# Patient Record
Sex: FEMALE | Race: ASIAN | NOT HISPANIC OR LATINO | Employment: OTHER | URBAN - METROPOLITAN AREA
[De-identification: names, ages, dates, MRNs, and addresses within clinical notes are randomized per-mention and may not be internally consistent; named-entity substitution may affect disease eponyms.]

---

## 2021-01-18 ENCOUNTER — HOSPITAL ENCOUNTER (EMERGENCY)
Facility: HOSPITAL | Age: 66
Discharge: HOME | End: 2021-01-18
Attending: EMERGENCY MEDICINE | Admitting: EMERGENCY MEDICINE
Payer: MEDICARE

## 2021-01-18 VITALS
SYSTOLIC BLOOD PRESSURE: 100 MMHG | OXYGEN SATURATION: 99 % | TEMPERATURE: 99.1 F | RESPIRATION RATE: 16 BRPM | HEART RATE: 87 BPM | DIASTOLIC BLOOD PRESSURE: 67 MMHG

## 2021-01-18 DIAGNOSIS — U07.1 COVID-19 VIRUS INFECTION: Primary | ICD-10-CM

## 2021-01-18 LAB
ALBUMIN SERPL BCP-MCNC: 3.3 G/DL (ref 3.5–5)
ALP SERPL-CCNC: 52 U/L (ref 46–116)
ALT SERPL W P-5'-P-CCNC: 50 U/L (ref 12–78)
ANION GAP SERPL CALCULATED.3IONS-SCNC: 12 MMOL/L (ref 4–13)
AST SERPL W P-5'-P-CCNC: 47 U/L (ref 5–45)
BASOPHILS # BLD AUTO: 0.01 THOUSANDS/ΜL (ref 0–0.1)
BASOPHILS NFR BLD AUTO: 0 % (ref 0–1)
BILIRUB SERPL-MCNC: 0.33 MG/DL (ref 0.2–1)
BILIRUB UR QL STRIP: NEGATIVE
BUN SERPL-MCNC: 21 MG/DL (ref 5–25)
CALCIUM ALBUM COR SERPL-MCNC: 8.4 MG/DL (ref 8.3–10.1)
CALCIUM SERPL-MCNC: 7.8 MG/DL (ref 8.3–10.1)
CHLORIDE SERPL-SCNC: 100 MMOL/L (ref 100–108)
CLARITY UR: CLEAR
CO2 SERPL-SCNC: 25 MMOL/L (ref 21–32)
COLOR UR: YELLOW
CREAT SERPL-MCNC: 1.27 MG/DL (ref 0.6–1.3)
EOSINOPHIL # BLD AUTO: 0 THOUSAND/ΜL (ref 0–0.61)
EOSINOPHIL NFR BLD AUTO: 0 % (ref 0–6)
ERYTHROCYTE [DISTWIDTH] IN BLOOD BY AUTOMATED COUNT: 13.3 % (ref 11.6–15.1)
FLUAV RNA RESP QL NAA+PROBE: NEGATIVE
FLUBV RNA RESP QL NAA+PROBE: NEGATIVE
GFR SERPL CREATININE-BSD FRML MDRD: 44 ML/MIN/1.73SQ M
GLUCOSE SERPL-MCNC: 120 MG/DL (ref 65–140)
GLUCOSE UR STRIP-MCNC: NEGATIVE MG/DL
HCT VFR BLD AUTO: 38.7 % (ref 34.8–46.1)
HGB BLD-MCNC: 12.7 G/DL (ref 11.5–15.4)
HGB UR QL STRIP.AUTO: NEGATIVE
IMM GRANULOCYTES # BLD AUTO: 0.02 THOUSAND/UL (ref 0–0.2)
IMM GRANULOCYTES NFR BLD AUTO: 0 % (ref 0–2)
KETONES UR STRIP-MCNC: NEGATIVE MG/DL
LEUKOCYTE ESTERASE UR QL STRIP: NEGATIVE
LYMPHOCYTES # BLD AUTO: 1.15 THOUSANDS/ΜL (ref 0.6–4.47)
LYMPHOCYTES NFR BLD AUTO: 26 % (ref 14–44)
MCH RBC QN AUTO: 29.7 PG (ref 26.8–34.3)
MCHC RBC AUTO-ENTMCNC: 32.8 G/DL (ref 31.4–37.4)
MCV RBC AUTO: 91 FL (ref 82–98)
MONOCYTES # BLD AUTO: 0.41 THOUSAND/ΜL (ref 0.17–1.22)
MONOCYTES NFR BLD AUTO: 9 % (ref 4–12)
NEUTROPHILS # BLD AUTO: 2.89 THOUSANDS/ΜL (ref 1.85–7.62)
NEUTS SEG NFR BLD AUTO: 65 % (ref 43–75)
NITRITE UR QL STRIP: NEGATIVE
NRBC BLD AUTO-RTO: 0 /100 WBCS
PH UR STRIP.AUTO: 5.5 [PH]
PLATELET # BLD AUTO: 155 THOUSANDS/UL (ref 149–390)
PMV BLD AUTO: 11.4 FL (ref 8.9–12.7)
POTASSIUM SERPL-SCNC: 2.9 MMOL/L (ref 3.5–5.3)
PROT SERPL-MCNC: 7.3 G/DL (ref 6.4–8.2)
PROT UR STRIP-MCNC: NEGATIVE MG/DL
RBC # BLD AUTO: 4.27 MILLION/UL (ref 3.81–5.12)
RSV RNA RESP QL NAA+PROBE: NEGATIVE
SARS-COV-2 RNA RESP QL NAA+PROBE: POSITIVE
SODIUM SERPL-SCNC: 137 MMOL/L (ref 136–145)
SP GR UR STRIP.AUTO: 1.01 (ref 1–1.03)
UROBILINOGEN UR QL STRIP.AUTO: 0.2 E.U./DL
WBC # BLD AUTO: 4.48 THOUSAND/UL (ref 4.31–10.16)

## 2021-01-18 PROCEDURE — 81003 URINALYSIS AUTO W/O SCOPE: CPT | Performed by: EMERGENCY MEDICINE

## 2021-01-18 PROCEDURE — 80053 COMPREHEN METABOLIC PANEL: CPT | Performed by: EMERGENCY MEDICINE

## 2021-01-18 PROCEDURE — 99284 EMERGENCY DEPT VISIT MOD MDM: CPT | Performed by: EMERGENCY MEDICINE

## 2021-01-18 PROCEDURE — 85025 COMPLETE CBC W/AUTO DIFF WBC: CPT | Performed by: EMERGENCY MEDICINE

## 2021-01-18 PROCEDURE — 0241U HB NFCT DS VIR RESP RNA 4 TRGT: CPT | Performed by: EMERGENCY MEDICINE

## 2021-01-18 PROCEDURE — 36415 COLL VENOUS BLD VENIPUNCTURE: CPT | Performed by: EMERGENCY MEDICINE

## 2021-01-18 PROCEDURE — 99284 EMERGENCY DEPT VISIT MOD MDM: CPT

## 2021-01-18 PROCEDURE — 96374 THER/PROPH/DIAG INJ IV PUSH: CPT

## 2021-01-18 RX ORDER — MELATONIN
2000 DAILY
Qty: 20 TABLET | Refills: 0 | Status: SHIPPED | OUTPATIENT
Start: 2021-01-18 | End: 2022-01-26

## 2021-01-18 RX ORDER — MELATONIN
2000 DAILY
Qty: 20 TABLET | Refills: 0 | Status: SHIPPED | OUTPATIENT
Start: 2021-01-18 | End: 2021-01-18 | Stop reason: SDUPTHER

## 2021-01-18 RX ORDER — MULTIVIT WITH MINERALS/LUTEIN
1000 TABLET ORAL 2 TIMES DAILY
Qty: 20 TABLET | Refills: 0 | Status: SHIPPED | OUTPATIENT
Start: 2021-01-18 | End: 2021-07-20 | Stop reason: ALTCHOICE

## 2021-01-18 RX ORDER — MULTIVITAMIN
1 TABLET ORAL DAILY
Qty: 10 TABLET | Refills: 0 | Status: SHIPPED | OUTPATIENT
Start: 2021-01-18 | End: 2021-01-18 | Stop reason: SDUPTHER

## 2021-01-18 RX ORDER — KETOROLAC TROMETHAMINE 30 MG/ML
15 INJECTION, SOLUTION INTRAMUSCULAR; INTRAVENOUS ONCE
Status: COMPLETED | OUTPATIENT
Start: 2021-01-18 | End: 2021-01-18

## 2021-01-18 RX ORDER — MULTIVIT WITH MINERALS/LUTEIN
1000 TABLET ORAL 2 TIMES DAILY
Qty: 20 TABLET | Refills: 0 | Status: SHIPPED | OUTPATIENT
Start: 2021-01-18 | End: 2021-01-18 | Stop reason: SDUPTHER

## 2021-01-18 RX ORDER — MULTIVITAMIN
1 TABLET ORAL DAILY
Qty: 10 TABLET | Refills: 0 | Status: SHIPPED | OUTPATIENT
Start: 2021-01-18 | End: 2022-01-26

## 2021-01-18 RX ORDER — POTASSIUM CHLORIDE 20 MEQ/1
40 TABLET, EXTENDED RELEASE ORAL ONCE
Status: COMPLETED | OUTPATIENT
Start: 2021-01-18 | End: 2021-01-18

## 2021-01-18 RX ADMIN — POTASSIUM CHLORIDE 40 MEQ: 1500 TABLET, EXTENDED RELEASE ORAL at 11:21

## 2021-01-18 RX ADMIN — KETOROLAC TROMETHAMINE 15 MG: 30 INJECTION, SOLUTION INTRAMUSCULAR at 10:32

## 2021-01-18 NOTE — DISCHARGE INSTRUCTIONS
Take tylenol 650 mg every 4 hours for pain  Alternatively, may take ibuprofen 400 mg every 6 hours as needed for pain up to 5 days

## 2021-01-18 NOTE — ED PROVIDER NOTES
History  Chief Complaint   Patient presents with    Generalized Body Aches     Patient is a 66-year-old female with a history of hypertension and hyperlipidemia who presents with generalized myalgias  Patient states that she had body aches for the past few days  She describes pain primarily in her back and lower extremities  She does have a history of chronic back pain and this feels similar to previous episodes of back pain  She describes her leg pain has muscle aches  She also describes intermittent headaches and generalized weakness  She states that multiple family members were in the hospital recently with COVID-19 infection  Therefore she was doing a majority of the house work by herself and has become very weak over the past couple of days  She denies fever, chills, cough, shortness of breath, chest pain, abdominal pain, vomiting, diarrhea or other complaints  History provided by:  Patient  Generalized Body Aches  Location:  Generalized, worse in lower back and legs  Progression:  Worsening  Chronicity:  New  Ineffective treatments:  None tried  Associated symptoms: fatigue, headaches and myalgias    Associated symptoms: no abdominal pain, no chest pain, no cough, no diarrhea, no fever, no nausea, no rash, no shortness of breath, no sore throat and no vomiting        None       History reviewed  No pertinent past medical history  History reviewed  No pertinent surgical history  History reviewed  No pertinent family history  I have reviewed and agree with the history as documented  E-Cigarette/Vaping     E-Cigarette/Vaping Substances     Social History     Tobacco Use    Smoking status: Never Smoker    Smokeless tobacco: Never Used   Substance Use Topics    Alcohol use: Never     Frequency: Never    Drug use: Never       Review of Systems   Constitutional: Positive for fatigue  Negative for chills, diaphoresis and fever     HENT: Negative for nosebleeds, sore throat and trouble swallowing  Eyes: Negative for photophobia, pain and visual disturbance  Respiratory: Negative for cough, chest tightness and shortness of breath  Cardiovascular: Negative for chest pain, palpitations and leg swelling  Gastrointestinal: Negative for abdominal pain, constipation, diarrhea, nausea and vomiting  Endocrine: Negative for polydipsia and polyuria  Genitourinary: Negative for difficulty urinating, dysuria, hematuria, pelvic pain, vaginal bleeding and vaginal discharge  Musculoskeletal: Positive for myalgias  Negative for back pain, neck pain and neck stiffness  Skin: Negative for pallor and rash  Neurological: Positive for headaches  Negative for dizziness, seizures and light-headedness  All other systems reviewed and are negative  Physical Exam  Physical Exam  Vitals signs and nursing note reviewed  Constitutional:       General: She is not in acute distress  Appearance: She is well-developed  HENT:      Head: Normocephalic and atraumatic  Eyes:      Pupils: Pupils are equal, round, and reactive to light  Neck:      Musculoskeletal: Normal range of motion and neck supple  Cardiovascular:      Rate and Rhythm: Normal rate and regular rhythm  Pulses: Normal pulses  Heart sounds: Normal heart sounds  Pulmonary:      Effort: Pulmonary effort is normal  No respiratory distress  Breath sounds: Normal breath sounds  Abdominal:      General: There is no distension  Palpations: Abdomen is soft  Abdomen is not rigid  Tenderness: There is no abdominal tenderness  There is no guarding or rebound  Musculoskeletal: Normal range of motion  Lumbar back: She exhibits tenderness (Diffuse tenderness to palpation in the lower lumbar region  )  Lymphadenopathy:      Cervical: No cervical adenopathy  Skin:     General: Skin is warm and dry  Capillary Refill: Capillary refill takes less than 2 seconds     Neurological:      Mental Status: She is alert and oriented to person, place, and time  Cranial Nerves: No cranial nerve deficit  Sensory: No sensory deficit  Motor: Motor function is intact  Vital Signs  ED Triage Vitals   Temperature Pulse Respirations Blood Pressure SpO2   01/18/21 0958 01/18/21 0958 01/18/21 0958 01/18/21 0958 01/18/21 0958   99 1 °F (37 3 °C) 91 16 117/62 100 %      Temp Source Heart Rate Source Patient Position - Orthostatic VS BP Location FiO2 (%)   01/18/21 0958 01/18/21 0958 01/18/21 0958 01/18/21 0958 --   Oral Monitor Sitting Left arm       Pain Score       01/18/21 1118       Worst Possible Pain           Vitals:    01/18/21 0958 01/18/21 1200 01/18/21 1613   BP: 117/62 106/66 100/67   Pulse: 91 78 87   Patient Position - Orthostatic VS: Sitting Lying Lying         Visual Acuity  Visual Acuity      Most Recent Value   L Pupil Size (mm)  3   R Pupil Size (mm)  3          ED Medications  Medications   ketorolac (TORADOL) injection 15 mg (15 mg Intravenous Given 1/18/21 1032)   potassium chloride (K-DUR,KLOR-CON) CR tablet 40 mEq (40 mEq Oral Given 1/18/21 1121)       Diagnostic Studies  Results Reviewed     Procedure Component Value Units Date/Time    COVID19, Influenza A/B, RSV PCR, [facility] [[id number]]  (Abnormal) Collected: 01/18/21 1033    Lab Status: Final result Specimen: Nares from Nasopharyngeal Swab Updated: 01/18/21 1126     SARS-CoV-2 Positive     INFLUENZA A PCR Negative     INFLUENZA B PCR Negative     RSV PCR Negative    Narrative: This test has been authorized by FDA under an EUA (Emergency Use Assay) for use by authorized laboratories  Clinical caution and judgement should be used with the interpretation of these results with consideration of the clinical impression and other laboratory testing  Testing reported as "Positive" or "Negative" has been proven to be accurate according to standard laboratory validation requirements    All testing is performed with control materials showing appropriate reactivity at standard intervals      Comprehensive metabolic panel [[id number]]  (Abnormal) Collected: 01/18/21 1032    Lab Status: Final result Specimen: Blood from Arm, Right Updated: 01/18/21 1108     Sodium 137 mmol/L      Potassium 2 9 mmol/L      Chloride 100 mmol/L      CO2 25 mmol/L      ANION GAP 12 mmol/L      BUN 21 mg/dL      Creatinine 1 27 mg/dL      Glucose 120 mg/dL      Calcium 7 8 mg/dL      Corrected Calcium 8 4 mg/dL      AST 47 U/L      ALT 50 U/L      Alkaline Phosphatase 52 U/L      Total Protein 7 3 g/dL      Albumin 3 3 g/dL      Total Bilirubin 0 33 mg/dL      eGFR 44 ml/min/1 73sq m     Narrative:      [facility] guidelines for Chronic Kid[facility] Disease (CKD):     Stage 1 with normal or high GFR (GFR > 90 mL/min/1 73 square meters)    Stage 2 Mild CKD (GFR = 60-89 mL/min/1 73 square meters)    Stage 3A Moderate CKD (GFR = 45-59 mL/min/1 73 square meters)    Stage 3B Moderate CKD (GFR = 30-44 mL/min/1 73 square meters)    Stage 4 Severe CKD (GFR = 15-29 mL/min/1 73 square meters)    Stage 5 End Stage CKD (GFR <15 mL/min/1 73 square meters)  Note: GFR calculation is accurate only with a steady state creatinine    UA (URINE) with reflex to Scope [[id number]] Collected: 01/18/21 1053    Lab Status: Final result Specimen: Urine, Clean Catch Updated: 01/18/21 1102     Color, UA Yellow     Clarity, UA Clear     Specific Gravity, UA 1 010     pH, UA 5 5     Leukocytes, UA Negative     Nitrite, UA Negative     Protein, UA Negative mg/dl      Glucose, UA Negative mg/dl      Ketones, UA Negative mg/dl      Urobilinogen, UA 0 2 E U /dl      Bilirubin, UA Negative     Blood, UA Negative    CBC and differential [[id number]] Collected: 01/18/21 1032    Lab Status: Final result Specimen: Blood from Arm, Right Updated: 01/18/21 1038     WBC 4 48 Thousand/uL      RBC 4 27 Million/uL      Hemoglobin 12 7 g/dL      Hematocrit 38 7 %      MCV 91 fL      MCH 29 7 pg      MCHC 32 8 g/dL      RDW 13 3 %      MPV 11 4 fL      Platelets 369 Thousands/uL      nRBC 0 /100 WBCs      Neutrophils Relative 65 %      Immat GRANS % 0 %      Lymphocytes Relative 26 %      Monocytes Relative 9 %      Eosinophils Relative 0 %      Basophils Relative 0 %      Neutrophils Absolute 2 89 Thousands/µL      Immature Grans Absolute 0 02 Thousand/uL      Lymphocytes Absolute 1 15 Thousands/µL      Monocytes Absolute 0 41 Thousand/µL      Eosinophils Absolute 0 00 Thousand/µL      Basophils Absolute 0 01 Thousands/µL                  No orders to display              Procedures  Procedures         ED Course  ED Course as of Jan 18 1916 Mon Jan 18, 2021   1110 Ordered p o  Potassium  Potassium(!): 2 9   1220 Discussed diagnosis with patient using [employer]   Patient breath his understanding discharge instructions  I also sent a message via epic for patient to be under  Patient seen to me criteria for monoclonal antibody  Therefore someone will contact her to schedule a virtual appointment  SBIRT 22yo+      Most Recent Value   SBIRT (22 yo +)   In order to provide better care to our patients, we are screening all of our patients for alcohol and drug use  Would it be okay to ask you these screening questions? Yes Filed at: 01/18/2021 1054   Initial Alcohol Screen: US AUDIT-C    1  How often do you have a drink containing alcohol?  0 Filed at: 01/18/2021 1054   2  How many drinks containing alcohol do you have on a typical day you are drinking? 0 Filed at: 01/18/2021 1054   3a  Male UNDER 65: How often do you have five or more drinks on one occasion? 0 Filed at: 01/18/2021 1054   3b  FEMALE Any Age, or MALE 65+: How often do you have 4 or more drinks on one occassion? 0 Filed at: 01/18/2021 1054   Audit-C Score  0 Filed at: 01/18/2021 1054   KYLE: How many times in the past year have you       Used an illegal drug or used a prescription medication for non-medical reasons? Never Filed at: 01/18/2021 1054                    [facility]  Number of Diagnoses or Management Options  COVID-19 virus infection: new and requires workup  Diagnosis management comments: Patient presents with generalized myalgias and fatigue  She is positive for SARS-CoV-2  However she is in no respiratory distress  She is not hypoxic or tachypneic  Although she feels generally weak, she is ambulating independently in the emergency department and is tolerating p o  Her potassium was replaced in the ED  She is stable for discharge and supportive care at home  She is comfortable with this plan and will return to ED if symptoms worsen  I did send a message via epic so that patient can be evaluated for possible monoclonal antibody treatment  Patient expresses understanding of discharge and treatment plan  Amount and/or Complexity of Data Reviewed  Clinical lab tests: ordered and reviewed  Tests in the medicine section of CPT®: ordered and reviewed  Review and summarize past medical records: yes  Independent visualization of images, tracings, or specimens: yes    Risk of Complications, Morbidity, and/or Mortality  Presenting problems: high  Diagnostic procedures: moderate  Management options: moderate    Patient Progress  Patient progress: stable      Disposition  Final diagnoses:   COVID-19 virus infection     Time reflects when diagnosis was documented in both MDM as applicable and the Disposition within this note     Time User Action Codes Description Comment    1/18/2021 11:44 AM Tere MULLINS Add [U07 1] COVID-19 virus infection       ED Disposition     ED Disposition Condition Date/Time Comment    Discharge Stable Mon Jan 18, 2021 11:44 AM Jamel Rinne discharge to home/self care              Follow-up Information     Follow up With Specialties Details Why Contact Info    Infolink  Schedule an appointment as soon as possible for a visit  Return to ED sooner if symptoms worsen or persist  [phone]            Discharge Medication List as of 1/18/2021 11:59 AM      START taking these medications    Details   Ascorbic Acid (vitamin C) 1000 MG tablet Take 1 tablet (1,000 mg total) by mouth 2 (two) times a day, Starting Mon 1/18/2021, Print      cholecalciferol (VITAMIN D3) 1,000 units tablet Take 2 tablets (2,000 Units total) by mouth daily, Starting Mon 1/18/2021, Print      Multiple Vitamin (multivitamin) tablet Take 1 tablet by mouth daily, Starting Mon 1/18/2021, Print           No discharge procedures on file      PDMP Review     None          ED Provider  Electronically Signed by           Carmel Sosa DO  01/18/21 6361

## 2021-01-21 ENCOUNTER — TELEPHONE (OUTPATIENT)
Dept: FAMILY MEDICINE CLINIC | Facility: CLINIC | Age: 66
End: 2021-01-21

## 2021-01-21 NOTE — TELEPHONE ENCOUNTER
Made second attempt to consult with Shadia Warner but was put through to voicemail  Message left to return my call

## 2021-02-03 ENCOUNTER — TELEMEDICINE (OUTPATIENT)
Dept: FAMILY MEDICINE CLINIC | Facility: CLINIC | Age: 66
End: 2021-02-03
Payer: MEDICARE

## 2021-02-03 DIAGNOSIS — E87.6 HYPOKALEMIA: ICD-10-CM

## 2021-02-03 DIAGNOSIS — K21.9 GASTROESOPHAGEAL REFLUX DISEASE WITHOUT ESOPHAGITIS: ICD-10-CM

## 2021-02-03 DIAGNOSIS — G89.29 CHRONIC BILATERAL LOW BACK PAIN WITHOUT SCIATICA: ICD-10-CM

## 2021-02-03 DIAGNOSIS — I10 ESSENTIAL HYPERTENSION: ICD-10-CM

## 2021-02-03 DIAGNOSIS — U07.1 COVID-19 VIRUS DETECTED: Primary | ICD-10-CM

## 2021-02-03 DIAGNOSIS — Z13.220 SCREENING, LIPID: ICD-10-CM

## 2021-02-03 DIAGNOSIS — E78.5 HYPERLIPIDEMIA, UNSPECIFIED HYPERLIPIDEMIA TYPE: ICD-10-CM

## 2021-02-03 DIAGNOSIS — R74.8 ELEVATED LIVER ENZYMES: ICD-10-CM

## 2021-02-03 DIAGNOSIS — Z12.31 VISIT FOR SCREENING MAMMOGRAM: ICD-10-CM

## 2021-02-03 DIAGNOSIS — Z12.11 SCREEN FOR COLON CANCER: ICD-10-CM

## 2021-02-03 DIAGNOSIS — M54.50 CHRONIC BILATERAL LOW BACK PAIN WITHOUT SCIATICA: ICD-10-CM

## 2021-02-03 PROCEDURE — 99204 OFFICE O/P NEW MOD 45 MIN: CPT | Performed by: FAMILY MEDICINE

## 2021-02-03 RX ORDER — ATORVASTATIN CALCIUM 20 MG/1
20 TABLET, FILM COATED ORAL DAILY
Qty: 90 TABLET | Refills: 1 | Status: SHIPPED | OUTPATIENT
Start: 2021-02-03 | End: 2021-07-20

## 2021-02-03 RX ORDER — OMEPRAZOLE 20 MG/1
20 CAPSULE, DELAYED RELEASE ORAL DAILY
Qty: 90 CAPSULE | Refills: 1 | Status: SHIPPED | OUTPATIENT
Start: 2021-02-03 | End: 2021-07-20

## 2021-02-03 RX ORDER — AMLODIPINE BESYLATE 5 MG/1
5 TABLET ORAL DAILY
Qty: 90 TABLET | Refills: 1 | Status: SHIPPED | OUTPATIENT
Start: 2021-02-03 | End: 2021-07-20

## 2021-02-03 RX ORDER — GABAPENTIN 300 MG/1
300 CAPSULE ORAL 3 TIMES DAILY
Qty: 90 CAPSULE | Refills: 0
Start: 2021-02-03 | End: 2021-11-05

## 2021-02-03 NOTE — PROGRESS NOTES
Virtual Regular Visit      Assessment/Plan:    Problem List Items Addressed This Visit        Digestive    Gastroesophageal reflux disease without esophagitis     Stable on omeprazole 20 milligram   Continue same  Relevant Medications    omeprazole (PriLOSEC) 20 mg delayed release capsule       Cardiovascular and Mediastinum    Essential hypertension     Stable on amlodipine 5 milligram   Continue same         Relevant Medications    amLODIPine (NORVASC) 5 mg tablet       Other    COVID-19 virus detected - Primary     Symptoms improved significantly  Advised CMP to follow-up on hypokalemia and elevated AST  Hyperlipidemia     Patient on atorvastatin 20 milligram   Suggested metabolic labs  Relevant Medications    atorvastatin (LIPITOR) 20 mg tablet    Chronic bilateral low back pain without sciatica    Relevant Medications    gabapentin (NEURONTIN) 300 mg capsule      Other Visit Diagnoses     Hypokalemia        Relevant Orders    Comprehensive metabolic panel    Elevated liver enzymes        Relevant Orders    Comprehensive metabolic panel    Screening, lipid        Relevant Orders    Lipid panel    Visit for screening mammogram        Relevant Orders    Mammo screening bilateral w 3d & cad    Screen for colon cancer        Relevant Orders    Cologuard               Reason for visit is   Chief Complaint   Patient presents with   [address] Follow-up    Virtual Regular Visit        Encounter provider Yolande Walter MD    Provider located at [address] [place] [id number]      Recent Visits  No visits were found meeting these conditions     Showing recent visits within past 7 days and meeting all other requirements     Today's Visits  Date Type Provider Dept   02/03/21 Telemedicine Yolande Walter MD [facility]   Showing today's visits and meeting all other requirements     Future Appointments  No visits were found meeting these conditions  Showing future appointments within next 150 days and meeting all other requirements        The patient was identified by name and date of birth  Compa Parker was informed that this is a telemedicine visit and that the visit is being conducted through [facility] and patient was informed that this is a secure, HIPAA-compliant platform  She agrees to proceed     My office door was closed  No one else was in the room  She acknowledged consent and understanding of privacy and security of the video platform  The patient has agreed to participate and understands they can discontinue the visit at any time  Patient is aware this is a billable service  Subjective  Compa Parker is a 77 y o  female    HPI   Patient's this time she care  Diagnosed to have COVID-19 on 01/18/2021  Patient reporting significant improvement in all her symptoms  Labs revealed an elevated liver enzyme, AST 47, potassium 2 9  Patient has not had any follow-up labs since  Patient has history of hypertension, on amlodipine 5 milligram   Also has history of hyperlipidemia, on atorvastatin 20 milligram   No recent labs  Has history of acid reflux  Symptoms stable on omeprazole 20 milligram   Patient has history of chronic low back pain  Is receiving epidural injection in her back to her spine specialist   Is prescribed gabapentin 300 milligram q h s  P r n  By her spine specialist   Patient denies any symptoms of chest pain or shortness of breath  History reviewed  No pertinent past medical history  History reviewed  No pertinent surgical history      Current Outpatient Medications   Medication Sig Dispense Refill    Ascorbic Acid (vitamin C) 1000 MG tablet Take 1 tablet (1,000 mg total) by mouth 2 (two) times a day 20 tablet 0    cholecalciferol (VITAMIN D3) 1,000 units tablet Take 2 tablets (2,000 Units total) by mouth daily 20 tablet 0    Multiple Vitamin (multivitamin) tablet Take 1 tablet by mouth daily 10 tablet 0    amLODIPine (NORVASC) 5 mg tablet Take 1 tablet (5 mg total) by mouth daily 90 tablet 1    atorvastatin (LIPITOR) 20 mg tablet Take 1 tablet (20 mg total) by mouth daily 90 tablet 1    gabapentin (NEURONTIN) 300 mg capsule Take 1 capsule (300 mg total) by mouth 3 (three) times a day 90 capsule 0    omeprazole (PriLOSEC) 20 mg delayed release capsule Take 1 capsule (20 mg total) by mouth daily 90 capsule 1     No current facility-administered medications for this visit  No Known Allergies    Review of Systems   Constitutional: Negative  Eyes: Negative  Respiratory: Negative  Negative for shortness of breath  Cardiovascular: Negative  Negative for chest pain and palpitations  Gastrointestinal: Negative  Endocrine: Negative  Genitourinary: Negative  Musculoskeletal: Negative  Negative for myalgias  Neurological: Negative  Negative for headaches  Psychiatric/Behavioral: Negative  Video Exam    There were no vitals filed for this visit  Physical Exam  Constitutional:       General: She is not in acute distress  Pulmonary:      Effort: Pulmonary effort is normal  No respiratory distress  Neurological:      Mental Status: She is alert and oriented to person, place, and time  Psychiatric:         Behavior: Behavior normal          Thought Content:  Thought content normal          Judgment: Judgment normal         Recent Results (from the past 672 hour(s))   CBC and differential    Collection Time: 01/18/21 10:32 AM   Result Value Ref Range    WBC 4 48 4 31 - 10 16 Thousand/uL    RBC 4 27 3 81 - 5 12 Million/uL    Hemoglobin 12 7 11 5 - 15 4 g/dL    Hematocrit 38 7 34 8 - 46 1 %    MCV 91 82 - 98 fL    MCH 29 7 26 8 - 34 3 pg    MCHC 32 8 31 4 - 37 4 g/dL    RDW 13 3 11 6 - 15 1 %    MPV 11 4 8 9 - 12 7 fL    Platelets 017 615 - 358 Thousands/uL    nRBC 0 /100 WBCs    Neutrophils Relative 65 43 - 75 %    Immat GRANS % 0 0 - 2 %    Lymphocytes Relative 26 14 - 44 %    Monocytes Relative 9 4 - 12 %    Eosinophils Relative 0 0 - 6 %    Basophils Relative 0 0 - 1 %    Neutrophils Absolute 2 89 1 85 - 7 62 Thousands/µL    Immature Grans Absolute 0 02 0 00 - 0 20 Thousand/uL    Lymphocytes Absolute 1 15 0 60 - 4 47 Thousands/µL    Monocytes Absolute 0 41 0 17 - 1 22 Thousand/µL    Eosinophils Absolute 0 00 0 00 - 0 61 Thousand/µL    Basophils Absolute 0 01 0 00 - 0 10 Thousands/µL   Comprehensive metabolic panel    Collection Time: 01/18/21 10:32 AM   Result Value Ref Range    Sodium 137 136 - 145 mmol/L    Potassium 2 9 (L) 3 5 - 5 3 mmol/L    Chloride 100 100 - 108 mmol/L    CO2 25 21 - 32 mmol/L    ANION GAP 12 4 - 13 mmol/L    BUN 21 5 - 25 mg/dL    Creatinine 1 27 0 60 - 1 30 mg/dL    Glucose 120 65 - 140 mg/dL    Calcium 7 8 (L) 8 3 - 10 1 mg/dL    Corrected Calcium 8 4 8 3 - 10 1 mg/dL    AST 47 (H) 5 - 45 U/L    ALT 50 12 - 78 U/L    Alkaline Phosphatase 52 46 - 116 U/L    Total Protein 7 3 6 4 - 8 2 g/dL    Albumin 3 3 (L) 3 5 - 5 0 g/dL    Total Bilirubin 0 33 0 20 - 1 00 mg/dL    eGFR 44 ml/min/1 73sq m   COVID19, Influenza A/B, RSV PCR, [facility]    Collection Time: 01/18/21 10:33 AM    Specimen: Nasopharyngeal Swab; Nares   Result Value Ref Range    SARS-CoV-2 Positive (A) Negative    INFLUENZA A PCR Negative Negative    INFLUENZA B PCR Negative Negative    RSV PCR Negative Negative   UA (URINE) with reflex to Scope    Collection Time: 01/18/21 10:53 AM   Result Value Ref Range    Color, UA Yellow     Clarity, UA Clear     Specific [place], UA 1 010 1 003 - 1 030    pH, UA 5 5 4 5, 5 0, 5 5, 6 0, 6 5, 7 0, 7 5, 8 0    Leukocytes, UA Negative Negative    Nitrite, UA Negative Negative    Protein, UA Negative Negative mg/dl    Glucose, UA Negative Negative mg/dl    Ketones, UA Negative Negative mg/dl    Urobilinogen, UA 0 2 0 2, 1 0 E U /dl E U /dl    Bilirubin, UA Negative Negative    Blood, UA Negative Negative   ]  I spent 45 minutes with patient today in which greater than 50% of the time was spent in counseling/coordination of care regarding Reviewing labs, management of symptoms      VIRTUAL VISIT DISCLAIMER    Cruz Giraldo acknowledges that she has consented to an online visit or consultation  She understands that the online visit is based solely on information provided by her, and that, in the absence of a face-to-face physical evaluation by the physician, the diagnosis she receives is both limited and provisional in terms of accuracy and completeness  This is not intended to replace a full medical face-to-face evaluation by the physician  Cruz Giraldo understands and accepts these terms

## 2021-02-09 DIAGNOSIS — I10 ESSENTIAL HYPERTENSION: Primary | ICD-10-CM

## 2021-02-09 RX ORDER — FUROSEMIDE 20 MG/1
20 TABLET ORAL DAILY
COMMUNITY
Start: 2021-01-11 | End: 2021-06-09

## 2021-02-09 RX ORDER — TRAMADOL HYDROCHLORIDE 50 MG/1
TABLET ORAL
COMMUNITY
Start: 2021-01-19 | End: 2021-04-14

## 2021-02-09 RX ORDER — LOSARTAN POTASSIUM AND HYDROCHLOROTHIAZIDE 25; 100 MG/1; MG/1
1 TABLET ORAL DAILY
Qty: 90 TABLET | Refills: 0 | Status: SHIPPED | OUTPATIENT
Start: 2021-02-09 | End: 2021-04-14 | Stop reason: SDUPTHER

## 2021-03-01 ENCOUNTER — TELEPHONE (OUTPATIENT)
Dept: LAB | Facility: HOSPITAL | Age: 66
End: 2021-03-01

## 2021-03-01 ENCOUNTER — TELEMEDICINE (OUTPATIENT)
Dept: FAMILY MEDICINE CLINIC | Facility: CLINIC | Age: 66
End: 2021-03-01
Payer: MEDICARE

## 2021-03-01 VITALS — DIASTOLIC BLOOD PRESSURE: 80 MMHG | SYSTOLIC BLOOD PRESSURE: 120 MMHG

## 2021-03-01 DIAGNOSIS — E78.5 HYPERLIPIDEMIA, UNSPECIFIED HYPERLIPIDEMIA TYPE: ICD-10-CM

## 2021-03-01 DIAGNOSIS — G89.29 CHRONIC BILATERAL LOW BACK PAIN WITHOUT SCIATICA: ICD-10-CM

## 2021-03-01 DIAGNOSIS — M54.50 CHRONIC BILATERAL LOW BACK PAIN WITHOUT SCIATICA: ICD-10-CM

## 2021-03-01 DIAGNOSIS — I10 ESSENTIAL HYPERTENSION: Primary | ICD-10-CM

## 2021-03-01 PROCEDURE — G0438 PPPS, INITIAL VISIT: HCPCS | Performed by: FAMILY MEDICINE

## 2021-03-01 PROCEDURE — 1123F ACP DISCUSS/DSCN MKR DOCD: CPT | Performed by: FAMILY MEDICINE

## 2021-03-01 PROCEDURE — 99214 OFFICE O/P EST MOD 30 MIN: CPT | Performed by: FAMILY MEDICINE

## 2021-03-01 NOTE — PROGRESS NOTES
Assessment and Plan:     Problem List Items Addressed This Visit     None           Preventive health issues were discussed with patient, and age appropriate screening tests were ordered as noted in patient's After Visit Summary  Personalized health advice and appropriate referrals for health education or preventive services given if needed, as noted in patient's After Visit Summary  History of Present Illness:     Patient presents for Medicare Annual Wellness visit    Patient Care Team:  Pancho Tran MD as PCP - General (Family Medicine)     Problem List:     Patient Active Problem List   Diagnosis    COVID-19 virus detected    Hyperlipidemia    Chronic bilateral low back pain without sciatica    Gastroesophageal reflux disease without esophagitis    Essential hypertension      Past Medical and Surgical History:     No past medical history on file  No past surgical history on file     Family History:     Family History   Problem Relation Age of Onset    No Known Problems Mother     No Known Problems Father       Social History:        Social History     Socioeconomic History    Marital status: /Civil Union     Spouse name: Not on file    Number of children: Not on file    Years of education: Not on file    Highest education level: Not on file   Occupational History    Not on file   Social Needs    Financial resource strain: Not on file    Food insecurity     Worry: Not on file     Inability: Not on file   [employer] needs     Medical: Not on file     Non-medical: Not on file   Tobacco Use    Smoking status: Never Smoker    Smokeless tobacco: Never Used   Substance and Sexual Activity    Alcohol use: Never     Frequency: Never    Drug use: Never    Sexual activity: Not on file   Lifestyle    Physical activity     Days per week: Not on file     Minutes per session: Not on file    Stress: Not on file   Relationships    Social connections     Talks on phone: Not on file Gets together: Not on file     Attends Yarsanism service: Not on file     Active member of club or organization: Not on file     Attends meetings of clubs or organizations: Not on file     Relationship status: Not on file    Intimate partner violence     Fear of current or ex partner: Not on file     Emotionally abused: Not on file     Physically abused: Not on file     Forced sexual activity: Not on file   Other Topics Concern    Not on file   Social History Narrative    Not on file      Medications and Allergies:     Current Outpatient Medications   Medication Sig Dispense Refill    amLODIPine (NORVASC) 5 mg tablet Take 1 tablet (5 mg total) by mouth daily 90 tablet 1    Ascorbic Acid (vitamin C) 1000 MG tablet Take 1 tablet (1,000 mg total) by mouth 2 (two) times a day 20 tablet 0    atorvastatin (LIPITOR) 20 mg tablet Take 1 tablet (20 mg total) by mouth daily 90 tablet 1    cholecalciferol (VITAMIN D3) 1,000 units tablet Take 2 tablets (2,000 Units total) by mouth daily 20 tablet 0    furosemide (LASIX) 20 mg tablet Take 20 mg by mouth daily      gabapentin (NEURONTIN) 300 mg capsule Take 1 capsule (300 mg total) by mouth 3 (three) times a day 90 capsule 0    losartan-hydrochlorothiazide (HYZAAR) 100-25 MG per tablet Take 1 tablet by mouth daily 90 tablet 0    Multiple Vitamin (multivitamin) tablet Take 1 tablet by mouth daily 10 tablet 0    omeprazole (PriLOSEC) 20 mg delayed release capsule Take 1 capsule (20 mg total) by mouth daily 90 capsule 1    traMADol (ULTRAM) 50 mg tablet TAKE ONE (1) TABLET BY MOUTH EVERY 8 HOURS DAILY AS NEEDED       No current facility-administered medications for this visit  No Known Allergies   Immunizations: There is no immunization history on file for this patient     Health Maintenance:         Topic Date Due    MAMMOGRAM  1955    Colorectal Cancer Screening  08/04/2003    Hepatitis C Screening  02/03/2022 (Originally 1955)         Topic Date Due    Pneumococcal Vaccine: 65+ Years (1 of 1 - PPSV23) 08/04/2018      Medicare Health Risk Assessment:     There were no vitals taken for this visit  Zelalem Miranda is here for her Initial Wellness visit  Health Risk Assessment:   Patient rates overall health as good  Patient feels that their physical health rating is same  Eyesight was rated as same  Hearing was rated as same  Patient feels that their emotional and mental health rating is same  Pain experienced in the last 7 days has been some  Patient's pain rating has been 3/10  Patient states that she has experienced no weight loss or gain in last 6 months  Depression Screening:   PHQ-2 Score: 0      Fall Risk Screening: In the past year, patient has experienced: no history of falling in past year      Urinary Incontinence Screening:   Patient has not leaked urine accidently in the last six months  Home Safety:  Patient does not have trouble with stairs inside or outside of their home  Patient has working smoke alarms and has working carbon monoxide detector  Home safety hazards include: none  Nutrition:   Current diet is Regular  Medications:   Patient is currently taking over-the-counter supplements  OTC medications include: see medication list  Patient is able to manage medications  Activities of Daily Living (ADLs)/Instrumental Activities of Daily Living (IADLs):   Walk and transfer into and out of bed and chair?: Yes  Dress and groom yourself?: Yes    Bathe or shower yourself?: Yes    Feed yourself?  Yes  Do your laundry/housekeeping?: Yes  Manage your money, pay your bills and track your expenses?: Yes  Make your own meals?: Yes    Do your own shopping?: Yes    Previous Hospitalizations:   Any hospitalizations or ED visits within the last 12 months?: Yes    How many hospitalizations have you had in the last year?: 1-2    Advance Care Planning:   Living will: No    Durable POA for healthcare: No    Advanced directive: No Cognitive Screening:   Provider or family/friend/caregiver concerned regarding cognition?: No    PREVENTIVE SCREENINGS      Cardiovascular Screening:    General: Screening Not Indicated and History Lipid Disorder      Diabetes Screening:     General: Screening Current      Colorectal Cancer Screening:     General: Risks and Benefits Discussed    Due for: Cologuard      Breast Cancer Screening:     General: Risks and Benefits Discussed    Due for: Mammogram        Cervical Cancer Screening:    General: Screening Not Indicated      Osteoporosis Screening:    General: Risks and Benefits Discussed and Patient Declines      Abdominal Aortic Aneurysm (AAA) Screening:        General: Risks and Benefits Discussed      Lung Cancer Screening:     General: Screening Not Indicated      Hepatitis C Screening:    General: Screening Current    Other Counseling Topics:   Car/seat belt/driving safety, skin self-exam, sunscreen and calcium and vitamin D intake and regular weightbearing exercise         Mau Coon MD

## 2021-03-01 NOTE — PROGRESS NOTES
Virtual Regular Visit      Assessment/Plan:    Problem List Items Addressed This Visit        Cardiovascular and Mediastinum    Essential hypertension - Primary     Blood pressure is at goal   Continue amlodipine 5 milligram, losartan hydrochlorothiazide 100/25 milligram             Other    Hyperlipidemia     Continue atorvastatin 20 milligram   Patient encouraged to go for metabolic labs         Chronic bilateral low back pain without sciatica     On gabapentin 300 milligram t i d  Being monitored by pain management  Relevant Orders    Ambulatory referral to Pain Management               Reason for visit is   Chief Complaint   Patient presents with    Medicare Wellness Visit    Follow-up    Virtual Regular Visit        Encounter provider Che Wheeler MD    Provider located at [address] [id number]      Recent Visits  No visits were found meeting these conditions  Showing recent visits within past 7 days and meeting all other requirements     Today's Visits  Date Type Provider Dept   03/01/21 Telemedicine Che Wheeler MD [facility]   Showing today's visits and meeting all other requirements     Future Appointments  No visits were found meeting these conditions  Showing future appointments within next 150 days and meeting all other requirements        The patient was identified by name and date of birth  Jose L Knee was informed that this is a telemedicine visit and that the visit is being conducted through [facility] and patient was informed that this is a secure, HIPAA-compliant platform  She agrees to proceed     My office door was closed  No one else was in the room  She acknowledged consent and understanding of privacy and security of the video platform  The patient has agreed to participate and understands they can discontinue the visit at any time  Patient is aware this is a billable service       Subjective  Lela Geoff Mi is a 77 y o  female    HPI   Patient here for routine follow-up  Following up on hypertension, hyperlipidemia  Unable to go for labs due to [place] pandemic  Reports stable blood pressure  Currently on amlodipine 5 milligram, losartan hydrochlorothiazide 100/25 milligram   Is currently on Lasix 20 milligram   Takes atorvastatin 20 milligram   Patient has chronic low back pain radiating to her legs  On gabapentin 300 milligram t i d   Patient follows with pain management specialist   Past medical history of hypertension, hyperlipidemia  History reviewed  No pertinent surgical history  Current Outpatient Medications   Medication Sig Dispense Refill    amLODIPine (NORVASC) 5 mg tablet Take 1 tablet (5 mg total) by mouth daily 90 tablet 1    Ascorbic Acid (vitamin C) 1000 MG tablet Take 1 tablet (1,000 mg total) by mouth 2 (two) times a day 20 tablet 0    atorvastatin (LIPITOR) 20 mg tablet Take 1 tablet (20 mg total) by mouth daily 90 tablet 1    cholecalciferol (VITAMIN D3) 1,000 units tablet Take 2 tablets (2,000 Units total) by mouth daily 20 tablet 0    furosemide (LASIX) 20 mg tablet Take 20 mg by mouth daily      gabapentin (NEURONTIN) 300 mg capsule Take 1 capsule (300 mg total) by mouth 3 (three) times a day 90 capsule 0    losartan-hydrochlorothiazide (HYZAAR) 100-25 MG per tablet Take 1 tablet by mouth daily 90 tablet 0    Multiple Vitamin (multivitamin) tablet Take 1 tablet by mouth daily 10 tablet 0    omeprazole (PriLOSEC) 20 mg delayed release capsule Take 1 capsule (20 mg total) by mouth daily 90 capsule 1    traMADol (ULTRAM) 50 mg tablet TAKE ONE (1) TABLET BY MOUTH EVERY 8 HOURS DAILY AS NEEDED       No current facility-administered medications for this visit  No Known Allergies    Review of Systems   Constitutional: Negative  HENT: Negative  Eyes: Negative  Respiratory: Negative  Cardiovascular: Negative  Gastrointestinal: Negative      Endocrine: Negative  Genitourinary: Negative  Musculoskeletal: Negative  Skin: Negative  Neurological: Negative  Psychiatric/Behavioral: Negative  Video Exam    Vitals:    03/01/21 0930   BP: 120/80       Physical Exam  Constitutional:       General: She is not in acute distress  Pulmonary:      Effort: Pulmonary effort is normal  No respiratory distress  Neurological:      Mental Status: She is alert and oriented to person, place, and time  Psychiatric:         Behavior: Behavior normal          Thought Content: Thought content normal          Judgment: Judgment normal           I spent 25 minutes with patient today in which greater than 50% of the time was spent in counseling/coordination of care regarding Management of symptoms  VIRTUAL VISIT DISCLAIMER    Dereje Gomez acknowledges that she has consented to an online visit or consultation  She understands that the online visit is based solely on information provided by her, and that, in the absence of a face-to-face physical evaluation by the physician, the diagnosis she receives is both limited and provisional in terms of accuracy and completeness  This is not intended to replace a full medical face-to-face evaluation by the physician  Dereje Gomez understands and accepts these terms

## 2021-03-01 NOTE — ASSESSMENT & PLAN NOTE
Blood pressure is at goal   Continue amlodipine 5 milligram, losartan hydrochlorothiazide 100/25 milligram

## 2021-03-01 NOTE — PATIENT INSTRUCTIONS
Medicare Preventive Visit Patient Instructions  Thank you for completing your Welcome to Medicare Visit or Medicare Annual Wellness Visit today  Your next wellness visit will be due in one year (3/1/2022)  The screening/preventive services that you may require over the next 5-10 years are detailed below  Some tests may not apply to you based off risk factors and/or age  Screening tests ordered at today's visit but not completed yet may show as past due  Also, please note that scanned in results may not display below  Preventive Screenings:  Service Recommendations Previous Testing/Comments   Colorectal Cancer Screening  * Colonoscopy    * Fecal Occult Blood Test (FOBT)/Fecal Immunochemical Test (FIT)  * Fecal DNA/Cologuard Test  * Flexible Sigmoidoscopy Age: 54-65 years old   Colonoscopy: every 10 years (may be performed more frequently if at higher risk)  OR  FOBT/FIT: every 1 year  OR  Cologuard: every 3 years  OR  Sigmoidoscopy: every 5 years  Screening may be recommended earlier than age 48 if at higher risk for colorectal cancer  Also, an individualized decision between you and your healthcare provider will decide whether screening between the ages of 74-80 would be appropriate  Colonoscopy: Not on file  FOBT/FIT: Not on file  Cologuard: Not on file  Sigmoidoscopy: Not on file         Breast Cancer Screening Age: 36 years old  Frequency: every 1-2 years  Not required if history of left and right mastectomy Mammogram: Not on file       Cervical Cancer Screening Between the ages of 21-29, pap smear recommended once every 3 years  Between the ages of 33-67, can perform pap smear with HPV co-testing every 5 years     Recommendations may differ for women with a history of total hysterectomy, cervical cancer, or abnormal pap smears in past  Pap Smear: Not on file    Screening Not Indicated   Hepatitis C Screening Once for adults born between [facility]  More frequently in patients at high risk for Hepatitis C Hep C Antibody: Not on file    Screening Current   Diabetes Screening 1-2 times per year if you're at risk for diabetes or have pre-diabetes Fasting glucose: No results in last 5 years   A1C: No results in last 5 years    Screening Current   Cholesterol Screening Once every 5 years if you don't have a lipid disorder  May order more often based on risk factors  Lipid panel: Not on file    Screening Not Indicated  History Lipid Disorder     Other Preventive Screenings Covered by Medicare:  1  Abdominal Aortic Aneurysm (AAA) Screening: covered once if your at risk  You're considered to be at risk if you have a family history of AAA  2  Lung Cancer Screening: covers low dose CT scan once per year if you meet all of the following conditions: (1) Age 50-69; (2) No signs or symptoms of lung cancer; (3) Current smoker or have quit smoking within the last 15 years; (4) You have a tobacco smoking history of at least 30 pack years (packs per day multiplied by number of years you smoked); (5) You get a written order from a healthcare provider  3  Glaucoma Screening: covered annually if you're considered high risk: (1) You have diabetes OR (2) Family history of glaucoma OR (3)  aged 48 and older OR (3)  American aged 72 and older  3  Osteoporosis Screening: covered every 2 years if you meet one of the following conditions: (1) You're estrogen deficient and at risk for osteoporosis based off medical history and other findings; (2) Have a vertebral abnormality; (3) On glucocorticoid therapy for more than 3 months; (4) Have primary hyperparathyroidism; (5) On osteoporosis medications and need to assess response to drug therapy  · Last bone density test (DXA Scan): Not on file  5  HIV Screening: covered annually if you're between the age of 12-76  Also covered annually if you are younger than 13 and older than 72 with risk factors for HIV infection   For pregnant patients, it is covered up to 3 times per pregnancy  Immunizations:  Immunization Recommendations   Influenza Vaccine Annual influenza vaccination during flu season is recommended for all persons aged >= 6 months who do not have contraindications   Pneumococcal Vaccine (Prevnar and Pneumovax)  * Prevnar = PCV13  * Pneumovax = PPSV23   Adults 25-60 years old: 1-3 doses may be recommended based on certain risk factors  Adults 72 years old: Prevnar (PCV13) vaccine recommended followed by Pneumovax (PPSV23) vaccine  If already received PPSV23 since turning 65, then PCV13 recommended at least one year after PPSV23 dose  Hepatitis B Vaccine 3 dose series if at intermediate or high risk (ex: diabetes, end stage renal disease, liver disease)   Tetanus (Td) Vaccine - COST NOT COVERED BY MEDICARE PART B Following completion of primary series, a booster dose should be given every 10 years to maintain immunity against tetanus  Td may also be given as tetanus wound prophylaxis  Tdap Vaccine - COST NOT COVERED BY MEDICARE PART B Recommended at least once for all adults  For pregnant patients, recommended with each pregnancy  Shingles Vaccine (Shingrix) - COST NOT COVERED BY MEDICARE PART B  2 shot series recommended in those aged 48 and above     Health Maintenance Due:      Topic Date Due    MAMMOGRAM  1955    Colorectal Cancer Screening  08/04/2003    Hepatitis C Screening  02/03/2022 (Originally 1955)     Immunizations Due:      Topic Date Due    Pneumococcal Vaccine: 65+ Years (1 of 1 - PPSV23) 08/04/2018     Advance Directives   What are advance directives? Advance directives are legal documents that state your wishes and plans for medical care  These plans are made ahead of time in case you lose your ability to make decisions for yourself  Advance directives can apply to any medical decision, such as the treatments you want, and if you want to donate organs  What are the types of advance directives?   There are many types of advance directives, and each state has rules about how to use them  You may choose a combination of any of the following:  · Living will: This is a written record of the treatment you want  You can also choose which treatments you do not want, which to limit, and which to stop at a certain time  This includes surgery, medicine, IV fluid, and tube feedings  · Durable power of  for healthcare [facility]): This is a written record that states who you want to make healthcare choices for you when you are unable to make them for yourself  This person, called a proxy, is usually a family member or a friend  You may choose more than 1 proxy  · Do not resuscitate (DNR) order:  A DNR order is used in case your heart stops beating or you stop breathing  It is a request not to have certain forms of treatment, such as CPR  A DNR order may be included in other types of advance directives  · Medical directive: This covers the care that you want if you are in a coma, near death, or unable to make decisions for yourself  You can list the treatments you want for each condition  Treatment may include pain medicine, surgery, blood transfusions, dialysis, IV or tube feedings, and a ventilator (breathing machine)  · Values history: This document has questions about your views, beliefs, and how you feel and think about life  This information can help others choose the care that you would choose  Why are advance directives important? An advance directive helps you control your care  Although spoken wishes may be used, it is better to have your wishes written down  Spoken wishes can be misunderstood, or not followed  Treatments may be given even if you do not want them  An advance directive may make it easier for your family to make difficult choices about your care  © Copyright [employer] 2018 Information is for End User's use only and may not be sold, redistributed or otherwise used for commercial purposes   All illustrations and images included in CareNotes® are the copyrighted property of A D A M [employer]  or Joey Goodson

## 2021-03-04 DIAGNOSIS — I10 ESSENTIAL HYPERTENSION: ICD-10-CM

## 2021-03-04 RX ORDER — LOSARTAN POTASSIUM AND HYDROCHLOROTHIAZIDE 25; 100 MG/1; MG/1
1 TABLET ORAL DAILY
Qty: 90 TABLET | Refills: 0 | OUTPATIENT
Start: 2021-03-04

## 2021-03-15 ENCOUNTER — APPOINTMENT (OUTPATIENT)
Dept: LAB | Facility: HOSPITAL | Age: 66
End: 2021-03-15
Payer: MEDICARE

## 2021-03-15 DIAGNOSIS — E87.6 HYPOKALEMIA: ICD-10-CM

## 2021-03-15 DIAGNOSIS — R74.8 ELEVATED LIVER ENZYMES: ICD-10-CM

## 2021-03-15 DIAGNOSIS — Z13.220 SCREENING, LIPID: ICD-10-CM

## 2021-03-15 LAB
ALBUMIN SERPL BCP-MCNC: 4.2 G/DL (ref 3.5–5)
ALP SERPL-CCNC: 75 U/L (ref 46–116)
ALT SERPL W P-5'-P-CCNC: 39 U/L (ref 12–78)
ANION GAP SERPL CALCULATED.3IONS-SCNC: 9 MMOL/L (ref 4–13)
AST SERPL W P-5'-P-CCNC: 18 U/L (ref 5–45)
BILIRUB SERPL-MCNC: 0.77 MG/DL (ref 0.2–1)
BUN SERPL-MCNC: 18 MG/DL (ref 5–25)
CALCIUM SERPL-MCNC: 9.6 MG/DL (ref 8.3–10.1)
CHLORIDE SERPL-SCNC: 103 MMOL/L (ref 100–108)
CHOLEST SERPL-MCNC: 182 MG/DL (ref 50–200)
CO2 SERPL-SCNC: 26 MMOL/L (ref 21–32)
CREAT SERPL-MCNC: 0.8 MG/DL (ref 0.6–1.3)
GFR SERPL CREATININE-BSD FRML MDRD: 77 ML/MIN/1.73SQ M
GLUCOSE SERPL-MCNC: 95 MG/DL (ref 65–140)
HDLC SERPL-MCNC: 44 MG/DL
LDLC SERPL CALC-MCNC: 97 MG/DL (ref 0–100)
NONHDLC SERPL-MCNC: 138 MG/DL
POTASSIUM SERPL-SCNC: 3.8 MMOL/L (ref 3.5–5.3)
PROT SERPL-MCNC: 8.5 G/DL (ref 6.4–8.2)
SODIUM SERPL-SCNC: 138 MMOL/L (ref 136–145)
TRIGL SERPL-MCNC: 205 MG/DL

## 2021-03-15 PROCEDURE — 80053 COMPREHEN METABOLIC PANEL: CPT

## 2021-03-15 PROCEDURE — 80061 LIPID PANEL: CPT

## 2021-03-15 PROCEDURE — 36415 COLL VENOUS BLD VENIPUNCTURE: CPT

## 2021-03-25 ENCOUNTER — TELEPHONE (OUTPATIENT)
Dept: FAMILY MEDICINE CLINIC | Facility: CLINIC | Age: 66
End: 2021-03-25

## 2021-03-25 NOTE — TELEPHONE ENCOUNTER
Patient's daughter called in, she said her mom tried to apply for [address] but failed the exam and now she needs a medical excuse for her dementia? She said she has a form that would need to be filled out  I advised I would send a message but she may need an OV  Patient had virtual visit on 3/1/21  Please call her daughter back

## 2021-03-27 DIAGNOSIS — I10 ESSENTIAL HYPERTENSION: ICD-10-CM

## 2021-03-29 RX ORDER — LOSARTAN POTASSIUM AND HYDROCHLOROTHIAZIDE 25; 100 MG/1; MG/1
1 TABLET ORAL DAILY
Qty: 90 TABLET | Refills: 0 | OUTPATIENT
Start: 2021-03-29

## 2021-04-14 ENCOUNTER — OFFICE VISIT (OUTPATIENT)
Dept: FAMILY MEDICINE CLINIC | Facility: CLINIC | Age: 66
End: 2021-04-14
Payer: MEDICARE

## 2021-04-14 VITALS
RESPIRATION RATE: 16 BRPM | DIASTOLIC BLOOD PRESSURE: 78 MMHG | WEIGHT: 177.8 LBS | HEIGHT: 60 IN | SYSTOLIC BLOOD PRESSURE: 118 MMHG | OXYGEN SATURATION: 99 % | BODY MASS INDEX: 34.91 KG/M2 | HEART RATE: 89 BPM

## 2021-04-14 DIAGNOSIS — M25.562 CHRONIC PAIN OF BOTH KNEES: ICD-10-CM

## 2021-04-14 DIAGNOSIS — M25.561 CHRONIC PAIN OF BOTH KNEES: ICD-10-CM

## 2021-04-14 DIAGNOSIS — I10 ESSENTIAL HYPERTENSION: Primary | ICD-10-CM

## 2021-04-14 DIAGNOSIS — E78.5 HYPERLIPIDEMIA, UNSPECIFIED HYPERLIPIDEMIA TYPE: ICD-10-CM

## 2021-04-14 DIAGNOSIS — G89.29 CHRONIC PAIN OF BOTH KNEES: ICD-10-CM

## 2021-04-14 PROBLEM — F03.90 DEMENTIA WITHOUT BEHAVIORAL DISTURBANCE (HCC): Status: RESOLVED | Noted: 2021-04-14 | Resolved: 2021-04-14

## 2021-04-14 PROBLEM — F03.90 DEMENTIA WITHOUT BEHAVIORAL DISTURBANCE (HCC): Status: ACTIVE | Noted: 2021-04-14

## 2021-04-14 PROBLEM — U07.1 COVID-19 VIRUS DETECTED: Status: RESOLVED | Noted: 2021-02-03 | Resolved: 2021-04-14

## 2021-04-14 PROCEDURE — 99213 OFFICE O/P EST LOW 20 MIN: CPT | Performed by: FAMILY MEDICINE

## 2021-04-14 RX ORDER — CYCLOBENZAPRINE HCL 5 MG
TABLET ORAL
COMMUNITY
Start: 2021-03-31 | End: 2021-07-20 | Stop reason: ALTCHOICE

## 2021-04-14 RX ORDER — LOSARTAN POTASSIUM AND HYDROCHLOROTHIAZIDE 25; 100 MG/1; MG/1
1 TABLET ORAL DAILY
Qty: 90 TABLET | Refills: 1 | Status: SHIPPED | OUTPATIENT
Start: 2021-04-14 | End: 2021-07-22

## 2021-04-14 NOTE — ASSESSMENT & PLAN NOTE
BMI Counseling: Body mass index is 34 72 kg/m²  The BMI is above normal  Nutrition recommendations include reducing portion sizes, decreasing overall calorie intake and 3-5 servings of fruits/vegetables daily  Exercise recommendations include strength training exercises

## 2021-04-14 NOTE — PROGRESS NOTES
Subjective:      Patient ID: Virgen Weaver is a 77 y o  female  Hypertension  This is a chronic problem  The current episode started more than 1 year ago  The problem is controlled  Pertinent negatives include no blurred vision, chest pain, headaches, palpitations or shortness of breath  Risk factors for coronary artery disease include dyslipidemia  Treatments tried: Amlodipine 5 milligram, losartan hydrochlorothiazide 100/25 milligram  The current treatment provides significant improvement  There are no compliance problems  Hyperlipidemia  This is a chronic problem  The current episode started more than 1 year ago  The problem is controlled  Recent lipid tests were reviewed and are normal  Pertinent negatives include no chest pain, focal sensory loss, focal weakness, myalgias or shortness of breath  Current antihyperlipidemic treatment includes statins (Atorvastatin 20 milligram)  The current treatment provides significant improvement of lipids  There are no compliance problems  Risk factors for coronary artery disease include dyslipidemia, hypertension and post-menopausal        Past Medical History:   Diagnosis Date    Hyperlipidemia     Hypertension        Family History   Problem Relation Age of Onset    No Known Problems Mother     No Known Problems Father        History reviewed  No pertinent surgical history  reports that she has never smoked  She has never used smokeless tobacco  She reports that she does not drink alcohol or use drugs        Current Outpatient Medications:     amLODIPine (NORVASC) 5 mg tablet, Take 1 tablet (5 mg total) by mouth daily, Disp: 90 tablet, Rfl: 1    atorvastatin (LIPITOR) 20 mg tablet, Take 1 tablet (20 mg total) by mouth daily, Disp: 90 tablet, Rfl: 1    cholecalciferol (VITAMIN D3) 1,000 units tablet, Take 2 tablets (2,000 Units total) by mouth daily, Disp: 20 tablet, Rfl: 0    cyclobenzaprine (FLEXERIL) 5 mg tablet, 1 TAB BY MOUTH TWICE DAILY, Disp: , Rfl:    Diclofenac Sodium (VOLTAREN) 1 %, 1-2 G TO THE AFFECTED AREA TWICE A DAY, Disp: , Rfl:     furosemide (LASIX) 20 mg tablet, Take 20 mg by mouth daily, Disp: , Rfl:     gabapentin (NEURONTIN) 300 mg capsule, Take 1 capsule (300 mg total) by mouth 3 (three) times a day, Disp: 90 capsule, Rfl: 0    losartan-hydrochlorothiazide (HYZAAR) 100-25 MG per tablet, Take 1 tablet by mouth daily, Disp: 90 tablet, Rfl: 1    Multiple Vitamin (multivitamin) tablet, Take 1 tablet by mouth daily, Disp: 10 tablet, Rfl: 0    omeprazole (PriLOSEC) 20 mg delayed release capsule, Take 1 capsule (20 mg total) by mouth daily, Disp: 90 capsule, Rfl: 1    traMADol-acetaminophen (ULTRACET) 37 5-325 mg per tablet, 1 TABLET BY MOUTH TWICE A DAY WHEN NECESSARY, Disp: , Rfl:     Ascorbic Acid (vitamin C) 1000 MG tablet, Take 1 tablet (1,000 mg total) by mouth 2 (two) times a day, Disp: 20 tablet, Rfl: 0    The following portions of the patient's history were reviewed and updated as appropriate: allergies, current medications, past family history, past medical history, past social history, past surgical history and problem list     Review of Systems   Constitutional: Negative  Eyes: Negative  Negative for blurred vision  Respiratory: Negative  Negative for shortness of breath  Cardiovascular: Negative  Negative for chest pain and palpitations  Gastrointestinal: Negative  Endocrine: Negative  Genitourinary: Negative  Musculoskeletal: Negative  Negative for myalgias  Neurological: Negative  Negative for focal weakness and headaches  Psychiatric/Behavioral: Negative  Objective:    /78   Pulse 89   Resp 16   Ht 5' (1 524 m)   Wt 80 6 kg (177 lb 12 8 oz)   SpO2 99%   BMI 34 72 kg/m²      Physical Exam  Constitutional:       Appearance: She is well-developed  Eyes:      Pupils: Pupils are equal, round, and reactive to light  Neck:      Musculoskeletal: Normal range of motion  Cardiovascular:      Rate and Rhythm: Normal rate and regular rhythm  Heart sounds: Normal heart sounds  Pulmonary:      Effort: Pulmonary effort is normal       Breath sounds: Normal breath sounds  Abdominal:      General: Bowel sounds are normal       Palpations: Abdomen is soft  Musculoskeletal: Normal range of motion  Neurological:      Mental Status: She is alert and oriented to person, place, and time     Psychiatric:         Behavior: Behavior normal          Judgment: Judgment normal            Recent Results (from the past 1008 hour(s))   Lipid panel    Collection Time: 03/15/21  7:05 AM   Result Value Ref Range    Cholesterol 182 50 - 200 mg/dL    Triglycerides 205 (H) <=150 mg/dL    HDL, Direct 44 >=40 mg/dL    LDL Calculated 97 0 - 100 mg/dL    Non-HDL-Chol (CHOL-HDL) 138 mg/dl   Comprehensive metabolic panel    Collection Time: 03/15/21  7:05 AM   Result Value Ref Range    Sodium 138 136 - 145 mmol/L    Potassium 3 8 3 5 - 5 3 mmol/L    Chloride 103 100 - 108 mmol/L    CO2 26 21 - 32 mmol/L    ANION GAP 9 4 - 13 mmol/L    BUN 18 5 - 25 mg/dL    Creatinine 0 80 0 60 - 1 30 mg/dL    Glucose 95 65 - 140 mg/dL    Calcium 9 6 8 3 - 10 1 mg/dL    AST 18 5 - 45 U/L    ALT 39 12 - 78 U/L    Alkaline Phosphatase 75 46 - 116 U/L    Total Protein 8 5 (H) 6 4 - 8 2 g/dL    Albumin 4 2 3 5 - 5 0 g/dL    Total Bilirubin 0 77 0 20 - 1 00 mg/dL    eGFR 77 ml/min/1 73sq m       Assessment/Plan:         Problem List Items Addressed This Visit        Cardiovascular and Mediastinum    Essential hypertension     Blood pressure is at goal   Continue amlodipine 5 milligram, losartan hydrochlorothiazide 100/25 milligram          Relevant Medications    losartan-hydrochlorothiazide (HYZAAR) 100-25 MG per tablet       Other    Hyperlipidemia - Primary     LDL is at goal   Continue atorvastatin 20 milligram          Relevant Orders    Comprehensive metabolic panel    Lipid panel    BMI 34 0-34 9,adult     BMI Counseling: Body mass index is 34 72 kg/m²  The BMI is above normal  Nutrition recommendations include reducing portion sizes, decreasing overall calorie intake and 3-5 servings of fruits/vegetables daily  Exercise recommendations include strength training exercises  Chronic pain of both knees     Continue per orthopedics                BMI Counseling: Body mass index is 34 72 kg/m²  The BMI is above normal  Nutrition recommendations include reducing portion sizes, decreasing overall calorie intake, 3-5 servings of fruits/vegetables daily, reducing fast food intake and consuming healthier snacks  Exercise recommendations include moderate aerobic physical activity for 150 minutes/week

## 2021-04-14 NOTE — ASSESSMENT & PLAN NOTE
Different treatment options discussed with patient  Patient would like to start with conservative care  Declines neurology evaluation  Has supportive family  Would like to continue monitoring her symptoms  If symptoms worsen then patient will consider neuro evaluation

## 2021-04-27 DIAGNOSIS — K21.9 GASTROESOPHAGEAL REFLUX DISEASE WITHOUT ESOPHAGITIS: ICD-10-CM

## 2021-04-27 DIAGNOSIS — E78.5 HYPERLIPIDEMIA, UNSPECIFIED HYPERLIPIDEMIA TYPE: ICD-10-CM

## 2021-04-27 RX ORDER — OMEPRAZOLE 20 MG/1
20 CAPSULE, DELAYED RELEASE ORAL DAILY
Qty: 90 CAPSULE | Refills: 1 | OUTPATIENT
Start: 2021-04-27

## 2021-04-27 RX ORDER — ATORVASTATIN CALCIUM 20 MG/1
20 TABLET, FILM COATED ORAL DAILY
Qty: 90 TABLET | Refills: 1 | OUTPATIENT
Start: 2021-04-27

## 2021-04-29 DIAGNOSIS — I10 ESSENTIAL HYPERTENSION: ICD-10-CM

## 2021-04-29 RX ORDER — AMLODIPINE BESYLATE 5 MG/1
5 TABLET ORAL DAILY
Qty: 90 TABLET | Refills: 1 | OUTPATIENT
Start: 2021-04-29

## 2021-05-20 ENCOUNTER — TELEPHONE (OUTPATIENT)
Dept: FAMILY MEDICINE CLINIC | Facility: CLINIC | Age: 66
End: 2021-05-20

## 2021-05-20 DIAGNOSIS — M25.562 CHRONIC PAIN OF BOTH KNEES: Primary | ICD-10-CM

## 2021-05-20 DIAGNOSIS — M54.50 CHRONIC BILATERAL LOW BACK PAIN WITHOUT SCIATICA: ICD-10-CM

## 2021-05-20 DIAGNOSIS — U07.1 COVID-19 VIRUS DETECTED: ICD-10-CM

## 2021-05-20 DIAGNOSIS — M25.561 CHRONIC PAIN OF BOTH KNEES: Primary | ICD-10-CM

## 2021-05-20 DIAGNOSIS — G89.29 CHRONIC BILATERAL LOW BACK PAIN WITHOUT SCIATICA: ICD-10-CM

## 2021-05-20 DIAGNOSIS — Z91.81 RISK FOR FALLS: ICD-10-CM

## 2021-05-20 DIAGNOSIS — G89.29 CHRONIC PAIN OF BOTH KNEES: Primary | ICD-10-CM

## 2021-05-20 NOTE — TELEPHONE ENCOUNTER
Patient's daughter called in requesting an order for a cane with the four prongs at the bottom be faxed to [facility]  Please call her once faxed       Fax: [phone]

## 2021-06-08 DIAGNOSIS — R60.0 PEDAL EDEMA: Primary | ICD-10-CM

## 2021-06-08 NOTE — TELEPHONE ENCOUNTER
Spoke with daughter last prescribed by previous pcp in [place] and she takes this for swelling that she gets in her feet and she is requesting a refill she is completely out

## 2021-06-09 RX ORDER — FUROSEMIDE 20 MG/1
TABLET ORAL
Qty: 30 TABLET | Refills: 2 | Status: SHIPPED | OUTPATIENT
Start: 2021-06-09 | End: 2021-07-26

## 2021-07-01 ENCOUNTER — EVALUATION (OUTPATIENT)
Dept: PHYSICAL THERAPY | Facility: CLINIC | Age: 66
End: 2021-07-01
Payer: MEDICARE

## 2021-07-01 DIAGNOSIS — R26.9 GAIT ABNORMALITY: ICD-10-CM

## 2021-07-01 DIAGNOSIS — R53.1 WEAKNESS: ICD-10-CM

## 2021-07-01 DIAGNOSIS — M25.572 LEFT ANKLE PAIN, UNSPECIFIED CHRONICITY: ICD-10-CM

## 2021-07-01 DIAGNOSIS — M25.562 RECURRENT PAIN OF BOTH KNEES: Primary | ICD-10-CM

## 2021-07-01 DIAGNOSIS — M25.561 RECURRENT PAIN OF BOTH KNEES: Primary | ICD-10-CM

## 2021-07-01 DIAGNOSIS — M25.571 RIGHT ANKLE PAIN, UNSPECIFIED CHRONICITY: ICD-10-CM

## 2021-07-01 PROCEDURE — 97162 PT EVAL MOD COMPLEX 30 MIN: CPT | Performed by: PHYSICAL THERAPIST

## 2021-07-01 NOTE — PROGRESS NOTES
PT Evaluation     Today's date: 2021  Patient name: Miguel Angel Pelayo  : 1955  MRN: [id number]  Referring provider: Jaylyn Hamilton DPM  Dx:   Encounter Diagnosis     ICD-10-CM    1  Recurrent pain of both knees  M25 561     M25 562    2  Right ankle pain, unspecified chronicity  M25 571    3  Left ankle pain, unspecified chronicity  M25 572    4  Weakness  R53 1    5  Gait abnormality  R26 9                   Assessment  Assessment details: Miguel Angel Pelayo is a pleasant 77 y o  female who presents with chronic history of LE pain including knees, ankles and involved musculature R > L resulting in difficulty with ADLs and functional activities  Subjective history and objective measures were limited this date secondary to language barrier  She does self limit true MMT testing secondary to pain  She would benefit from skilled PT to address strength, limited mobility, and overall safety  We did discuss SPC use to offload RLE  The patient's greatest concerns are the pain she is experiencing, worry over not knowing what's wrong, concern at no signs of improvement, fear of not being able to keep active and future ill health (and wanting to prevent it)  The primary movement problem is gait dysfunction and impaired strength of b/l LE, resulting in R and L LE pain and weakness and limiting her ability to care for self, exercise or recreation, get off the toilet, get out of a chair, lift, perform household chores, sleep, squat to  objects from the floor, stand, walk and negotiate stairs  No further referral appears necessary at this time based upon examination results  Primary Impairments:  1) gait dysfunction  2) impaired strength of b/l LE    Etiologic factors include none recalled by the patient      Impairments: abnormal gait, abnormal muscle firing, abnormal muscle tone, abnormal or restricted ROM, abnormal movement, activity intolerance, difficulty understanding, impaired balance, impaired physical strength, lacks appropriate home exercise program, pain with function, safety issue and weight-bearing intolerance    Symptom irritability: moderateUnderstanding of Dx/Px/POC: good   Prognosis: good  Prognosis details: Positive prognostic indicators include absence of observed red flags  Negative prognostic indicators include chronicity of symptoms, hypertension, high symptom irritability, minimal changes in pain with movement, multiple concurrent orthopedic problems and language barrier  Goals  ST  Patient will be able to perform sit to stand with UE assist without weight shift to L in 4 weeks  2  Patient will be able to demonstrate correct SPC use to offload RLE as needed in 4 weeks  LT  Patient will be able to perform 6 inch step up with HR in 8 weeks  2  Patient will be able to walk the length of the clinic without increase in RLE symptoms in 8 weeks  3  Patient will be independent with home exercise program    4  Patient will be able to manage symptoms independently  Plan  Plan details: Prognosis above is given PT services 2x/week tapering to 1x/week over the next 2 months and home program adherence  Patient would benefit from: skilled physical therapy  Planned modality interventions: thermotherapy: hydrocollator packs and cryotherapy  Planned therapy interventions: activity modification, joint mobilization, manual therapy, motor coordination training, neuromuscular re-education, patient education, self care, therapeutic activities, therapeutic exercise, graded activity, home exercise program, behavior modification, gait training, functional ROM exercises, flexibility, stretching, strengthening, postural training, balance/weight bearing training and abdominal trunk stabilization  Frequency: 2x week  Duration in weeks: 8  Treatment plan discussed with: patient, referring physician and family        Subjective Evaluation    History of Present Illness  Mechanism of injury: Pain for 1 year   Back pain first  Denies any falls  She notes back problem  She notes both knees hurt, but R > L  She notes that her R foot hurts also  Patient with difficulty providing subjective history due to language barrier; daughter in law presented for final 1/3 of session to assist with translation  She notes injections in back and knees, but only had about 2 weeks of relief  She notes that they got a new car and lifting her leg into the car is a problem  She notes lifting R leg to the side to roll over in bed and it hurts, but the L isn't as bad  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  She notes that walking causes too much pain  She notes that sitting she has no pain  Pain  Relieving factors: ice and rest  Aggravating factors: stair climbing and walking  Progression: worsening    Patient Goals  Patient goals for therapy: decreased pain, increased motion, improved balance, increased strength and independence with ADLs/IADLs          Objective     Static Posture     Comments  Difficulty with sit to stand, weight shift to L, UE dependent   Improved with raised seat height    Strength/Myotome Testing     Left Hip   Planes of Motion   Flexion: 3+  Abduction: 3+  Adduction: 3+    Right Hip   Planes of Motion   Flexion: 3+  Abduction: 3+  Adduction: 3+    Left Knee   Flexion: 4-  Extension: 4    Right Knee   Flexion: 3+  Extension: 3+    Left Ankle/Foot   Dorsiflexion: 4+  Inversion: 3-    Right Ankle/Foot   Dorsiflexion: 4+  Inversion: 3-    Additional Strength Details  Strength assessed in sitting due to following direction; unable to assess against resistance due to patient difficulty with following instructions              Diagnosis: b/l LE strength deficits, gait dysfunction  Precautions: Language barrier      Manuals 7/1            Gentle ankle, knee ROM                                                    Neuro Re-Ed                          [employer] Ther Ex             bike             Heel slides             Ankle pumps             LAQ                                                                 Ther Activity             Sit to stands                          Gait Training                                       Modalities

## 2021-07-01 NOTE — LETTER
2021    Francia Burgess, [address]  [address]    Patient: Marianna Mariano   YOB: 1955   Date of Visit: 2021     Encounter Diagnosis     ICD-10-CM    1  Recurrent pain of both knees  M25 561     M25 562    2  Right ankle pain, unspecified chronicity  M25 571    3  Left ankle pain, unspecified chronicity  M25 572    4  Weakness  R53 1    5  Gait abnormality  R26 9        Dear Dr Claudia Blair: Thank you for your recent referral of Marianna Mariano  Please review the attached evaluation summary from [facility]'s recent visit  Please verify that you agree with the plan of care by signing the attached order  If you have any questions or concerns, please do not hesitate to call  I sincerely appreciate the opportunity to share in the care of one of your patients and hope to have another opportunity to work with you in the near future  Sincerely,    Karel Kelly, PT      Referring Provider:      I certify that I have read the below Plan of Care and certify the need for these services furnished under this plan of treatment while under my care  Francia Burgess[address]  [address]  Via Fax: [phone]          PT Evaluation     Today's date: 2021  Patient name: Marianna Mariano  : 1955  MRN: [id number]  Referring provider: Agnieszka Acuna DPM  Dx:   Encounter Diagnosis     ICD-10-CM    1  Recurrent pain of both knees  M25 561     M25 562    2  Right ankle pain, unspecified chronicity  M25 571    3  Left ankle pain, unspecified chronicity  M25 572    4  Weakness  R53 1    5  Gait abnormality  R26 9                   Assessment  Assessment details: Marianna Mariano is a pleasant 77 y o  female who presents with chronic history of LE pain including knees, ankles and involved musculature R > L resulting in difficulty with ADLs and functional activities   Subjective history and objective measures were limited this date secondary to language barrier  She does self limit true MMT testing secondary to pain  She would benefit from skilled PT to address strength, limited mobility, and overall safety  We did discuss SPC use to offload RLE  The patient's greatest concerns are the pain she is experiencing, worry over not knowing what's wrong, concern at no signs of improvement, fear of not being able to keep active and future ill health (and wanting to prevent it)  The primary movement problem is gait dysfunction and impaired strength of b/l LE, resulting in R and L LE pain and weakness and limiting her ability to care for self, exercise or recreation, get off the toilet, get out of a chair, lift, perform household chores, sleep, squat to  objects from the floor, stand, walk and negotiate stairs  No further referral appears necessary at this time based upon examination results  Primary Impairments:  1) gait dysfunction  2) impaired strength of b/l LE    Etiologic factors include none recalled by the patient  Impairments: abnormal gait, abnormal muscle firing, abnormal muscle tone, abnormal or restricted ROM, abnormal movement, activity intolerance, difficulty understanding, impaired balance, impaired physical strength, lacks appropriate home exercise program, pain with function, safety issue and weight-bearing intolerance    Symptom irritability: moderateUnderstanding of Dx/Px/POC: good   Prognosis: good  Prognosis details: Positive prognostic indicators include absence of observed red flags  Negative prognostic indicators include chronicity of symptoms, hypertension, high symptom irritability, minimal changes in pain with movement, multiple concurrent orthopedic problems and language barrier  Goals  ST  Patient will be able to perform sit to stand with UE assist without weight shift to L in 4 weeks  2  Patient will be able to demonstrate correct SPC use to offload RLE as needed in 4 weeks  LT   Patient will be able to perform 6 inch step up with HR in 8 weeks  2  Patient will be able to walk the length of the clinic without increase in RLE symptoms in 8 weeks  3  Patient will be independent with home exercise program    4  Patient will be able to manage symptoms independently  Plan  Plan details: Prognosis above is given PT services 2x/week tapering to 1x/week over the next 2 months and home program adherence  Patient would benefit from: skilled physical therapy  Planned modality interventions: thermotherapy: hydrocollator packs and cryotherapy  Planned therapy interventions: activity modification, joint mobilization, manual therapy, motor coordination training, neuromuscular re-education, patient education, self care, therapeutic activities, therapeutic exercise, graded activity, home exercise program, behavior modification, gait training, functional ROM exercises, flexibility, stretching, strengthening, postural training, balance/weight bearing training and abdominal trunk stabilization  Frequency: 2x week  Duration in weeks: 8  Treatment plan discussed with: patient, referring physician and family        Subjective Evaluation    History of Present Illness  Mechanism of injury: Pain for 1 year  Back pain first  Denies any falls  She notes back problem  She notes both knees hurt, but R > L  She notes that her R foot hurts also  Patient with difficulty providing subjective history due to language barrier; daughter in law presented for final 1/3 of session to assist with translation  She notes injections in back and knees, but only had about 2 weeks of relief  She notes that they got a new car and lifting her leg into the car is a problem  She notes lifting R leg to the side to roll over in bed and it hurts, but the L isn't as bad  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  She notes that walking causes too much pain  She notes that sitting she has no pain     Pain  Relieving factors: ice and rest  Aggravating factors: stair climbing and walking  Progression: worsening    Patient Goals  Patient goals for therapy: decreased pain, increased motion, improved balance, increased strength and independence with ADLs/IADLs          Objective     Static Posture     Comments  Difficulty with sit to stand, weight shift to L, UE dependent   Improved with raised seat height    Strength/Myotome Testing     Left Hip   Planes of Motion   Flexion: 3+  Abduction: 3+  Adduction: 3+    Right Hip   Planes of Motion   Flexion: 3+  Abduction: 3+  Adduction: 3+    Left Knee   Flexion: 4-  Extension: 4    Right Knee   Flexion: 3+  Extension: 3+    Left Ankle/Foot   Dorsiflexion: 4+  Inversion: 3-    Right Ankle/Foot   Dorsiflexion: 4+  Inversion: 3-    Additional Strength Details  Strength assessed in sitting due to following direction; unable to assess against resistance due to patient difficulty with following instructions              Diagnosis: b/l LE strength deficits, gait dysfunction  Precautions: Language barrier      Manuals 7/1            Gentle ankle, knee ROM                                                    Neuro Re-Ed                          [employer]                                                                 Ther Ex             bike             Heel slides             Ankle pumps             LAQ                                                                 Ther Activity             Sit to stands                          Gait Training                                       Modalities

## 2021-07-08 ENCOUNTER — TELEPHONE (OUTPATIENT)
Dept: FAMILY MEDICINE CLINIC | Facility: CLINIC | Age: 66
End: 2021-07-08

## 2021-07-08 ENCOUNTER — OFFICE VISIT (OUTPATIENT)
Dept: PHYSICAL THERAPY | Facility: CLINIC | Age: 66
End: 2021-07-08
Payer: MEDICARE

## 2021-07-08 DIAGNOSIS — M25.571 RIGHT ANKLE PAIN, UNSPECIFIED CHRONICITY: ICD-10-CM

## 2021-07-08 DIAGNOSIS — R26.9 GAIT ABNORMALITY: ICD-10-CM

## 2021-07-08 DIAGNOSIS — M25.562 RECURRENT PAIN OF BOTH KNEES: Primary | ICD-10-CM

## 2021-07-08 DIAGNOSIS — M25.572 LEFT ANKLE PAIN, UNSPECIFIED CHRONICITY: ICD-10-CM

## 2021-07-08 DIAGNOSIS — R53.1 WEAKNESS: ICD-10-CM

## 2021-07-08 DIAGNOSIS — M25.561 RECURRENT PAIN OF BOTH KNEES: Primary | ICD-10-CM

## 2021-07-08 PROCEDURE — 97110 THERAPEUTIC EXERCISES: CPT | Performed by: PHYSICAL THERAPIST

## 2021-07-08 PROCEDURE — 97140 MANUAL THERAPY 1/> REGIONS: CPT | Performed by: PHYSICAL THERAPIST

## 2021-07-08 NOTE — PROGRESS NOTES
Daily Note     Today's date: 2021  Patient name: Tariq Luna  : 1955  MRN: [id number]  Referring provider: Samy Tavarez DPM  Dx:   Encounter Diagnosis     ICD-10-CM    1  Recurrent pain of both knees  M25 561     M25 562    2  Right ankle pain, unspecified chronicity  M25 571    3  Left ankle pain, unspecified chronicity  M25 572    4  Weakness  R53 1    5  Gait abnormality  R26 9                   Subjective: Patient reports too much pain in her back and leg  Objective: See treatment diary below      Assessment: Tolerated treatment well  Patient would benefit from continued PT  She was unable to tolerate bike without pendulums and encouragement  She tolerated PROM on L  >R, with improved R knee PROM after manual techniques  She could perform gravity minimized hip abduction on stool this date  She did demonstrate less antalgic gait when ambulating with SPC in LUE this date at end of session  Plan: Continue per plan of care  Progress to weight bearing strengthening as able        Diagnosis: b/l LE strength deficits, gait dysfunction  Precautions: Language barrier      Manuals            Gentle ankle, knee ROM  RS                                                  Neuro Re-Ed                          Glute sets  20x           Quad sets  20x                                                               Ther Ex             bike  5'  pendulums           Heel slides             Ankle pumps  20xea           LAQ  20xea           Hip abduction on stool  20xea                        Chair flexion  5"x20    Bal 5"x20                        Ther Activity             Sit to stands                          Gait Training                                       Modalities

## 2021-07-08 NOTE — TELEPHONE ENCOUNTER
Patient's daughter came to the window    she is requesting a walker for her mother  She is having difficulty walking  Please call her back

## 2021-07-15 ENCOUNTER — OFFICE VISIT (OUTPATIENT)
Dept: PHYSICAL THERAPY | Facility: CLINIC | Age: 66
End: 2021-07-15
Payer: MEDICARE

## 2021-07-15 DIAGNOSIS — R26.9 GAIT ABNORMALITY: ICD-10-CM

## 2021-07-15 DIAGNOSIS — M25.572 LEFT ANKLE PAIN, UNSPECIFIED CHRONICITY: ICD-10-CM

## 2021-07-15 DIAGNOSIS — M25.571 RIGHT ANKLE PAIN, UNSPECIFIED CHRONICITY: ICD-10-CM

## 2021-07-15 DIAGNOSIS — R53.1 WEAKNESS: ICD-10-CM

## 2021-07-15 DIAGNOSIS — M25.561 RECURRENT PAIN OF BOTH KNEES: Primary | ICD-10-CM

## 2021-07-15 DIAGNOSIS — M25.562 RECURRENT PAIN OF BOTH KNEES: Primary | ICD-10-CM

## 2021-07-15 PROCEDURE — 97110 THERAPEUTIC EXERCISES: CPT

## 2021-07-15 PROCEDURE — 97140 MANUAL THERAPY 1/> REGIONS: CPT

## 2021-07-15 NOTE — PROGRESS NOTES
Daily Note     Today's date: 7/15/2021  Patient name: Jacquie Morales  : 1955  MRN: [id number]  Referring provider: Aditi Laureano DPM  Dx:   Encounter Diagnosis     ICD-10-CM    1  Recurrent pain of both knees  M25 561     M25 562    2  Right ankle pain, unspecified chronicity  M25 571    3  Left ankle pain, unspecified chronicity  M25 572    4  Weakness  R53 1    5  Gait abnormality  R26 9        Start Time: 1044  Stop Time: 1125  Total time in clinic (min): 41 minutes    Subjective: Patient states she is in so much pain today  Objective: See treatment diary below      Assessment: Patient states she is unable to use the bike today because of too much pain  She has fair tolerance to strenghtening exercises performed this session  She requires cueing to maintain proper technique, difficult at times due to language barrier, however tactile cues of assistance  She was able to progress with seated marches and isometric PF into ball  Updated HEP with pictures to perform at home  She states she feels better post session  Plan: Progress treatment as tolerated         Diagnosis: b/l LE strength deficits, gait dysfunction  Precautions: Language barrier      Manuals 7/1 7/8 7/15          Gentle ankle, knee ROM  RS KS                                                 Neuro Re-Ed                          Glute sets  20x 20x           Quad sets  20x 20x          Isometric PF into purple ball   20x ea                                                 Ther Ex             bike  5'  pendulums def          Heel slides   20xea          Ankle pumps  20xea 20xea          LAQ  20xea 20xea          Hip abduction on stool  20xea 20xea                       Chair flexion  5"x20    Bal 5"x20 5"x20    Ball 5"x20          Seated marches   20xea                                                 Ther Activity             Sit to stands                          Gait Training                                       Modalities

## 2021-07-16 RX ORDER — PREDNISONE 10 MG/1
10 TABLET ORAL DAILY
COMMUNITY
Start: 2021-05-17 | End: 2021-07-20 | Stop reason: ALTCHOICE

## 2021-07-20 ENCOUNTER — OFFICE VISIT (OUTPATIENT)
Dept: FAMILY MEDICINE CLINIC | Facility: CLINIC | Age: 66
End: 2021-07-20
Payer: MEDICARE

## 2021-07-20 ENCOUNTER — APPOINTMENT (OUTPATIENT)
Dept: LAB | Facility: CLINIC | Age: 66
End: 2021-07-20
Payer: MEDICARE

## 2021-07-20 VITALS
HEIGHT: 60 IN | HEART RATE: 82 BPM | RESPIRATION RATE: 16 BRPM | SYSTOLIC BLOOD PRESSURE: 118 MMHG | BODY MASS INDEX: 35.3 KG/M2 | DIASTOLIC BLOOD PRESSURE: 70 MMHG | TEMPERATURE: 97.6 F | WEIGHT: 179.8 LBS | OXYGEN SATURATION: 97 %

## 2021-07-20 DIAGNOSIS — I10 ESSENTIAL HYPERTENSION: ICD-10-CM

## 2021-07-20 DIAGNOSIS — E78.5 HYPERLIPIDEMIA, UNSPECIFIED HYPERLIPIDEMIA TYPE: ICD-10-CM

## 2021-07-20 DIAGNOSIS — Z01.818 PREOPERATIVE CLEARANCE: ICD-10-CM

## 2021-07-20 DIAGNOSIS — H26.9 CATARACT OF BOTH EYES, UNSPECIFIED CATARACT TYPE: ICD-10-CM

## 2021-07-20 DIAGNOSIS — Z01.818 PREOPERATIVE CLEARANCE: Primary | ICD-10-CM

## 2021-07-20 LAB
ALBUMIN SERPL BCP-MCNC: 4.2 G/DL (ref 3.5–5)
ALP SERPL-CCNC: 82 U/L (ref 46–116)
ALT SERPL W P-5'-P-CCNC: 39 U/L (ref 12–78)
ANION GAP SERPL CALCULATED.3IONS-SCNC: 8 MMOL/L (ref 4–13)
AST SERPL W P-5'-P-CCNC: 25 U/L (ref 5–45)
BASOPHILS # BLD AUTO: 0.05 THOUSANDS/ΜL (ref 0–0.1)
BASOPHILS NFR BLD AUTO: 1 % (ref 0–1)
BILIRUB SERPL-MCNC: 0.56 MG/DL (ref 0.2–1)
BUN SERPL-MCNC: 15 MG/DL (ref 5–25)
CALCIUM SERPL-MCNC: 9.8 MG/DL (ref 8.3–10.1)
CHLORIDE SERPL-SCNC: 101 MMOL/L (ref 100–108)
CO2 SERPL-SCNC: 28 MMOL/L (ref 21–32)
CREAT SERPL-MCNC: 0.91 MG/DL (ref 0.6–1.3)
EOSINOPHIL # BLD AUTO: 0.09 THOUSAND/ΜL (ref 0–0.61)
EOSINOPHIL NFR BLD AUTO: 1 % (ref 0–6)
ERYTHROCYTE [DISTWIDTH] IN BLOOD BY AUTOMATED COUNT: 13 % (ref 11.6–15.1)
GFR SERPL CREATININE-BSD FRML MDRD: 65 ML/MIN/1.73SQ M
GLUCOSE SERPL-MCNC: 97 MG/DL (ref 65–140)
HCT VFR BLD AUTO: 40.9 % (ref 34.8–46.1)
HGB BLD-MCNC: 13.5 G/DL (ref 11.5–15.4)
IMM GRANULOCYTES # BLD AUTO: 0.02 THOUSAND/UL (ref 0–0.2)
IMM GRANULOCYTES NFR BLD AUTO: 0 % (ref 0–2)
LYMPHOCYTES # BLD AUTO: 3.1 THOUSANDS/ΜL (ref 0.6–4.47)
LYMPHOCYTES NFR BLD AUTO: 37 % (ref 14–44)
MCH RBC QN AUTO: 29.3 PG (ref 26.8–34.3)
MCHC RBC AUTO-ENTMCNC: 33 G/DL (ref 31.4–37.4)
MCV RBC AUTO: 89 FL (ref 82–98)
MONOCYTES # BLD AUTO: 0.8 THOUSAND/ΜL (ref 0.17–1.22)
MONOCYTES NFR BLD AUTO: 10 % (ref 4–12)
NEUTROPHILS # BLD AUTO: 4.29 THOUSANDS/ΜL (ref 1.85–7.62)
NEUTS SEG NFR BLD AUTO: 51 % (ref 43–75)
NRBC BLD AUTO-RTO: 0 /100 WBCS
PLATELET # BLD AUTO: 219 THOUSANDS/UL (ref 149–390)
PMV BLD AUTO: 12.1 FL (ref 8.9–12.7)
POTASSIUM SERPL-SCNC: 3.1 MMOL/L (ref 3.5–5.3)
PROT SERPL-MCNC: 8.7 G/DL (ref 6.4–8.2)
RBC # BLD AUTO: 4.61 MILLION/UL (ref 3.81–5.12)
SODIUM SERPL-SCNC: 137 MMOL/L (ref 136–145)
WBC # BLD AUTO: 8.35 THOUSAND/UL (ref 4.31–10.16)

## 2021-07-20 PROCEDURE — 80053 COMPREHEN METABOLIC PANEL: CPT

## 2021-07-20 PROCEDURE — 36415 COLL VENOUS BLD VENIPUNCTURE: CPT

## 2021-07-20 PROCEDURE — 85025 COMPLETE CBC W/AUTO DIFF WBC: CPT

## 2021-07-20 PROCEDURE — 99214 OFFICE O/P EST MOD 30 MIN: CPT | Performed by: NURSE PRACTITIONER

## 2021-07-20 RX ORDER — POLYMYXIN B SULFATE AND TRIMETHOPRIM 1; 10000 MG/ML; [USP'U]/ML
SOLUTION OPHTHALMIC
COMMUNITY
Start: 2021-07-15 | End: 2022-05-16

## 2021-07-20 RX ORDER — PREDNISOLONE ACETATE 10 MG/ML
SUSPENSION/ DROPS OPHTHALMIC
COMMUNITY
Start: 2021-07-15 | End: 2022-05-16

## 2021-07-20 NOTE — PROGRESS NOTES
Assessment/Plan:      Diagnoses and all orders for this visit:    Preoperative clearance  -     CBC and differential; Future  -     Comprehensive metabolic panel; Future  -     ECG 12 lead; Future    Patient is scheduled for left eye cataract surgery on 7/26/21 and right eye cataract surgery on 8/16/21  Lab work and EKG ordered  Essential hypertension  -     CBC and differential; Future  -     Comprehensive metabolic panel; Future  -     ECG 12 lead; Future    Stable  Continue current medications  Hyperlipidemia, unspecified hyperlipidemia type  -     CBC and differential; Future  -     Comprehensive metabolic panel; Future  -     ECG 12 lead; Future    Cataract of both eyes, unspecified cataract type  Patient has left eye cataract surgery scheduled for 7/26/21 and right eye cataract surgery scheduled for 8/16/21  Patient is cleared for surgery pending lab results and EKG results  Subjective:     Patient ID: Emma Garcia is a 77 y o  female  Patient is here with her daughter  Patient's daughter is translating for her  Patient is here for a preoperative clearance for cataract surgery  Patient is having left eye cataract surgery on 7/26/21 and right eye cataract surgery on 8/16/21  Dr [facility] is performing the surgeries  Patient has had surgery before  Denies any adverse reaction to anesthesia  Denies any chest pain or SOB with exertion or at rest  Denies any fevers, sore throat, earache, nasal congestion, or cough  Denies any wheezing, palpitations, dizziness, Has, vomiting, or diarrhea  Denies any easy bleeding or bruising  Patient has help at home after the surgery  Patient takes amlodipine and losartan-HCTZ for HTN  Denies any chest pain or palpitations  Patient takes atorvastatin for hyperlipidemia  Denies any side effects  Review of Systems   Constitutional: Negative for chills and fever  HENT: Negative for congestion, ear pain, sinus pressure and sore throat      Eyes: As noted in HPI  Respiratory: Negative for cough, chest tightness, shortness of breath and wheezing  Cardiovascular: Negative for chest pain and palpitations  Gastrointestinal: Negative for abdominal pain, blood in stool, diarrhea, nausea and vomiting  Genitourinary: Negative for dysuria, frequency and hematuria  Skin: Negative for rash  Neurological: Negative for dizziness, seizures, syncope, weakness and headaches  Objective:     Physical Exam  Vitals reviewed  Constitutional:       General: She is not in acute distress  Appearance: She is obese  She is not ill-appearing or diaphoretic  HENT:      Right Ear: Tympanic membrane, ear canal and external ear normal       Left Ear: Tympanic membrane, ear canal and external ear normal       Nose: Nose normal       Mouth/Throat:      Mouth: Mucous membranes are moist       Pharynx: Oropharynx is clear  No posterior oropharyngeal erythema  Eyes:      Conjunctiva/sclera: Conjunctivae normal       Pupils: Pupils are equal, round, and reactive to light  Cardiovascular:      Rate and Rhythm: Normal rate and regular rhythm  Pulses: Normal pulses  Heart sounds: Normal heart sounds  Pulmonary:      Effort: Pulmonary effort is normal  No respiratory distress  Breath sounds: Normal breath sounds  No wheezing  Abdominal:      General: There is no distension  Palpations: Abdomen is soft  Tenderness: There is no abdominal tenderness  Lymphadenopathy:      Cervical: No cervical adenopathy  Skin:     Findings: No rash  Neurological:      Mental Status: She is alert and oriented to person, place, and time     Psychiatric:         Mood and Affect: Mood normal

## 2021-07-21 ENCOUNTER — TELEPHONE (OUTPATIENT)
Dept: FAMILY MEDICINE CLINIC | Facility: CLINIC | Age: 66
End: 2021-07-21

## 2021-07-21 NOTE — TELEPHONE ENCOUNTER
----- Message from [id number] Docurated sent at 7/20/2021  9:02 PM EDT -----  Please let patient's daughter know that her EKG was normal    Please let her know that her mother's potassium level was low  Please make sure that her mother is eating potassium rich foods such as kiwis and bananas  I want to repeat her potassium level in 2 days to make sure that it is not any lower  Order placed

## 2021-07-23 ENCOUNTER — OFFICE VISIT (OUTPATIENT)
Dept: PHYSICAL THERAPY | Facility: CLINIC | Age: 66
End: 2021-07-23
Payer: MEDICARE

## 2021-07-23 DIAGNOSIS — M25.572 LEFT ANKLE PAIN, UNSPECIFIED CHRONICITY: ICD-10-CM

## 2021-07-23 DIAGNOSIS — M25.562 RECURRENT PAIN OF BOTH KNEES: Primary | ICD-10-CM

## 2021-07-23 DIAGNOSIS — M25.571 RIGHT ANKLE PAIN, UNSPECIFIED CHRONICITY: ICD-10-CM

## 2021-07-23 DIAGNOSIS — M25.561 RECURRENT PAIN OF BOTH KNEES: Primary | ICD-10-CM

## 2021-07-23 DIAGNOSIS — R53.1 WEAKNESS: ICD-10-CM

## 2021-07-23 DIAGNOSIS — R26.9 GAIT ABNORMALITY: ICD-10-CM

## 2021-07-23 PROCEDURE — 97530 THERAPEUTIC ACTIVITIES: CPT | Performed by: PHYSICAL THERAPIST

## 2021-07-23 PROCEDURE — 97116 GAIT TRAINING THERAPY: CPT | Performed by: PHYSICAL THERAPIST

## 2021-07-23 PROCEDURE — 97110 THERAPEUTIC EXERCISES: CPT | Performed by: PHYSICAL THERAPIST

## 2021-07-26 DIAGNOSIS — R60.0 PEDAL EDEMA: ICD-10-CM

## 2021-07-26 RX ORDER — FUROSEMIDE 20 MG/1
TABLET ORAL
Qty: 30 TABLET | Refills: 2 | Status: SHIPPED | OUTPATIENT
Start: 2021-07-26 | End: 2021-11-09

## 2021-08-20 NOTE — PROGRESS NOTES
PT Discharge    Today's date: 2021  Patient name: Torie Edmondson  : 1955  MRN: [id number]  Referring provider: Niya Covarrubias DPM  Dx:   Encounter Diagnosis     ICD-10-CM    1  Recurrent pain of both knees  M25 561     M25 562    2  Right ankle pain, unspecified chronicity  M25 571    3  Left ankle pain, unspecified chronicity  M25 572    4  Weakness  R53 1    5  Gait abnormality  R26 9        Start Time: 1005  Stop Time: 1100  Total time in clinic (min): 55 minutes    Assessment/Plan    Subjective     Patient was last treated on 21  She was not scheduled for future appointments due to being scheduled for an eye surgery  She or her family did not call to schedule additional appointments  She was contacted via phone but family did not answer call  Patient is discharged at this time      Objective

## 2021-09-15 ENCOUNTER — OFFICE VISIT (OUTPATIENT)
Dept: FAMILY MEDICINE CLINIC | Facility: CLINIC | Age: 66
End: 2021-09-15
Payer: MEDICARE

## 2021-09-15 VITALS
SYSTOLIC BLOOD PRESSURE: 136 MMHG | DIASTOLIC BLOOD PRESSURE: 86 MMHG | BODY MASS INDEX: 35.34 KG/M2 | WEIGHT: 180 LBS | HEART RATE: 78 BPM | HEIGHT: 60 IN | RESPIRATION RATE: 18 BRPM | OXYGEN SATURATION: 98 %

## 2021-09-15 DIAGNOSIS — Z91.81 AT RISK FOR FALLS: ICD-10-CM

## 2021-09-15 DIAGNOSIS — G89.29 CHRONIC PAIN OF BOTH KNEES: Primary | ICD-10-CM

## 2021-09-15 DIAGNOSIS — E66.01 OBESITY, MORBID (HCC): ICD-10-CM

## 2021-09-15 DIAGNOSIS — M25.562 CHRONIC PAIN OF BOTH KNEES: Primary | ICD-10-CM

## 2021-09-15 DIAGNOSIS — M54.50 CHRONIC BILATERAL LOW BACK PAIN WITHOUT SCIATICA: ICD-10-CM

## 2021-09-15 DIAGNOSIS — R26.89 BALANCE DISORDER: ICD-10-CM

## 2021-09-15 DIAGNOSIS — G89.29 CHRONIC BILATERAL LOW BACK PAIN WITHOUT SCIATICA: ICD-10-CM

## 2021-09-15 DIAGNOSIS — M25.561 CHRONIC PAIN OF BOTH KNEES: Primary | ICD-10-CM

## 2021-09-15 PROCEDURE — 99214 OFFICE O/P EST MOD 30 MIN: CPT | Performed by: FAMILY MEDICINE

## 2021-09-15 NOTE — ASSESSMENT & PLAN NOTE
Patient scheduled for evaluation with Orthopedics  Advised topical Voltaren  Suggested a walker to avoid falls

## 2021-09-15 NOTE — PROGRESS NOTES
Subjective:      Patient ID: Miguel Angel Pelayo is a 74 y o  female  HPI  Patient is here reporting pain in both her knees and lower back  Has been evaluated by orthopedics in the, received intra-articular injections and advised knee replacement surgery  Patient has made appointment for evaluation within ortho specialist locally  Also has been following [facility] specialist for low back pain receives injections in her spine  Reports difficulty walking, due to pain  Has history of hypertension, hyperlipidemia  Her pressure is at goal today  Does not eat any refills today  Due for metabolic labs  Past Medical History:   Diagnosis Date    Hyperlipidemia     Hypertension        Family History   Problem Relation Age of Onset    No Known Problems Mother     No Known Problems Father        History reviewed  No pertinent surgical history  reports that she has never smoked  She has never used smokeless tobacco  She reports that she does not drink alcohol and does not use drugs        Current Outpatient Medications:     amLODIPine (NORVASC) 5 mg tablet, TAKE 1 TABLET (5 MG TOTAL) BY MOUTH DAILY, Disp: 90 tablet, Rfl: 1    atorvastatin (LIPITOR) 20 mg tablet, TAKE 1 TABLET (20 MG TOTAL) BY MOUTH DAILY, Disp: 90 tablet, Rfl: 1    furosemide (LASIX) 20 mg tablet, TAKE ONE (1) TABLET BY MOUTH DAILY, Disp: 30 tablet, Rfl: 2    gabapentin (NEURONTIN) 300 mg capsule, Take 1 capsule (300 mg total) by mouth 3 (three) times a day, Disp: 90 capsule, Rfl: 0    losartan-hydrochlorothiazide (HYZAAR) 100-25 MG per tablet, TAKE 1 TABLET BY MOUTH DAILY, Disp: 90 tablet, Rfl: 0    omeprazole (PriLOSEC) 20 mg delayed release capsule, TAKE 1 CAPSULE (20 MG TOTAL) BY MOUTH DAILY, Disp: 90 capsule, Rfl: 1    cholecalciferol (VITAMIN D3) 1,000 units tablet, Take 2 tablets (2,000 Units total) by mouth daily (Patient not taking: Reported on 7/20/2021), Disp: 20 tablet, Rfl: 0    Multiple Vitamin (multivitamin) tablet, Take 1 tablet by mouth daily (Patient not taking: Reported on 7/20/2021), Disp: 10 tablet, Rfl: 0    polymyxin b-trimethoprim (POLYTRIM) ophthalmic solution, INSTILL 1 DROP INTO SURGERY EYE 4 TIMES A DAY  START 3 DAYS PRIOR TO SURGERY DATE  SEPARATE EYE DROPS BY FIVE MINUTES OF EACH OTHER , Disp: , Rfl:     prednisoLONE acetate (PRED FORTE) 1 % ophthalmic suspension, INSTILL 1 DROP INTO SURGERY EYE 4 TIMES A DAY  START 3 DAYS PRIOR TO SURGERY DATE  SEPARATE EYE DROPS BY FIVE MINUTES OF EACH OTHER , Disp: , Rfl:     The following portions of the patient's history were reviewed and updated as appropriate: allergies, current medications, past family history, past medical history, past social history, past surgical history and problem list     Review of Systems   Constitutional: Negative  Eyes: Negative  Respiratory: Negative  Negative for shortness of breath  Cardiovascular: Negative  Negative for chest pain and palpitations  Gastrointestinal: Negative  Endocrine: Negative  Genitourinary: Negative  Musculoskeletal: Negative  Negative for myalgias  Left knee pain  Back pain   Neurological: Negative  Negative for headaches  Psychiatric/Behavioral: Negative  Objective:    /86   Pulse 78   Resp 18   Ht 5' (1 524 m)   Wt 81 6 kg (180 lb)   SpO2 98%   BMI 35 15 kg/m²      Physical Exam  Constitutional:       Appearance: She is well-developed  HENT:      Head: Normocephalic  Right Ear: External ear normal       Left Ear: External ear normal    Eyes:      Pupils: Pupils are equal, round, and reactive to light  Cardiovascular:      Rate and Rhythm: Normal rate and regular rhythm  Pulmonary:      Effort: Pulmonary effort is normal       Breath sounds: Normal breath sounds  Abdominal:      General: Bowel sounds are normal       Palpations: Abdomen is soft  Musculoskeletal:         General: Normal range of motion        Cervical back: Normal range of motion and neck supple  Comments: Limping due to pain  Left knee- ROM restricted  Neurological:      Mental Status: She is alert and oriented to person, place, and time  Psychiatric:         Behavior: Behavior normal          Judgment: Judgment normal            No results found for this or any previous visit (from the past 1008 hour(s))  Assessment/Plan:    No problem-specific Assessment & Plan notes found for this encounter  Problem List Items Addressed This Visit        Other    Chronic bilateral low back pain without sciatica     Continue treatment per pain management  Relevant Orders    Walker    Chronic pain of both knees - Primary     Patient scheduled for evaluation with Orthopedics  Advised topical Voltaren  Suggested a walker to avoid falls           Relevant Orders    Ambulatory referral to Orthopedic Surgery    Walker    Obesity, morbid ([facility] [address] )    At risk for falls    Relevant Orders    Walker    Balance disorder    Relevant Orders    Ana Martinez

## 2021-10-07 ENCOUNTER — TELEPHONE (OUTPATIENT)
Dept: OBGYN CLINIC | Facility: HOSPITAL | Age: 66
End: 2021-10-07

## 2021-10-08 ENCOUNTER — OFFICE VISIT (OUTPATIENT)
Dept: OBGYN CLINIC | Facility: CLINIC | Age: 66
End: 2021-10-08
Payer: MEDICARE

## 2021-10-08 ENCOUNTER — APPOINTMENT (OUTPATIENT)
Dept: RADIOLOGY | Facility: AMBULARY SURGERY CENTER | Age: 66
End: 2021-10-08
Payer: MEDICARE

## 2021-10-08 VITALS
SYSTOLIC BLOOD PRESSURE: 118 MMHG | HEIGHT: 60 IN | BODY MASS INDEX: 35.34 KG/M2 | HEART RATE: 82 BPM | WEIGHT: 180 LBS | DIASTOLIC BLOOD PRESSURE: 74 MMHG

## 2021-10-08 DIAGNOSIS — M25.561 CHRONIC PAIN OF BOTH KNEES: ICD-10-CM

## 2021-10-08 DIAGNOSIS — M17.0 PRIMARY OSTEOARTHRITIS OF BOTH KNEES: Primary | ICD-10-CM

## 2021-10-08 DIAGNOSIS — M25.562 CHRONIC PAIN OF BOTH KNEES: ICD-10-CM

## 2021-10-08 DIAGNOSIS — M54.50 CHRONIC BILATERAL LOW BACK PAIN WITHOUT SCIATICA: ICD-10-CM

## 2021-10-08 DIAGNOSIS — G89.29 CHRONIC BILATERAL LOW BACK PAIN WITHOUT SCIATICA: ICD-10-CM

## 2021-10-08 DIAGNOSIS — G89.29 CHRONIC PAIN OF BOTH KNEES: ICD-10-CM

## 2021-10-08 PROCEDURE — 99204 OFFICE O/P NEW MOD 45 MIN: CPT | Performed by: PHYSICAL MEDICINE & REHABILITATION

## 2021-10-08 PROCEDURE — 20610 DRAIN/INJ JOINT/BURSA W/O US: CPT | Performed by: PHYSICAL MEDICINE & REHABILITATION

## 2021-10-08 PROCEDURE — 73562 X-RAY EXAM OF KNEE 3: CPT

## 2021-10-08 RX ORDER — TRIAMCINOLONE ACETONIDE 40 MG/ML
40 INJECTION, SUSPENSION INTRA-ARTICULAR; INTRAMUSCULAR
Status: COMPLETED | OUTPATIENT
Start: 2021-10-08 | End: 2021-10-08

## 2021-10-08 RX ORDER — NAPROXEN 500 MG/1
500 TABLET ORAL 2 TIMES DAILY PRN
Qty: 90 TABLET | Refills: 0 | Status: SHIPPED | OUTPATIENT
Start: 2021-10-08 | End: 2021-11-17 | Stop reason: SDUPTHER

## 2021-10-08 RX ORDER — LIDOCAINE HYDROCHLORIDE 10 MG/ML
3 INJECTION, SOLUTION INFILTRATION; PERINEURAL
Status: COMPLETED | OUTPATIENT
Start: 2021-10-08 | End: 2021-10-08

## 2021-10-08 RX ADMIN — LIDOCAINE HYDROCHLORIDE 3 ML: 10 INJECTION, SOLUTION INFILTRATION; PERINEURAL at 08:52

## 2021-10-08 RX ADMIN — TRIAMCINOLONE ACETONIDE 40 MG: 40 INJECTION, SUSPENSION INTRA-ARTICULAR; INTRAMUSCULAR at 08:52

## 2021-10-21 ENCOUNTER — APPOINTMENT (OUTPATIENT)
Dept: LAB | Facility: HOSPITAL | Age: 66
End: 2021-10-21
Payer: MEDICARE

## 2021-10-21 DIAGNOSIS — E87.6 HYPOKALEMIA: ICD-10-CM

## 2021-10-21 DIAGNOSIS — E78.5 HYPERLIPIDEMIA, UNSPECIFIED HYPERLIPIDEMIA TYPE: ICD-10-CM

## 2021-10-21 LAB
ALBUMIN SERPL BCP-MCNC: 3.8 G/DL (ref 3.5–5)
ALP SERPL-CCNC: 80 U/L (ref 46–116)
ALT SERPL W P-5'-P-CCNC: 39 U/L (ref 12–78)
ANION GAP SERPL CALCULATED.3IONS-SCNC: 9 MMOL/L (ref 4–13)
AST SERPL W P-5'-P-CCNC: 21 U/L (ref 5–45)
BILIRUB SERPL-MCNC: 0.58 MG/DL (ref 0.2–1)
BUN SERPL-MCNC: 12 MG/DL (ref 5–25)
CALCIUM SERPL-MCNC: 9.2 MG/DL (ref 8.3–10.1)
CHLORIDE SERPL-SCNC: 104 MMOL/L (ref 100–108)
CHOLEST SERPL-MCNC: 165 MG/DL (ref 50–200)
CO2 SERPL-SCNC: 26 MMOL/L (ref 21–32)
CREAT SERPL-MCNC: 0.78 MG/DL (ref 0.6–1.3)
GFR SERPL CREATININE-BSD FRML MDRD: 78 ML/MIN/1.73SQ M
GLUCOSE SERPL-MCNC: 63 MG/DL (ref 65–140)
HDLC SERPL-MCNC: 42 MG/DL
LDLC SERPL CALC-MCNC: 95 MG/DL (ref 0–100)
NONHDLC SERPL-MCNC: 123 MG/DL
POTASSIUM SERPL-SCNC: 3.2 MMOL/L (ref 3.5–5.3)
PROT SERPL-MCNC: 8 G/DL (ref 6.4–8.2)
SODIUM SERPL-SCNC: 139 MMOL/L (ref 136–145)
TRIGL SERPL-MCNC: 142 MG/DL

## 2021-10-21 PROCEDURE — 80061 LIPID PANEL: CPT

## 2021-10-21 PROCEDURE — 36415 COLL VENOUS BLD VENIPUNCTURE: CPT

## 2021-10-21 PROCEDURE — 80053 COMPREHEN METABOLIC PANEL: CPT

## 2021-10-22 ENCOUNTER — TELEPHONE (OUTPATIENT)
Dept: FAMILY MEDICINE CLINIC | Facility: CLINIC | Age: 66
End: 2021-10-22

## 2021-11-02 DIAGNOSIS — I10 ESSENTIAL HYPERTENSION: ICD-10-CM

## 2021-11-02 RX ORDER — LOSARTAN POTASSIUM AND HYDROCHLOROTHIAZIDE 25; 100 MG/1; MG/1
1 TABLET ORAL DAILY
Qty: 90 TABLET | Refills: 0 | Status: SHIPPED | OUTPATIENT
Start: 2021-11-02 | End: 2022-01-26

## 2021-11-04 ENCOUNTER — APPOINTMENT (OUTPATIENT)
Dept: LAB | Facility: CLINIC | Age: 66
End: 2021-11-04
Payer: MEDICARE

## 2021-11-04 DIAGNOSIS — E87.6 HYPOKALEMIA: ICD-10-CM

## 2021-11-04 LAB
ALBUMIN SERPL BCP-MCNC: 4 G/DL (ref 3.5–5)
ALP SERPL-CCNC: 85 U/L (ref 46–116)
ALT SERPL W P-5'-P-CCNC: 41 U/L (ref 12–78)
ANION GAP SERPL CALCULATED.3IONS-SCNC: 7 MMOL/L (ref 4–13)
AST SERPL W P-5'-P-CCNC: 27 U/L (ref 5–45)
BILIRUB SERPL-MCNC: 0.64 MG/DL (ref 0.2–1)
BUN SERPL-MCNC: 17 MG/DL (ref 5–25)
CALCIUM SERPL-MCNC: 9.6 MG/DL (ref 8.3–10.1)
CHLORIDE SERPL-SCNC: 103 MMOL/L (ref 100–108)
CO2 SERPL-SCNC: 26 MMOL/L (ref 21–32)
CREAT SERPL-MCNC: 1.06 MG/DL (ref 0.6–1.3)
GFR SERPL CREATININE-BSD FRML MDRD: 54 ML/MIN/1.73SQ M
GLUCOSE P FAST SERPL-MCNC: 100 MG/DL (ref 65–99)
POTASSIUM SERPL-SCNC: 3.5 MMOL/L (ref 3.5–5.3)
PROT SERPL-MCNC: 8.2 G/DL (ref 6.4–8.2)
SODIUM SERPL-SCNC: 136 MMOL/L (ref 136–145)

## 2021-11-04 PROCEDURE — 36415 COLL VENOUS BLD VENIPUNCTURE: CPT

## 2021-11-04 PROCEDURE — 80053 COMPREHEN METABOLIC PANEL: CPT

## 2021-11-05 ENCOUNTER — OFFICE VISIT (OUTPATIENT)
Dept: FAMILY MEDICINE CLINIC | Facility: CLINIC | Age: 66
End: 2021-11-05
Payer: MEDICARE

## 2021-11-05 VITALS
OXYGEN SATURATION: 98 % | HEIGHT: 60 IN | BODY MASS INDEX: 36.05 KG/M2 | HEART RATE: 98 BPM | DIASTOLIC BLOOD PRESSURE: 78 MMHG | SYSTOLIC BLOOD PRESSURE: 120 MMHG | WEIGHT: 183.6 LBS | RESPIRATION RATE: 18 BRPM

## 2021-11-05 DIAGNOSIS — M79.671 PAIN IN BOTH FEET: ICD-10-CM

## 2021-11-05 DIAGNOSIS — E78.5 HYPERLIPIDEMIA, UNSPECIFIED HYPERLIPIDEMIA TYPE: ICD-10-CM

## 2021-11-05 DIAGNOSIS — R73.9 ELEVATED BLOOD SUGAR: ICD-10-CM

## 2021-11-05 DIAGNOSIS — I10 ESSENTIAL HYPERTENSION: Primary | ICD-10-CM

## 2021-11-05 DIAGNOSIS — K21.9 GASTROESOPHAGEAL REFLUX DISEASE WITHOUT ESOPHAGITIS: ICD-10-CM

## 2021-11-05 DIAGNOSIS — M79.672 PAIN IN BOTH FEET: ICD-10-CM

## 2021-11-05 DIAGNOSIS — E66.01 OBESITY, MORBID (HCC): ICD-10-CM

## 2021-11-05 PROBLEM — Z01.818 PREOPERATIVE CLEARANCE: Status: RESOLVED | Noted: 2021-07-20 | Resolved: 2021-11-05

## 2021-11-05 PROBLEM — H26.9 CATARACT OF BOTH EYES: Status: RESOLVED | Noted: 2021-07-20 | Resolved: 2021-11-05

## 2021-11-05 PROCEDURE — 99214 OFFICE O/P EST MOD 30 MIN: CPT | Performed by: FAMILY MEDICINE

## 2021-11-08 DIAGNOSIS — R60.0 PEDAL EDEMA: ICD-10-CM

## 2021-11-09 RX ORDER — FUROSEMIDE 20 MG/1
TABLET ORAL
Qty: 30 TABLET | Refills: 2 | Status: SHIPPED | OUTPATIENT
Start: 2021-11-09 | End: 2021-12-07 | Stop reason: ALTCHOICE

## 2021-11-16 DIAGNOSIS — M17.0 PRIMARY OSTEOARTHRITIS OF BOTH KNEES: ICD-10-CM

## 2021-11-17 RX ORDER — NAPROXEN 500 MG/1
500 TABLET ORAL 2 TIMES DAILY PRN
Qty: 90 TABLET | Refills: 0 | Status: SHIPPED | OUTPATIENT
Start: 2021-11-17 | End: 2021-12-29

## 2021-11-22 ENCOUNTER — HOSPITAL ENCOUNTER (OUTPATIENT)
Dept: RADIOLOGY | Facility: HOSPITAL | Age: 66
Discharge: HOME | End: 2021-11-22
Attending: ANESTHESIOLOGY
Payer: MEDICARE

## 2021-11-22 ENCOUNTER — CONSULT (OUTPATIENT)
Dept: PAIN MEDICINE | Facility: CLINIC | Age: 66
End: 2021-11-22
Payer: MEDICARE

## 2021-11-22 VITALS
DIASTOLIC BLOOD PRESSURE: 92 MMHG | BODY MASS INDEX: 35.74 KG/M2 | SYSTOLIC BLOOD PRESSURE: 154 MMHG | WEIGHT: 183 LBS | HEART RATE: 74 BPM

## 2021-11-22 DIAGNOSIS — G89.29 CHRONIC BILATERAL LOW BACK PAIN WITH RIGHT-SIDED SCIATICA: ICD-10-CM

## 2021-11-22 DIAGNOSIS — M54.41 CHRONIC BILATERAL LOW BACK PAIN WITH RIGHT-SIDED SCIATICA: ICD-10-CM

## 2021-11-22 DIAGNOSIS — M51.16 INTERVERTEBRAL DISC DISORDER WITH RADICULOPATHY OF LUMBAR REGION: ICD-10-CM

## 2021-11-22 DIAGNOSIS — M17.11 PRIMARY LOCALIZED OSTEOARTHRITIS OF RIGHT KNEE: ICD-10-CM

## 2021-11-22 DIAGNOSIS — G89.4 CHRONIC PAIN SYNDROME: Primary | ICD-10-CM

## 2021-11-22 DIAGNOSIS — M48.062 LUMBAR STENOSIS WITH NEUROGENIC CLAUDICATION: ICD-10-CM

## 2021-11-22 PROCEDURE — 99204 OFFICE O/P NEW MOD 45 MIN: CPT | Performed by: ANESTHESIOLOGY

## 2021-11-22 PROCEDURE — 73521 X-RAY EXAM HIPS BI 2 VIEWS: CPT

## 2021-11-22 RX ORDER — PREGABALIN 75 MG/1
75 CAPSULE ORAL 2 TIMES DAILY
Qty: 60 CAPSULE | Refills: 1 | Status: SHIPPED | OUTPATIENT
Start: 2021-11-22 | End: 2022-01-26

## 2021-11-23 ENCOUNTER — TELEPHONE (OUTPATIENT)
Dept: FAMILY MEDICINE CLINIC | Facility: CLINIC | Age: 66
End: 2021-11-23

## 2021-11-30 ENCOUNTER — TELEPHONE (OUTPATIENT)
Dept: PAIN MEDICINE | Facility: CLINIC | Age: 66
End: 2021-11-30

## 2021-12-02 ENCOUNTER — TELEPHONE (OUTPATIENT)
Dept: FAMILY MEDICINE CLINIC | Facility: CLINIC | Age: 66
End: 2021-12-02

## 2021-12-07 ENCOUNTER — HOSPITAL ENCOUNTER (EMERGENCY)
Facility: HOSPITAL | Age: 66
Discharge: HOME | End: 2021-12-07
Attending: EMERGENCY MEDICINE
Payer: MEDICARE

## 2021-12-07 ENCOUNTER — APPOINTMENT (EMERGENCY)
Dept: VASCULAR ULTRASOUND | Facility: HOSPITAL | Age: 66
End: 2021-12-07
Payer: MEDICARE

## 2021-12-07 VITALS
DIASTOLIC BLOOD PRESSURE: 84 MMHG | RESPIRATION RATE: 18 BRPM | SYSTOLIC BLOOD PRESSURE: 123 MMHG | TEMPERATURE: 97.7 F | OXYGEN SATURATION: 98 % | HEART RATE: 84 BPM

## 2021-12-07 DIAGNOSIS — E87.6 HYPOKALEMIA: ICD-10-CM

## 2021-12-07 DIAGNOSIS — N18.9 CKD (CHRONIC KIDNEY DISEASE): ICD-10-CM

## 2021-12-07 DIAGNOSIS — R60.9 PERIPHERAL EDEMA: Primary | ICD-10-CM

## 2021-12-07 LAB
ALBUMIN SERPL BCP-MCNC: 3.8 G/DL (ref 3.5–5)
ALP SERPL-CCNC: 77 U/L (ref 46–116)
ALT SERPL W P-5'-P-CCNC: 49 U/L (ref 12–78)
ANION GAP SERPL CALCULATED.3IONS-SCNC: 10 MMOL/L (ref 4–13)
AST SERPL W P-5'-P-CCNC: 31 U/L (ref 5–45)
BASOPHILS # BLD AUTO: 0.04 THOUSANDS/ΜL (ref 0–0.1)
BILIRUB SERPL-MCNC: 0.33 MG/DL (ref 0.2–1)
BUN SERPL-MCNC: 15 MG/DL (ref 5–25)
CALCIUM SERPL-MCNC: 8.9 MG/DL (ref 8.3–10.1)
CHLORIDE SERPL-SCNC: 104 MMOL/L (ref 100–108)
CO2 SERPL-SCNC: 27 MMOL/L (ref 21–32)
CREAT SERPL-MCNC: 0.95 MG/DL (ref 0.6–1.3)
EOSINOPHIL # BLD AUTO: 0.26 THOUSAND/ΜL (ref 0–0.61)
EOSINOPHIL NFR BLD AUTO: 4 % (ref 0–6)
ERYTHROCYTE [DISTWIDTH] IN BLOOD BY AUTOMATED COUNT: 13.2 % (ref 11.6–15.1)
GFR SERPL CREATININE-BSD FRML MDRD: 62 ML/MIN/1.73SQ M
GLUCOSE SERPL-MCNC: 113 MG/DL (ref 65–140)
HCT VFR BLD AUTO: 38.4 % (ref 34.8–46.1)
HGB BLD-MCNC: 12.3 G/DL (ref 11.5–15.4)
HOLD SPECIMEN: NORMAL
LYMPHOCYTES # BLD AUTO: 2.8 THOUSANDS/ΜL (ref 0.6–4.47)
LYMPHOCYTES NFR BLD AUTO: 42 % (ref 14–44)
MCH RBC QN AUTO: 28.5 PG (ref 26.8–34.3)
MCHC RBC AUTO-ENTMCNC: 32 G/DL (ref 31.4–37.4)
MCV RBC AUTO: 89 FL (ref 82–98)
MONOCYTES # BLD AUTO: 0.68 THOUSAND/ΜL (ref 0.17–1.22)
MONOCYTES NFR BLD AUTO: 10 % (ref 4–12)
NEUTROPHILS # BLD AUTO: 2.96 THOUSANDS/ΜL (ref 1.85–7.62)
NEUTS SEG NFR BLD AUTO: 44 % (ref 43–75)
NT-PROBNP SERPL-MCNC: 167 PG/ML
PLATELET # BLD AUTO: 176 THOUSANDS/UL (ref 149–390)
PMV BLD AUTO: 12.1 FL (ref 8.9–12.7)
POTASSIUM SERPL-SCNC: 3.2 MMOL/L (ref 3.5–5.3)
PROT SERPL-MCNC: 7.8 G/DL (ref 6.4–8.2)
RBC # BLD AUTO: 4.31 MILLION/UL (ref 3.81–5.12)
SODIUM SERPL-SCNC: 141 MMOL/L (ref 136–145)
WBC # BLD AUTO: 6.75 THOUSAND/UL (ref 4.31–10.16)

## 2021-12-07 PROCEDURE — 99285 EMERGENCY DEPT VISIT HI MDM: CPT | Performed by: EMERGENCY MEDICINE

## 2021-12-07 PROCEDURE — 83880 ASSAY OF NATRIURETIC PEPTIDE: CPT | Performed by: EMERGENCY MEDICINE

## 2021-12-07 PROCEDURE — 99284 EMERGENCY DEPT VISIT MOD MDM: CPT

## 2021-12-07 PROCEDURE — 80053 COMPREHEN METABOLIC PANEL: CPT | Performed by: EMERGENCY MEDICINE

## 2021-12-07 PROCEDURE — 93971 EXTREMITY STUDY: CPT

## 2021-12-07 PROCEDURE — 93971 EXTREMITY STUDY: CPT | Performed by: SURGERY

## 2021-12-07 PROCEDURE — 85025 COMPLETE CBC W/AUTO DIFF WBC: CPT | Performed by: EMERGENCY MEDICINE

## 2021-12-07 PROCEDURE — 36415 COLL VENOUS BLD VENIPUNCTURE: CPT

## 2021-12-07 RX ORDER — POTASSIUM CHLORIDE 20 MEQ/1
20 TABLET, EXTENDED RELEASE ORAL DAILY
Qty: 15 TABLET | Refills: 0 | Status: SHIPPED | OUTPATIENT
Start: 2021-12-07 | End: 2021-12-16 | Stop reason: SDUPTHER

## 2021-12-07 RX ORDER — POTASSIUM CHLORIDE 20 MEQ/1
40 TABLET, EXTENDED RELEASE ORAL ONCE
Status: COMPLETED | OUTPATIENT
Start: 2021-12-07 | End: 2021-12-07

## 2021-12-07 RX ORDER — TORSEMIDE 10 MG/1
10 TABLET ORAL DAILY
Qty: 15 TABLET | Refills: 0 | Status: SHIPPED | OUTPATIENT
Start: 2021-12-07 | End: 2021-12-16

## 2021-12-07 RX ORDER — TORSEMIDE 10 MG/1
10 TABLET ORAL ONCE
Status: COMPLETED | OUTPATIENT
Start: 2021-12-07 | End: 2021-12-07

## 2021-12-07 RX ADMIN — POTASSIUM CHLORIDE 40 MEQ: 1500 TABLET, EXTENDED RELEASE ORAL at 19:24

## 2021-12-07 RX ADMIN — TORSEMIDE 10 MG: 10 TABLET ORAL at 20:04

## 2021-12-16 ENCOUNTER — OFFICE VISIT (OUTPATIENT)
Dept: FAMILY MEDICINE CLINIC | Facility: CLINIC | Age: 66
End: 2021-12-16
Payer: MEDICARE

## 2021-12-16 ENCOUNTER — TELEPHONE (OUTPATIENT)
Dept: RADIOLOGY | Facility: CLINIC | Age: 66
End: 2021-12-16

## 2021-12-16 VITALS
TEMPERATURE: 98.6 F | SYSTOLIC BLOOD PRESSURE: 120 MMHG | DIASTOLIC BLOOD PRESSURE: 72 MMHG | BODY MASS INDEX: 35.65 KG/M2 | HEIGHT: 60 IN | OXYGEN SATURATION: 99 % | WEIGHT: 181.6 LBS | HEART RATE: 97 BPM | RESPIRATION RATE: 16 BRPM

## 2021-12-16 DIAGNOSIS — E87.6 HYPOKALEMIA: ICD-10-CM

## 2021-12-16 DIAGNOSIS — G89.29 CHRONIC PAIN OF BOTH KNEES: ICD-10-CM

## 2021-12-16 DIAGNOSIS — M25.561 CHRONIC PAIN OF BOTH KNEES: ICD-10-CM

## 2021-12-16 DIAGNOSIS — M25.562 CHRONIC PAIN OF BOTH KNEES: ICD-10-CM

## 2021-12-16 DIAGNOSIS — R60.0 PEDAL EDEMA: Primary | ICD-10-CM

## 2021-12-16 DIAGNOSIS — I10 ESSENTIAL HYPERTENSION: ICD-10-CM

## 2021-12-16 PROCEDURE — 99215 OFFICE O/P EST HI 40 MIN: CPT | Performed by: FAMILY MEDICINE

## 2021-12-16 RX ORDER — TORSEMIDE 20 MG/1
20 TABLET ORAL DAILY
Qty: 30 TABLET | Refills: 0 | Status: SHIPPED | OUTPATIENT
Start: 2021-12-16 | End: 2022-01-12

## 2021-12-16 RX ORDER — POTASSIUM CHLORIDE 20 MEQ/1
20 TABLET, EXTENDED RELEASE ORAL DAILY
Qty: 30 TABLET | Refills: 0 | Status: SHIPPED | OUTPATIENT
Start: 2021-12-16 | End: 2022-01-18

## 2021-12-16 RX ORDER — TRAMADOL HYDROCHLORIDE 50 MG/1
50 TABLET ORAL EVERY 8 HOURS PRN
Qty: 20 TABLET | Refills: 0 | Status: SHIPPED | OUTPATIENT
Start: 2021-12-16 | End: 2022-01-26

## 2021-12-28 DIAGNOSIS — M17.0 PRIMARY OSTEOARTHRITIS OF BOTH KNEES: ICD-10-CM

## 2021-12-29 RX ORDER — NAPROXEN 500 MG/1
500 TABLET ORAL 2 TIMES DAILY PRN
Qty: 90 TABLET | Refills: 0 | Status: SHIPPED | OUTPATIENT
Start: 2021-12-29 | End: 2022-01-26

## 2022-01-10 DIAGNOSIS — I10 ESSENTIAL HYPERTENSION: ICD-10-CM

## 2022-01-10 DIAGNOSIS — R60.0 PEDAL EDEMA: ICD-10-CM

## 2022-01-12 ENCOUNTER — TELEMEDICINE (OUTPATIENT)
Dept: FAMILY MEDICINE CLINIC | Facility: CLINIC | Age: 67
End: 2022-01-12
Payer: MEDICARE

## 2022-01-12 VITALS
TEMPERATURE: 98 F | WEIGHT: 190 LBS | SYSTOLIC BLOOD PRESSURE: 126 MMHG | DIASTOLIC BLOOD PRESSURE: 80 MMHG | BODY MASS INDEX: 37.11 KG/M2

## 2022-01-12 DIAGNOSIS — I10 ESSENTIAL HYPERTENSION: ICD-10-CM

## 2022-01-12 DIAGNOSIS — K21.9 GASTROESOPHAGEAL REFLUX DISEASE WITHOUT ESOPHAGITIS: ICD-10-CM

## 2022-01-12 DIAGNOSIS — R60.0 PEDAL EDEMA: ICD-10-CM

## 2022-01-12 DIAGNOSIS — R05.9 COUGH: Primary | ICD-10-CM

## 2022-01-12 DIAGNOSIS — Z20.822 EXPOSURE TO COVID-19 VIRUS: ICD-10-CM

## 2022-01-12 DIAGNOSIS — M79.671 PAIN IN BOTH FEET: ICD-10-CM

## 2022-01-12 DIAGNOSIS — M79.672 PAIN IN BOTH FEET: ICD-10-CM

## 2022-01-12 PROCEDURE — 99214 OFFICE O/P EST MOD 30 MIN: CPT | Performed by: FAMILY MEDICINE

## 2022-01-12 RX ORDER — DEXTROMETHORPHAN HYDROBROMIDE AND PROMETHAZINE HYDROCHLORIDE 15; 6.25 MG/5ML; MG/5ML
5 SOLUTION ORAL 4 TIMES DAILY PRN
Qty: 118 ML | Refills: 0 | Status: SHIPPED | OUTPATIENT
Start: 2022-01-12 | End: 2022-01-26

## 2022-01-12 RX ORDER — ACETAMINOPHEN 500 MG
500 TABLET ORAL EVERY 6 HOURS PRN
Qty: 90 TABLET | Refills: 1 | Status: SHIPPED | OUTPATIENT
Start: 2022-01-12

## 2022-01-12 RX ORDER — BENZONATATE 200 MG/1
200 CAPSULE ORAL 3 TIMES DAILY PRN
Qty: 90 CAPSULE | Refills: 0 | Status: SHIPPED | OUTPATIENT
Start: 2022-01-12 | End: 2022-01-26

## 2022-01-12 RX ORDER — TORSEMIDE 20 MG/1
20 TABLET ORAL DAILY
Qty: 30 TABLET | Refills: 0 | Status: SHIPPED | OUTPATIENT
Start: 2022-01-12 | End: 2022-01-26

## 2022-01-12 NOTE — ASSESSMENT & PLAN NOTE
Pedal edema has reduced mildly after stopping amlodipine  Blood pressure is at goal on losartan hydrochlorothiazide 100/25 and torsemide 20  Continue same

## 2022-01-12 NOTE — PROGRESS NOTES
Virtual Regular Visit    Verification of patient location:    Patient is located in the following state in which I hold an active license [place]      Assessment/Plan:    Problem List Items Addressed This Visit        Cardiovascular and Mediastinum    Essential hypertension     Pedal edema has reduced mildly after stopping amlodipine  Blood pressure is at goal on losartan hydrochlorothiazide 100/25 and torsemide 20  Continue same  Other    Pain in both feet    Relevant Medications    Diclofenac Sodium (VOLTAREN) 1 %    acetaminophen (TYLENOL) 500 mg tablet    Cough - Primary     New onset  Partially vaccinated  R/O covid  No other covid symptoms  Supportive treatment prescribed  Fup if symptoms persist or worsen         Relevant Medications    benzonatate (TESSALON) 200 MG capsule    Promethazine-DM (PHENERGAN-DM) 6 25-15 mg/5 mL oral syrup    Other Relevant Orders    Covid/Flu- [facility]    Exposure to COVID-19 virus    Relevant Orders    Covid/Flu- [facility] [place]          BMI Counseling: Body mass index is 37 11 kg/m²  The BMI is above normal  Nutrition recommendations include decreasing portion sizes, encouraging healthy choices of fruits and vegetables and decreasing fast food intake  Exercise recommendations include moderate physical activity 150 minutes/week  No pharmacotherapy was ordered  Rationale for BMI follow-up plan is due to patient being overweight or obese  Depression Screening and Follow-up Plan: Patient was screened for depression during today's encounter  They screened negative with a PHQ-2 score of 0  Reason for visit is   Chief Complaint   Patient presents with    Cough    Virtual Regular Visit        Encounter provider Jacob Zavala MD    Provider located at [address] [place] [id number]      Recent Visits  No visits were found meeting these conditions    Showing recent visits within past 7 days and meeting all other requirements  Today's Visits  Date Type Provider Dept   01/12/22 Telemedicine Dandre Wang MD [facility]   Showing today's visits and meeting all other requirements  Future Appointments  No visits were found meeting these conditions  Showing future appointments within next 150 days and meeting all other requirements       The patient was identified by name and date of birth  Sher Guevara was informed that this is a telemedicine visit and that the visit is being conducted through [address] [place] [address] [facility] and patient was informed that this is a secure, HIPAA-compliant platform  She agrees to proceed     My office door was closed  No one else was in the room  She acknowledged consent and understanding of privacy and security of the video platform  The patient has agreed to participate and understands they can discontinue the visit at any time  Patient is aware this is a billable service  Subjective  Sher Guevara is a 74 y o  female    Cough  This is a new problem  The current episode started in the past 7 days  The problem has been unchanged  The cough is non-productive  Pertinent negatives include no chest pain, chills, fever, headaches, myalgias, postnasal drip, sore throat or shortness of breath  Patient is also following up on her BP and pedal edema  Reports mild improvement in her pedal edema after amlodipine was discontinued  Patient's blood pressure is at goal on losartan hydrochlorothiazide  Past Medical History:   Diagnosis Date    Hyperlipidemia     Hypertension        History reviewed  No pertinent surgical history      Current Outpatient Medications   Medication Sig Dispense Refill    atorvastatin (LIPITOR) 20 mg tablet TAKE 1 TABLET (20 MG TOTAL) BY MOUTH DAILY 90 tablet 1    cholecalciferol (VITAMIN D3) 1,000 units tablet Take 2 tablets (2,000 Units total) by mouth daily 20 tablet 0    Diclofenac Sodium (VOLTAREN) 1 % Apply 2 g topically 4 (four) times a day 150 g 1    losartan-hydrochlorothiazide (HYZAAR) 100-25 MG per tablet TAKE 1 TABLET BY MOUTH DAILY 90 tablet 0    Multiple Vitamin (multivitamin) tablet Take 1 tablet by mouth daily 10 tablet 0    naproxen (NAPROSYN) 500 mg tablet TAKE 1 TABLET (500 MG TOTAL) BY MOUTH 2 (TWO) TIMES A DAY AS NEEDED FOR MODERATE PAIN 90 tablet 0    omeprazole (PriLOSEC) 20 mg delayed release capsule TAKE 1 CAPSULE (20 MG TOTAL) BY MOUTH DAILY 90 capsule 1    polymyxin b-trimethoprim (POLYTRIM) ophthalmic solution INSTILL 1 DROP INTO SURGERY EYE 4 TIMES A DAY  START 3 DAYS PRIOR TO SURGERY DATE  SEPARATE EYE DROPS BY FIVE MINUTES OF EACH OTHER   potassium chloride (K-DUR,KLOR-CON) 20 mEq tablet Take 1 tablet (20 mEq total) by mouth daily 30 tablet 0    prednisoLONE acetate (PRED FORTE) 1 % ophthalmic suspension INSTILL 1 DROP INTO SURGERY EYE 4 TIMES A DAY  START 3 DAYS PRIOR TO SURGERY DATE  SEPARATE EYE DROPS BY FIVE MINUTES OF EACH OTHER   pregabalin (LYRICA) 75 mg capsule Take 1 capsule (75 mg total) by mouth 2 (two) times a day 60 capsule 1    torsemide (DEMADEX) 20 mg tablet Take 1 tablet (20 mg total) by mouth daily 30 tablet 0    traMADol (Ultram) 50 mg tablet Take 1 tablet (50 mg total) by mouth every 8 (eight) hours as needed for moderate pain 20 tablet 0    acetaminophen (TYLENOL) 500 mg tablet Take 1 tablet (500 mg total) by mouth every 6 (six) hours as needed for mild pain 90 tablet 1    benzonatate (TESSALON) 200 MG capsule Take 1 capsule (200 mg total) by mouth 3 (three) times a day as needed for cough 90 capsule 0    Promethazine-DM (PHENERGAN-DM) 6 25-15 mg/5 mL oral syrup Take 5 mL by mouth 4 (four) times a day as needed for cough 118 mL 0     No current facility-administered medications for this visit  No Known Allergies    Review of Systems   Constitutional: Negative  Negative for chills and fever  HENT: Negative for postnasal drip and sore throat      Respiratory: Positive for cough  Negative for shortness of breath  Cardiovascular: Negative  Negative for chest pain and palpitations  Musculoskeletal: Negative for myalgias  Neurological: Negative  Negative for headaches  Psychiatric/Behavioral: Negative  Video Exam    Vitals:    01/12/22 0746   BP: 126/80   Temp: 98 °F (36 7 °C)   Weight: 86 2 kg (190 lb)       Physical Exam  Constitutional:       General: She is not in acute distress  Pulmonary:      Effort: Pulmonary effort is normal  No respiratory distress  Neurological:      Mental Status: She is alert and oriented to person, place, and time  Psychiatric:         Behavior: Behavior normal          Thought Content: Thought content normal          Judgment: Judgment normal           I spent 25 minutes with patient today in which greater than 50% of the time was spent in counseling/coordination of care regarding Management of symptoms, suggesting treatment options  VIRTUAL VISIT DISCLAIMER      Gabriella Ward verbally agrees to participate in [employer]  Pt is aware that [employer] could be limited without vital signs or the ability to perform a full hands-on physical Rozella Purchase understands she or the provider may request at any time to terminate the video visit and request the patient to seek care or treatment in person

## 2022-01-12 NOTE — ASSESSMENT & PLAN NOTE
New onset  Partially vaccinated  R/O covid  No other covid symptoms  Supportive treatment prescribed     Fup if symptoms persist or worsen

## 2022-01-13 RX ORDER — AMLODIPINE BESYLATE 5 MG/1
5 TABLET ORAL DAILY
Qty: 90 TABLET | Refills: 1 | OUTPATIENT
Start: 2022-01-13

## 2022-01-13 RX ORDER — OMEPRAZOLE 20 MG/1
20 CAPSULE, DELAYED RELEASE ORAL DAILY
Qty: 90 CAPSULE | Refills: 1 | OUTPATIENT
Start: 2022-01-13

## 2022-01-13 RX ORDER — TORSEMIDE 20 MG/1
20 TABLET ORAL DAILY
Qty: 30 TABLET | Refills: 0 | OUTPATIENT
Start: 2022-01-13

## 2022-01-17 DIAGNOSIS — M79.672 PAIN IN BOTH FEET: ICD-10-CM

## 2022-01-17 DIAGNOSIS — M79.671 PAIN IN BOTH FEET: ICD-10-CM

## 2022-01-17 PROBLEM — N18.30 STAGE 3 CHRONIC KIDNEY DISEASE (HCC): Status: ACTIVE | Noted: 2022-01-17

## 2022-01-17 PROBLEM — I12.9 PARENCHYMAL RENAL HYPERTENSION: Status: ACTIVE | Noted: 2022-01-17

## 2022-01-17 NOTE — PROGRESS NOTES
Consultation - Nephrology 1/26/2022        History of Present Illness   Reason for Consult / Principal Problem:  CKD    HPI: Ubaldo Troy is a 74y o  year old female with a history of hypertension/dyslipidemia/peripheral edema: We are asked to see regarding peripheral edema and hypertension  Especially in  December  She has had negative DVT evaluation of her legs  She has been on off diuretics in the past   Amlodipine has been adjusted and regards to her swelling in the past   Apparently Lyrica had contributed to her swelling possibly  Torsemide had improved  No history of kidney disease in the past  No history kidney stones/bladder problems/urinary tract infection  No current urinary symptoms including dysuria hematuria voiding symptoms or for  Some degrees of naproxen use, and Motrin:  Stop since December  Patient with chronic leg pain particular lower extremity seen by pain management  Only rash/pruritus is of the eyelids  No paresthesias  ? No history of diabetes mellitus? Borderline recently  Hypertension for many years at least 10-15 years  Well controlled no blood pressure readings at home  Current medications:  · K-Dur 20 mEq daily  · Losartan-HCTZ 100-25 mg daily    Pertinent labs:  · UA:  No proteinuria and no hematuria as of 01/18/2020  · Creatinine:  0 95 as of 12/07/2021  · Potassium 3 2  · CO2 27  · Calcium 8 9/albumin 3 8/total protein 7 point  · Hemoglobin 12 3    Review of systems:    General:  Overall feels fairly well except on study especially on her right leg, only fever with the booster vaccine, no other colds, good appetite reasonably good energy  Weight is stable  Cardiovascular:  No chest pain or shortness of breath  Respiratory:  No wheezing or coughing  Gastrointestinal:  No nausea vomiting diarrhea or abdominal pain  No history of bleeding  No colonoscopy  Patient has refused a colonoscopy    Genitourinary:  See HPI  Neurology:  No headaches, no dizziness or lightheadedness upon standing  All other systems were reviewed and are negative    Historical Information   Past Medical History:   Diagnosis Date    Hyperlipidemia     Hypertension    · No history of CAD/CHF/cancer/lung disease/liver disease/CVA/seizures/thyroid disease/anemia    Past surgical history:   · Repair of left wrist fracture      Social History   Social History     Substance and Sexual Activity   Alcohol Use Never     Social History     Substance and Sexual Activity   Drug Use Never     Social History     Tobacco Use   Smoking Status Never Smoker   Smokeless Tobacco Never Used   · Tries to stay away from salt  · No dedicated exercise    Family History   Problem Relation Age of Onset    No Known Problems Mother     No Known Problems Father    · No kidney disease  · No history of hypertension    Meds/Allergies   all current active meds have been reviewed, current meds:   Current Outpatient Medications:     acetaminophen (TYLENOL) 500 mg tablet, Take 1 tablet (500 mg total) by mouth every 6 (six) hours as needed for mild pain, Disp: 90 tablet, Rfl: 1    atorvastatin (LIPITOR) 20 mg tablet, TAKE 1 TABLET (20 MG TOTAL) BY MOUTH DAILY, Disp: 90 tablet, Rfl: 1    omeprazole (PriLOSEC) 20 mg delayed release capsule, TAKE 1 CAPSULE (20 MG TOTAL) BY MOUTH DAILY, Disp: 90 capsule, Rfl: 1    Diclofenac Sodium (VOLTAREN) 1 %, APPLY 2 GRAMS TOPICALLY 4 (FOUR) TIMES A DAY (Patient not taking: Reported on 1/26/2022), Disp: 100 g, Rfl: 0    losartan (COZAAR) 100 MG tablet, Take 1 tablet (100 mg total) by mouth daily, Disp: 30 tablet, Rfl: 5    polymyxin b-trimethoprim (POLYTRIM) ophthalmic solution, INSTILL 1 DROP INTO SURGERY EYE 4 TIMES A DAY  START 3 DAYS PRIOR TO SURGERY DATE  SEPARATE EYE DROPS BY FIVE MINUTES OF EACH OTHER  (Patient not taking: Reported on 1/26/2022), Disp: , Rfl:     prednisoLONE acetate (PRED FORTE) 1 % ophthalmic suspension, INSTILL 1 DROP INTO SURGERY EYE 4 TIMES A DAY   START 3 DAYS PRIOR TO SURGERY DATE  SEPARATE EYE DROPS BY FIVE MINUTES OF EACH OTHER  (Patient not taking: Reported on 1/26/2022), Disp: , Rfl:     spironolactone (ALDACTONE) 25 mg tablet, Take 1 tablet (25 mg total) by mouth daily, Disp: 30 tablet, Rfl: 5    torsemide (DEMADEX) 5 MG tablet, Take 1 tablet (5 mg total) by mouth daily, Disp: 30 tablet, Rfl: 5    No Known Allergies    Objective   BP sitting on right:  140/76 with a heart rate of 64 regular  BP sitting on left:  130/70 with a heart rate of 64 regular  BP standing on left:140/80 with a hr 72:  Same on right  Body mass index is 35 54 kg/m²  General:  Well-developed well-nourished no acute distress/obese  Skin:  No acute rash  Eyes:  No scleral icterus, noninjected, conjunctiva appear normal, no discharge from the eyes  ENT:  Normocephalic/atraumatic, mucous membranes moist, tongue appears normal size  Neck:  Supple, no jugular venous distention, 1+ carotid upstroke, no carotid bruits; trachea is midline, no thyromegaly; no lymphadenopathy  Back:  No CVA tenderness, spine appears midline without any overt abnormalities  Chest:  Clear to auscultation and percussion, good respiratory effort, no use of accessory respiratory muscles  CVS:  Regular rate and rhythm without a murmur rub or gallops appreciable  Abdomen/gastrointestinal:  Obese,Normal bowel sounds, nontender and nondistended without hepatosplenomegaly or bruits; no masses appreciable  Extremities:  No clubbing, no cyanosis, 1-2 +lower extremity nonpitting edema 2/3 the way up the pretibial region and pedal, there is some discoloration of the dorsal portion of the leg as well suggesting possible is some venous stasis changes  , 1+ dorsalis pedis pulses, no femoral bruits, no arthritic changes and full range of motion  Neuro:  No gross focality  Psych:  Alert, oriented and appropriate    Current Weight:   Weight (last 2 days)     Date/Time Weight    01/26/22 0804 82 6 (182)            Lab Results:  I have personally reviewed pertinent labs  Results for orders placed or performed in visit on 01/26/22   POCT urine dip   Result Value Ref Range    LEUKOCYTE ESTERASE,UA neg     NITRITE,UA neg     SL AMB POCT UROBILINOGEN 0 2     POCT URINE PROTEIN trace      PH,UA 5 0     BLOOD,UA neg     SPECIFIC GRAVITY,UA 1 015     KETONES,UA neg     BILIRUBIN,UA neg     GLUCOSE, UA neg      COLOR,UA yellow     CLARITY,UA clear        Microscopic exam by myself personally today:  No cells casts or crystals seen except for occasional squamous epithelial cell        ASSESSMENT AND PLAN:   74y o  year old female with a history of hypertension/dyslipidemia/peripheral edema: We are asked to see regarding peripheral edema and hypertension  We are seeing regarding hypertension/edema/CKD      1  CKD stage 3A   Etiology:  Hypertensive nephrosclerosis/arteriolar nephrosclerosis, less likely intrinsic disease with a bland urinalysis in the past   Rule out obstructive uropathy and ischemic nephropathy   Baseline creatinine:  Approximately 1 0  Recommend:   Workup:  o Follow-up chemistry  o UA with microscopic: Trace proteinuria but no hematuria no other cells casts or crystals seen  o Urine protein creatinine ratio  o Mineral bone disorder evaluation from CKD including magnesium/phosphorus/PTH intact level/vitamin-D level  o CBC  o Lipid profile  o Renal ultrasound with PVR  o Renal artery duplex to rule out renal artery stenosis     Treatment:  o Treat hypertension, please see below for recommendations  o Treat dyslipidemia  o Avoid nephrotoxic agents such as NSAIDs, and proton pump inhibitors as able; patient counseled as such  o Good overall health recommendations including weight loss as appropriate, isotonic exercise as able, and avoidance of salt; patient counseled as such    Further workup and treatment recommendations will be forthcoming depending upon the above results and course    2   Hypertension:   Goal:  Less than 130/80 given CKD   Push nonmedical regimen as outlined above   Medication changes today:   I would switch to losartan 100 mg daily   Torsemide 5 mg daily to help with edema and hypertension   Add spironolactone 25 mg daily for the hypokalemia and hypertension   I would stop the HCTZ given hypokalemia   Potential addition of a different type of calcium channel blocker/non dihydropyridine    3  Other problems:   Peripheral edema: Seems to be lymphedema   ? Medication related including dihydropyridine calcium channel blockers/Lyrica   Negative DVT: Recheck at this   Echo pending   Renal workup as outlined in particular ruling out proteinuria   Albumin is normal   Obtain thyroid profile  Treatment:  · Low-sodium diet  · Leg elevation when able  · Support hose especially if standing  · Diuretics: Torsemide 5 mg daily  · Most likely lymphedema/venous stasis will refer to vascular surgery    · Hypokalemia: Certainly could be related to diuretics however rule out primary aldosterone state and renal artery stenosis  · Aldosterone/renin ratio  · Renal artery duplex  Treatment:  · High potassium diet  · Stop HCTZ  · Spironolactone 25 mg daily after ordering aldosterone/renin ratio    · Dyslipidemia  · GERD  · Arthritis       Patient Instructions   1  Medication changes today:   Please stop Hyzaar/losartan-HCTZ now   Please begin losartan 100 mg daily in the morning   Please change torsemide to just 5 mg daily in the morning   Please stop the potassium/K-Dur or now   AFTER GOING FOR LAB WORK, THEN BEGIN SPIRONOLACTONE 25 MG DAILY  WATCH FOR BREAST SORENESS OR ENLARGEMENT AND CALL IF YOU DEVELOPED ANY OF THOSE SYMPTOMS  2  Please go for  fasting  lab work AT [id number] [address] FOR 1 HOUR OR SO    3  Please go for nonfasting lab work 1-2 weeks after making the above medication changes  4   Please go for an ultrasound of your leg to make sure there are no blood clots at this time  5  Please go for an ultrasound of your kidneys at this time  6  Please go for an ultrasound of your kidney arteries at this time  7  Please go for an echocardiogram of your heart as recommended by Dr Shimon Maddox     8  Please make an appointment to see vascular surgery at this time we will help to facilitate regarding possible lymphedema/swelling of your right leg    9  Please purchase an Omron blood pressure machine:  Omron-10 series is the best machine available    Please take 1 week a blood pressure readings  1-2 weeks after making the above medication changes     AS FOLLOWS  MORNING AND EVENING, SITTING  as follows:  · TAKE THE MORNING READINGS BEFORE ANY MEDICATIONS AND WHEN YOU ARE RELAXED FOR SEVERAL MINUTES  · TAKE THE EVENING READINGS:  BETWEEN 7-10 P M ; PRIOR TO ANY MEDICATIONS; AT LEAST IN OUR  FROM DINNER; AND CERTAINLY AFTER RELAXING FOR A FEW MINUTES  · PLEASE INCLUDE HEART RATE WITH YOUR BLOOD PRESSURE READINGS  · When taking standing readings, keep your arm supported at heart level and not dangling  · Make sure you are sitting with your back supported and feet on the ground and do not cross your legs or feet  · Make sure you have not taken any coffee or caffeine products or exercised or smoke cigarettes at least 30 minutes before taking your blood pressure  Then please mail these readings into the office    10  Follow-up in 4  months   Please bring in 1 week a blood pressure readings morning evening, sitting and standing is outlined above   PLEASE BRING AN YOUR BLOOD PRESSURE MACHINE TO CORRELATE WITH THE OFFICE MACHINE AT THIS NEXT SCHEDULED VISIT   Please go for fasting lab work 1-2 weeks prior to your appointment      11   General instructions:   AVOID SALT BUT NOT ADDING AN READING LABELS TO MAKE SURE THERE IS LOW-SALT IN THE FOOD THAT YOU ARE EATING  o Goal is less than 2 g of sodium intake or less than 5 g of sodium chloride intake per day     Avoid nonsteroidal anti-inflammatory drugs such as Naprosyn, ibuprofen, Aleve, Advil, Celebrex, Meloxicam (Mobic) etc   You can use Tylenol as needed if you do not have any liver condition to be concerned about     Avoid medications such as Sudafed or decongestants and antihistamines that contained the D component which is the decongestant  You can take antihistamines without the decongestant or D component   Try to avoid medications such as pantoprazole or  Protonix/Nexium or Esomeprazole)/Prilosec or omeprazole/Prevacid or lansoprazole/AcipHex or Rabeprazole  If you are able to, use Pepcid as this is safer for your kidneys   Try to exercise at least 30 minutes 3 days a week to begin with with an ultimate goal of 5 days a week for at least 30 minutes     Try to lose 5-10 lb by your next visit     Please do not drink more than 2 glasses of alcohol/wine on a daily basis as this may contribute to your high blood pressure  12 Measures to help with your leg swelling:   Please avoid salt as much as possible   Fluid restriction approximately 1500 mL or 48 oz a day   Keep your legs elevated whenever seated   Use support hose knee-high that you can purchase at a  medical supply company or drug store when you are on her feet for a significant portion of the day   Adjust your water pill diuretics as follows: Torsemide 5 mg daily as outlined   PLEASE CALL IF YOUR SWELLING DOES NOT IMPROVE OR CERTAINLY WORSENS; OR YOU DEVELOPED A SIGNIFICANT WEIGHT GAIN OF GREATER THAN 3-4 LB IN A SHORT PERIOD OF TIME FOR EXAMPLE 1-2 DAYS THAT DOES NOT IMPROVE           Portions of the record may have been created with voice recognition software  Occasional wrong word or "sound a like" substitutions may have occurred due to the inherent limitations of voice recognition software  Read the chart carefully and recognize, using context, where substitutions have occurred      Lisa Richardson MD

## 2022-01-18 DIAGNOSIS — E87.6 HYPOKALEMIA: ICD-10-CM

## 2022-01-18 RX ORDER — POTASSIUM CHLORIDE 20 MEQ/1
20 TABLET, EXTENDED RELEASE ORAL DAILY
Qty: 30 TABLET | Refills: 0 | Status: SHIPPED | OUTPATIENT
Start: 2022-01-18 | End: 2022-01-26

## 2022-01-24 ENCOUNTER — TELEPHONE (OUTPATIENT)
Dept: FAMILY MEDICINE CLINIC | Facility: CLINIC | Age: 67
End: 2022-01-24

## 2022-01-24 NOTE — TELEPHONE ENCOUNTER
She is due for Cmp to reassess potassium, if it comes back normal, I can discontinue it   She has renal appointment on 26 th

## 2022-01-25 ENCOUNTER — APPOINTMENT (OUTPATIENT)
Dept: LAB | Facility: CLINIC | Age: 67
End: 2022-01-25
Payer: MEDICARE

## 2022-01-25 DIAGNOSIS — I10 ESSENTIAL HYPERTENSION: ICD-10-CM

## 2022-01-25 DIAGNOSIS — E87.6 HYPOKALEMIA: ICD-10-CM

## 2022-01-25 DIAGNOSIS — R60.0 PEDAL EDEMA: ICD-10-CM

## 2022-01-25 DIAGNOSIS — R73.9 ELEVATED BLOOD SUGAR: ICD-10-CM

## 2022-01-25 LAB
ALBUMIN SERPL BCP-MCNC: 3.6 G/DL (ref 3.5–5)
ALP SERPL-CCNC: 69 U/L (ref 46–116)
ALT SERPL W P-5'-P-CCNC: 35 U/L (ref 12–78)
ANION GAP SERPL CALCULATED.3IONS-SCNC: 6 MMOL/L (ref 4–13)
AST SERPL W P-5'-P-CCNC: 24 U/L (ref 5–45)
BILIRUB SERPL-MCNC: 0.54 MG/DL (ref 0.2–1)
BUN SERPL-MCNC: 17 MG/DL (ref 5–25)
CALCIUM SERPL-MCNC: 9.2 MG/DL (ref 8.3–10.1)
CHLORIDE SERPL-SCNC: 106 MMOL/L (ref 100–108)
CO2 SERPL-SCNC: 27 MMOL/L (ref 21–32)
CREAT SERPL-MCNC: 0.91 MG/DL (ref 0.6–1.3)
EST. AVERAGE GLUCOSE BLD GHB EST-MCNC: 126 MG/DL
GFR SERPL CREATININE-BSD FRML MDRD: 65 ML/MIN/1.73SQ M
GLUCOSE P FAST SERPL-MCNC: 105 MG/DL (ref 65–99)
HBA1C MFR BLD: 6 %
POTASSIUM SERPL-SCNC: 3.6 MMOL/L (ref 3.5–5.3)
PROT SERPL-MCNC: 7.9 G/DL (ref 6.4–8.2)
SODIUM SERPL-SCNC: 139 MMOL/L (ref 136–145)

## 2022-01-25 PROCEDURE — 83036 HEMOGLOBIN GLYCOSYLATED A1C: CPT

## 2022-01-25 PROCEDURE — 36415 COLL VENOUS BLD VENIPUNCTURE: CPT

## 2022-01-25 PROCEDURE — 80053 COMPREHEN METABOLIC PANEL: CPT

## 2022-01-26 ENCOUNTER — CONSULT (OUTPATIENT)
Dept: NEPHROLOGY | Facility: CLINIC | Age: 67
End: 2022-01-26
Payer: MEDICARE

## 2022-01-26 VITALS — BODY MASS INDEX: 35.73 KG/M2 | HEIGHT: 60 IN | WEIGHT: 182 LBS

## 2022-01-26 DIAGNOSIS — N18.9 CKD (CHRONIC KIDNEY DISEASE): ICD-10-CM

## 2022-01-26 DIAGNOSIS — E78.49 OTHER HYPERLIPIDEMIA: ICD-10-CM

## 2022-01-26 DIAGNOSIS — R60.0 PEDAL EDEMA: ICD-10-CM

## 2022-01-26 DIAGNOSIS — I12.9 PARENCHYMAL RENAL HYPERTENSION, STAGE 1 THROUGH STAGE 4 OR UNSPECIFIED CHRONIC KIDNEY DISEASE: Primary | ICD-10-CM

## 2022-01-26 DIAGNOSIS — E87.6 HYPOKALEMIA: ICD-10-CM

## 2022-01-26 DIAGNOSIS — N18.31 STAGE 3A CHRONIC KIDNEY DISEASE (HCC): ICD-10-CM

## 2022-01-26 LAB
SL AMB  POCT GLUCOSE, UA: NORMAL
SL AMB LEUKOCYTE ESTERASE,UA: NORMAL
SL AMB POCT BILIRUBIN,UA: NORMAL
SL AMB POCT BLOOD,UA: NORMAL
SL AMB POCT CLARITY,UA: CLEAR
SL AMB POCT COLOR,UA: YELLOW
SL AMB POCT KETONES,UA: NORMAL
SL AMB POCT NITRITE,UA: NORMAL
SL AMB POCT PH,UA: 5
SL AMB POCT SPECIFIC GRAVITY,UA: 1.01
SL AMB POCT URINE PROTEIN: NORMAL
SL AMB POCT UROBILINOGEN: 0.2

## 2022-01-26 PROCEDURE — 81002 URINALYSIS NONAUTO W/O SCOPE: CPT | Performed by: INTERNAL MEDICINE

## 2022-01-26 PROCEDURE — 99205 OFFICE O/P NEW HI 60 MIN: CPT | Performed by: INTERNAL MEDICINE

## 2022-01-26 RX ORDER — SPIRONOLACTONE 25 MG/1
25 TABLET ORAL DAILY
Qty: 30 TABLET | Refills: 5 | Status: SHIPPED | OUTPATIENT
Start: 2022-01-26 | End: 2022-06-14

## 2022-01-26 RX ORDER — TORSEMIDE 5 MG/1
5 TABLET ORAL DAILY
Qty: 30 TABLET | Refills: 5 | Status: SHIPPED | OUTPATIENT
Start: 2022-01-26 | End: 2022-06-14

## 2022-01-26 RX ORDER — LOSARTAN POTASSIUM 100 MG/1
100 TABLET ORAL DAILY
Qty: 30 TABLET | Refills: 5 | Status: SHIPPED | OUTPATIENT
Start: 2022-01-26 | End: 2022-03-04 | Stop reason: DRUGHIGH

## 2022-01-27 ENCOUNTER — APPOINTMENT (OUTPATIENT)
Dept: LAB | Facility: CLINIC | Age: 67
End: 2022-01-27
Payer: MEDICARE

## 2022-01-27 DIAGNOSIS — N18.9 CKD (CHRONIC KIDNEY DISEASE): ICD-10-CM

## 2022-01-27 DIAGNOSIS — E87.6 HYPOKALEMIA: ICD-10-CM

## 2022-01-27 DIAGNOSIS — N18.31 STAGE 3A CHRONIC KIDNEY DISEASE (HCC): ICD-10-CM

## 2022-01-27 DIAGNOSIS — I12.9 PARENCHYMAL RENAL HYPERTENSION, STAGE 1 THROUGH STAGE 4 OR UNSPECIFIED CHRONIC KIDNEY DISEASE: ICD-10-CM

## 2022-01-27 DIAGNOSIS — E78.49 OTHER HYPERLIPIDEMIA: ICD-10-CM

## 2022-01-27 DIAGNOSIS — N18.31 STAGE 3A CHRONIC KIDNEY DISEASE (HCC): Primary | ICD-10-CM

## 2022-01-27 DIAGNOSIS — R60.0 PEDAL EDEMA: ICD-10-CM

## 2022-01-27 LAB
ALBUMIN SERPL BCP-MCNC: 3.7 G/DL (ref 3.5–5)
ALP SERPL-CCNC: 69 U/L (ref 46–116)
ALT SERPL W P-5'-P-CCNC: 31 U/L (ref 12–78)
ANION GAP SERPL CALCULATED.3IONS-SCNC: 6 MMOL/L (ref 4–13)
AST SERPL W P-5'-P-CCNC: 19 U/L (ref 5–45)
BILIRUB SERPL-MCNC: 0.53 MG/DL (ref 0.2–1)
BUN SERPL-MCNC: 15 MG/DL (ref 5–25)
CALCIUM SERPL-MCNC: 9.4 MG/DL (ref 8.3–10.1)
CHLORIDE SERPL-SCNC: 105 MMOL/L (ref 100–108)
CHOLEST SERPL-MCNC: 149 MG/DL
CO2 SERPL-SCNC: 28 MMOL/L (ref 21–32)
CREAT SERPL-MCNC: 0.9 MG/DL (ref 0.6–1.3)
CREAT UR-MCNC: 112 MG/DL
ERYTHROCYTE [DISTWIDTH] IN BLOOD BY AUTOMATED COUNT: 12.8 % (ref 11.6–15.1)
GFR SERPL CREATININE-BSD FRML MDRD: 65 ML/MIN/1.73SQ M
GLUCOSE P FAST SERPL-MCNC: 95 MG/DL (ref 65–99)
HCT VFR BLD AUTO: 37.7 % (ref 34.8–46.1)
HDLC SERPL-MCNC: 34 MG/DL
HGB BLD-MCNC: 12.2 G/DL (ref 11.5–15.4)
LDLC SERPL CALC-MCNC: 83 MG/DL (ref 0–100)
MAGNESIUM SERPL-MCNC: 1.5 MG/DL (ref 1.6–2.6)
MCH RBC QN AUTO: 28.2 PG (ref 26.8–34.3)
MCHC RBC AUTO-ENTMCNC: 32.4 G/DL (ref 31.4–37.4)
MCV RBC AUTO: 87 FL (ref 82–98)
PLATELET # BLD AUTO: 210 THOUSANDS/UL (ref 149–390)
PMV BLD AUTO: 11.8 FL (ref 8.9–12.7)
POTASSIUM SERPL-SCNC: 3.4 MMOL/L (ref 3.5–5.3)
PROT SERPL-MCNC: 7.5 G/DL (ref 6.4–8.2)
PROT UR-MCNC: 16 MG/DL
PROT/CREAT UR: 0.14 MG/G{CREAT} (ref 0–0.1)
RBC # BLD AUTO: 4.33 MILLION/UL (ref 3.81–5.12)
SODIUM SERPL-SCNC: 139 MMOL/L (ref 136–145)
TRIGL SERPL-MCNC: 158 MG/DL
TSH SERPL DL<=0.05 MIU/L-ACNC: 2.43 UIU/ML (ref 0.36–3.74)
WBC # BLD AUTO: 6.6 THOUSAND/UL (ref 4.31–10.16)

## 2022-01-27 PROCEDURE — 84156 ASSAY OF PROTEIN URINE: CPT | Performed by: INTERNAL MEDICINE

## 2022-01-27 PROCEDURE — 36415 COLL VENOUS BLD VENIPUNCTURE: CPT

## 2022-01-27 PROCEDURE — 82570 ASSAY OF URINE CREATININE: CPT | Performed by: INTERNAL MEDICINE

## 2022-01-27 PROCEDURE — 84443 ASSAY THYROID STIM HORMONE: CPT

## 2022-01-27 PROCEDURE — 82088 ASSAY OF ALDOSTERONE: CPT

## 2022-01-27 PROCEDURE — 80061 LIPID PANEL: CPT

## 2022-01-27 PROCEDURE — 85027 COMPLETE CBC AUTOMATED: CPT

## 2022-01-27 PROCEDURE — 83735 ASSAY OF MAGNESIUM: CPT

## 2022-01-27 PROCEDURE — 80053 COMPREHEN METABOLIC PANEL: CPT

## 2022-01-27 PROCEDURE — 84244 ASSAY OF RENIN: CPT

## 2022-01-27 RX ORDER — POTASSIUM CHLORIDE 20 MEQ/1
20 TABLET, EXTENDED RELEASE ORAL DAILY
Qty: 3 TABLET | Refills: 0 | Status: SHIPPED | OUTPATIENT
Start: 2022-01-27 | End: 2022-03-29

## 2022-01-27 NOTE — TELEPHONE ENCOUNTER
----- Message from Elisabet Gilmore MD sent at 1/27/2022  2:58 PM EST -----  Based on today's labs I of 2 recommendation  1  K-Dur 20 mEq for 3 days once a day then stop as we have placed her on spironolactone    2  Magnesium oxide 400 mg daily watch for diarrhea    3  I believe she is due for repeat labs 1-2 weeks after initiating spironolactone, make sure there is a magnesium level as well      Thus far the rest the labs look good

## 2022-01-28 ENCOUNTER — HOSPITAL ENCOUNTER (OUTPATIENT)
Dept: ULTRASOUND IMAGING | Facility: HOSPITAL | Age: 67
Discharge: HOME | End: 2022-01-28
Attending: INTERNAL MEDICINE
Payer: MEDICARE

## 2022-01-28 DIAGNOSIS — N18.9 CKD (CHRONIC KIDNEY DISEASE): ICD-10-CM

## 2022-01-28 DIAGNOSIS — I12.9 PARENCHYMAL RENAL HYPERTENSION, STAGE 1 THROUGH STAGE 4 OR UNSPECIFIED CHRONIC KIDNEY DISEASE: ICD-10-CM

## 2022-01-28 DIAGNOSIS — E87.6 HYPOKALEMIA: ICD-10-CM

## 2022-01-28 DIAGNOSIS — R60.0 PEDAL EDEMA: ICD-10-CM

## 2022-01-28 DIAGNOSIS — N18.31 STAGE 3A CHRONIC KIDNEY DISEASE (HCC): ICD-10-CM

## 2022-01-28 DIAGNOSIS — E78.49 OTHER HYPERLIPIDEMIA: ICD-10-CM

## 2022-01-28 PROCEDURE — 76770 US EXAM ABDO BACK WALL COMP: CPT

## 2022-01-31 ENCOUNTER — TELEPHONE (OUTPATIENT)
Dept: NEPHROLOGY | Facility: CLINIC | Age: 67
End: 2022-01-31

## 2022-01-31 NOTE — TELEPHONE ENCOUNTER
----- Message from Lm Barnhart MD sent at 1/31/2022 11:23 AM EST -----  Renal ultrasound was normal please let the patient know

## 2022-02-02 LAB — RENIN PLAS-CCNC: 1.54 NG/ML/HR (ref 0.17–5.38)

## 2022-02-03 LAB — ALDOST SERPL-MCNC: 5.2 NG/DL (ref 0–30)

## 2022-02-07 ENCOUNTER — HOSPITAL ENCOUNTER (OUTPATIENT)
Dept: NON INVASIVE DIAGNOSTICS | Facility: HOSPITAL | Age: 67
Discharge: HOME | End: 2022-02-07
Payer: MEDICARE

## 2022-02-07 VITALS
HEART RATE: 80 BPM | WEIGHT: 182 LBS | HEIGHT: 60 IN | SYSTOLIC BLOOD PRESSURE: 126 MMHG | BODY MASS INDEX: 35.73 KG/M2 | DIASTOLIC BLOOD PRESSURE: 80 MMHG

## 2022-02-07 DIAGNOSIS — I10 ESSENTIAL HYPERTENSION: ICD-10-CM

## 2022-02-07 DIAGNOSIS — R60.0 PEDAL EDEMA: ICD-10-CM

## 2022-02-07 LAB
AORTIC ROOT: 2.6 CM
APICAL FOUR CHAMBER EJECTION FRACTION: 65 %
E WAVE DECELERATION TIME: 220 MS
FRACTIONAL SHORTENING: 39 % (ref 28–44)
INTERVENTRICULAR SEPTUM IN DIASTOLE (PARASTERNAL SHORT AXIS VIEW): 0.7 CM (ref 0.52–0.98)
LAAS-AP4: 16.1 CM2
LEFT ATRIUM SIZE: 3.1 CM
LEFT INTERNAL DIMENSION IN SYSTOLE: 3 CM (ref 2.1–4)
LEFT VENTRICULAR INTERNAL DIMENSION IN DIASTOLE: 4.9 CM (ref 4.41–6.56)
LEFT VENTRICULAR POSTERIOR WALL IN END DIASTOLE: 0.7 CM (ref 0.51–0.97)
LEFT VENTRICULAR STROKE VOLUME: 79 ML
MV E'TISSUE VEL-LAT: 8 CM/S
MV PEAK A VEL: 1.07 M/S
MV PEAK E VEL: 82 CM/S
RA PRESSURE ESTIMATED: 5 MMHG
RIGHT ATRIAL 2D VOLUME: 18 ML
RIGHT ATRIUM AREA SYSTOLE A4C: 9.9 CM2
RIGHT VENTRICLE ID DIMENSION: 2.9 CM
RV PSP: 30 MMHG
SL CV LV EF: 65
SL CV PED ECHO LEFT VENTRICLE DIASTOLIC VOLUME (MOD BIPLANE) 2D: 113 ML
SL CV PED ECHO LEFT VENTRICLE SYSTOLIC VOLUME (MOD BIPLANE) 2D: 34 ML
TR MAX PG: 25 MMHG
TRICUSPID VALVE PEAK REGURGITATION VELOCITY: 2.49 M/S
Z-SCORE OF INTERVENTRICULAR SEPTUM IN END DIASTOLE: -0.44
Z-SCORE OF LEFT VENTRICULAR DIMENSION IN END SYSTOLE: -0.92
Z-SCORE OF LEFT VENTRICULAR POSTERIOR WALL IN END DIASTOLE: -0.33

## 2022-02-07 PROCEDURE — 93306 TTE W/DOPPLER COMPLETE: CPT

## 2022-02-07 PROCEDURE — 93306 TTE W/DOPPLER COMPLETE: CPT | Performed by: INTERNAL MEDICINE

## 2022-02-10 ENCOUNTER — HOSPITAL ENCOUNTER (OUTPATIENT)
Dept: NON INVASIVE DIAGNOSTICS | Facility: CLINIC | Age: 67
Discharge: HOME | End: 2022-02-10
Payer: MEDICARE

## 2022-02-10 ENCOUNTER — LAB (OUTPATIENT)
Dept: LAB | Facility: CLINIC | Age: 67
End: 2022-02-10
Payer: MEDICARE

## 2022-02-10 DIAGNOSIS — R60.0 PEDAL EDEMA: ICD-10-CM

## 2022-02-10 DIAGNOSIS — N18.31 STAGE 3A CHRONIC KIDNEY DISEASE (HCC): ICD-10-CM

## 2022-02-10 DIAGNOSIS — N18.9 CKD (CHRONIC KIDNEY DISEASE): ICD-10-CM

## 2022-02-10 DIAGNOSIS — E78.49 OTHER HYPERLIPIDEMIA: ICD-10-CM

## 2022-02-10 DIAGNOSIS — I12.9 PARENCHYMAL RENAL HYPERTENSION, STAGE 1 THROUGH STAGE 4 OR UNSPECIFIED CHRONIC KIDNEY DISEASE: ICD-10-CM

## 2022-02-10 DIAGNOSIS — E87.6 HYPOKALEMIA: ICD-10-CM

## 2022-02-10 DIAGNOSIS — N18.31 STAGE 3A CHRONIC KIDNEY DISEASE (HCC): Primary | ICD-10-CM

## 2022-02-10 LAB
ALBUMIN SERPL BCP-MCNC: 3.9 G/DL (ref 3.5–5)
ALP SERPL-CCNC: 85 U/L (ref 46–116)
ALT SERPL W P-5'-P-CCNC: 33 U/L (ref 12–78)
ANION GAP SERPL CALCULATED.3IONS-SCNC: 13 MMOL/L (ref 4–13)
AST SERPL W P-5'-P-CCNC: 19 U/L (ref 5–45)
BILIRUB SERPL-MCNC: 0.52 MG/DL (ref 0.2–1)
BUN SERPL-MCNC: 17 MG/DL (ref 5–25)
CALCIUM SERPL-MCNC: 9.5 MG/DL (ref 8.3–10.1)
CHLORIDE SERPL-SCNC: 101 MMOL/L (ref 100–108)
CO2 SERPL-SCNC: 24 MMOL/L (ref 21–32)
CREAT SERPL-MCNC: 0.95 MG/DL (ref 0.6–1.3)
GFR SERPL CREATININE-BSD FRML MDRD: 61 ML/MIN/1.73SQ M
GLUCOSE P FAST SERPL-MCNC: 100 MG/DL (ref 65–99)
POTASSIUM SERPL-SCNC: 4.2 MMOL/L (ref 3.5–5.3)
PROT SERPL-MCNC: 8.2 G/DL (ref 6.4–8.2)
SODIUM SERPL-SCNC: 138 MMOL/L (ref 136–145)

## 2022-02-10 PROCEDURE — 93975 VASCULAR STUDY: CPT

## 2022-02-10 PROCEDURE — 80053 COMPREHEN METABOLIC PANEL: CPT

## 2022-02-10 PROCEDURE — 36415 COLL VENOUS BLD VENIPUNCTURE: CPT

## 2022-02-10 PROCEDURE — 93970 EXTREMITY STUDY: CPT

## 2022-02-11 ENCOUNTER — TELEPHONE (OUTPATIENT)
Dept: NEPHROLOGY | Facility: CLINIC | Age: 67
End: 2022-02-11

## 2022-02-11 PROCEDURE — 93970 EXTREMITY STUDY: CPT | Performed by: SURGERY

## 2022-02-11 PROCEDURE — 93975 VASCULAR STUDY: CPT | Performed by: SURGERY

## 2022-02-11 NOTE — TELEPHONE ENCOUNTER
Daughter advised that K has normalized per Dr Shira Erickson  No changes  (daughter states pt only prescribed to take K supplement x3 days)  ----- Message from Eder Edwards MD sent at 2/11/2022  1:17 PM EST -----  Hello    Patient normally is followed up by Dr Geovanna Estrella    MA team-please let the patient daughter know that the potassium is normalized  No changes for now  Further plans per Dr Geovanna Estrella when he is back      Thank you    np

## 2022-02-11 NOTE — RESULT ENCOUNTER NOTE
Hello    Patient normally is followed up by Dr Barbara Keen MA team-please let the patient daughter know that the potassium is normalized  No changes for now  Further plans per Dr Barbara Kene when he is back      Thank you    np

## 2022-02-14 ENCOUNTER — TELEMEDICINE (OUTPATIENT)
Dept: FAMILY MEDICINE CLINIC | Facility: CLINIC | Age: 67
End: 2022-02-14
Payer: MEDICARE

## 2022-02-14 VITALS — WEIGHT: 182 LBS | HEIGHT: 60 IN | BODY MASS INDEX: 35.73 KG/M2

## 2022-02-14 DIAGNOSIS — R74.8 ELEVATED LIVER ENZYMES: ICD-10-CM

## 2022-02-14 DIAGNOSIS — K21.9 GASTROESOPHAGEAL REFLUX DISEASE WITHOUT ESOPHAGITIS: Primary | ICD-10-CM

## 2022-02-14 DIAGNOSIS — R73.03 PREDIABETES: ICD-10-CM

## 2022-02-14 DIAGNOSIS — Z12.11 SCREEN FOR COLON CANCER: ICD-10-CM

## 2022-02-14 DIAGNOSIS — I10 ESSENTIAL HYPERTENSION: ICD-10-CM

## 2022-02-14 DIAGNOSIS — Z12.31 VISIT FOR SCREENING MAMMOGRAM: ICD-10-CM

## 2022-02-14 DIAGNOSIS — E78.5 HYPERLIPIDEMIA, UNSPECIFIED HYPERLIPIDEMIA TYPE: ICD-10-CM

## 2022-02-14 DIAGNOSIS — N18.31 STAGE 3A CHRONIC KIDNEY DISEASE (HCC): ICD-10-CM

## 2022-02-14 PROBLEM — R73.9 ELEVATED BLOOD SUGAR: Status: RESOLVED | Noted: 2021-11-05 | Resolved: 2022-02-14

## 2022-02-14 PROBLEM — R05.9 COUGH: Status: RESOLVED | Noted: 2022-01-12 | Resolved: 2022-02-14

## 2022-02-14 PROBLEM — M79.672 PAIN IN BOTH FEET: Status: RESOLVED | Noted: 2021-11-05 | Resolved: 2022-02-14

## 2022-02-14 PROBLEM — Z20.822 EXPOSURE TO COVID-19 VIRUS: Status: RESOLVED | Noted: 2022-01-12 | Resolved: 2022-02-14

## 2022-02-14 PROBLEM — E87.6 HYPOKALEMIA: Status: RESOLVED | Noted: 2021-12-16 | Resolved: 2022-02-14

## 2022-02-14 PROBLEM — R60.0 PEDAL EDEMA: Status: RESOLVED | Noted: 2021-12-16 | Resolved: 2022-02-14

## 2022-02-14 PROBLEM — M79.671 PAIN IN BOTH FEET: Status: RESOLVED | Noted: 2021-11-05 | Resolved: 2022-02-14

## 2022-02-14 PROCEDURE — 99214 OFFICE O/P EST MOD 30 MIN: CPT | Performed by: FAMILY MEDICINE

## 2022-02-14 NOTE — ASSESSMENT & PLAN NOTE
Patient omeprazole 20 milligram   Will continue same  Suggested to continue with lifestyle modifications, avoid dietary triggers

## 2022-02-14 NOTE — ASSESSMENT & PLAN NOTE
Lab Results   Component Value Date    EGFR 61 02/10/2022    EGFR 65 01/27/2022    EGFR 65 01/25/2022    CREATININE 0 95 02/10/2022    CREATININE 0 90 01/27/2022    CREATININE 0 91 01/25/2022     Renal functions stable  Continue monitoring/management per Nephrology

## 2022-02-14 NOTE — PROGRESS NOTES
Virtual Regular Visit    Verification of patient location:    Patient is located in the following state in which I hold an active license [place]      Assessment/Plan:    Problem List Items Addressed This Visit        Digestive    Gastroesophageal reflux disease without esophagitis - Primary     Patient omeprazole 20 milligram   Will continue same  Suggested to continue with lifestyle modifications, avoid dietary triggers  Cardiovascular and Mediastinum    Essential hypertension     Patient's blood pressure is at goal   Currently on losartan 100 milligram, spironolactone, torsemide  Renal functions stable  Being monitored by Nephrology  Genitourinary    Stage 3 chronic kidney disease [facility])     Lab Results   Component Value Date    EGFR 61 02/10/2022    EGFR 65 01/27/2022    EGFR 65 01/25/2022    CREATININE 0 95 02/10/2022    CREATININE 0 90 01/27/2022    CREATININE 0 91 01/25/2022     Renal functions stable  Continue monitoring/management per Nephrology  Other    Hyperlipidemia     LDL is at goal, liver function stable  Tolerating atorvastatin 20 milligram without any side effects  Continue same  Relevant Orders    Lipid panel    Prediabetes     Patient has pre diabetes  A1c 6 0  Recommended low carb diet  Continue monitoring           Relevant Orders    Comprehensive metabolic panel    Hemoglobin A1C      Other Visit Diagnoses     Visit for screening mammogram        Relevant Orders    Mammo screening bilateral w 3d & cad    Elevated liver enzymes        Screen for colon cancer        Relevant Orders    Cologuard               Reason for visit is   Chief Complaint   Patient presents with    Follow-up     3 month follow up    Virtual Regular Visit        Encounter provider Moraima Jang MD    Provider located at [address] [place] [id number]      Recent Visits  No visits were found meeting these conditions  Showing recent visits within past 7 days and meeting all other requirements  Today's Visits  Date Type Provider Dept   02/14/22 Telemedicine Ren Daniels MD [facility]   Showing today's visits and meeting all other requirements  Future Appointments  No visits were found meeting these conditions  Showing future appointments within next 150 days and meeting all other requirements       The patient was identified by name and date of birth  Boo Quiros was informed that this is a telemedicine visit and that the visit is being conducted through [address] Now and patient was informed that this is a secure, HIPAA-compliant platform  She agrees to proceed     My office door was closed  No one else was in the room  She acknowledged consent and understanding of privacy and security of the video platform  The patient has agreed to participate and understands they can discontinue the visit at any time  Patient is aware this is a billable service  Subjective  Boo Quiros is a 74 y o  female    Hyperlipidemia  This is a chronic problem  The current episode started more than 1 year ago  The problem is controlled  Recent lipid tests were reviewed and are normal  Pertinent negatives include no chest pain, focal sensory loss, myalgias or shortness of breath  Current antihyperlipidemic treatment includes statins  The current treatment provides significant improvement of lipids  There are no compliance problems  Risk factors for coronary artery disease include dyslipidemia, hypertension and post-menopausal    Heartburn  She reports no chest pain or no heartburn  This is a chronic problem  The current episode started more than 1 year ago  The problem occurs occasionally  The problem has been gradually improving  The symptoms are aggravated by certain foods  She has tried a PPI for the symptoms  The treatment provided significant relief  Patient has history of pre diabetes    Labs reveal borderline fasting blood sugar, A1c stable  Patient denies any symptoms of polyuria or polydipsia  Patient continues to experience pain in both her knees  Was evaluated by [facility] or full, known to have moderate to severe arthritis in both her knees  Patient was unable to continue physical therapy  Encouraged to restart physical therapy, N/C that helps control her symptoms  Past Medical History:   Diagnosis Date    Hyperlipidemia     Hypertension        History reviewed  No pertinent surgical history  Current Outpatient Medications   Medication Sig Dispense Refill    acetaminophen (TYLENOL) 500 mg tablet Take 1 tablet (500 mg total) by mouth every 6 (six) hours as needed for mild pain 90 tablet 1    atorvastatin (LIPITOR) 20 mg tablet TAKE 1 TABLET (20 MG TOTAL) BY MOUTH DAILY 90 tablet 1    losartan (COZAAR) 100 MG tablet Take 1 tablet (100 mg total) by mouth daily 30 tablet 5    omeprazole (PriLOSEC) 20 mg delayed release capsule TAKE 1 CAPSULE (20 MG TOTAL) BY MOUTH DAILY 90 capsule 1    spironolactone (ALDACTONE) 25 mg tablet Take 1 tablet (25 mg total) by mouth daily 30 tablet 5    torsemide (DEMADEX) 5 MG tablet Take 1 tablet (5 mg total) by mouth daily 30 tablet 5    Diclofenac Sodium (VOLTAREN) 1 % APPLY 2 GRAMS TOPICALLY 4 (FOUR) TIMES A DAY (Patient not taking: Reported on 1/26/2022) 100 g 0    polymyxin b-trimethoprim (POLYTRIM) ophthalmic solution INSTILL 1 DROP INTO SURGERY EYE 4 TIMES A DAY  START 3 DAYS PRIOR TO SURGERY DATE  SEPARATE EYE DROPS BY FIVE MINUTES OF EACH OTHER  (Patient not taking: Reported on 1/26/2022)      potassium chloride (K-DUR,KLOR-CON) 20 mEq tablet Take 1 tablet (20 mEq total) by mouth daily for 3 days 3 tablet 0    prednisoLONE acetate (PRED FORTE) 1 % ophthalmic suspension INSTILL 1 DROP INTO SURGERY EYE 4 TIMES A DAY  START 3 DAYS PRIOR TO SURGERY DATE  SEPARATE EYE DROPS BY FIVE MINUTES OF EACH OTHER   (Patient not taking: Reported on 1/26/2022)       No current facility-administered medications for this visit  No Known Allergies    Review of Systems   Constitutional: Negative  Respiratory: Negative  Negative for shortness of breath  Cardiovascular: Negative  Negative for chest pain and palpitations  Gastrointestinal: Negative for heartburn  Musculoskeletal: Negative for myalgias  Knee pain   Neurological: Negative  Psychiatric/Behavioral: Negative  Video Exam    Vitals:    02/14/22 0900   Weight: 82 6 kg (182 lb)   Height: 5' (1 524 m)       Physical Exam  Constitutional:       Appearance: She is well-developed  Pulmonary:      Effort: No respiratory distress  Breath sounds: Normal breath sounds  No wheezing  Neurological:      Mental Status: She is alert and oriented to person, place, and time  Psychiatric:         Behavior: Behavior normal          Thought Content: Thought content normal          Judgment: Judgment normal           I spent 25 minutes with patient today in which greater than 50% of the time was spent in counseling/coordination of care regarding Reviewing labs, management of symptoms  Suggesting treatment options  VIRTUAL VISIT DISCLAIMER      [place]ry [place] verbally agrees to participate in [employer]  Pt is aware that [employer] could be limited without vital signs or the ability to perform a full hands-on physical Edger Pillow understands she or the provider may request at any time to terminate the video visit and request the patient to seek care or treatment in person

## 2022-02-14 NOTE — ASSESSMENT & PLAN NOTE
Patient's blood pressure is at goal   Currently on losartan 100 milligram, spironolactone, torsemide  Renal functions stable  Being monitored by Nephrology

## 2022-02-14 NOTE — ASSESSMENT & PLAN NOTE
LDL is at goal, liver function stable  Tolerating atorvastatin 20 milligram without any side effects  Continue same

## 2022-02-15 ENCOUNTER — TELEPHONE (OUTPATIENT)
Dept: NEPHROLOGY | Facility: CLINIC | Age: 67
End: 2022-02-15

## 2022-02-15 NOTE — TELEPHONE ENCOUNTER
----- Message from Pam Patino MD sent at 2/15/2022 10:06 AM EST -----  Please let the patient know that the kidney artery Doppler is negative for any blockages  Thank you

## 2022-02-15 NOTE — TELEPHONE ENCOUNTER
I would make sure to order a basic metabolic profile in about 2-3 weeks now that she has been off potassium this is nonfasting   Thank you

## 2022-02-15 NOTE — TELEPHONE ENCOUNTER
Lm for the patients daughter advising that imaging was all normal no sign of blockage or blood clots

## 2022-02-15 NOTE — TELEPHONE ENCOUNTER
----- Message from Terrence Schmid MD sent at 2/15/2022  9:59 AM EST -----  Please let the patient know there is no evidence of a blood clot in the legs!

## 2022-02-21 ENCOUNTER — CONSULT (OUTPATIENT)
Dept: VASCULAR SURGERY | Facility: CLINIC | Age: 67
End: 2022-02-21
Payer: MEDICARE

## 2022-02-21 VITALS
SYSTOLIC BLOOD PRESSURE: 132 MMHG | DIASTOLIC BLOOD PRESSURE: 68 MMHG | BODY MASS INDEX: 35.34 KG/M2 | HEIGHT: 60 IN | HEART RATE: 70 BPM | WEIGHT: 180 LBS

## 2022-02-21 DIAGNOSIS — E78.49 OTHER HYPERLIPIDEMIA: ICD-10-CM

## 2022-02-21 DIAGNOSIS — R60.0 PEDAL EDEMA: ICD-10-CM

## 2022-02-21 DIAGNOSIS — I12.9 PARENCHYMAL RENAL HYPERTENSION, STAGE 1 THROUGH STAGE 4 OR UNSPECIFIED CHRONIC KIDNEY DISEASE: ICD-10-CM

## 2022-02-21 DIAGNOSIS — N18.31 STAGE 3A CHRONIC KIDNEY DISEASE (HCC): ICD-10-CM

## 2022-02-21 DIAGNOSIS — N18.9 CKD (CHRONIC KIDNEY DISEASE): ICD-10-CM

## 2022-02-21 DIAGNOSIS — I87.2 VENOUS INSUFFICIENCY: ICD-10-CM

## 2022-02-21 DIAGNOSIS — I89.0 LYMPHEDEMA: Primary | ICD-10-CM

## 2022-02-21 DIAGNOSIS — E66.01 OBESITY, MORBID (HCC): ICD-10-CM

## 2022-02-21 DIAGNOSIS — E87.6 HYPOKALEMIA: ICD-10-CM

## 2022-02-21 PROCEDURE — 99204 OFFICE O/P NEW MOD 45 MIN: CPT | Performed by: PHYSICIAN ASSISTANT

## 2022-02-21 NOTE — PATIENT INSTRUCTIONS
Bilateral lower extremity edema  Small veins    She appears to have evidence of lower extremity lymphedema  There are unlikely to be any good surgical options  Recommend daily compression  Continue with work up      - daily compression  - apply compression 1st thing in the morning and take them off at night  - compression, elevation, low-sodium diet  - Regular activity with walking for weight loss  - lymphedema pumps  - check reflux study for completion   - follow up in 1 month to re-measure legs            Lymphedema   AMBULATORY CARE:   The lymphatic system  contains fluid, vessels, tissue, and organs  This system removes and carries fluid throughout the body  It also helps the body fight infection  Lymphedema is the buildup of lymph fluid in fat tissue under your skin  The buildup causes the area to swell  Lymphedema can happen any time that lymphatic vessels are blocked or damaged  Damage to lymphatic vessels may be caused by surgery, infection, injury, cancer, radiation, or scar tissue     Common signs and symptoms include the following:  Signs and symptoms may happen where lymph nodes were removed, or in the arm, leg, chest, or underarm  · Swelling or itching    · Pain, burning, or aching    · Tight, hard, or red skin    · Hair loss    · Heaviness or fullness    · Numbness or tingling    · Stiffness    Contact your healthcare provider or lymphedema specialist if:   · You have a fever or chills  · You have an open area of skin that looks red or swollen, or drains pus  · Your symptoms, such as swelling or pain, get worse  · Your arms or legs feel heavy, or you cannot move them  · Your skin becomes hard, thick, or rough  · You have a skin wound that will not heal      · Your shoes, clothes, or jewelry feel tighter  · You have questions or concerns about your condition or care  Treatment for lymphedema  can relieve symptoms and prevent lymphedema from getting worse   You may need therapeutic massage, physical therapy, or compression devices to help decrease swelling and pain  Surgery may be needed if other treatments do not work  Self-care:   · Elevate  your arm or leg above the level of your heart as often as you can  This will help decrease swelling and pain  Prop your arm or leg on pillows or blankets to keep it elevated comfortably  · Wear compression socks, sleeves, or bandages  as directed  Compression devices must be fitted by a healthcare provider  Compression devices may need to be replaced every 3 to 6 months  · Exercise  can help you maintain or regain function of your arm or leg  Ask your healthcare provider what type of exercise to do and how often to do it  Start slow, take breaks, and gradually do more each day  Do not do vigorous, repeated exercises  Watch for changes in your arm or leg during exercise  Stop and rest if you have swelling, increased pain, or heaviness  Elevate your arm or leg above the level of your heart  · Change your position often  to help move lymphatic fluid through your body  Do not sit or  one position for more than 30 minutes  Do not cross your legs when you sit  These actions can cause lymphatic fluid to buildup  · Maintain a healthy weight  Ask your healthcare provider what you should weigh  Weight loss may improve your symptoms  If you need to lose weight, your healthcare provider can help you create a weight loss program     Prevent infection with proper skin care:  A skin infection can make lymphedema worse  Do the following to decrease your risk for a skin infection in your arm or leg with lymphedema:  · Wash your skin gently and dry it well  Use a mild soap to wash your skin  Gently pat your skin dry after you bathe  Apply a mild cream or lotion to moisturize your skin and prevent dryness or cracking  Keep your feet clean and dry  · Protect your skin from injury  Wear gloves when you garden and wash dishes   Cut your nails straight across to prevent injury to your fingers and toes  Use sunscreen and insect repellant to avoid burns and punctures  Wear slippers in the house  Wear shoes when you go outside  · Check your skin every day for signs of infection  Signs of infection include redness, swelling, increased heat, or pus  You may also have a fever or chills  · Care for cuts, scratches or burns  Apply antibiotic ointment to cuts and other small breaks in the skin  Apply a cold pack or cold water to a burn for 15 minutes  Then wash it with soap and water  Cover scratches, cuts, or burns with a clean, dry gauze or bandage as directed  Keep the area clean and dry  · Tell healthcare providers that you have lymphedema  Tell them not to do, IVs, blood draws, and blood pressure readings in the arm or leg with lymphedema  If there is lymphedema in both arms, ask them to take your blood pressure on your leg  Do not allow flu shots or vaccinations in your arm with lymphedema  Follow up with your healthcare provider or lymphedema specialist as directed: You will need regular visits so healthcare providers can examine the affected areas  You may also be referred to a clinic that specializes in lymphedema treatment  Write down your questions so you remember to ask them during your visits  © Copyright [facility] 2021 Information is for End User's use only and may not be sold, redistributed or otherwise used for commercial purposes  All illustrations and images included in CareNotes® are the copyrighted property of [email or web address]  or Joey Goodson  The above information is an  only  It is not intended as medical advice for individual conditions or treatments  Talk to your doctor, nurse or pharmacist before following any medical regimen to see if it is safe and effective for you

## 2022-02-21 NOTE — PROGRESS NOTES
[facility]   Phone: [phone]  Fax: [phone]   E-mail: [email or web address]    Ordering Provider:  YRIS Zeng (NPI: [id number])  [facility]  [address]  [place]   [address] [facility], [address] [place] [address]  Phone: [phone]  Fax: [phone]      Patient Information  Ana Dimas  1955  [address] [place] [id number] [phone]     Insurance Information  Payor: MEDICARE / Plan: MEDICARE A AND B / Product Type: Medicare A & B Fee for Service /      [id number] - (Medicare )     Patient Height and Weight 5' (1 524 m)    Wt Readings from Last 1 Encounters:   02/21/22 81 6 kg (180 lb)       Compression Lymphedema Pump Prescription Form      [x] Patient utilized Compression Garment ?  30 mmHg distally OR Gradient Wrap System    Appliance:     Legs:    [x] Right    [x] Left   Arms:    [] Right    [] Left     []Chest garment    []Abdominal garment     Length of need: 99 months    Protocol:  [x] Std: 40 mmHg, TID/ BID,  60 minutes  [] Other:   Pressures: __ mmHg    Frequency: __ / Day   Minutes/ Session: __ minutes    Patient History and Prognosis:  [x] Patient was diagnosed for the chronic disorder with reported on-set __  [x] Attempts at elevation and compression have not improved patients' condition and is now at risk of lymphatic disorder progressing to the next stage/ grade  [x] Patient's physical condition/ range of motion limited for exercise  [x] Patient/ Caregiver experienced difficulty applying 30 mmHg distal compression garments  [x] Patient compression stocking/ wrap tolerance limited due to pain/ reduced circulation  [x] Patient advised to reduce salt intake and adhere to a daily regiment of elevation, compression, and lymphatic exercises  [x] Patient's severe condition will not improve without further treatment interventions  [x] Patient has noted skin changes such as marked hyperkeratosis with hyperplasia and hyperpigmentation, papillomatosis cutis lymphostatica, elephantiasis, and/ or skin breakdown with persisting lymphorrhea    Symptoms/ Observation/ Evaluation/ Plan of Care for Lymphedema / Venous Compression Pumps     Conservative Care ? 4 weeks for severe lymphedema (check all that apply):  Patient instructions for DAILY use of conservative therapies;  [x] Elevate extremities daily and nightly to reduce swelling  [x] Exercise and ambulate / range of motion (ROM) daily to increase fluid flow and reduce swelling  [x] Wear 30-mmHg distal compression garments / wraps daily to reduce / control swelling  [x] Manual lymph drainage (MLD)  [x] On-set date of lymphedema / ulcers: uncontrolled lymphedema began  approx July 2021      Initial Measurements      Body Part Right Left   Ankle / Forearm 28 cm 26 cm   Calf / Elbow  48 cm 42 cm   Knee / Bicep  53 cm 43 cm   Mid-Thigh / Axilla  56 cm 49 cm

## 2022-02-21 NOTE — PROGRESS NOTES
Assessment/Plan:    Lymphedema  Bilateral lower extremity and pedal edema  Venous insufficiency  -     VAS reflux lower limb venous duplex study with reflux assessment, complete bilateral; Future  -     Assistive Device (stocking application)  -     Compression Stocking    -chronic, bilateral LE edema which significantly worsened about 6 months ago  -daily painful, tired and heavy legs; difficulty walking  -evaluations by multiple doctors    A/P   R>L bilateral LE edema    She appears to have lymphedema  Recommend daily compression and other conservative measures  Would likely benefit from lymphedema pumps  Will check venous reflux study for completeness and help with diagnosis  Continue with work up  There are unlikely to be any good surgical options  - trial of daily compression  - apply compression in the morning and take them off at night  - compression, elevation, low-sodium diet  - Regular activity with walking for weight loss  - will measure for lymphedema pumps  - check reflux study for completion   - follow up in 1 month to re-measure legs        Parenchymal renal hypertension, stage 1 through stage 4 or unspecified chronic kidney disease  -     Ambulatory Referral to Vascular Surgery      Other hyperlipidemia  -     Ambulatory Referral to Vascular Surgery      Obesity, morbid ([facility] [address] )      Subjective:      Patient ID: Ubaldo Troy is a 74 y o  female  Pt is new and was referred by Liliya Owens MD for lower extremity edema  Recent renal artey duplex and LEV 2/10/2022  Pt c/o bilateral R >L foot and leg pain and swelling  Pt is taking Atorvastatin      HPI    Ubaldo Troy 18-year-old female obese, pre-diabetes, CKD 3a, hypertension, hyperlipidemia, low back pain right lower extremity edema  Patient is being seen by Nephrology, Dr Liliya Owens, regarding lower extremity edema and optimization of blood pressure therapy  She is now referred to vascular surgery for evaluation of leg pain and edema    She is accompanied by her daughter-in-law who acts as   Patient is seated in wheelchair and feels very limited  She complains of painful, heavy and swollen feet/legs which bother her everyday  Historically, daughter-in-law states that she has always had a little bit of swelling  However, about 6 months ago, the swelling became severe particularly to the right leg  She shows me a picture of foot recently with there is 4+ lower extremity swelling  The legs are very painful  She experiences pain day and night  She has right leg pain if she sits too long  It often feels like the leg is a going to sleep  She also has difficulty getting comfortable in bed or is sitting in a car due to pain and heaviness in the right leg  She reports that she has had to adapt the way she sits in a vehicle  She is unable to perform daily activities she believes she ought to be doing due to leg pain  She tried compression but is unable to get them on by herself  She elevates her legs at rest   She is also overweight  She expresses that she has seen multiple doctors, including primary care, ED, pain management with back injections, Orthopedics with knee injections, Nephrology and now seeing vascular  She also has an upcoming appointment for evaluation Cardiology  She had multiple tests including lower extremity venous study, renal duplex and echocardiogram which have been normal   She is taking a diuretic therapy  A renal artery duplex shows no evidence of renal artery stenosis  Lower extremity venous duplex shows no evidence of DVT bilaterally  There are noted to be triphasic waveforms at the ankles, bilaterally  We discussed the importance of low-sodium diet, continued activity and weight loss  Patient/daughter in-law very frustrated as she has had multiple evaluations but unable to determine clear cause or adequate control of leg swelling and pain  Current medications include:   Atorvastatin 20, torsemide 5, spironolactone 25, losartan 100, etc  She was on Lyrica which was stopped due to concern that it can increase edema  LEV 2/10/22  Risk Factors  The patient has history of HTN and Hyperlipidemia  She has no history of DVT  or Recent Trauma  Clinical  Right Pressure:  140/76 mm Hg, Left Pressure:  140/80 mm Hg  CONCLUSION:     Impression:  RIGHT LOWER LIMB:  No evidence of acute or chronic deep vein thrombosis  No evidence of superficial thrombophlebitis noted  Doppler evaluation shows a normal response to augmentation maneuvers  Popliteal, posterior tibial and anterior tibial arterial Doppler waveforms are  triphasic  LEFT LOWER LIMB:  No evidence of acute or chronic deep vein thrombosis  No evidence of superficial thrombophlebitis noted  Doppler evaluation shows a normal response to augmentation maneuvers  Popliteal, posterior tibial and anterior tibial arterial Doppler waveforms are  Triphasic  Renal du 2/10/22  Patient presents to evaluate the renal arteries secondary to uncontrolled HTN  Patient is currently on medications for Blood pressure  Operative History:  Patient denies any cardiovascular procedures  Risk Factors  The patient has history of HTN, Hyperlipidemia and CKD  Clinical  Right Pressure:  140/76 mm Hg, Left Pressure:  140/80 mm Hg  FINDINGS:     Unilateral  PSV (cm/s)  EDV (cm/s)    RI    Sup-Bailey Ao          72          22  0 70    Celiac             124          29          Prox   SMA          203          38             Right         Impression  PSV (cm/s)  EDV (cm/s)   RAR    RI  Kidney (cm)    Ostial Renal                      88          21                             Prox Renal                       118          27  1 63                       Mid Renal                        132          33  1 82  0 77                 Dist Renal                        76          22  1 05  0 76                 Kidney 10 10    Upper Pole                        25           9        0 65                 Mid Pole                          25           8                             Lower Pole                        17           8        0 53                 Renal         Patent                                                            Left          Impression  PSV (cm/s)  EDV (cm/s)   RAR    RI  Kidney (cm)    Ostial Renal                     103          31                             Prox Renal                       135          28  1 86                       Mid Renal                        106          36  1 46  0 66                 Dist Renal                       127          31  1 76  0 76                 Kidney                                                               9 40    Upper Pole                        34           8        0 76                 Mid Pole                          35           9        0 75                 Lower Pole                        19           8        0 57                 Renal         Patent                                                                  CONCLUSION:  Impression  The abdominal aorta is widely patent and normal caliber  RIGHT RENAL:  No evidence of significant arterial occlusive disease in the main renal artery  Patent renal vein  Renovascular resistive index of 0 65  Renal/Aorta Ratio: 1 82  The Kidney measures 10 1 cm  LEFT RENAL:  No evidence of significant arterial occlusive disease in the main renal artery  Patent renal vein  Renovascular resistive index of 0 76  Renal/Aorta Ratio: 1 86  The Kidney measures 9 4 cm  MESENTERIC:  Celiac and superior mesenteric arteries are patent  Technically difficult/limited study to heavy overlying bowel gas and  respiratory movement        The following portions of the patient's history were reviewed and updated as appropriate: allergies, current medications, past family history, past medical history, past social history, past surgical history and problem list     Review of Systems   Constitutional: Negative  HENT: Negative  Eyes: Negative  Respiratory: Negative  Cardiovascular: Positive for leg swelling  Swollen right foot    Gastrointestinal: Negative  Endocrine: Negative  Genitourinary: Negative  Musculoskeletal: Negative  Skin: Negative  Allergic/Immunologic: Negative  Neurological: Negative  Hematological: Negative  Objective:      /68 (BP Location: Left arm, Patient Position: Sitting, Cuff Size: Standard)   Pulse 70   Ht 5' (1 524 m)   Wt 81 6 kg (180 lb)   BMI 35 15 kg/m²     Seated in wheelchair  Comfortable and in no distress  Rubbing right anterior and lateral thigh due to discomfort  Small, scattered veins over thighs and lower legs less than 3 mm wide    R LE and pedal edema 2+, 1+ DP pulse    L LE edema 1+, 1+ DP pulse    Feet are warm and well perfused  Skin is healthy and intact  Physical Exam  Vitals and nursing note reviewed  Constitutional:       Appearance: She is well-developed  She is obese  HENT:      Head: Normocephalic and atraumatic  Eyes:      General: No scleral icterus  Pupils: Pupils are equal, round, and reactive to light  Neck:      Thyroid: No thyromegaly  Vascular: No JVD  Trachea: Trachea normal    Cardiovascular:      Rate and Rhythm: Normal rate and regular rhythm  Pulses:           Carotid pulses are 2+ on the right side and 2+ on the left side  Radial pulses are 2+ on the right side and 2+ on the left side  Dorsalis pedis pulses are 1+ on the right side and 1+ on the left side  Heart sounds: Normal heart sounds, S1 normal and S2 normal  No murmur heard  No friction rub  No gallop  Pulmonary:      Effort: Pulmonary effort is normal  No accessory muscle usage or respiratory distress  Breath sounds: Normal breath sounds  No wheezing or rales  Abdominal:      General: Bowel sounds are normal  There is no distension  Palpations: Abdomen is soft  Tenderness: There is no abdominal tenderness  Musculoskeletal:         General: No deformity  Normal range of motion  Cervical back: Neck supple  Right lower leg: Edema present  Left lower leg: Edema present  Skin:     General: Skin is warm and dry  Findings: No lesion or rash  Nails: There is no clubbing  Neurological:      Mental Status: She is alert and oriented to person, place, and time  Comments: Grossly normal    Psychiatric:         Behavior: Behavior is cooperative  I have reviewed and made appropriate changes to the review of systems input by the medical assistant  Vitals:    02/21/22 0822   BP: 132/68   BP Location: Left arm   Patient Position: Sitting   Cuff Size: Standard   Pulse: 70   Weight: 81 6 kg (180 lb)   Height: 5' (1 524 m)       Patient Active Problem List   Diagnosis    Hyperlipidemia    Chronic bilateral low back pain without sciatica    Gastroesophageal reflux disease without esophagitis    Essential hypertension    BMI 34 0-34 9,adult    Chronic pain of both knees    Obesity, morbid (HCC)    At risk for falls    Balance disorder    Primary osteoarthritis of both knees    Parenchymal renal hypertension    Stage 3 chronic kidney disease ([facility] [address] )    Prediabetes       No past surgical history on file      Family History   Problem Relation Age of Onset    No Known Problems Mother     No Known Problems Father        Social History     Socioeconomic History    Marital status: /Civil Union     Spouse name: Not on file    Number of children: Not on file    Years of education: Not on file    Highest education level: Not on file   Occupational History    Not on file   Tobacco Use    Smoking status: Never Smoker    Smokeless tobacco: Never Used   Substance and Sexual Activity    Alcohol use: Never    Drug use: Never    Sexual activity: Not on file   Other Topics Concern    Not on file   Social History Narrative    Not on file     Social Determinants of Health     Financial Resource Strain: Not on file   Food Insecurity: Not on file   Transportation Needs: Not on file   Physical Activity: Not on file   Stress: Not on file   Social Connections: Not on file   Intimate Partner Violence: Not on file   Housing Stability: Not on file       No Known Allergies      Current Outpatient Medications:     acetaminophen (TYLENOL) 500 mg tablet, Take 1 tablet (500 mg total) by mouth every 6 (six) hours as needed for mild pain, Disp: 90 tablet, Rfl: 1    atorvastatin (LIPITOR) 20 mg tablet, TAKE 1 TABLET (20 MG TOTAL) BY MOUTH DAILY, Disp: 90 tablet, Rfl: 1    losartan (COZAAR) 100 MG tablet, Take 1 tablet (100 mg total) by mouth daily, Disp: 30 tablet, Rfl: 5    omeprazole (PriLOSEC) 20 mg delayed release capsule, TAKE 1 CAPSULE (20 MG TOTAL) BY MOUTH DAILY, Disp: 90 capsule, Rfl: 1    spironolactone (ALDACTONE) 25 mg tablet, Take 1 tablet (25 mg total) by mouth daily, Disp: 30 tablet, Rfl: 5    torsemide (DEMADEX) 5 MG tablet, Take 1 tablet (5 mg total) by mouth daily, Disp: 30 tablet, Rfl: 5    Diclofenac Sodium (VOLTAREN) 1 %, APPLY 2 GRAMS TOPICALLY 4 (FOUR) TIMES A DAY (Patient not taking: Reported on 1/26/2022), Disp: 100 g, Rfl: 0    polymyxin b-trimethoprim (POLYTRIM) ophthalmic solution, INSTILL 1 DROP INTO SURGERY EYE 4 TIMES A DAY  START 3 DAYS PRIOR TO SURGERY DATE  SEPARATE EYE DROPS BY FIVE MINUTES OF EACH OTHER  (Patient not taking: Reported on 1/26/2022), Disp: , Rfl:     potassium chloride (K-DUR,KLOR-CON) 20 mEq tablet, Take 1 tablet (20 mEq total) by mouth daily for 3 days, Disp: 3 tablet, Rfl: 0    prednisoLONE acetate (PRED FORTE) 1 % ophthalmic suspension, INSTILL 1 DROP INTO SURGERY EYE 4 TIMES A DAY  START 3 DAYS PRIOR TO SURGERY DATE  SEPARATE EYE DROPS BY FIVE MINUTES OF EACH OTHER   (Patient not taking: Reported on 1/26/2022), Disp: , Rfl:

## 2022-02-22 DIAGNOSIS — M79.671 PAIN IN BOTH FEET: ICD-10-CM

## 2022-02-22 DIAGNOSIS — M79.672 PAIN IN BOTH FEET: ICD-10-CM

## 2022-03-03 DIAGNOSIS — I89.0 LYMPHEDEMA: Primary | ICD-10-CM

## 2022-03-03 NOTE — PROGRESS NOTES
Received call from daughter, she is at [facility] and pt would like open toe  They are telling her she needs a new rx  S/w person at [facility], she states pt is requesting one open toe and one closed toe pair of compression hose  Medicare requires separate rx for open toe  rx faxed to [phone]

## 2022-03-04 ENCOUNTER — DOCUMENTATION (OUTPATIENT)
Dept: NEPHROLOGY | Facility: CLINIC | Age: 67
End: 2022-03-04

## 2022-03-04 DIAGNOSIS — N18.31 STAGE 3A CHRONIC KIDNEY DISEASE (HCC): Primary | ICD-10-CM

## 2022-03-04 DIAGNOSIS — I12.9 PARENCHYMAL RENAL HYPERTENSION, STAGE 1 THROUGH STAGE 4 OR UNSPECIFIED CHRONIC KIDNEY DISEASE: ICD-10-CM

## 2022-03-04 RX ORDER — LOSARTAN POTASSIUM 50 MG/1
50 TABLET ORAL DAILY
Qty: 90 TABLET | Refills: 3 | Status: SHIPPED | OUTPATIENT
Start: 2022-03-04

## 2022-03-04 NOTE — TELEPHONE ENCOUNTER
Spoke with patient's daughter about the following recommendations:  1  Decrease losartan to 50 mg daily because of low blood pressure  2  Repeat a basic metabolic profile 1-2 weeks after doing this  3  Continue all other medications  4  Wait 2-3 weeks and then recheck week a blood pressure readings morning evening if possible sitting and standing and try to have them see 1 of the advanced practitioner's to check the blood pressure machine along with the blood pressure readings    She wrote down all of the instructions and expressed understanding  She asked for a new losartan prescription be sent to the [facility]

## 2022-03-04 NOTE — PROGRESS NOTES
Home blood pressure readings:  -106/67 morning evening  Heart rate 70-80    Recommend:  1  Decrease losartan to 50 mg daily because of low blood pressure  2  Repeat a basic metabolic profile 1-2 weeks after doing this  3  Continue all other medications  4   Wait 2-3 weeks and then recheck week a blood pressure readings morning evening if possible sitting and standing and try to have them see 1 of the advanced practitioner's to check the blood pressure machine along with the blood pressure readings    Thank you

## 2022-03-08 ENCOUNTER — HOSPITAL ENCOUNTER (OUTPATIENT)
Dept: NON INVASIVE DIAGNOSTICS | Facility: CLINIC | Age: 67
Discharge: HOME | End: 2022-03-08
Payer: MEDICARE

## 2022-03-08 DIAGNOSIS — I87.2 VENOUS INSUFFICIENCY: ICD-10-CM

## 2022-03-08 PROCEDURE — 93970 EXTREMITY STUDY: CPT | Performed by: SURGERY

## 2022-03-08 PROCEDURE — 93970 EXTREMITY STUDY: CPT

## 2022-03-18 ENCOUNTER — APPOINTMENT (OUTPATIENT)
Dept: LAB | Facility: HOSPITAL | Age: 67
End: 2022-03-18
Attending: INTERNAL MEDICINE
Payer: MEDICARE

## 2022-03-18 DIAGNOSIS — N18.31 STAGE 3A CHRONIC KIDNEY DISEASE (HCC): ICD-10-CM

## 2022-03-18 LAB
ANION GAP SERPL CALCULATED.3IONS-SCNC: 7 MMOL/L (ref 4–13)
BUN SERPL-MCNC: 13 MG/DL (ref 5–25)
CALCIUM SERPL-MCNC: 9.5 MG/DL (ref 8.3–10.1)
CHLORIDE SERPL-SCNC: 105 MMOL/L (ref 100–108)
CO2 SERPL-SCNC: 25 MMOL/L (ref 21–32)
CREAT SERPL-MCNC: 0.94 MG/DL (ref 0.6–1.3)
GFR SERPL CREATININE-BSD FRML MDRD: 62 ML/MIN/1.73SQ M
GLUCOSE SERPL-MCNC: 89 MG/DL (ref 65–140)
POTASSIUM SERPL-SCNC: 4 MMOL/L (ref 3.5–5.3)
SODIUM SERPL-SCNC: 137 MMOL/L (ref 136–145)

## 2022-03-18 PROCEDURE — 36415 COLL VENOUS BLD VENIPUNCTURE: CPT

## 2022-03-18 PROCEDURE — 80048 BASIC METABOLIC PNL TOTAL CA: CPT

## 2022-03-21 ENCOUNTER — TELEPHONE (OUTPATIENT)
Dept: NEPHROLOGY | Facility: CLINIC | Age: 67
End: 2022-03-21

## 2022-03-21 NOTE — TELEPHONE ENCOUNTER
LM for patient's daughter, Zach Darling, letting her know labs look great and there are no changes at this time  Asked her to call back if she has any questions

## 2022-03-21 NOTE — TELEPHONE ENCOUNTER
----- Message from Vonda Beasley MD sent at 3/18/2022  3:08 PM EDT -----  Labs look great including the potassium and the kidney tests  No changes at this time

## 2022-03-22 ENCOUNTER — TELEPHONE (OUTPATIENT)
Dept: VASCULAR SURGERY | Facility: CLINIC | Age: 67
End: 2022-03-22

## 2022-03-22 NOTE — TELEPHONE ENCOUNTER
Received a call from pt's daughter, Kathryn  She stated that pt still did not get her compression stockings yet  They are on special order from [facility] because pt is a petite size  Hopefully the stockings will arrive in two weeks  Pt has a follow up office visit with provider on 4/4/22  Kathryn was calling to see if the appointment should be rescheduled  She also said that provider had mentioned that massage therapy may be beneficial to patient  Kathryn was calling to get more information on that  Routed to provider to advise if massage therapy would be beneficial and if her appointment should be rescheduled  Kathryn's call back number is [phone]

## 2022-03-23 NOTE — TELEPHONE ENCOUNTER
Spoke with Kathryn  Made her aware of provider's response  Canceled the follow up appointment  Kathryn will call back to reschedule when she gets the compression stockings  If she does not get the compression within the next week, she will call the office to get patient tubigrip to use until the stockings come in

## 2022-03-23 NOTE — TELEPHONE ENCOUNTER
Tg Buck PA-C  You; [facility] 12 minutes ago (8:19 AM)     RD    She should wear Tubigrip or medical compression for 1 month and then return for measurements so we can move forward with lymph pumps  You can let her know that the Carlie study was OK as we expected  This also suggests lymphedema without surgical options  We will plan to get her the lymphedema pumps and if she needs manual massage we can do that also      Message text

## 2022-03-24 NOTE — PROGRESS NOTES
RENAL FOLLOW UP NOTE: td     ASSESSMENT AND PLAN:   74y o  year old female with a history of hypertension/dyslipidemia/peripheral edema: We are asked to see regarding peripheral edema and hypertension  We are seeing regarding hypertension/edema/CKD    1  CKD stage 3A   I HAVE PERSONALLY REVIEWED  AND ANALYZED THE DATA/ LABS FOR THIS VISIT: DISCUSSION AS FOLLOWS  · Etiology:  Hypertensive nephrosclerosis/arteriolar nephrosclerosis  · Baseline creatinine:  1 0  · Current creatinine:  0 94 goal  · Urine protein creatinine ratio:  0 14 g at goal  · UA: Trace proteinuria but no hematuria or cell seen  · Renal ultrasound:  Negative, bilateral renal vascular calcification otherwise unremarkable  · Renal artery duplex[de-identified] Negative  Recommendations:  · Treat hypertension-please see below  · Treat dyslipidemia-please see below  · Maintain proteinuria less than 1 g or as low as possible  · Avoid nephrotoxic agents such as NSAIDs, and proton pump inhibitors if possible; patient counseled as such    2   Volume:  Peripheral edema:  Seems compatible with lymphedema  · ? Medication related including dihydropyridine calcium channel blockers/Lyrica  · Negative DVT:  Follow-up negative  · Echo 70/67/6325: EF 83% diastolic function mildly abnormal with grade 1 relaxation and aortic valve sclerosis  · Renal workup as outlined in particular ruling out proteinuria  · Albumin is normal  · Thyroid profile normal  Treatment:  · Low-sodium diet  · Leg elevation when able  · Support hose especially if standing  · Diuretics: Torsemide 5 mg daily  · Most likely lymphedema/venous stasis: Now seeing vascular surgery      3  Hypertension:       Current blood pressure averages:       None today is blood pressure machine active    · Goal blood pressure:  Less than 130/80 given CKD    Recommendations:  · Push nonmedical regimen including weight loss, isotonic exercise and a low sodium diet    Patient has been counseled the such   · MedicationChanges today:    · She will purchase a new blood pressure machine and then send in some readings  Today's office reading is reasonably good  4   Electrolytes:   Hypokalemia: Probably related to diuretics however ruling out secondary causes:   Aldosterone/renin ratio:  Negative   Renal artery duplex:  Negative  Treatment:  · Off HCTZ  · Spironolactone 25 mg daily  · High potassium diet  Current potassium:  4 0 which is normal    5  Mineral bone disorder:  Of chronic kidney disease:  · Calcium/magnesium/phosphorus:  · Borderline low magnesium 1 5: Replete    6  Dyslipidemia:  · Goal LDL:  Less than 100 given CKD  · Current lipid profile:  LDL 83/HDL 34/triglycerides 158  from January 2022  Recommendations:    Low-cholesterol/low-fat diet / weight loss as appropriate and isotonic exercise    Medication changes today:  No changes as patient is at goal    7  Anemia:   Current hemoglobin:  Normal at 12 2    8  Other problems:  · Lymphedema  · Dyslipidemia  · GERD  · Arthritis       GI HEALTH MAINTENANCE:  Patient has refused a colonoscopy understanding the potential risk of colon cancer and subsequent death if she does have colon cancer without being found  She still refuses a colonoscopy at this time  PATIENT INSTRUCTIONS:    Patient Instructions   1  Medication changes today:   Please take magnesium oxide 400 mg daily for supplementation watch for diarrhea call if you developed any    2  Please go for non fasting  lab work 2 weeks after making the above medication change  3  Please purchase a new [employer] blood pressure machine:   Then  Please take 1 week a blood pressure readings  at this time     AS FOLLOWS  MORNING AND EVENING, SITTING AND STANDING as follows:  · TAKE THE MORNING READINGS BEFORE ANY MEDICATIONS AND WHEN YOU ARE RELAXED FOR SEVERAL MINUTES  · TAKE THE EVENING READINGS:  BETWEEN 7-10 P M ; PRIOR TO ANY MEDICATIONS; AT LEAST IN OUR  FROM DINNER; AND CERTAINLY AFTER RELAXING FOR A FEW MINUTES  · PLEASE INCLUDE HEART RATE WITH YOUR BLOOD PRESSURE READINGS  · When taking standing readings, keep your arm supported at heart level and not dangling  · Make sure you are sitting with your back supported and feet on the ground and do not cross your legs or feet  · Make sure you have not taken any coffee or caffeine products or exercised or smoke cigarettes at least 30 minutes before taking your blood pressure  Then please mail these readings into the office    4  Follow-up in 4  months   Please bring in 1 week a blood pressure readings morning evening, sitting and standing is outlined above   PLEASE BRING AN YOUR BLOOD PRESSURE MACHINE TO CORRELATE WITH THE OFFICE MACHINE AT THIS NEXT SCHEDULED VISIT   Please go for fasting lab work 1-2 weeks prior to your appointment      5  General instructions:   AVOID SALT BUT NOT ADDING AN READING LABELS TO MAKE SURE THERE IS LOW-SALT IN THE FOOD THAT YOU ARE EATING  o Goal is less than 2 g of sodium intake or less than 5 g of sodium chloride intake per day     Avoid nonsteroidal anti-inflammatory drugs such as Naprosyn, ibuprofen, Aleve, Advil, Celebrex, Meloxicam (Mobic) etc   You can use Tylenol as needed if you do not have any liver condition to be concerned about     Avoid medications such as Sudafed or decongestants and antihistamines that contained the D component which is the decongestant  You can take antihistamines without the decongestant or D component   Try to avoid medications such as pantoprazole or  Protonix/Nexium or Esomeprazole)/Prilosec or omeprazole/Prevacid or lansoprazole/AcipHex or Rabeprazole  If you are able to, use Pepcid as this is safer for your kidneys       Try to exercise at least 30 minutes 3 days a week to begin with with an ultimate goal of 5 days a week for at least 30 minutes     Try to lose 5-10 lb by your next visit     Please do not drink more than 2 glasses of alcohol/wine on a daily basis as this may contribute to your high blood pressure  Subjective: There has been no hospitalizations or acute illnesses since last visit  The patient overall is feeling well  No fevers, chills, or cough or colds  Good appetite and good energy  No hematuria, dysuria, voiding symptoms or foamy urine  No gastrointestinal symptoms  No cardiovascular symptoms, she is getting ongoing treatment by vascular surgery for lower extremity edema with support hose, she did have a cramp yesterday  No headaches, dizziness or lightheadedness, but a little unsteady  Blood pressure medications:   Torsemide 5 mg daily morning   Spironolactone 25 mg daily in the morning   Losartan 50 mg daily in the morning      ROS:  See HPI, otherwise review of systems as completely reviewed with the patient are negative    Past Medical History:   Diagnosis Date    Hyperlipidemia     Hypertension      History reviewed  No pertinent surgical history  Family History   Problem Relation Age of Onset    No Known Problems Mother     No Known Problems Father       reports that she has never smoked  She has never used smokeless tobacco  She reports that she does not drink alcohol and does not use drugs  I COMPLETELY REVIEWED THE PAST MEDICAL HISTORY/PAST SURGICAL HISTORY/SOCIAL HISTORY/FAMILY HISTORY/AND MEDICATIONS  AND UPDATED ALL    Objective:     Vitals:   BP sitting on left:  132/72 with a heart rate of 72 and regular  BP standing on right:  122/80 with a heart rate of 76 and    Weight (last 2 days)     None        Wt Readings from Last 3 Encounters:   02/21/22 81 6 kg (180 lb)   02/14/22 82 6 kg (182 lb)   02/07/22 82 6 kg (182 lb)       Body mass index is 35 15 kg/m²      Physical Exam: General:  No acute distress  Skin:  No acute rash  Eyes:  No scleral icterus, noninjected, no discharge from eyes  ENT:  Moist mucous membranes  Neck:  Supple, no jugular venous distention, trachea is midline, no lymphadenopathy and no thyromegaly  Back   No CVAT  Chest:  Clear to auscultation and percussion, good respiratory effort  CVS:  Regular rate and rhythm without a rub, or gallops or murmurs  Abdomen:  Soft and nontender with normal bowel sounds  Extremities:  No cyanosis and essentially no significant lower extremity edema, no arthritic changes, normal range of motion  Neuro:  Grossly intact  Psych:  Alert, oriented x3 and appropriate      Medications:    Current Outpatient Medications:     acetaminophen (TYLENOL) 500 mg tablet, Take 1 tablet (500 mg total) by mouth every 6 (six) hours as needed for mild pain, Disp: 90 tablet, Rfl: 1    atorvastatin (LIPITOR) 20 mg tablet, TAKE 1 TABLET (20 MG TOTAL) BY MOUTH DAILY, Disp: 90 tablet, Rfl: 1    losartan (COZAAR) 50 mg tablet, Take 1 tablet (50 mg total) by mouth daily, Disp: 90 tablet, Rfl: 3    omeprazole (PriLOSEC) 20 mg delayed release capsule, TAKE 1 CAPSULE (20 MG TOTAL) BY MOUTH DAILY, Disp: 90 capsule, Rfl: 1    spironolactone (ALDACTONE) 25 mg tablet, Take 1 tablet (25 mg total) by mouth daily, Disp: 30 tablet, Rfl: 5    torsemide (DEMADEX) 5 MG tablet, Take 1 tablet (5 mg total) by mouth daily, Disp: 30 tablet, Rfl: 5    Diclofenac Sodium (VOLTAREN) 1 %, APPLY 2 GRAMS TOPICALLY 4 (FOUR) TIMES A DAY (Patient not taking: Reported on 3/29/2022), Disp: 100 g, Rfl: 0    magnesium oxide (MAG-OX) 400 mg, Take 1 tablet (400 mg total) by mouth 2 (two) times a day, Disp: 30 tablet, Rfl: 5    polymyxin b-trimethoprim (POLYTRIM) ophthalmic solution, INSTILL 1 DROP INTO SURGERY EYE 4 TIMES A DAY  START 3 DAYS PRIOR TO SURGERY DATE  SEPARATE EYE DROPS BY FIVE MINUTES OF EACH OTHER  (Patient not taking: Reported on 1/26/2022), Disp: , Rfl:     prednisoLONE acetate (PRED FORTE) 1 % ophthalmic suspension, INSTILL 1 DROP INTO SURGERY EYE 4 TIMES A DAY  START 3 DAYS PRIOR TO SURGERY DATE  SEPARATE EYE DROPS BY FIVE MINUTES OF EACH OTHER   (Patient not taking: Reported on 1/26/2022), Disp: , Rfl:     Lab, Imaging and other studies: I have personally reviewed pertinent labs  Laboratory Results:  Results for orders placed or performed in visit on 96/08/96   Basic metabolic panel   Result Value Ref Range    Sodium 137 136 - 145 mmol/L    Potassium 4 0 3 5 - 5 3 mmol/L    Chloride 105 100 - 108 mmol/L    CO2 25 21 - 32 mmol/L    ANION GAP 7 4 - 13 mmol/L    BUN 13 5 - 25 mg/dL    Creatinine 0 94 0 60 - 1 30 mg/dL    Glucose 89 65 - 140 mg/dL    Calcium 9 5 8 3 - 10 1 mg/dL    eGFR 62 ml/min/1 73sq m             Invalid input(s): ALBUMIN      Radiology review:   chest X-ray    Ultrasound      Portions of the record may have been created with voice recognition software  Occasional wrong word or "sound a like" substitutions may have occurred due to the inherent limitations of voice recognition software  Read the chart carefully and recognize, using context, where substitutions have occurred

## 2022-03-28 ENCOUNTER — TELEPHONE (OUTPATIENT)
Dept: NEPHROLOGY | Facility: CLINIC | Age: 67
End: 2022-03-28

## 2022-03-28 NOTE — TELEPHONE ENCOUNTER
Appointment Confirmation   Person confirmed appointment with  If not patient, name of the person Child    Date and time of appointment 3/29   8:00    Patient acknowledged and will be at appointment? yes    Did you advise the patient that they will need a urine sample if they are a new patient?  N/A    Did you advise the patient to bring their current medications for verification? (including any OTC) Yes    Additional Information

## 2022-03-29 ENCOUNTER — OFFICE VISIT (OUTPATIENT)
Dept: NEPHROLOGY | Facility: CLINIC | Age: 67
End: 2022-03-29
Payer: MEDICARE

## 2022-03-29 VITALS — WEIGHT: 178 LBS | BODY MASS INDEX: 34.95 KG/M2 | HEIGHT: 60 IN

## 2022-03-29 DIAGNOSIS — I12.9 PARENCHYMAL RENAL HYPERTENSION, STAGE 1 THROUGH STAGE 4 OR UNSPECIFIED CHRONIC KIDNEY DISEASE: Primary | ICD-10-CM

## 2022-03-29 DIAGNOSIS — M79.672 PAIN IN BOTH FEET: ICD-10-CM

## 2022-03-29 DIAGNOSIS — E78.49 OTHER HYPERLIPIDEMIA: ICD-10-CM

## 2022-03-29 DIAGNOSIS — N18.31 STAGE 3A CHRONIC KIDNEY DISEASE (HCC): ICD-10-CM

## 2022-03-29 DIAGNOSIS — I89.0 LYMPHEDEMA: ICD-10-CM

## 2022-03-29 DIAGNOSIS — E83.42 HYPOMAGNESEMIA: ICD-10-CM

## 2022-03-29 DIAGNOSIS — M79.671 PAIN IN BOTH FEET: ICD-10-CM

## 2022-03-29 PROCEDURE — 99214 OFFICE O/P EST MOD 30 MIN: CPT | Performed by: INTERNAL MEDICINE

## 2022-03-29 NOTE — LETTER
March 29, 2022     [place]  [address] [address]  [facility] [address]    Patient: Consuelo Martin   YOB: 1955   Date of Visit: 3/29/2022       Dear Dr Bob Ortiz:    Thank you for referring Consuelo Martin to me for evaluation  Below are my notes for this consultation  If you have questions, please do not hesitate to call me  I look forward to following your patient along with you  Sincerely,        Dinorah Townsend MD        CC: No Recipients  Dinorah Townsend MD  3/29/2022  8:31 AM  Sign when Signing Visit  RENAL FOLLOW UP NOTE: td     ASSESSMENT AND PLAN:   74y o  year old female with a history of hypertension/dyslipidemia/peripheral edema: We are asked to see regarding peripheral edema and hypertension  We are seeing regarding hypertension/edema/CKD    1  CKD stage 3A   I HAVE PERSONALLY REVIEWED  AND ANALYZED THE DATA/ LABS FOR THIS VISIT: DISCUSSION AS FOLLOWS  · Etiology:  Hypertensive nephrosclerosis/arteriolar nephrosclerosis  · Baseline creatinine:  1 0  · Current creatinine:  0 94 goal  · Urine protein creatinine ratio:  0 14 g at goal  · UA: Trace proteinuria but no hematuria or cell seen  · Renal ultrasound:  Negative, bilateral renal vascular calcification otherwise unremarkable  · Renal artery duplex[de-identified] Negative  Recommendations:  · Treat hypertension-please see below  · Treat dyslipidemia-please see below  · Maintain proteinuria less than 1 g or as low as possible  · Avoid nephrotoxic agents such as NSAIDs, and proton pump inhibitors if possible; patient counseled as such    2   Volume:  Peripheral edema:  Seems compatible with lymphedema  · ?   Medication related including dihydropyridine calcium channel blockers/Lyrica  · Negative DVT:  Follow-up negative  · Echo 40/66/6219: EF 98% diastolic function mildly abnormal with grade 1 relaxation and aortic valve sclerosis  · Renal workup as outlined in particular ruling out proteinuria  · Albumin is normal  · Thyroid profile normal  Treatment:  · Low-sodium diet  · Leg elevation when able  · Support hose especially if standing  · Diuretics: Torsemide 5 mg daily  · Most likely lymphedema/venous stasis: Now seeing vascular surgery      3  Hypertension:       Current blood pressure averages:       None today is blood pressure machine active    · Goal blood pressure:  Less than 130/80 given CKD    Recommendations:  · Push nonmedical regimen including weight loss, isotonic exercise and a low sodium diet  Patient has been counseled the such  · MedicationChanges today:    · She will purchase a new blood pressure machine and then send in some readings  Today's office reading is reasonably good  4   Electrolytes:   Hypokalemia: Probably related to diuretics however ruling out secondary causes:   Aldosterone/renin ratio:  Negative   Renal artery duplex:  Negative  Treatment:  · Off HCTZ  · Spironolactone 25 mg daily  · High potassium diet  Current potassium:  4 0 which is normal    5  Mineral bone disorder:  Of chronic kidney disease:  · Calcium/magnesium/phosphorus:  · Borderline low magnesium 1 5: Replete    6  Dyslipidemia:  · Goal LDL:  Less than 100 given CKD  · Current lipid profile:  LDL 83/HDL 34/triglycerides 158  from January 2022  Recommendations:    Low-cholesterol/low-fat diet / weight loss as appropriate and isotonic exercise    Medication changes today:  No changes as patient is at goal    7  Anemia:   Current hemoglobin:  Normal at 12 2    8  Other problems:  · Lymphedema  · Dyslipidemia  · GERD  · Arthritis       GI HEALTH MAINTENANCE:  Patient has refused a colonoscopy understanding the potential risk of colon cancer and subsequent death if she does have colon cancer without being found  She still refuses a colonoscopy at this time  PATIENT INSTRUCTIONS:    Patient Instructions   1   Medication changes today:   Please take magnesium oxide 400 mg daily for supplementation watch for diarrhea call if you developed any    2  Please go for non fasting  lab work 2 weeks after making the above medication change  3  Please purchase a new Omron blood pressure machine: Then  Please take 1 week a blood pressure readings  at this time     AS FOLLOWS  MORNING AND EVENING, SITTING AND STANDING as follows:  · TAKE THE MORNING READINGS BEFORE ANY MEDICATIONS AND WHEN YOU ARE RELAXED FOR SEVERAL MINUTES  · TAKE THE EVENING READINGS:  BETWEEN 7-10 P M ; PRIOR TO ANY MEDICATIONS; AT LEAST IN OUR  FROM DINNER; AND CERTAINLY AFTER RELAXING FOR A FEW MINUTES  · PLEASE INCLUDE HEART RATE WITH YOUR BLOOD PRESSURE READINGS  · When taking standing readings, keep your arm supported at heart level and not dangling  · Make sure you are sitting with your back supported and feet on the ground and do not cross your legs or feet  · Make sure you have not taken any coffee or caffeine products or exercised or smoke cigarettes at least 30 minutes before taking your blood pressure  Then please mail these readings into the office    4  Follow-up in 4  months   Please bring in 1 week a blood pressure readings morning evening, sitting and standing is outlined above   PLEASE BRING AN YOUR BLOOD PRESSURE MACHINE TO CORRELATE WITH THE OFFICE MACHINE AT THIS NEXT SCHEDULED VISIT   Please go for fasting lab work 1-2 weeks prior to your appointment      5  General instructions:   AVOID SALT BUT NOT ADDING AN READING LABELS TO MAKE SURE THERE IS LOW-SALT IN THE FOOD THAT YOU ARE EATING  o Goal is less than 2 g of sodium intake or less than 5 g of sodium chloride intake per day     Avoid nonsteroidal anti-inflammatory drugs such as Naprosyn, ibuprofen, Aleve, Advil, Celebrex, Meloxicam (Mobic) etc   You can use Tylenol as needed if you do not have any liver condition to be concerned about     Avoid medications such as Sudafed or decongestants and antihistamines that contained the D component which is the decongestant    You can take antihistamines without the decongestant or D component   Try to avoid medications such as pantoprazole or  Protonix/Nexium or Esomeprazole)/Prilosec or omeprazole/Prevacid or lansoprazole/AcipHex or Rabeprazole  If you are able to, use Pepcid as this is safer for your kidneys   Try to exercise at least 30 minutes 3 days a week to begin with with an ultimate goal of 5 days a week for at least 30 minutes     Try to lose 5-10 lb by your next visit     Please do not drink more than 2 glasses of alcohol/wine on a daily basis as this may contribute to your high blood pressure  Subjective: There has been no hospitalizations or acute illnesses since last visit  The patient overall is feeling well  No fevers, chills, or cough or colds  Good appetite and good energy  No hematuria, dysuria, voiding symptoms or foamy urine  No gastrointestinal symptoms  No cardiovascular symptoms, she is getting ongoing treatment by vascular surgery for lower extremity edema with support hose, she did have a cramp yesterday  No headaches, dizziness or lightheadedness, but a little unsteady  Blood pressure medications:   Torsemide 5 mg daily morning   Spironolactone 25 mg daily in the morning   Losartan 50 mg daily in the morning      ROS:  See HPI, otherwise review of systems as completely reviewed with the patient are negative    Past Medical History:   Diagnosis Date    Hyperlipidemia     Hypertension      No past surgical history on file  Family History   Problem Relation Age of Onset    No Known Problems Mother     No Known Problems Father       reports that she has never smoked  She has never used smokeless tobacco  She reports that she does not drink alcohol and does not use drugs      I COMPLETELY REVIEWED THE PAST MEDICAL HISTORY/PAST SURGICAL HISTORY/SOCIAL HISTORY/FAMILY HISTORY/AND MEDICATIONS  AND UPDATED ALL    Objective:     Vitals:   BP sitting on left:  132/72 with a heart rate of 72 and regular  BP standing on right:  122/80 with a heart rate of 76 and    Weight (last 2 days)     None        Wt Readings from Last 3 Encounters:   02/21/22 81 6 kg (180 lb)   02/14/22 82 6 kg (182 lb)   02/07/22 82 6 kg (182 lb)       Body mass index is 35 15 kg/m²      Physical Exam: General:  No acute distress  Skin:  No acute rash  Eyes:  No scleral icterus, noninjected, no discharge from eyes  ENT:  Moist mucous membranes  Neck:  Supple, no jugular venous distention, trachea is midline, no lymphadenopathy and no thyromegaly  Back   No CVAT  Chest:  Clear to auscultation and percussion, good respiratory effort  CVS:  Regular rate and rhythm without a rub, or gallops or murmurs  Abdomen:  Soft and nontender with normal bowel sounds  Extremities:  No cyanosis and essentially no significant lower extremity edema, no arthritic changes, normal range of motion  Neuro:  Grossly intact  Psych:  Alert, oriented x3 and appropriate      Medications:    Current Outpatient Medications:     acetaminophen (TYLENOL) 500 mg tablet, Take 1 tablet (500 mg total) by mouth every 6 (six) hours as needed for mild pain, Disp: 90 tablet, Rfl: 1    atorvastatin (LIPITOR) 20 mg tablet, TAKE 1 TABLET (20 MG TOTAL) BY MOUTH DAILY, Disp: 90 tablet, Rfl: 1    losartan (COZAAR) 50 mg tablet, Take 1 tablet (50 mg total) by mouth daily, Disp: 90 tablet, Rfl: 3    omeprazole (PriLOSEC) 20 mg delayed release capsule, TAKE 1 CAPSULE (20 MG TOTAL) BY MOUTH DAILY, Disp: 90 capsule, Rfl: 1    spironolactone (ALDACTONE) 25 mg tablet, Take 1 tablet (25 mg total) by mouth daily, Disp: 30 tablet, Rfl: 5    torsemide (DEMADEX) 5 MG tablet, Take 1 tablet (5 mg total) by mouth daily, Disp: 30 tablet, Rfl: 5    Diclofenac Sodium (VOLTAREN) 1 %, APPLY 2 GRAMS TOPICALLY 4 (FOUR) TIMES A DAY (Patient not taking: Reported on 3/29/2022), Disp: 100 g, Rfl: 0    magnesium oxide (MAG-OX) 400 mg, Take 1 tablet (400 mg total) by mouth 2 (two) times a day, Disp: 30 tablet, Rfl: 5    polymyxin b-trimethoprim (POLYTRIM) ophthalmic solution, INSTILL 1 DROP INTO SURGERY EYE 4 TIMES A DAY  START 3 DAYS PRIOR TO SURGERY DATE  SEPARATE EYE DROPS BY FIVE MINUTES OF EACH OTHER  (Patient not taking: Reported on 1/26/2022), Disp: , Rfl:     prednisoLONE acetate (PRED FORTE) 1 % ophthalmic suspension, INSTILL 1 DROP INTO SURGERY EYE 4 TIMES A DAY  START 3 DAYS PRIOR TO SURGERY DATE  SEPARATE EYE DROPS BY FIVE MINUTES OF EACH OTHER  (Patient not taking: Reported on 1/26/2022), Disp: , Rfl:     Lab, Imaging and other studies: I have personally reviewed pertinent labs  Laboratory Results:  Results for orders placed or performed in visit on 49/27/96   Basic metabolic panel   Result Value Ref Range    Sodium 137 136 - 145 mmol/L    Potassium 4 0 3 5 - 5 3 mmol/L    Chloride 105 100 - 108 mmol/L    CO2 25 21 - 32 mmol/L    ANION GAP 7 4 - 13 mmol/L    BUN 13 5 - 25 mg/dL    Creatinine 0 94 0 60 - 1 30 mg/dL    Glucose 89 65 - 140 mg/dL    Calcium 9 5 8 3 - 10 1 mg/dL    eGFR 62 ml/min/1 73sq m             Invalid input(s): ALBUMIN      Radiology review:   chest X-ray    Ultrasound      Portions of the record may have been created with voice recognition software  Occasional wrong word or "sound a like" substitutions may have occurred due to the inherent limitations of voice recognition software  Read the chart carefully and recognize, using context, where substitutions have occurred

## 2022-03-29 NOTE — PATIENT INSTRUCTIONS
1  Medication changes today:   Please take magnesium oxide 400 mg daily for supplementation watch for diarrhea call if you developed any    2  Please go for non fasting  lab work 2 weeks after making the above medication change  3  Please purchase a new Om[employer] blood pressure machine: Then  Please take 1 week a blood pressure readings  at this time     AS FOLLOWS  MORNING AND EVENING, SITTING AND STANDING as follows:  · TAKE THE MORNING READINGS BEFORE ANY MEDICATIONS AND WHEN YOU ARE RELAXED FOR SEVERAL MINUTES  · TAKE THE EVENING READINGS:  BETWEEN 7-10 P M ; PRIOR TO ANY MEDICATIONS; AT LEAST IN OUR  FROM DINNER; AND CERTAINLY AFTER RELAXING FOR A FEW MINUTES  · PLEASE INCLUDE HEART RATE WITH YOUR BLOOD PRESSURE READINGS  · When taking standing readings, keep your arm supported at heart level and not dangling  · Make sure you are sitting with your back supported and feet on the ground and do not cross your legs or feet  · Make sure you have not taken any coffee or caffeine products or exercised or smoke cigarettes at least 30 minutes before taking your blood pressure  Then please mail these readings into the office    4  Follow-up in 4  months   Please bring in 1 week a blood pressure readings morning evening, sitting and standing is outlined above   PLEASE BRING AN YOUR BLOOD PRESSURE MACHINE TO CORRELATE WITH THE OFFICE MACHINE AT THIS NEXT SCHEDULED VISIT   Please go for fasting lab work 1-2 weeks prior to your appointment      5   General instructions:   AVOID SALT BUT NOT ADDING AN READING LABELS TO MAKE SURE THERE IS LOW-SALT IN THE FOOD THAT YOU ARE EATING  o Goal is less than 2 g of sodium intake or less than 5 g of sodium chloride intake per day     Avoid nonsteroidal anti-inflammatory drugs such as Naprosyn, ibuprofen, Aleve, Advil, Celebrex, Meloxicam (Mobic) etc   You can use Tylenol as needed if you do not have any liver condition to be concerned about     Avoid medications such as Sudafed or decongestants and antihistamines that contained the D component which is the decongestant  You can take antihistamines without the decongestant or D component   Try to avoid medications such as pantoprazole or  Protonix/Nexium or Esomeprazole)/Prilosec or omeprazole/Prevacid or lansoprazole/AcipHex or Rabeprazole  If you are able to, use Pepcid as this is safer for your kidneys   Try to exercise at least 30 minutes 3 days a week to begin with with an ultimate goal of 5 days a week for at least 30 minutes     Try to lose 5-10 lb by your next visit     Please do not drink more than 2 glasses of alcohol/wine on a daily basis as this may contribute to your high blood pressure

## 2022-05-13 ENCOUNTER — TELEPHONE (OUTPATIENT)
Dept: NEPHROLOGY | Facility: CLINIC | Age: 67
End: 2022-05-13

## 2022-05-13 NOTE — TELEPHONE ENCOUNTER
Left message to schedule August follow up with Dr Jayleen Boyer or PA in the [place] office  This was the first call

## 2022-05-16 ENCOUNTER — OFFICE VISIT (OUTPATIENT)
Dept: FAMILY MEDICINE CLINIC | Facility: CLINIC | Age: 67
End: 2022-05-16
Payer: MEDICARE

## 2022-05-16 VITALS — OXYGEN SATURATION: 98 % | HEART RATE: 78 BPM | RESPIRATION RATE: 16 BRPM

## 2022-05-16 DIAGNOSIS — R09.81 NASAL CONGESTION: ICD-10-CM

## 2022-05-16 DIAGNOSIS — U07.1 COVID-19: Primary | ICD-10-CM

## 2022-05-16 PROCEDURE — 99213 OFFICE O/P EST LOW 20 MIN: CPT | Performed by: NURSE PRACTITIONER

## 2022-05-16 RX ORDER — LANOLIN ALCOHOL/MO/W.PET/CERES
400 CREAM (GRAM) TOPICAL 2 TIMES DAILY
COMMUNITY
Start: 2022-05-05

## 2022-05-16 RX ORDER — LOSARTAN POTASSIUM 100 MG/1
100 TABLET ORAL DAILY
COMMUNITY
Start: 2022-04-29 | End: 2022-06-14

## 2022-05-16 RX ORDER — NAPROXEN 500 MG/1
TABLET ORAL
COMMUNITY
Start: 2022-03-17

## 2022-05-16 RX ORDER — FLUTICASONE PROPIONATE 50 MCG
1 SPRAY, SUSPENSION (ML) NASAL DAILY
Qty: 15.8 ML | Refills: 0 | Status: SHIPPED | OUTPATIENT
Start: 2022-05-16

## 2022-05-16 NOTE — PROGRESS NOTES
COVID-19 Outpatient Progress Note    Assessment/Plan:    Problem List Items Addressed This Visit        Other    COVID-19 - Primary    Nasal congestion    Relevant Medications    fluticasone (FLONASE) 50 mcg/act nasal spray         Disposition:     I recommended continued isolation until at least 24 hours have passed since recovery defined as resolution of fever without the use of fever-reducing medications AND improvement in COVID symptoms AND 10 days have passed since onset of symptoms (or 10 days have passed since date of first positive viral diagnostic test for asymptomatic patients)  Discussed vitamin D, vitamin C, and/or zinc supplementation with patient  Patient's symptoms started on 5/9/22  Patient tested positive for covid 19 today (5/16/22)  Isolation instructions reviewed  Fluticasone prescribed for nasal congestion  Medication information and side effects reviewed  Patient instructed to drink plenty of fluids  Patient instructed to follow-up if symptoms get worse or do not get better  Patient instructed to go to the ED if she becomes SOB  I have spent 8 minutes directly with the patient  Greater than 50% of this time was spent in counseling/coordination of care regarding: COVID 19 infection and isolation instructions         Encounter provider BOYD Hickman    Provider located at [address] [place] [id number]    Recent Visits  No visits were found meeting these conditions  Showing recent visits within past 7 days and meeting all other requirements  Today's Visits  Date Type Provider Dept   05/16/22 Office Visit BOYD Hickman [facility]   Showing today's visits and meeting all other requirements  Future Appointments  No visits were found meeting these conditions    Showing future appointments within next 150 days and meeting all other requirements     This virtual check-in was done via [employer] and patient was informed that this is a secure, HIPAA-compliant platform  She agrees to proceed  Patient agrees to participate in a virtual check in via telephone or video visit instead of presenting to the office to address urgent/immediate medical needs  Patient is aware this is a billable service  After connecting through [facility], the patient was identified by name and date of birth  gO Shea was informed that this was a telemedicine visit and that the exam was being conducted confidentially over secure lines  My office door was closed  No one else was in the room  Og Shea acknowledged consent and understanding of privacy and security of the telemedicine visit  I informed the patient that I have reviewed her record in Epic and presented the opportunity for her to ask any questions regarding the visit today  The patient agreed to participate  Verification of patient location:  Patient is located in the following state in which I hold an active license: [place]    Subjective:   Og Shea is a 74 y o  female who has been screened for COVID-19  Symptom change since last report: unchanged  Patient's symptoms include fatigue, nasal congestion, sore throat, cough, myalgias and headache  Patient denies fever, chills, malaise, rhinorrhea, anosmia, loss of taste, shortness of breath, chest tightness, abdominal pain, nausea, vomiting and diarrhea  - Date of symptom onset: 5/9/2022  - Date of positive COVID-19 test: 5/16/2022  Type of test: Rapid antigen  COVID-19 vaccination status: Fully vaccinated (primary series)    Moreno Cornell has been staying home and has isolated themselves in her home  She is taking care to not share personal items and     Lab Results   Component Value Date    SARSCOV2 Positive (A) 01/18/2021    SARSCOVAG Positive (A) 05/16/2022     Past Medical History:   Diagnosis Date    Hyperlipidemia     Hypertension      History reviewed  No pertinent surgical history    Current Outpatient Medications   Medication Sig Dispense Refill    acetaminophen (TYLENOL) 500 mg tablet Take 1 tablet (500 mg total) by mouth every 6 (six) hours as needed for mild pain 90 tablet 1    atorvastatin (LIPITOR) 20 mg tablet TAKE 1 TABLET (20 MG TOTAL) BY MOUTH DAILY 90 tablet 1    Diclofenac Sodium (VOLTAREN) 1 % APPLY 2 GRAMS TOPICALLY 4 (FOUR) TIMES A  g 0    fluticasone (FLONASE) 50 mcg/act nasal spray 1 spray into each nostril in the morning  15 8 mL 0    losartan (COZAAR) 100 MG tablet Take 100 mg by mouth in the morning   losartan (COZAAR) 50 mg tablet Take 1 tablet (50 mg total) by mouth daily 90 tablet 3    magnesium Oxide (MAG-OX) 400 mg TABS Take 400 mg by mouth in the morning and 400 mg in the evening   magnesium oxide (MAG-OX) 400 mg Take 1 tablet (400 mg total) by mouth 2 (two) times a day 30 tablet 5    naproxen (EC NAPROSYN) 500 MG EC tablet TAKE 1 TABLET (500 MG TOTAL) BY MOUTH 2 (TWO) TIMES A DAY AS NEEDED FOR MODERATE PAIN      omeprazole (PriLOSEC) 20 mg delayed release capsule TAKE 1 CAPSULE (20 MG TOTAL) BY MOUTH DAILY 90 capsule 1    spironolactone (ALDACTONE) 25 mg tablet Take 1 tablet (25 mg total) by mouth daily 30 tablet 5    torsemide (DEMADEX) 5 MG tablet Take 1 tablet (5 mg total) by mouth daily 30 tablet 5     No current facility-administered medications for this visit  No Known Allergies    Review of Systems   Constitutional: Positive for fatigue  Negative for chills and fever  HENT: Positive for congestion and sore throat  Negative for ear pain and rhinorrhea  Respiratory: Positive for cough  Negative for chest tightness, shortness of breath and wheezing  Cardiovascular: Negative for chest pain  Gastrointestinal: Negative for abdominal pain, diarrhea, nausea and vomiting  Musculoskeletal: Positive for myalgias  Skin: Negative for rash  Neurological: Positive for headaches  Negative for dizziness and syncope       Objective:    Vitals: 05/16/22 1627   Pulse: 78   Resp: 16   SpO2: 98%       Physical Exam  Constitutional:       General: She is not in acute distress  Appearance: She is not diaphoretic  HENT:      Right Ear: External ear normal       Left Ear: External ear normal    Pulmonary:      Effort: Pulmonary effort is normal  No respiratory distress  Neurological:      Mental Status: She is alert and oriented to person, place, and time  Psychiatric:         Mood and Affect: Mood normal          VIRTUAL VISIT DISCLAIMER    Juan R Ochoa verbally agrees to participate in [employer]  Pt is aware that [employer] could be limited without vital signs or the ability to perform a full hands-on physical Bunny Messier understands she or the provider may request at any time to terminate the video visit and request the patient to seek care or treatment in person

## 2022-05-17 ENCOUNTER — TELEPHONE (OUTPATIENT)
Dept: FAMILY MEDICINE CLINIC | Facility: CLINIC | Age: 67
End: 2022-05-17

## 2022-05-18 ENCOUNTER — TELEMEDICINE (OUTPATIENT)
Dept: FAMILY MEDICINE CLINIC | Facility: CLINIC | Age: 67
End: 2022-05-18
Payer: MEDICARE

## 2022-05-18 VITALS — TEMPERATURE: 97.8 F

## 2022-05-18 DIAGNOSIS — B97.89 SORE THROAT (VIRAL): ICD-10-CM

## 2022-05-18 DIAGNOSIS — U07.1 COVID-19: Primary | ICD-10-CM

## 2022-05-18 DIAGNOSIS — J02.8 SORE THROAT (VIRAL): ICD-10-CM

## 2022-05-18 PROCEDURE — 99214 OFFICE O/P EST MOD 30 MIN: CPT | Performed by: FAMILY MEDICINE

## 2022-05-18 RX ORDER — BENZONATATE 100 MG/1
100 CAPSULE ORAL 3 TIMES DAILY PRN
Qty: 60 CAPSULE | Refills: 0 | Status: SHIPPED | OUTPATIENT
Start: 2022-05-18

## 2022-05-18 NOTE — TELEPHONE ENCOUNTER
Patient's daughter in law called back and said they're doing all the things [employer] said to do with no relief  She wanted me to send a message to you because last time you gave her a pill but she can't remember what it was and she can't keep taking Tylenol she said (especially when it's not working)   Can you please advise on this

## 2022-05-18 NOTE — PROGRESS NOTES
Virtual Regular Visit    Verification of patient location:    Patient is located in the following state in which I hold an active license {[facility] virtual patient location:[address]}      Assessment/Plan:    Problem List Items Addressed This Visit        Digestive    Sore throat (viral)    Relevant Medications    nirmatrelvir & ritonavir (Paxlovid) tablet therapy pack    benzonatate (TESSALON PERLES) 100 mg capsule       Other    COVID-19 - Primary    Relevant Medications    nirmatrelvir & ritonavir (Paxlovid) tablet therapy pack    benzonatate (TESSALON PERLES) 100 mg capsule               Reason for visit is   Chief Complaint   Patient presents with    Virtual Regular Visit    Sore Throat        Encounter provider Palmer Castañeda MD    Provider located at [address] [id number]      Recent Visits  Date Type Provider Dept   05/17/22 Telephone BOYD Espinoza Pg   05/16/22 Office Visit Raffaele Quevedo, [address] recent visits within past 7 days and meeting all other requirements  Today's Visits  Date Type Provider Dept   05/18/22 Telemedicine MD Elvin Hill [place]   Showing today's visits and meeting all other requirements  Future Appointments  No visits were found meeting these conditions  Showing future appointments within next 150 days and meeting all other requirements       The patient was identified by name and date of birth  Basilia Shone was informed that this is a telemedicine visit and that the visit is being conducted through {AMB VIRTUAL VISIT QOIC:72073}  {Telemedicine confidentiality :20447} {Telemedicine participants:50610}  She acknowledged consent and understanding of privacy and security of the video platform  The patient has agreed to participate and understands they can discontinue the visit at any time  Patient is aware this is a billable service       Subjective  Basilia Shone is a 74 y o  female ***   Sore Throat          Past Medical History:   Diagnosis Date    Hyperlipidemia     Hypertension        History reviewed  No pertinent surgical history  Current Outpatient Medications   Medication Sig Dispense Refill    acetaminophen (TYLENOL) 500 mg tablet Take 1 tablet (500 mg total) by mouth every 6 (six) hours as needed for mild pain 90 tablet 1    atorvastatin (LIPITOR) 20 mg tablet TAKE 1 TABLET (20 MG TOTAL) BY MOUTH DAILY 90 tablet 1    benzonatate (TESSALON PERLES) 100 mg capsule Take 1 capsule (100 mg total) by mouth as needed in the morning and 1 capsule (100 mg total) as needed at noon and 1 capsule (100 mg total) as needed in the evening for cough  60 capsule 0    Diclofenac Sodium (VOLTAREN) 1 % APPLY 2 GRAMS TOPICALLY 4 (FOUR) TIMES A  g 0    fluticasone (FLONASE) 50 mcg/act nasal spray 1 spray into each nostril in the morning  15 8 mL 0    losartan (COZAAR) 100 MG tablet Take 100 mg by mouth in the morning   losartan (COZAAR) 50 mg tablet Take 1 tablet (50 mg total) by mouth daily 90 tablet 3    magnesium Oxide (MAG-OX) 400 mg TABS Take 400 mg by mouth in the morning and 400 mg in the evening   magnesium oxide (MAG-OX) 400 mg Take 1 tablet (400 mg total) by mouth 2 (two) times a day 30 tablet 5    naproxen (EC NAPROSYN) 500 MG EC tablet TAKE 1 TABLET (500 MG TOTAL) BY MOUTH 2 (TWO) TIMES A DAY AS NEEDED FOR MODERATE PAIN      nirmatrelvir & ritonavir (Paxlovid) tablet therapy pack Take 3 tablets by mouth in the morning and 3 tablets in the evening  Do all this for 5 days  Take 2 nirmatrelvir tablets + 1 ritonavir tablet together per dose   30 tablet 0    omeprazole (PriLOSEC) 20 mg delayed release capsule TAKE 1 CAPSULE (20 MG TOTAL) BY MOUTH DAILY 90 capsule 1    spironolactone (ALDACTONE) 25 mg tablet Take 1 tablet (25 mg total) by mouth daily 30 tablet 5    torsemide (DEMADEX) 5 MG tablet Take 1 tablet (5 mg total) by mouth daily 30 tablet 5     No current facility-administered medications for this visit  No Known Allergies    Review of Systems   HENT: Positive for sore throat  Video Exam    Vitals:    05/18/22 1607   Temp: 97 8 °F (36 6 °C)   TempSrc: Oral       Physical Exam     {Time Spent:77744}    VIRTUAL VISIT DISCLAIMER      Williams Molina verbally agrees to participate in [employer]  Pt is aware that [employer] could be limited without vital signs or the ability to perform a full hands-on physical Norbert Coupe understands she or the provider may request at any time to terminate the video visit and request the patient to seek care or treatment in person

## 2022-05-18 NOTE — PROGRESS NOTES
COVID-19 Outpatient Progress Note    Assessment/Plan:    Problem List Items Addressed This Visit        Digestive    Sore throat (viral)    Relevant Medications    nirmatrelvir & ritonavir (Paxlovid) tablet therapy pack    benzonatate (TESSALON PERLES) 100 mg capsule       Other    COVID-19 - Primary    Relevant Medications    nirmatrelvir & ritonavir (Paxlovid) tablet therapy pack    benzonatate (TESSALON PERLES) 100 mg capsule         Disposition:     I have spent 10 minutes directly with the patient  Greater than 50% of this time was spent in counseling/coordination of care regarding: prognosis, risks and benefits of treatment options, instructions for management, patient and family education, importance of treatment compliance, risk factor reductions and impressions  Encounter provider Kevin Carrasco MD    Provider located at [address] [place] [phone]    Recent Visits  Date Type Provider Dept   05/17/22 Telephone BOYD Norman Pg   05/16/22 Office Visit Sarah Beth Chacon, [address] recent visits within past 7 days and meeting all other requirements  Today's Visits  Date Type Provider Dept   05/18/22 Telemedicine MD Elvin Be[place]   Showing today's visits and meeting all other requirements  Future Appointments  No visits were found meeting these conditions  Showing future appointments within next 150 days and meeting all other requirements     This virtual check-in was done via [employer] and patient was informed that this is a secure, HIPAA-compliant platform  She agrees to proceed  Patient agrees to participate in a virtual check in via telephone or video visit instead of presenting to the office to address urgent/immediate medical needs  Patient is aware this is a billable service  After connecting through [facility], the patient was identified by name and date of birth  Compa Parker was informed that this was a telemedicine visit and that the exam was being conducted confidentially over secure lines  My office door was closed  No one else was in the room  Compa Parker acknowledged consent and understanding of privacy and security of the telemedicine visit  I informed the patient that I have reviewed her record in Epic and presented the opportunity for her to ask any questions regarding the visit today  The patient agreed to participate  Verification of patient location:  Patient is located in the following state in which I hold an active license: [place]    Subjective:   Compa Parker is a 74 y o  female who has been screened for COVID-19  Patient's symptoms include sore throat  Patient denies fever, chills, fatigue, malaise, congestion, anosmia, loss of taste, cough, shortness of breath, chest tightness, abdominal pain, nausea, vomiting, diarrhea, myalgias and headaches  - Date of symptom onset: 5/9/2022  - Date of positive COVID-19 test: 5/16/2022  Type of test: Rapid antigen  COVID-19 vaccination status: Fully vaccinated (primary series)    Kasia Garcia has been staying home and has isolated themselves in her home  She is taking care to not share personal items and is cleaning all surfaces that are touched often, like counters, tabletops, and doorknobs using household cleaning sprays or wipes  She is wearing a mask when she leaves her room  Lab Results   Component Value Date    SARSCOV2 Positive (A) 01/18/2021    SARSCOVAG Positive (A) 05/16/2022     Past Medical History:   Diagnosis Date    Hyperlipidemia     Hypertension      History reviewed  No pertinent surgical history    Current Outpatient Medications   Medication Sig Dispense Refill    acetaminophen (TYLENOL) 500 mg tablet Take 1 tablet (500 mg total) by mouth every 6 (six) hours as needed for mild pain 90 tablet 1    atorvastatin (LIPITOR) 20 mg tablet TAKE 1 TABLET (20 MG TOTAL) BY MOUTH DAILY 90 tablet 1    benzonatate (TESSALON PERLES) 100 mg capsule Take 1 capsule (100 mg total) by mouth as needed in the morning and 1 capsule (100 mg total) as needed at noon and 1 capsule (100 mg total) as needed in the evening for cough  60 capsule 0    Diclofenac Sodium (VOLTAREN) 1 % APPLY 2 GRAMS TOPICALLY 4 (FOUR) TIMES A  g 0    fluticasone (FLONASE) 50 mcg/act nasal spray 1 spray into each nostril in the morning  15 8 mL 0    losartan (COZAAR) 100 MG tablet Take 100 mg by mouth in the morning   losartan (COZAAR) 50 mg tablet Take 1 tablet (50 mg total) by mouth daily 90 tablet 3    magnesium Oxide (MAG-OX) 400 mg TABS Take 400 mg by mouth in the morning and 400 mg in the evening   magnesium oxide (MAG-OX) 400 mg Take 1 tablet (400 mg total) by mouth 2 (two) times a day 30 tablet 5    naproxen (EC NAPROSYN) 500 MG EC tablet TAKE 1 TABLET (500 MG TOTAL) BY MOUTH 2 (TWO) TIMES A DAY AS NEEDED FOR MODERATE PAIN      nirmatrelvir & ritonavir (Paxlovid) tablet therapy pack Take 3 tablets by mouth in the morning and 3 tablets in the evening  Do all this for 5 days  Take 2 nirmatrelvir tablets + 1 ritonavir tablet together per dose  30 tablet 0    omeprazole (PriLOSEC) 20 mg delayed release capsule TAKE 1 CAPSULE (20 MG TOTAL) BY MOUTH DAILY 90 capsule 1    spironolactone (ALDACTONE) 25 mg tablet Take 1 tablet (25 mg total) by mouth daily 30 tablet 5    torsemide (DEMADEX) 5 MG tablet Take 1 tablet (5 mg total) by mouth daily 30 tablet 5     No current facility-administered medications for this visit  No Known Allergies    Review of Systems   Constitutional: Negative for chills, fatigue and fever  HENT: Positive for sore throat  Negative for congestion  Respiratory: Negative for cough, chest tightness and shortness of breath  Gastrointestinal: Negative for abdominal pain, diarrhea, nausea and vomiting  Musculoskeletal: Negative for myalgias  Neurological: Negative for headaches       Objective:    Vitals: 05/18/22 1607   Temp: 97 8 °F (36 6 °C)   TempSrc: Oral       Physical Exam  Constitutional:       General: She is not in acute distress  Appearance: She is well-developed  Pulmonary:      Effort: Pulmonary effort is normal    Neurological:      Mental Status: She is alert and oriented to person, place, and time  Psychiatric:         Behavior: Behavior normal          Thought Content: Thought content normal          Judgment: Judgment normal          VIRTUAL VISIT DISCLAIMER    Beny Speaks verbally agrees to participate in [employer]  Pt is aware that [employer] could be limited without vital signs or the ability to perform a full hands-on physical Felipe Blade understands she or the provider may request at any time to terminate the video visit and request the patient to seek care or treatment in person

## 2022-05-18 NOTE — TELEPHONE ENCOUNTER
Please tell the patient that she can take tylenol OTC prn for sore throat  Patient should also gargle with salt water  Patient should use the flonase as prescribed  The flonase will help with any post nasal drip causing throat irritation

## 2022-05-20 DIAGNOSIS — N18.31 STAGE 3A CHRONIC KIDNEY DISEASE (HCC): ICD-10-CM

## 2022-05-20 DIAGNOSIS — I12.9 PARENCHYMAL RENAL HYPERTENSION, STAGE 1 THROUGH STAGE 4 OR UNSPECIFIED CHRONIC KIDNEY DISEASE: ICD-10-CM

## 2022-05-20 DIAGNOSIS — E87.6 HYPOKALEMIA: ICD-10-CM

## 2022-05-20 DIAGNOSIS — R60.0 PEDAL EDEMA: ICD-10-CM

## 2022-05-20 DIAGNOSIS — N18.9 CKD (CHRONIC KIDNEY DISEASE): ICD-10-CM

## 2022-05-20 DIAGNOSIS — E78.49 OTHER HYPERLIPIDEMIA: ICD-10-CM

## 2022-05-20 RX ORDER — LOSARTAN POTASSIUM 100 MG/1
TABLET ORAL
Qty: 30 TABLET | OUTPATIENT
Start: 2022-05-20

## 2022-05-20 RX ORDER — TORSEMIDE 5 MG/1
5 TABLET ORAL DAILY
Qty: 30 TABLET | Refills: 5 | OUTPATIENT
Start: 2022-05-20

## 2022-05-20 RX ORDER — SPIRONOLACTONE 25 MG/1
25 TABLET ORAL DAILY
Qty: 30 TABLET | Refills: 5 | OUTPATIENT
Start: 2022-05-20

## 2022-05-24 DIAGNOSIS — E83.42 HYPOMAGNESEMIA: ICD-10-CM

## 2022-05-24 RX ORDER — LANOLIN ALCOHOL/MO/W.PET/CERES
CREAM (GRAM) TOPICAL
Qty: 30 TABLET | Refills: 0 | Status: SHIPPED | OUTPATIENT
Start: 2022-05-24 | End: 2022-07-13

## 2022-06-01 ENCOUNTER — TELEPHONE (OUTPATIENT)
Dept: FAMILY MEDICINE CLINIC | Facility: CLINIC | Age: 67
End: 2022-06-01

## 2022-06-01 NOTE — TELEPHONE ENCOUNTER
Pt's daughter called and stated pt saw Dr Lyla Renee for a virtual visit on 5/18/22 and she prescribed medication but pt's daughter stated it did not work for her  She is requesting another medication

## 2022-06-03 ENCOUNTER — OFFICE VISIT (OUTPATIENT)
Dept: FAMILY MEDICINE CLINIC | Facility: CLINIC | Age: 67
End: 2022-06-03
Payer: MEDICARE

## 2022-06-03 VITALS
SYSTOLIC BLOOD PRESSURE: 134 MMHG | HEART RATE: 78 BPM | OXYGEN SATURATION: 97 % | TEMPERATURE: 97.6 F | BODY MASS INDEX: 35.14 KG/M2 | DIASTOLIC BLOOD PRESSURE: 68 MMHG | WEIGHT: 179 LBS | HEIGHT: 60 IN

## 2022-06-03 DIAGNOSIS — U07.1 COVID-19: ICD-10-CM

## 2022-06-03 DIAGNOSIS — R05.9 COUGH: Primary | ICD-10-CM

## 2022-06-03 PROCEDURE — 99214 OFFICE O/P EST MOD 30 MIN: CPT | Performed by: FAMILY MEDICINE

## 2022-06-03 RX ORDER — DEXTROMETHORPHAN HYDROBROMIDE AND PROMETHAZINE HYDROCHLORIDE 15; 6.25 MG/5ML; MG/5ML
5 SOLUTION ORAL 4 TIMES DAILY PRN
Qty: 180 ML | Refills: 1 | Status: SHIPPED | OUTPATIENT
Start: 2022-06-03

## 2022-06-03 RX ORDER — AMOXICILLIN AND CLAVULANATE POTASSIUM 875; 125 MG/1; MG/1
1 TABLET, FILM COATED ORAL EVERY 12 HOURS SCHEDULED
Qty: 14 TABLET | Refills: 0 | Status: SHIPPED | OUTPATIENT
Start: 2022-06-03 | End: 2022-06-10

## 2022-06-03 NOTE — PROGRESS NOTES
Subjective:      Patient ID: Chino Sherwood is a 74 y o  female  Cough  This is a new problem  The current episode started in the past 7 days  The problem has been unchanged  The problem occurs constantly  The cough is productive of purulent sputum  Associated symptoms include nasal congestion and postnasal drip  Pertinent negatives include no chest pain, myalgias, sore throat, shortness of breath or wheezing  She has tried rest and cool air for the symptoms  The treatment provided no relief  COVID       Past Medical History:   Diagnosis Date    Hyperlipidemia     Hypertension        Family History   Problem Relation Age of Onset    No Known Problems Mother     No Known Problems Father        History reviewed  No pertinent surgical history  reports that she has never smoked  She has never used smokeless tobacco  She reports that she does not drink alcohol and does not use drugs  Current Outpatient Medications:     acetaminophen (TYLENOL) 500 mg tablet, Take 1 tablet (500 mg total) by mouth every 6 (six) hours as needed for mild pain, Disp: 90 tablet, Rfl: 1    amoxicillin-clavulanate (AUGMENTIN) 875-125 mg per tablet, Take 1 tablet by mouth every 12 (twelve) hours for 7 days, Disp: 14 tablet, Rfl: 0    atorvastatin (LIPITOR) 20 mg tablet, TAKE 1 TABLET (20 MG TOTAL) BY MOUTH DAILY, Disp: 90 tablet, Rfl: 1    benzonatate (TESSALON PERLES) 100 mg capsule, Take 1 capsule (100 mg total) by mouth as needed in the morning and 1 capsule (100 mg total) as needed at noon and 1 capsule (100 mg total) as needed in the evening for cough  , Disp: 60 capsule, Rfl: 0    Diclofenac Sodium (VOLTAREN) 1 %, APPLY 2 GRAMS TOPICALLY 4 (FOUR) TIMES A DAY, Disp: 100 g, Rfl: 0    fluticasone (FLONASE) 50 mcg/act nasal spray, 1 spray into each nostril in the morning , Disp: 15 8 mL, Rfl: 0    fluticasone-vilanterol (Breo Ellipta) 200-25 MCG/INH inhaler, Inhale 1 puff daily Rinse mouth after use , Disp: 60 blister, Rfl: 0   losartan (COZAAR) 100 MG tablet, Take 100 mg by mouth in the morning , Disp: , Rfl:     losartan (COZAAR) 50 mg tablet, Take 1 tablet (50 mg total) by mouth daily, Disp: 90 tablet, Rfl: 3    magnesium Oxide (MAG-OX) 400 mg TABS, Take 400 mg by mouth in the morning and 400 mg in the evening , Disp: , Rfl:     magnesium Oxide (MAG-OX) 400 mg TABS, TAKE 1 TABLET (400 MG TOTAL) BY MOUTH 2 (TWO) TIMES A DAY, Disp: 30 tablet, Rfl: 0    naproxen (EC NAPROSYN) 500 MG EC tablet, TAKE 1 TABLET (500 MG TOTAL) BY MOUTH 2 (TWO) TIMES A DAY AS NEEDED FOR MODERATE PAIN, Disp: , Rfl:     omeprazole (PriLOSEC) 20 mg delayed release capsule, TAKE 1 CAPSULE (20 MG TOTAL) BY MOUTH DAILY, Disp: 90 capsule, Rfl: 1    Promethazine-DM (PHENERGAN-DM) 6 25-15 mg/5 mL oral syrup, Take 5 mL by mouth 4 (four) times a day as needed for cough, Disp: 180 mL, Rfl: 1    spironolactone (ALDACTONE) 25 mg tablet, Take 1 tablet (25 mg total) by mouth daily, Disp: 30 tablet, Rfl: 5    torsemide (DEMADEX) 5 MG tablet, Take 1 tablet (5 mg total) by mouth daily, Disp: 30 tablet, Rfl: 5    The following portions of the patient's history were reviewed and updated as appropriate: allergies, current medications, past family history, past medical history, past social history, past surgical history and problem list     Review of Systems   Constitutional: Negative  HENT: Positive for postnasal drip  Negative for sore throat  Respiratory: Positive for cough  Negative for shortness of breath and wheezing  Cardiovascular: Negative  Negative for chest pain and palpitations  Musculoskeletal: Negative for myalgias  Neurological: Negative  Psychiatric/Behavioral: Negative  Objective:    /68   Pulse 78   Temp 97 6 °F (36 4 °C) (Tympanic)   Ht 5' (1 524 m)   Wt 81 2 kg (179 lb)   SpO2 97%   BMI 34 96 kg/m²      Physical Exam  Constitutional:       Appearance: She is well-developed     Cardiovascular:      Rate and Rhythm: Normal rate and regular rhythm  Heart sounds: Normal heart sounds  Pulmonary:      Effort: Pulmonary effort is normal       Breath sounds: Normal breath sounds  Abdominal:      General: Bowel sounds are normal       Palpations: Abdomen is soft  Neurological:      Mental Status: She is alert and oriented to person, place, and time  Psychiatric:         Behavior: Behavior normal          Judgment: Judgment normal            Recent Results (from the past 1008 hour(s))   Poct Covid 19 Rapid Antigen Test    Collection Time: 05/16/22  4:43 PM   Result Value Ref Range    POCT SARS-CoV-2 Ag Positive (A) Negative    VALID CONTROL Valid        Assessment/Plan:    No problem-specific Assessment & Plan notes found for this encounter             Problem List Items Addressed This Visit        Other    COVID-19    Relevant Medications    amoxicillin-clavulanate (AUGMENTIN) 875-125 mg per tablet    fluticasone-vilanterol (Breo Ellipta) 200-25 MCG/INH inhaler    Promethazine-DM (PHENERGAN-DM) 6 25-15 mg/5 mL oral syrup      Other Visit Diagnoses     Cough    -  Primary    Relevant Medications    amoxicillin-clavulanate (AUGMENTIN) 875-125 mg per tablet    fluticasone-vilanterol (Breo Ellipta) 200-25 MCG/INH inhaler    Promethazine-DM (PHENERGAN-DM) 6 25-15 mg/5 mL oral syrup

## 2022-06-06 DIAGNOSIS — M79.672 PAIN IN BOTH FEET: ICD-10-CM

## 2022-06-06 DIAGNOSIS — M79.671 PAIN IN BOTH FEET: ICD-10-CM

## 2022-06-07 ENCOUNTER — APPOINTMENT (OUTPATIENT)
Dept: LAB | Facility: CLINIC | Age: 67
End: 2022-06-07
Payer: MEDICARE

## 2022-06-07 DIAGNOSIS — E78.5 HYPERLIPIDEMIA, UNSPECIFIED HYPERLIPIDEMIA TYPE: ICD-10-CM

## 2022-06-07 DIAGNOSIS — R73.03 PREDIABETES: ICD-10-CM

## 2022-06-07 LAB
ALBUMIN SERPL BCP-MCNC: 3.7 G/DL (ref 3.5–5)
ALP SERPL-CCNC: 93 U/L (ref 46–116)
ALT SERPL W P-5'-P-CCNC: 31 U/L (ref 12–78)
ANION GAP SERPL CALCULATED.3IONS-SCNC: 4 MMOL/L (ref 4–13)
AST SERPL W P-5'-P-CCNC: 19 U/L (ref 5–45)
BILIRUB SERPL-MCNC: 0.53 MG/DL (ref 0.2–1)
BUN SERPL-MCNC: 13 MG/DL (ref 5–25)
CALCIUM SERPL-MCNC: 9.7 MG/DL (ref 8.3–10.1)
CHLORIDE SERPL-SCNC: 109 MMOL/L (ref 100–108)
CHOLEST SERPL-MCNC: 140 MG/DL
CO2 SERPL-SCNC: 25 MMOL/L (ref 21–32)
CREAT SERPL-MCNC: 0.9 MG/DL (ref 0.6–1.3)
EST. AVERAGE GLUCOSE BLD GHB EST-MCNC: 131 MG/DL
GFR SERPL CREATININE-BSD FRML MDRD: 65 ML/MIN/1.73SQ M
GLUCOSE P FAST SERPL-MCNC: 100 MG/DL (ref 65–99)
HBA1C MFR BLD: 6.2 %
HDLC SERPL-MCNC: 36 MG/DL
LDLC SERPL CALC-MCNC: 75 MG/DL (ref 0–100)
NONHDLC SERPL-MCNC: 104 MG/DL
POTASSIUM SERPL-SCNC: 4.1 MMOL/L (ref 3.5–5.3)
PROT SERPL-MCNC: 7.8 G/DL (ref 6.4–8.2)
SODIUM SERPL-SCNC: 138 MMOL/L (ref 136–145)
TRIGL SERPL-MCNC: 143 MG/DL

## 2022-06-07 PROCEDURE — 80061 LIPID PANEL: CPT

## 2022-06-07 PROCEDURE — 83036 HEMOGLOBIN GLYCOSYLATED A1C: CPT

## 2022-06-07 PROCEDURE — 80053 COMPREHEN METABOLIC PANEL: CPT

## 2022-06-07 PROCEDURE — 36415 COLL VENOUS BLD VENIPUNCTURE: CPT

## 2022-06-14 DIAGNOSIS — E78.49 OTHER HYPERLIPIDEMIA: ICD-10-CM

## 2022-06-14 DIAGNOSIS — I12.9 PARENCHYMAL RENAL HYPERTENSION, STAGE 1 THROUGH STAGE 4 OR UNSPECIFIED CHRONIC KIDNEY DISEASE: ICD-10-CM

## 2022-06-14 DIAGNOSIS — N18.31 STAGE 3A CHRONIC KIDNEY DISEASE (HCC): ICD-10-CM

## 2022-06-14 DIAGNOSIS — R60.0 PEDAL EDEMA: ICD-10-CM

## 2022-06-14 DIAGNOSIS — E87.6 HYPOKALEMIA: ICD-10-CM

## 2022-06-14 DIAGNOSIS — N18.9 CKD (CHRONIC KIDNEY DISEASE): ICD-10-CM

## 2022-06-14 RX ORDER — SPIRONOLACTONE 25 MG/1
25 TABLET ORAL DAILY
Qty: 30 TABLET | Refills: 5 | Status: SHIPPED | OUTPATIENT
Start: 2022-06-14

## 2022-06-14 RX ORDER — LOSARTAN POTASSIUM 100 MG/1
TABLET ORAL
Qty: 30 TABLET | Refills: 5 | Status: SHIPPED | OUTPATIENT
Start: 2022-06-14

## 2022-06-14 RX ORDER — TORSEMIDE 5 MG/1
5 TABLET ORAL DAILY
Qty: 30 TABLET | Refills: 5 | Status: SHIPPED | OUTPATIENT
Start: 2022-06-14

## 2022-06-20 ENCOUNTER — OFFICE VISIT (OUTPATIENT)
Dept: OBGYN CLINIC | Facility: CLINIC | Age: 67
End: 2022-06-20
Payer: MEDICARE

## 2022-06-20 VITALS
BODY MASS INDEX: 35.14 KG/M2 | DIASTOLIC BLOOD PRESSURE: 77 MMHG | WEIGHT: 179 LBS | SYSTOLIC BLOOD PRESSURE: 122 MMHG | HEIGHT: 60 IN | HEART RATE: 77 BPM

## 2022-06-20 DIAGNOSIS — M17.0 PRIMARY OSTEOARTHRITIS OF BOTH KNEES: Primary | ICD-10-CM

## 2022-06-20 DIAGNOSIS — E66.01 OBESITY, MORBID (HCC): ICD-10-CM

## 2022-06-20 DIAGNOSIS — N18.31 STAGE 3A CHRONIC KIDNEY DISEASE (HCC): ICD-10-CM

## 2022-06-20 PROCEDURE — 99213 OFFICE O/P EST LOW 20 MIN: CPT | Performed by: PHYSICAL MEDICINE & REHABILITATION

## 2022-06-20 PROCEDURE — 20610 DRAIN/INJ JOINT/BURSA W/O US: CPT | Performed by: PHYSICAL MEDICINE & REHABILITATION

## 2022-06-20 RX ORDER — TRIAMCINOLONE ACETONIDE 40 MG/ML
80 INJECTION, SUSPENSION INTRA-ARTICULAR; INTRAMUSCULAR
Status: COMPLETED | OUTPATIENT
Start: 2022-06-20 | End: 2022-06-20

## 2022-06-20 RX ORDER — LIDOCAINE HYDROCHLORIDE 10 MG/ML
3 INJECTION, SOLUTION INFILTRATION; PERINEURAL
Status: COMPLETED | OUTPATIENT
Start: 2022-06-20 | End: 2022-06-20

## 2022-06-20 RX ADMIN — TRIAMCINOLONE ACETONIDE 80 MG: 40 INJECTION, SUSPENSION INTRA-ARTICULAR; INTRAMUSCULAR at 13:04

## 2022-06-20 RX ADMIN — LIDOCAINE HYDROCHLORIDE 3 ML: 10 INJECTION, SOLUTION INFILTRATION; PERINEURAL at 13:04

## 2022-06-20 NOTE — PROGRESS NOTES
1  Primary osteoarthritis of both knees  Large joint arthrocentesis: R knee   2  Stage 3a chronic kidney disease (HCC)     3  Obesity, morbid ([facility] [address] )       Orders Placed This Encounter   Procedures    Large joint arthrocentesis: R knee        Impression:  Patient presents in follow-up of chronic bilateral knee pain with right worse than left, secondary to osteoarthritis (R- medial joint L- lateral joint)  She last had a right knee steroid injection and was provided with a medial  brace on 10/8/2021  We decided to proceed with a repeat right knee steroid injection today  We will also order viscosupplementation for her  We discussed an assistive device for ambulation assistance but she is against this for now  I will see her back for the one shot viscosupplementation injection  She would benefit from physical therapy for which I have placed another order  She should not be taking any NSAIDs due to CKD  Patient is in agreement with the above plan  The caretaker left the room during the encounter      Imaging Studies (I personally reviewed images in PACS and report if it was available):  Bilateral knee x-rays obtained on 10/8/2021  Left knee shows lateral joint space narrowing more than medial joint space  There is osteophytosis  There is patellofemoral joint space narrowing  The right knee shows medial joint space narrowing more than lateral   There is patellofemoral joint space narrowing with osteophytosis  No acute osseous abnormalities  These findings are consistent with bilateral Kellgren Josiah grade 3 osteoarthritis  No follow-ups on file  Patient is in agreement with the above plan  HPI:  Rozena Mohs is a 74 y o  female  who presents in follow up  Here for   Chief Complaint   Patient presents with    Left Leg - Pain    Right Leg - Pain       Since last visit: See above  She presents with chronic knee pain with right worse than left    The injection was helpful for a little bit of time  Following history reviewed and updated:  Past Medical History:   Diagnosis Date    Hyperlipidemia     Hypertension      History reviewed  No pertinent surgical history  Social History   Social History     Substance and Sexual Activity   Alcohol Use Never     Social History     Substance and Sexual Activity   Drug Use Never     Social History     Tobacco Use   Smoking Status Never Smoker   Smokeless Tobacco Never Used     Family History   Problem Relation Age of Onset    No Known Problems Mother     No Known Problems Father      No Known Allergies     Constitutional:  /77   Pulse 77   Ht 5' (1 524 m)   Wt 81 2 kg (179 lb)   BMI 34 96 kg/m²    General: NAD  Eyes: Clear sclerae  ENT: No inflammation, lesion, or mass of lips  No tracheal deviation  Musculoskeletal: As mentioned below  Integumentary: No visible rashes or skin lesions  Pulmonary/Chest: Effort normal  No respiratory distress  Neuro: CN's grossly intact, GILBERT  Psych: Normal affect and judgement  Vascular: WWP  Right Knee Exam     Tenderness   The patient is experiencing tenderness in the medial joint line (Globally tender)  Range of Motion   Extension: normal   Flexion:  100 abnormal     Tests   Mukesh:  Medial - positive Lateral - positive  Varus: negative Valgus: negative    Other   Erythema: absent  Scars: absent  Sensation: normal  Pulse: present             Large joint arthrocentesis: R knee  Universal Protocol:  Consent: Verbal consent obtained  Written consent not obtained  Risks and benefits: risks, benefits and alternatives were discussed  Consent given by: patient  Timeout called at: 6/20/2022 1:04 PM   Patient understanding: patient states understanding of the procedure being performed  Patient consent: the patient's understanding of the procedure matches consent given  Site marked: the operative site was marked  Radiology Images displayed and confirmed   If images not available, report reviewed: imaging studies available  Patient identity confirmed: verbally with patient    Supporting Documentation  Indications: pain   Procedure Details  Location: knee - R knee  Needle size: 22 G  Ultrasound guidance: no  Approach: Inferolateral to patella  Medications administered: 3 mL lidocaine 1 %; 80 mg triamcinolone acetonide 40 mg/mL    Patient tolerance: patient tolerated the procedure well with no immediate complications  Dressing:  Sterile dressing applied    There was little to no resistance encountered during the injection  Prior to the procedure, the patient was informed of the following risks in layman terms:    - Risk of bleeding since a needle is involved  - Risk of infection (1/10,000 chance as per recent studies)  Signs/symptoms were discussed and they would prompt an urgent evaluation at an emergency department   - Risk of pigmentation or skin dimpling in the skin (2-3% chance as per recent studies) from the steroid  - Risk of increased pain from steroid flare (1% chance as per recent studies) that typically lasts 24-48 hours  - Risk of increased blood sugars from the steroid medication that can last for a few weeks  If the patient is a diabetic or pre-diabetic, they were encouraged to closely monitor their blood sugars and discuss with PCP if elevated more than usual or if having symptoms  After going over these risks, we decided that the benefits outweigh the risks and proceeded with the procedure

## 2022-06-23 DIAGNOSIS — E78.5 HYPERLIPIDEMIA, UNSPECIFIED HYPERLIPIDEMIA TYPE: ICD-10-CM

## 2022-06-23 DIAGNOSIS — K21.9 GASTROESOPHAGEAL REFLUX DISEASE WITHOUT ESOPHAGITIS: ICD-10-CM

## 2022-06-23 RX ORDER — OMEPRAZOLE 20 MG/1
20 CAPSULE, DELAYED RELEASE ORAL DAILY
Qty: 90 CAPSULE | Refills: 1 | Status: SHIPPED | OUTPATIENT
Start: 2022-06-23

## 2022-06-23 RX ORDER — ATORVASTATIN CALCIUM 20 MG/1
20 TABLET, FILM COATED ORAL DAILY
Qty: 90 TABLET | Refills: 1 | Status: SHIPPED | OUTPATIENT
Start: 2022-06-23

## 2022-07-13 ENCOUNTER — OFFICE VISIT (OUTPATIENT)
Dept: PAIN MEDICINE | Facility: CLINIC | Age: 67
End: 2022-07-13
Payer: MEDICARE

## 2022-07-13 VITALS
SYSTOLIC BLOOD PRESSURE: 133 MMHG | BODY MASS INDEX: 34.96 KG/M2 | DIASTOLIC BLOOD PRESSURE: 79 MMHG | WEIGHT: 179 LBS | HEART RATE: 64 BPM

## 2022-07-13 DIAGNOSIS — M51.16 INTERVERTEBRAL DISC DISORDER WITH RADICULOPATHY OF LUMBAR REGION: Primary | ICD-10-CM

## 2022-07-13 PROCEDURE — 99214 OFFICE O/P EST MOD 30 MIN: CPT | Performed by: ANESTHESIOLOGY

## 2022-07-13 NOTE — PROGRESS NOTES
Assessment:  1  Intervertebral disc disorder with radiculopathy of lumbar region        Plan:  The patient is experiencing recurrence of pain from her L5-S1 disc herniation which is leading to right-greater-than-left radicular symptoms  At this time, I discussed performing a bilateral L5 transforaminal epidural steroid injection to help reduce swelling and inflammation which is leading to the pain symptoms  She would like to proceed and will be scheduled for an upcoming Tuesday or Thursday under fluoroscopic guidance  Complete risks and benefits including bleeding, infection, tissue reaction, nerve injury and allergic reaction were discussed  The approach was demonstrated using models and literature was provided  Verbal and written consent was obtained  My impressions and treatment recommendations were discussed in detail with the patient who verbalized understanding and had no further questions  Discharge instructions were provided  I personally saw and examined the patient and I agree with the above discussed plan of care  Orders Placed This Encounter   Procedures    FL spine and pain procedure     Standing Status:   Future     Standing Expiration Date:   7/13/2026     Order Specific Question:   Reason for Exam:     Answer:   Bilateral L5 TF GISELLE     Order Specific Question:   Anticoagulant hold needed? Answer:   No     No orders of the defined types were placed in this encounter  History of Present Illness:  Hemal Kimball is a 74 y o  female who presents for a follow up office visit in regards to Foot Pain (Right side/)  The patient has a history of lumbar disc disorder with radiculopathy returns for follow-up  She reports worsening lower back right greater than left leg symptoms into the foot more so on the right  Symptoms are moderate to severe accurate with physical activity and transitioning movements    In the past she is undergone epidural steroid injections but did not think they were helpful at that time but realizes that they were  I have personally reviewed and/or updated the patient's past medical history, past surgical history, family history, social history, current medications, allergies, and vital signs today  Review of Systems   Respiratory: Negative for shortness of breath  Cardiovascular: Negative for chest pain  Gastrointestinal: Negative for constipation, diarrhea, nausea and vomiting  Musculoskeletal: Positive for gait problem and joint swelling  Negative for arthralgias and myalgias  Skin: Negative for rash  Neurological: Positive for numbness  Negative for dizziness, seizures and weakness  All other systems reviewed and are negative  Patient Active Problem List   Diagnosis    Hyperlipidemia    Chronic bilateral low back pain without sciatica    Gastroesophageal reflux disease without esophagitis    Essential hypertension    BMI 34 0-34 9,adult    Chronic pain of both knees    Obesity, morbid ([address] )    At risk for falls    Balance disorder    Primary osteoarthritis of both knees    Exposure to COVID-19 virus    Parenchymal renal hypertension    Stage 3 chronic kidney disease (HCC)    Prediabetes    Lymphedema    Venous insufficiency    COVID-19    Nasal congestion    Sore throat (viral)       Past Medical History:   Diagnosis Date    Hyperlipidemia     Hypertension        No past surgical history on file      Family History   Problem Relation Age of Onset    No Known Problems Mother     No Known Problems Father        Social History     Occupational History    Occupation: retired   Tobacco Use    Smoking status: Never Smoker    Smokeless tobacco: Never Used   Substance and Sexual Activity    Alcohol use: Never    Drug use: Never    Sexual activity: Not on file       Current Outpatient Medications on File Prior to Visit   Medication Sig    atorvastatin (LIPITOR) 20 mg tablet TAKE 1 TABLET (20 MG TOTAL) BY MOUTH DAILY    omeprazole (PriLOSEC) 20 mg delayed release capsule TAKE 1 CAPSULE (20 MG TOTAL) BY MOUTH DAILY    acetaminophen (TYLENOL) 500 mg tablet Take 1 tablet (500 mg total) by mouth every 6 (six) hours as needed for mild pain    benzonatate (TESSALON PERLES) 100 mg capsule Take 1 capsule (100 mg total) by mouth as needed in the morning and 1 capsule (100 mg total) as needed at noon and 1 capsule (100 mg total) as needed in the evening for cough   Diclofenac Sodium (VOLTAREN) 1 % APPLY 2 GRAMS TOPICALLY 4 (FOUR) TIMES A DAY    fluticasone (FLONASE) 50 mcg/act nasal spray 1 spray into each nostril in the morning   fluticasone-vilanterol (Breo Ellipta) 200-25 MCG/INH inhaler Inhale 1 puff daily Rinse mouth after use   losartan (COZAAR) 100 MG tablet TAKE 1 TABLET (100 MG TOTAL) BY MOUTH DAILY    losartan (COZAAR) 50 mg tablet Take 1 tablet (50 mg total) by mouth daily    magnesium Oxide (MAG-OX) 400 mg TABS Take 400 mg by mouth in the morning and 400 mg in the evening   naproxen (EC NAPROSYN) 500 MG EC tablet TAKE 1 TABLET (500 MG TOTAL) BY MOUTH 2 (TWO) TIMES A DAY AS NEEDED FOR MODERATE PAIN    Promethazine-DM (PHENERGAN-DM) 6 25-15 mg/5 mL oral syrup Take 5 mL by mouth 4 (four) times a day as needed for cough    spironolactone (ALDACTONE) 25 mg tablet TAKE 1 TABLET (25 MG TOTAL) BY MOUTH DAILY    torsemide (DEMADEX) 5 MG tablet TAKE 1 TABLET (5 MG TOTAL) BY MOUTH DAILY    [DISCONTINUED] magnesium Oxide (MAG-OX) 400 mg TABS TAKE 1 TABLET (400 MG TOTAL) BY MOUTH 2 (TWO) TIMES A DAY     No current facility-administered medications on file prior to visit  No Known Allergies    Physical Exam:    /79   Pulse 64   Wt 81 2 kg (179 lb)   BMI 34 96 kg/m²     Constitutional:normal, well developed, well nourished, alert, in no distress and non-toxic and no overt pain behavior   and obese  Eyes:anicteric  HEENT:grossly intact  Neck:supple, symmetric, trachea midline and no masses   Pulmonary:even and unlabored  Cardiovascular:Bilateral lower extremity edema  Skin:Normal without rashes or lesions and well hydrated  Psychiatric:Mood and affect appropriate  Neurologic:Cranial Nerves II-XII grossly intact  Musculoskeletal:normal     Lumbar Spine Exam  Appearance:  Normal lordosis  Palpation/Tenderness:  left lumbar paraspinal tenderness  right lumbar paraspinal tenderness  Range of Motion:  Flexion:   Moderately limited  with pain  Extension:  Moderately limited  with pain  Motor Strength:  Left hip flexion:  5/5  Left hip extension:  5/5  Right hip flexion:  5/5  Right hip extension:  5/5  Left knee flexion:  5/5  Left knee extension:  5/5  Right knee flexion:  5/5  Right knee extension:  5/5  Left foot dorsiflexion:  5/5  Left foot plantar flexion:  5/5  Right foot dorsiflexion:  5/5  Right foot plantar flexion:  5/5

## 2022-07-16 DIAGNOSIS — M79.672 PAIN IN BOTH FEET: ICD-10-CM

## 2022-07-16 DIAGNOSIS — M79.671 PAIN IN BOTH FEET: ICD-10-CM

## 2022-07-18 ENCOUNTER — APPOINTMENT (OUTPATIENT)
Dept: LAB | Facility: HOSPITAL | Age: 67
End: 2022-07-18
Attending: INTERNAL MEDICINE
Payer: MEDICARE

## 2022-07-18 DIAGNOSIS — R60.0 PEDAL EDEMA: ICD-10-CM

## 2022-07-18 DIAGNOSIS — I12.9 PARENCHYMAL RENAL HYPERTENSION, STAGE 1 THROUGH STAGE 4 OR UNSPECIFIED CHRONIC KIDNEY DISEASE: ICD-10-CM

## 2022-07-18 DIAGNOSIS — I89.0 LYMPHEDEMA: ICD-10-CM

## 2022-07-18 DIAGNOSIS — N18.31 STAGE 3A CHRONIC KIDNEY DISEASE (HCC): ICD-10-CM

## 2022-07-18 DIAGNOSIS — E83.42 HYPOMAGNESEMIA: ICD-10-CM

## 2022-07-18 DIAGNOSIS — E78.49 OTHER HYPERLIPIDEMIA: ICD-10-CM

## 2022-07-18 DIAGNOSIS — E87.6 HYPOKALEMIA: ICD-10-CM

## 2022-07-18 DIAGNOSIS — N18.9 CKD (CHRONIC KIDNEY DISEASE): ICD-10-CM

## 2022-07-18 LAB
25(OH)D3 SERPL-MCNC: 28.3 NG/ML (ref 30–100)
ALBUMIN SERPL BCP-MCNC: 3.9 G/DL (ref 3.5–5)
ALP SERPL-CCNC: 82 U/L (ref 34–104)
ALT SERPL W P-5'-P-CCNC: 18 U/L (ref 7–52)
ANION GAP SERPL CALCULATED.3IONS-SCNC: 9 MMOL/L (ref 4–13)
AST SERPL W P-5'-P-CCNC: 16 U/L (ref 13–39)
BILIRUB SERPL-MCNC: 0.41 MG/DL (ref 0.2–1)
BUN SERPL-MCNC: 18 MG/DL (ref 5–25)
CALCIUM SERPL-MCNC: 9.1 MG/DL (ref 8.4–10.2)
CHLORIDE SERPL-SCNC: 103 MMOL/L (ref 96–108)
CK MB SERPL-MCNC: 3.1 % (ref 0–2.5)
CK MB SERPL-MCNC: 4.5 NG/ML (ref 0.6–6.3)
CK SERPL-CCNC: 147 U/L (ref 26–192)
CO2 SERPL-SCNC: 24 MMOL/L (ref 21–32)
CREAT SERPL-MCNC: 0.88 MG/DL (ref 0.6–1.3)
ERYTHROCYTE [DISTWIDTH] IN BLOOD BY AUTOMATED COUNT: 13.9 % (ref 11.6–15.1)
GFR SERPL CREATININE-BSD FRML MDRD: 67 ML/MIN/1.73SQ M
GLUCOSE P FAST SERPL-MCNC: 87 MG/DL (ref 65–99)
HCT VFR BLD AUTO: 35.2 % (ref 34.8–46.1)
HGB BLD-MCNC: 11.9 G/DL (ref 11.5–15.4)
MAGNESIUM SERPL-MCNC: 2 MG/DL (ref 1.9–2.7)
MCH RBC QN AUTO: 29.6 PG (ref 26.8–34.3)
MCHC RBC AUTO-ENTMCNC: 33.8 G/DL (ref 31.4–37.4)
MCV RBC AUTO: 88 FL (ref 82–98)
PLATELET # BLD AUTO: 187 THOUSANDS/UL (ref 149–390)
PMV BLD AUTO: 11.7 FL (ref 8.9–12.7)
POTASSIUM SERPL-SCNC: 4.5 MMOL/L (ref 3.5–5.3)
PROT SERPL-MCNC: 7.4 G/DL (ref 6.4–8.4)
RBC # BLD AUTO: 4.02 MILLION/UL (ref 3.81–5.12)
SODIUM SERPL-SCNC: 136 MMOL/L (ref 135–147)
WBC # BLD AUTO: 7.39 THOUSAND/UL (ref 4.31–10.16)

## 2022-07-18 PROCEDURE — 36415 COLL VENOUS BLD VENIPUNCTURE: CPT

## 2022-07-18 PROCEDURE — 80053 COMPREHEN METABOLIC PANEL: CPT

## 2022-07-18 PROCEDURE — 82550 ASSAY OF CK (CPK): CPT

## 2022-07-18 PROCEDURE — 82306 VITAMIN D 25 HYDROXY: CPT

## 2022-07-18 PROCEDURE — 83735 ASSAY OF MAGNESIUM: CPT

## 2022-07-18 PROCEDURE — 85027 COMPLETE CBC AUTOMATED: CPT

## 2022-07-18 PROCEDURE — 82553 CREATINE MB FRACTION: CPT

## 2022-07-19 ENCOUNTER — TELEPHONE (OUTPATIENT)
Dept: NEPHROLOGY | Facility: CLINIC | Age: 67
End: 2022-07-19

## 2022-07-19 ENCOUNTER — TELEPHONE (OUTPATIENT)
Dept: FAMILY MEDICINE CLINIC | Facility: CLINIC | Age: 67
End: 2022-07-19

## 2022-07-19 NOTE — TELEPHONE ENCOUNTER
Daughter called and states neuro put pt on vitamin d 2,000mcg od and she also takes vitamin e 400 od and wants to know if you would send to pharmacy since insurance will pay for it

## 2022-07-20 DIAGNOSIS — E55.9 VITAMIN D DEFICIENCY: Primary | ICD-10-CM

## 2022-07-20 RX ORDER — MELATONIN
2000 DAILY
Qty: 60 TABLET | Refills: 5 | Status: SHIPPED | OUTPATIENT
Start: 2022-07-20 | End: 2022-07-29

## 2022-07-29 DIAGNOSIS — E55.9 VITAMIN D DEFICIENCY: Primary | ICD-10-CM

## 2022-07-29 RX ORDER — CHOLECALCIFEROL (VITAMIN D3) 50 MCG
2000 TABLET ORAL DAILY
Qty: 90 TABLET | Refills: 1 | Status: SHIPPED | OUTPATIENT
Start: 2022-07-29 | End: 2023-02-24

## 2022-07-29 NOTE — TELEPHONE ENCOUNTER
Patients daughter advised  She was not happy the Vitamin E was not written as it is OTC  Advised daughter it is up to physician digression what prescriptions to write and not write for

## 2022-07-29 NOTE — TELEPHONE ENCOUNTER
Vit d called in  I am unable to send Vit E, has to come from advising physician   I do not have any recommendations for use to Vit E

## 2022-08-04 ENCOUNTER — TELEPHONE (OUTPATIENT)
Dept: PAIN MEDICINE | Facility: CLINIC | Age: 67
End: 2022-08-04

## 2022-08-04 NOTE — TELEPHONE ENCOUNTER
Patient scheduled for procedure with Myrprisca December 9/8/22, arrival time 3pm   Patient does not have mychart, so the following instructions were given to patient verbally, no vaccines 14 days prior or after procedure, nothing to eat or drink 1 hr prior to procedure, wear something loose and comfortable, no jewelry, if ill, infection or antibiotics, must call office to reschedule procedure  Take prescription medications as normal and you will need a  18 yrs or older

## 2022-08-10 ENCOUNTER — OFFICE VISIT (OUTPATIENT)
Dept: FAMILY MEDICINE CLINIC | Facility: CLINIC | Age: 67
End: 2022-08-10
Payer: MEDICARE

## 2022-08-10 VITALS
BODY MASS INDEX: 34.99 KG/M2 | DIASTOLIC BLOOD PRESSURE: 78 MMHG | OXYGEN SATURATION: 98 % | WEIGHT: 178.2 LBS | HEART RATE: 73 BPM | SYSTOLIC BLOOD PRESSURE: 128 MMHG | HEIGHT: 60 IN

## 2022-08-10 DIAGNOSIS — E78.49 OTHER HYPERLIPIDEMIA: Primary | ICD-10-CM

## 2022-08-10 DIAGNOSIS — J01.10 ACUTE NON-RECURRENT FRONTAL SINUSITIS: ICD-10-CM

## 2022-08-10 DIAGNOSIS — Z78.0 MENOPAUSE: ICD-10-CM

## 2022-08-10 DIAGNOSIS — M17.0 PRIMARY OSTEOARTHRITIS OF BOTH KNEES: ICD-10-CM

## 2022-08-10 DIAGNOSIS — R73.03 PREDIABETES: ICD-10-CM

## 2022-08-10 DIAGNOSIS — I10 ESSENTIAL HYPERTENSION: ICD-10-CM

## 2022-08-10 PROBLEM — R09.81 NASAL CONGESTION: Status: RESOLVED | Noted: 2022-05-16 | Resolved: 2022-08-10

## 2022-08-10 PROBLEM — J02.8 SORE THROAT (VIRAL): Status: RESOLVED | Noted: 2022-05-18 | Resolved: 2022-08-10

## 2022-08-10 PROBLEM — U07.1 COVID-19: Status: RESOLVED | Noted: 2022-05-16 | Resolved: 2022-08-10

## 2022-08-10 PROBLEM — B97.89 SORE THROAT (VIRAL): Status: RESOLVED | Noted: 2022-05-18 | Resolved: 2022-08-10

## 2022-08-10 PROCEDURE — G0439 PPPS, SUBSEQ VISIT: HCPCS | Performed by: FAMILY MEDICINE

## 2022-08-10 PROCEDURE — 99214 OFFICE O/P EST MOD 30 MIN: CPT | Performed by: FAMILY MEDICINE

## 2022-08-10 RX ORDER — AZITHROMYCIN 250 MG/1
TABLET, FILM COATED ORAL
Qty: 6 TABLET | Refills: 0 | Status: SHIPPED | OUTPATIENT
Start: 2022-08-10 | End: 2022-08-14

## 2022-08-10 NOTE — PATIENT INSTRUCTIONS
Medicare Preventive Visit Patient Instructions  Thank you for completing your Welcome to Medicare Visit or Medicare Annual Wellness Visit today  Your next wellness visit will be due in one year (8/11/2023)  The screening/preventive services that you may require over the next 5-10 years are detailed below  Some tests may not apply to you based off risk factors and/or age  Screening tests ordered at today's visit but not completed yet may show as past due  Also, please note that scanned in results may not display below  Preventive Screenings:  Service Recommendations Previous Testing/Comments   Colorectal Cancer Screening  * Colonoscopy    * Fecal Occult Blood Test (FOBT)/Fecal Immunochemical Test (FIT)  * Fecal DNA/Cologuard Test  * Flexible Sigmoidoscopy Age: 39-70 years old   Colonoscopy: every 10 years (may be performed more frequently if at higher risk)  OR  FOBT/FIT: every 1 year  OR  Cologuard: every 3 years  OR  Sigmoidoscopy: every 5 years  Screening may be recommended earlier than age 39 if at higher risk for colorectal cancer  Also, an individualized decision between you and your healthcare provider will decide whether screening between the ages of 74-80 would be appropriate  Colonoscopy: Not on file  FOBT/FIT: Not on file  Cologuard: Not on file  Sigmoidoscopy: Not on file    Risks and Benefits Discussed  Due for Cologuard     Breast Cancer Screening Age: 36 years old  Frequency: every 1-2 years  Not required if history of left and right mastectomy Mammogram: Not on file    Risks and Benefits Discussed  Due for Mammogram   Cervical Cancer Screening Between the ages of 21-29, pap smear recommended once every 3 years  Between the ages of 33-67, can perform pap smear with HPV co-testing every 5 years     Recommendations may differ for women with a history of total hysterectomy, cervical cancer, or abnormal pap smears in past  Pap Smear: Not on file    Screening Not Indicated   Hepatitis C Screening Once for adults born between 1945 and 1965  More frequently in patients at high risk for Hepatitis C Hep C Antibody: Not on file    Screening Current   Diabetes Screening 1-2 times per year if you're at risk for diabetes or have pre-diabetes Fasting glucose: 87 mg/dL (7/18/2022)  A1C: 6 2 % (6/7/2022)  Screening Current   Cholesterol Screening Once every 5 years if you don't have a lipid disorder  May order more often based on risk factors  Lipid panel: 06/07/2022    Screening Not Indicated  History Lipid Disorder     Other Preventive Screenings Covered by Medicare:  1  Abdominal Aortic Aneurysm (AAA) Screening: covered once if your at risk  You're considered to be at risk if you have a family history of AAA  2  Lung Cancer Screening: covers low dose CT scan once per year if you meet all of the following conditions: (1) Age 50-69; (2) No signs or symptoms of lung cancer; (3) Current smoker or have quit smoking within the last 15 years; (4) You have a tobacco smoking history of at least 20 pack years (packs per day multiplied by number of years you smoked); (5) You get a written order from a healthcare provider  3  Glaucoma Screening: covered annually if you're considered high risk: (1) You have diabetes OR (2) Family history of glaucoma OR (3)  aged 48 and older OR (3)  American aged 72 and older  3  Osteoporosis Screening: covered every 2 years if you meet one of the following conditions: (1) You're estrogen deficient and at risk for osteoporosis based off medical history and other findings; (2) Have a vertebral abnormality; (3) On glucocorticoid therapy for more than 3 months; (4) Have primary hyperparathyroidism; (5) On osteoporosis medications and need to assess response to drug therapy  · Last bone density test (DXA Scan): Not on file  5  HIV Screening: covered annually if you're between the age of 12-76   Also covered annually if you are younger than 13 and older than 72 with risk factors for HIV infection  For pregnant patients, it is covered up to 3 times per pregnancy  Immunizations:  Immunization Recommendations   Influenza Vaccine Annual influenza vaccination during flu season is recommended for all persons aged >= 6 months who do not have contraindications   Pneumococcal Vaccine   * Pneumococcal conjugate vaccine = PCV13 (Prevnar 13), PCV15 (Vaxneuvance), PCV20 (Prevnar 20)  * Pneumococcal polysaccharide vaccine = PPSV23 (Pneumovax) Adults 25-60 years old: 1-3 doses may be recommended based on certain risk factors  Adults 72 years old: 1-2 doses may be recommended based off what pneumonia vaccine you previously received   Hepatitis B Vaccine 3 dose series if at intermediate or high risk (ex: diabetes, end stage renal disease, liver disease)   Tetanus (Td) Vaccine - COST NOT COVERED BY MEDICARE PART B Following completion of primary series, a booster dose should be given every 10 years to maintain immunity against tetanus  Td may also be given as tetanus wound prophylaxis  Tdap Vaccine - COST NOT COVERED BY MEDICARE PART B Recommended at least once for all adults  For pregnant patients, recommended with each pregnancy  Shingles Vaccine (Shingrix) - COST NOT COVERED BY MEDICARE PART B  2 shot series recommended in those aged 48 and above     Health Maintenance Due:      Topic Date Due    Hepatitis C Screening  Never done    Breast Cancer Screening: Mammogram  Never done    Colorectal Cancer Screening  Never done     Immunizations Due:      Topic Date Due    COVID-19 Vaccine (3 - Booster for Pfizer series) 10/02/2021    Influenza Vaccine (1) 09/01/2022     Advance Directives   What are advance directives? Advance directives are legal documents that state your wishes and plans for medical care  These plans are made ahead of time in case you lose your ability to make decisions for yourself   Advance directives can apply to any medical decision, such as the treatments you want, and if you want to donate organs  What are the types of advance directives? There are many types of advance directives, and each state has rules about how to use them  You may choose a combination of any of the following:  · Living will: This is a written record of the treatment you want  You can also choose which treatments you do not want, which to limit, and which to stop at a certain time  This includes surgery, medicine, IV fluid, and tube feedings  · Durable power of  for healthcare [facility]): This is a written record that states who you want to make healthcare choices for you when you are unable to make them for yourself  This person, called a proxy, is usually a family member or a friend  You may choose more than 1 proxy  · Do not resuscitate (DNR) order:  A DNR order is used in case your heart stops beating or you stop breathing  It is a request not to have certain forms of treatment, such as CPR  A DNR order may be included in other types of advance directives  · Medical directive: This covers the care that you want if you are in a coma, near death, or unable to make decisions for yourself  You can list the treatments you want for each condition  Treatment may include pain medicine, surgery, blood transfusions, dialysis, IV or tube feedings, and a ventilator (breathing machine)  · Values history: This document has questions about your views, beliefs, and how you feel and think about life  This information can help others choose the care that you would choose  Why are advance directives important? An advance directive helps you control your care  Although spoken wishes may be used, it is better to have your wishes written down  Spoken wishes can be misunderstood, or not followed  Treatments may be given even if you do not want them  An advance directive may make it easier for your family to make difficult choices about your care     Weight Management   Why it is important to manage your weight:  Being overweight increases your risk of health conditions such as heart disease, high blood pressure, type 2 diabetes, and certain types of cancer  It can also increase your risk for osteoarthritis, sleep apnea, and other respiratory problems  Aim for a slow, steady weight loss  Even a small amount of weight loss can lower your risk of health problems  How to lose weight safely:  A safe and healthy way to lose weight is to eat fewer calories and get regular exercise  You can lose up about 1 pound a week by decreasing the number of calories you eat by 500 calories each day  Healthy meal plan for weight management:  A healthy meal plan includes a variety of foods, contains fewer calories, and helps you stay healthy  A healthy meal plan includes the following:  · Eat whole-grain foods more often  A healthy meal plan should contain fiber  Fiber is the part of grains, fruits, and vegetables that is not broken down by your body  Whole-grain foods are healthy and provide extra fiber in your diet  Some examples of whole-grain foods are whole-wheat breads and pastas, oatmeal, brown rice, and bulgur  · Eat a variety of vegetables every day  Include dark, leafy greens such as spinach, kale, kai greens, and mustard greens  Eat yellow and orange vegetables such as carrots, sweet potatoes, and winter squash  · Eat a variety of fruits every day  Choose fresh or canned fruit (canned in its own juice or light syrup) instead of juice  Fruit juice has very little or no fiber  · Eat low-fat dairy foods  Drink fat-free (skim) milk or 1% milk  Eat fat-free yogurt and low-fat cottage cheese  Try low-fat cheeses such as mozzarella and other reduced-fat cheeses  · Choose meat and other protein foods that are low in fat  Choose beans or other legumes such as split peas or lentils  Choose fish, skinless poultry (chicken or turkey), or lean cuts of red meat (beef or pork)   Before you cook meat or poultry, cut off any visible fat  · Use less fat and oil  Try baking foods instead of frying them  Add less fat, such as margarine, sour cream, regular salad dressing and mayonnaise to foods  Eat fewer high-fat foods  Some examples of high-fat foods include french fries, doughnuts, ice cream, and cakes  · Eat fewer sweets  Limit foods and drinks that are high in sugar  This includes candy, cookies, regular soda, and sweetened drinks  Exercise:  Exercise at least 30 minutes per day on most days of the week  Some examples of exercise include walking, biking, dancing, and swimming  You can also fit in more physical activity by taking the stairs instead of the elevator or parking farther away from stores  Ask your healthcare provider about the best exercise plan for you  © Copyright [employer] 2018 Information is for End User's use only and may not be sold, redistributed or otherwise used for commercial purposes   All illustrations and images included in CareNotes® are the copyrighted property of [employer]  or [address] [place] [address]

## 2022-08-10 NOTE — PROGRESS NOTES
Assessment and Plan:     Problem List Items Addressed This Visit    None         Depression Screening and Follow-up Plan: Patient was screened for depression during today's encounter  They screened negative with a PHQ-2 score of 0  Preventive health issues were discussed with patient, and age appropriate screening tests were ordered as noted in patient's After Visit Summary  Personalized health advice and appropriate referrals for health education or preventive services given if needed, as noted in patient's After Visit Summary  History of Present Illness:     Patient presents for a Medicare Wellness Visit    Hyperlipidemia  This is a chronic problem  The current episode started more than 1 year ago  The problem is controlled  Recent lipid tests were reviewed and are normal  Pertinent negatives include no chest pain, focal sensory loss, myalgias or shortness of breath  Current antihyperlipidemic treatment includes statins  The current treatment provides significant improvement of lipids  There are no compliance problems  Risk factors for coronary artery disease include dyslipidemia, hypertension, post-menopausal and a sedentary lifestyle  Patient has history of hypertension, CKD  Currently monitored by Nephrology  Her blood pressure is at today  Also reports his pain in both her legs  Has severe arthritis in both her  Received intra-articular steroid injection which did not help  Patient reach out to specialist to discuss the next treatment option  Patient also has history of pre diabetes  A1c stable  Patient Care Team:  Michelle Ramírez MD as PCP - General (Family Medicine)     Review of Systems:     Review of Systems   Constitutional: Negative  Respiratory: Negative  Negative for shortness of breath  Cardiovascular: Negative  Negative for chest pain and palpitations  Musculoskeletal: Negative for myalgias  Neurological: Negative  Psychiatric/Behavioral: Negative  Problem List:     Patient Active Problem List   Diagnosis    Hyperlipidemia    Chronic bilateral low back pain without sciatica    Gastroesophageal reflux disease without esophagitis    Essential hypertension    BMI 34 0-34 9,adult    Chronic pain of both knees    Obesity, morbid ([address] )    At risk for falls    Balance disorder    Primary osteoarthritis of both knees    Exposure to COVID-19 virus    Parenchymal renal hypertension    Stage 3 chronic kidney disease (HCC)    Prediabetes    Lymphedema    Venous insufficiency    COVID-19    Nasal congestion    Sore throat (viral)      Past Medical and Surgical History:     Past Medical History:   Diagnosis Date    Hyperlipidemia     Hypertension      No past surgical history on file     Family History:     Family History   Problem Relation Age of Onset    No Known Problems Mother     No Known Problems Father       Social History:     Social History     Socioeconomic History    Marital status: /Civil Union     Spouse name: Not on file    Number of children: Not on file    Years of education: Not on file    Highest education level: Not on file   Occupational History    Occupation: retired   Tobacco Use    Smoking status: Never Smoker    Smokeless tobacco: Never Used   Substance and Sexual Activity    Alcohol use: Never    Drug use: Never    Sexual activity: Not on file   Other Topics Concern    Not on file   Social History Narrative    Not on file     Social Determinants of Health     Financial Resource Strain: Not on file   Food Insecurity: Not on file   Transportation Needs: Not on file   Physical Activity: Not on file   Stress: Not on file   Social Connections: Not on file   Intimate Partner Violence: Not on file   Housing Stability: Not on file      Medications and Allergies:     Current Outpatient Medications   Medication Sig Dispense Refill    acetaminophen (TYLENOL) 500 mg tablet Take 1 tablet (500 mg total) by mouth every 6 (six) hours as needed for mild pain 90 tablet 1    atorvastatin (LIPITOR) 20 mg tablet TAKE 1 TABLET (20 MG TOTAL) BY MOUTH DAILY 90 tablet 1    benzonatate (TESSALON PERLES) 100 mg capsule Take 1 capsule (100 mg total) by mouth as needed in the morning and 1 capsule (100 mg total) as needed at noon and 1 capsule (100 mg total) as needed in the evening for cough  60 capsule 0    Cholecalciferol (Vitamin D) 50 MCG (2000 UT) tablet Take 1 tablet (2,000 Units total) by mouth daily 90 tablet 1    Diclofenac Sodium (VOLTAREN) 1 % APPLY 2 GRAMS TOPICALLY 4 (FOUR) TIMES A  g 0    fluticasone (FLONASE) 50 mcg/act nasal spray 1 spray into each nostril in the morning  15 8 mL 0    losartan (COZAAR) 100 MG tablet TAKE 1 TABLET (100 MG TOTAL) BY MOUTH DAILY 30 tablet 5    losartan (COZAAR) 50 mg tablet Take 1 tablet (50 mg total) by mouth daily 90 tablet 3    magnesium Oxide (MAG-OX) 400 mg TABS Take 400 mg by mouth in the morning and 400 mg in the evening   naproxen (EC NAPROSYN) 500 MG EC tablet TAKE 1 TABLET (500 MG TOTAL) BY MOUTH 2 (TWO) TIMES A DAY AS NEEDED FOR MODERATE PAIN      omeprazole (PriLOSEC) 20 mg delayed release capsule TAKE 1 CAPSULE (20 MG TOTAL) BY MOUTH DAILY 90 capsule 1    Promethazine-DM (PHENERGAN-DM) 6 25-15 mg/5 mL oral syrup Take 5 mL by mouth 4 (four) times a day as needed for cough 180 mL 1    spironolactone (ALDACTONE) 25 mg tablet TAKE 1 TABLET (25 MG TOTAL) BY MOUTH DAILY 30 tablet 5    torsemide (DEMADEX) 5 MG tablet TAKE 1 TABLET (5 MG TOTAL) BY MOUTH DAILY 30 tablet 5    fluticasone-vilanterol (Breo Ellipta) 200-25 MCG/INH inhaler Inhale 1 puff daily Rinse mouth after use  60 blister 0     No current facility-administered medications for this visit       No Known Allergies   Immunizations:     Immunization History   Administered Date(s) Administered    COVID-19 PFIZER VACCINE 0 3 ML IM 04/13/2021, 05/02/2021      Health Maintenance:         Topic Date Due    Hepatitis C Screening  Never done    Breast Cancer Screening: Mammogram  Never done    Colorectal Cancer Screening  Never done         Topic Date Due    COVID-19 Vaccine (3 - Booster for Pfizer series) 10/02/2021    Influenza Vaccine (1) 09/01/2022      Medicare Screening Tests and Risk Assessments:     Chica Lozoya is here for her Initial Wellness and Subsequent Wellness visit  Health Risk Assessment:   Patient rates overall health as good  Patient feels that their physical health rating is same  Eyesight was rated as same  Hearing was rated as same  Patient feels that their emotional and mental health rating is same  Pain experienced in the last 7 days has been some  Patient's pain rating has been 3/10  Patient states that she has experienced no weight loss or gain in last 6 months  Fall Risk Screening: In the past year, patient has experienced: no history of falling in past year      Urinary Incontinence Screening:   Patient has not leaked urine accidently in the last six months  Home Safety:  Patient does not have trouble with stairs inside or outside of their home  Patient has working smoke alarms and has working carbon monoxide detector  Home safety hazards include: none  Nutrition:   Current diet is Regular  Medications:   Patient is currently taking over-the-counter supplements  OTC medications include: see medication list  Patient is able to manage medications  Activities of Daily Living (ADLs)/Instrumental Activities of Daily Living (IADLs):   Walk and transfer into and out of bed and chair?: Yes  Dress and groom yourself?: Yes    Bathe or shower yourself?: Yes    Feed yourself?  Yes  Do your laundry/housekeeping?: Yes  Manage your money, pay your bills and track your expenses?: Yes  Make your own meals?: Yes    Do your own shopping?: Yes    Previous Hospitalizations:   Any hospitalizations or ED visits within the last 12 months?: Yes    How many hospitalizations have you had in the last year?: 1-2    Advance Care Planning:   Living will: No    Durable POA for healthcare: No    Advanced directive: No      Cognitive Screening:   Provider or family/friend/caregiver concerned regarding cognition?: No    PREVENTIVE SCREENINGS      Cardiovascular Screening:    General: Screening Not Indicated and History Lipid Disorder      Diabetes Screening:     General: Screening Current      Colorectal Cancer Screening:     General: Risks and Benefits Discussed    Due for: Cologuard      Breast Cancer Screening:     General: Risks and Benefits Discussed    Due for: Mammogram        Cervical Cancer Screening:    General: Screening Not Indicated      Osteoporosis Screening:    General: Risks and Benefits Discussed and Patient Declines      Abdominal Aortic Aneurysm (AAA) Screening:        General: Risks and Benefits Discussed      Lung Cancer Screening:     General: Screening Not Indicated      Hepatitis C Screening:    General: Screening Current    Other Counseling Topics:   Car/seat belt/driving safety, skin self-exam, sunscreen and calcium and vitamin D intake and regular weightbearing exercise  No exam data present     Physical Exam:     /78   Pulse 73   Ht 5' (1 524 m)   Wt 80 8 kg (178 lb 3 2 oz)   SpO2 98%   BMI 34 80 kg/m²     Physical Exam  Vitals and nursing note reviewed  HENT:      Right Ear: External ear normal       Left Ear: External ear normal    Eyes:      Conjunctiva/sclera: Conjunctivae normal    Cardiovascular:      Rate and Rhythm: Normal rate and regular rhythm  Heart sounds: Normal heart sounds  Pulmonary:      Effort: Pulmonary effort is normal       Breath sounds: Normal breath sounds  Abdominal:      General: Bowel sounds are normal       Palpations: Abdomen is soft  Musculoskeletal:         General: Normal range of motion  Cervical back: Normal range of motion and neck supple  Skin:     General: Skin is warm     Neurological:      Mental Status: She is alert and oriented to person, place, and time  Psychiatric:         Behavior: Behavior normal          Thought Content:  Thought content normal          Judgment: Judgment normal           Kahlil Chan MD

## 2022-08-11 DIAGNOSIS — M54.16 LUMBAR RADICULOPATHY: Primary | ICD-10-CM

## 2022-08-22 NOTE — TELEPHONE ENCOUNTER
----- Message from Jared Telles MD sent at 7/18/2022  5:02 PM EDT -----  I would have the patient take vitamin-D 2000 units daily over-the-counter for borderline low vitamin-D level of 28 3
----- Message from Precious Ho MD sent at 7/18/2022 12:08 PM EDT -----  All labs look good
Spoke with patient's daughter, Ezra Beavers, about the following, she expressed understanding and thanked us for the call:     All labs look good  I would have the patient take vitamin-D 2000 units daily over-the-counter for borderline low vitamin-D level of 28 3
Detail Level: Detailed
Quality 431: Preventive Care And Screening: Unhealthy Alcohol Use - Screening: Patient not identified as an unhealthy alcohol user when screened for unhealthy alcohol use using a systematic screening method
Quality 226: Preventive Care And Screening: Tobacco Use: Screening And Cessation Intervention: Patient screened for tobacco use and is an ex/non-smoker

## 2022-09-08 ENCOUNTER — HOSPITAL ENCOUNTER (OUTPATIENT)
Dept: RADIOLOGY | Facility: CLINIC | Age: 67
End: 2022-09-08
Payer: MEDICARE

## 2022-09-08 VITALS
DIASTOLIC BLOOD PRESSURE: 78 MMHG | HEART RATE: 84 BPM | TEMPERATURE: 96.8 F | RESPIRATION RATE: 20 BRPM | OXYGEN SATURATION: 97 % | SYSTOLIC BLOOD PRESSURE: 126 MMHG

## 2022-09-08 DIAGNOSIS — M51.16 INTERVERTEBRAL DISC DISORDER WITH RADICULOPATHY OF LUMBAR REGION: ICD-10-CM

## 2022-09-08 PROCEDURE — 64483 NJX AA&/STRD TFRM EPI L/S 1: CPT | Performed by: ANESTHESIOLOGY

## 2022-09-08 RX ORDER — BUPIVACAINE HCL/PF 2.5 MG/ML
2 VIAL (ML) INJECTION ONCE
Status: COMPLETED | OUTPATIENT
Start: 2022-09-08 | End: 2022-09-08

## 2022-09-08 RX ORDER — 0.9 % SODIUM CHLORIDE 0.9 %
4 VIAL (ML) INJECTION ONCE
Status: COMPLETED | OUTPATIENT
Start: 2022-09-08 | End: 2022-09-08

## 2022-09-08 RX ORDER — METHYLPREDNISOLONE ACETATE 80 MG/ML
80 INJECTION, SUSPENSION INTRA-ARTICULAR; INTRALESIONAL; INTRAMUSCULAR; PARENTERAL; SOFT TISSUE ONCE
Status: COMPLETED | OUTPATIENT
Start: 2022-09-08 | End: 2022-09-08

## 2022-09-08 RX ADMIN — BUPIVACAINE HYDROCHLORIDE 2 ML: 2.5 INJECTION, SOLUTION EPIDURAL; INFILTRATION; INTRACAUDAL at 15:29

## 2022-09-08 RX ADMIN — Medication 4 ML: at 15:28

## 2022-09-08 RX ADMIN — METHYLPREDNISOLONE ACETATE 80 MG: 80 INJECTION, SUSPENSION INTRA-ARTICULAR; INTRALESIONAL; INTRAMUSCULAR; PARENTERAL; SOFT TISSUE at 15:29

## 2022-09-08 RX ADMIN — IOHEXOL 1 ML: 300 INJECTION, SOLUTION INTRAVENOUS at 15:29

## 2022-09-08 NOTE — H&P
History of Present Illness: The patient is a 69 y o  female who presents with complaints of lower back and leg pain is here today for bilateral L5 transforaminal epidural steroid injection    Patient Active Problem List   Diagnosis    Hyperlipidemia    Chronic bilateral low back pain without sciatica    Gastroesophageal reflux disease without esophagitis    Essential hypertension    BMI 34 0-34 9,adult    Chronic pain of both knees    Obesity, morbid [address] [address]    At risk for falls    Balance disorder    Primary osteoarthritis of both knees    Exposure to COVID-19 virus    Parenchymal renal hypertension    Stage 3 chronic kidney disease ([facility] [address] )    Prediabetes    Lymphedema    Venous insufficiency    Acute non-recurrent frontal sinusitis    Menopause       Past Medical History:   Diagnosis Date    Hyperlipidemia     Hypertension        No past surgical history on file  Current Outpatient Medications:     acetaminophen (TYLENOL) 500 mg tablet, Take 1 tablet (500 mg total) by mouth every 6 (six) hours as needed for mild pain, Disp: 90 tablet, Rfl: 1    atorvastatin (LIPITOR) 20 mg tablet, TAKE 1 TABLET (20 MG TOTAL) BY MOUTH DAILY, Disp: 90 tablet, Rfl: 1    benzonatate (TESSALON PERLES) 100 mg capsule, Take 1 capsule (100 mg total) by mouth as needed in the morning and 1 capsule (100 mg total) as needed at noon and 1 capsule (100 mg total) as needed in the evening for cough  , Disp: 60 capsule, Rfl: 0    Cholecalciferol (Vitamin D) 50 MCG (2000 UT) tablet, Take 1 tablet (2,000 Units total) by mouth daily, Disp: 90 tablet, Rfl: 1    Diclofenac Sodium (VOLTAREN) 1 %, APPLY 2 GRAMS TOPICALLY 4 (FOUR) TIMES A DAY, Disp: 100 g, Rfl: 0    fluticasone (FLONASE) 50 mcg/act nasal spray, 1 spray into each nostril in the morning , Disp: 15 8 mL, Rfl: 0    fluticasone-vilanterol (Breo Ellipta) 200-25 MCG/INH inhaler, Inhale 1 puff daily Rinse mouth after use , Disp: 60 blister, Rfl: 0    losartan (COZAAR) 100 MG tablet, TAKE 1 TABLET (100 MG TOTAL) BY MOUTH DAILY, Disp: 30 tablet, Rfl: 5    losartan (COZAAR) 50 mg tablet, Take 1 tablet (50 mg total) by mouth daily, Disp: 90 tablet, Rfl: 3    magnesium Oxide (MAG-OX) 400 mg TABS, Take 400 mg by mouth in the morning and 400 mg in the evening , Disp: , Rfl:     naproxen (EC NAPROSYN) 500 MG EC tablet, TAKE 1 TABLET (500 MG TOTAL) BY MOUTH 2 (TWO) TIMES A DAY AS NEEDED FOR MODERATE PAIN, Disp: , Rfl:     omeprazole (PriLOSEC) 20 mg delayed release capsule, TAKE 1 CAPSULE (20 MG TOTAL) BY MOUTH DAILY, Disp: 90 capsule, Rfl: 1    Promethazine-DM (PHENERGAN-DM) 6 25-15 mg/5 mL oral syrup, Take 5 mL by mouth 4 (four) times a day as needed for cough, Disp: 180 mL, Rfl: 1    spironolactone (ALDACTONE) 25 mg tablet, TAKE 1 TABLET (25 MG TOTAL) BY MOUTH DAILY, Disp: 30 tablet, Rfl: 5    torsemide (DEMADEX) 5 MG tablet, TAKE 1 TABLET (5 MG TOTAL) BY MOUTH DAILY, Disp: 30 tablet, Rfl: 5    Current Facility-Administered Medications:     bupivacaine (PF) (MARCAINE) 0 25 % injection 2 mL, 2 mL, Epidural, Once, Apurva Marquez MD    iohexol (OMNIPAQUE) 300 mg/mL injection 1 mL, 1 mL, Epidural, Once, Apurva Marquez MD    lidocaine (PF) (XYLOCAINE-MPF) 2 % injection 4 mL, 4 mL, Infiltration, Once, Apurva Marquez MD    methylPREDNISolone acetate (DEPO-MEDROL) injection 80 mg, 80 mg, Epidural, Once, Apurva Marquez MD    sodium chloride (PF) 0 9 % injection 4 mL, 4 mL, Infiltration, Once, Apurva Marquez MD    No Known Allergies    Physical Exam:   Vitals:    09/08/22 1514   BP: 126/78   Pulse: 76   Resp: 18   Temp: (!) 96 8 °F (36 °C)   SpO2: 97%     General: Awake, Alert, Oriented x 3, Mood and affect appropriate  Respiratory: Respirations even and unlabored  Cardiovascular: Peripheral pulses intact; no edema  Musculoskeletal Exam:  Lower back pain    ASA Score: 3    Patient/Chart Verification  Patient ID Verified: Verbal  ID Band Applied: No  Consents Confirmed: Procedural  H&P( within 30 days) Verified: To be obtained in the Pre-Procedure area  Interval H&P(within 24 hr) Complete (required for Outpatients and Surgery Admit only): To be obtained in the Pre-Procedure area  Allergies Reviewed: Yes  Anticoag/NSAID held?: No  Currently on antibiotics?: No  Pre-op Lab/Test Results Available: N/A  Pregnancy Lab Collected: N/A comment    Assessment:   1   Intervertebral disc disorder with radiculopathy of lumbar region        Plan: Bilateral L5 TF GISELLE

## 2022-09-08 NOTE — DISCHARGE INSTRUCTIONS
Epidural Steroid Injection   WHAT YOU NEED TO KNOW:   An epidural steroid injection (GISELLE) is a procedure to inject steroid medicine into the epidural space  The epidural space is between your spinal cord and vertebrae  Steroids reduce inflammation and fluid buildup in your spine that may be causing pain  You may be given pain medicine along with the steroids  ACTIVITY  Do not drive or operate machinery today  No strenuous activity today - bending, lifting, etc   You may resume normal activites starting tomorrow - start slowly and as tolerated  You may shower today, but no tub baths or hot tubs  You may have numbness for several hours from the local anesthetic  Please use caution and common sense, especially with weight-bearing activities  CARE OF THE INJECTION SITE  If you have soreness or pain, apply ice to the area today (20 minutes on/20 minutes off)  Starting tomorrow, you may use warm, moist heat or ice if needed  You may have an increase or change in your discomfort for 36-48 hours after your treatment  Apply ice and continue with any pain medication you have been prescribed  Notify the [facility] if you have any of the following: redness, drainage, swelling, headache, stiff neck or fever above 100°F     SPECIAL INSTRUCTIONS  Our office will contact you in approximately 7 days for a progress report  MEDICATIONS  Continue to take all routine medications  Our office may have instructed you to hold some medications  As no general anesthesia was used in today's procedure, you should not experience any side effects related to anesthesia  If you are diabetic, the steroids used in today's injection may temporarily increase your blood sugar levels after the first few days after your injection  Please keep a close eye on your sugars and alert the doctor who manages your diabetes if your sugars are significantly high from your baseline or you are symptomatic       If you have a problem specifically related to your procedure, please call our office at [phone]  Problems not related to your procedure should be directed to your primary care physician

## 2022-09-15 ENCOUNTER — TELEPHONE (OUTPATIENT)
Dept: PAIN MEDICINE | Facility: CLINIC | Age: 67
End: 2022-09-15

## 2022-09-15 DIAGNOSIS — M48.062 LUMBAR STENOSIS WITH NEUROGENIC CLAUDICATION: Primary | ICD-10-CM

## 2022-09-15 NOTE — TELEPHONE ENCOUNTER
Pt reports 0% improvement post inj  Pain level 10/10    Patient is having difficulty sleeping at night due to the pain, she's unable to sit, or lay in bed  Patient is currently taking tylenol and gets minimal relieve  Patient would like to know if you can prescribe a medication for her

## 2022-09-16 NOTE — TELEPHONE ENCOUNTER
S/w pt daughter, with permission from pt, and advised of same  Pts daughter states her mother has taken Celebrex in the past w/ little relief in sx but is willing to try the Celebrex again  Pts daughter stating that pt is having muscle cramping/spasm in b/l LE mostly at HS and would like to know if you rec vitamin E? Pts daughter said she has read that Vitamin aids in muscle spasm relief  Pt will try Celebrex  Please send to pharmacy on file  Pts daughter asking for a script for Vitamin E or something to aide in relief of muscle spasm  Please advise

## 2022-09-20 RX ORDER — METHOCARBAMOL 500 MG/1
500 TABLET, FILM COATED ORAL 2 TIMES DAILY PRN
Qty: 60 TABLET | Refills: 1 | Status: SHIPPED | OUTPATIENT
Start: 2022-09-20

## 2022-09-20 RX ORDER — CELECOXIB 200 MG/1
200 CAPSULE ORAL DAILY
Qty: 30 CAPSULE | Refills: 2 | Status: SHIPPED | OUTPATIENT
Start: 2022-09-20

## 2022-09-20 NOTE — TELEPHONE ENCOUNTER
E-rx sent for celecoxib help with inflammation pain  Prescription also sent for methocarbamol 500 mg twice daily to help with spasms

## 2022-09-20 NOTE — TELEPHONE ENCOUNTER
Per communication consent, s/w pt's daughter Kathryn, made aware of the same  Verbalized understanding and appreciation

## 2022-10-11 PROBLEM — J01.10 ACUTE NON-RECURRENT FRONTAL SINUSITIS: Status: RESOLVED | Noted: 2022-08-10 | Resolved: 2022-10-11

## 2022-11-01 DIAGNOSIS — N18.31 STAGE 3A CHRONIC KIDNEY DISEASE (HCC): ICD-10-CM

## 2022-11-01 DIAGNOSIS — N18.9 CKD (CHRONIC KIDNEY DISEASE): ICD-10-CM

## 2022-11-01 DIAGNOSIS — I12.9 PARENCHYMAL RENAL HYPERTENSION, STAGE 1 THROUGH STAGE 4 OR UNSPECIFIED CHRONIC KIDNEY DISEASE: ICD-10-CM

## 2022-11-01 DIAGNOSIS — R60.0 PEDAL EDEMA: ICD-10-CM

## 2022-11-01 DIAGNOSIS — E87.6 HYPOKALEMIA: ICD-10-CM

## 2022-11-01 DIAGNOSIS — E78.49 OTHER HYPERLIPIDEMIA: ICD-10-CM

## 2022-11-01 RX ORDER — LOSARTAN POTASSIUM 100 MG/1
TABLET ORAL
Qty: 30 TABLET | Refills: 5 | OUTPATIENT
Start: 2022-11-01

## 2022-11-01 RX ORDER — SPIRONOLACTONE 25 MG/1
25 TABLET ORAL DAILY
Qty: 30 TABLET | Refills: 5 | OUTPATIENT
Start: 2022-11-01

## 2022-11-01 RX ORDER — TORSEMIDE 5 MG/1
5 TABLET ORAL DAILY
Qty: 30 TABLET | Refills: 5 | OUTPATIENT
Start: 2022-11-01

## 2022-11-01 NOTE — TELEPHONE ENCOUNTER
Unfortunately the losartan as supposedly 50 mg please clarify and then please place refills for the appropriate 3 medication

## 2022-11-02 NOTE — TELEPHONE ENCOUNTER
LM for patient's daughter, Kathryn, asking them to call back to clarify orders for spironolactone, losartan, and torsemide

## 2022-11-03 RX ORDER — SPIRONOLACTONE 25 MG/1
25 TABLET ORAL DAILY
Qty: 90 TABLET | Refills: 3 | Status: SHIPPED | OUTPATIENT
Start: 2022-11-03

## 2022-11-03 RX ORDER — LOSARTAN POTASSIUM 100 MG/1
100 TABLET ORAL DAILY
Qty: 90 TABLET | Refills: 3 | Status: SHIPPED | OUTPATIENT
Start: 2022-11-03

## 2022-11-03 RX ORDER — TORSEMIDE 5 MG/1
5 TABLET ORAL DAILY
Qty: 90 TABLET | Refills: 3 | Status: SHIPPED | OUTPATIENT
Start: 2022-11-03

## 2022-11-03 NOTE — TELEPHONE ENCOUNTER
Patient's daughter, Kathryn, called back about medications  He has to double check on dosages and will call back when she has the information

## 2022-11-03 NOTE — TELEPHONE ENCOUNTER
Patient's daughter, Kathryn, confirmed patient is taking losartan 100mg daily, spironolactone 25mg daily, and torsemide 5mg daily

## 2022-11-15 DIAGNOSIS — B97.89 SORE THROAT (VIRAL): ICD-10-CM

## 2022-11-15 DIAGNOSIS — J02.8 SORE THROAT (VIRAL): ICD-10-CM

## 2022-11-15 DIAGNOSIS — U07.1 COVID-19: ICD-10-CM

## 2022-11-16 RX ORDER — BENZONATATE 100 MG/1
100 CAPSULE ORAL 3 TIMES DAILY PRN
Qty: 60 CAPSULE | Refills: 0 | Status: SHIPPED | OUTPATIENT
Start: 2022-11-16

## 2022-12-01 DIAGNOSIS — M79.672 PAIN IN BOTH FEET: ICD-10-CM

## 2022-12-01 DIAGNOSIS — M79.671 PAIN IN BOTH FEET: ICD-10-CM

## 2023-01-24 DIAGNOSIS — I12.9 PARENCHYMAL RENAL HYPERTENSION, STAGE 1 THROUGH STAGE 4 OR UNSPECIFIED CHRONIC KIDNEY DISEASE: ICD-10-CM

## 2023-01-24 DIAGNOSIS — N18.31 STAGE 3A CHRONIC KIDNEY DISEASE (HCC): ICD-10-CM

## 2023-01-24 RX ORDER — LOSARTAN POTASSIUM 50 MG/1
50 TABLET ORAL DAILY
Qty: 90 TABLET | Refills: 3 | Status: SHIPPED | OUTPATIENT
Start: 2023-01-24

## 2023-01-26 ENCOUNTER — OFFICE VISIT (OUTPATIENT)
Dept: PAIN MEDICINE | Facility: CLINIC | Age: 68
End: 2023-01-26

## 2023-01-26 ENCOUNTER — HOSPITAL ENCOUNTER (OUTPATIENT)
Dept: RADIOLOGY | Facility: HOSPITAL | Age: 68
Discharge: HOME | End: 2023-01-26

## 2023-01-26 DIAGNOSIS — M51.26 LUMBAR DISC HERNIATION: ICD-10-CM

## 2023-01-26 DIAGNOSIS — M51.16 INTERVERTEBRAL DISC DISORDER WITH RADICULOPATHY OF LUMBAR REGION: ICD-10-CM

## 2023-01-26 DIAGNOSIS — M79.671 RIGHT FOOT PAIN: ICD-10-CM

## 2023-01-26 DIAGNOSIS — M79.671 PAIN IN BOTH FEET: ICD-10-CM

## 2023-01-26 DIAGNOSIS — M79.672 PAIN IN BOTH FEET: ICD-10-CM

## 2023-01-26 DIAGNOSIS — G89.4 CHRONIC PAIN SYNDROME: Primary | ICD-10-CM

## 2023-01-26 RX ORDER — ACETAMINOPHEN 500 MG
1000 TABLET ORAL 3 TIMES DAILY PRN
Qty: 180 TABLET | Refills: 1 | Status: SHIPPED | OUTPATIENT
Start: 2023-01-26

## 2023-01-26 NOTE — PROGRESS NOTES
Assessment:  1  Chronic pain syndrome    2  Intervertebral disc disorder with radiculopathy of lumbar region    3  Right foot pain    4  Lumbar disc herniation    5  Pain in both feet        Plan:  The patient is a 66-year-old female with a history of chronic pain secondary to low back pain, lumbar intervertebral disc disorder with radiculopathy and lumbar disc herniation who presents to the office with ongoing low back pain and pain that radiates into bilateral lower extremities, right worse than left as well as right foot pain  At this time I will x-ray her right foot to evaluate for any pathology causing her right foot pain with right foot swelling  I instructed patient I will also call once I receive the results  To decrease inflammation and provide her relief, I instructed patient we can repeat an epidural steroid injection, however since the majority of her pain is in the right low back and radiating into the right lower extremity, I will order a right-sided L5-S1 TFESI  Patient would like to proceed  I instructed our  will schedule her  Complete risks and benefits including bleeding, infection, tissue reaction, nerve injury and allergic reaction were discussed  The approach was demonstrated using models and literature was provided  Verbal and written consent was obtained  My impressions and treatment recommendations were discussed in detail with the patient who verbalized understanding and had no further questions  Discharge instructions were provided  I personally saw and examined the patient and I agree with the above discussed plan of care  Orders Placed This Encounter   Procedures   • [place] [facility] procedure     Dr Nazanin Singh     Standing Status:   Future     Standing Expiration Date:   1/26/2027     Order Specific Question:   Reason for Exam:     Answer:   Right L5-S1 TFESI     Order Specific Question:   Anticoagulant hold needed?      Answer:   No   • XR foot 2 vw right Standing Status:   Future     Number of Occurrences:   1     Standing Expiration Date:   1/26/2027     Scheduling Instructions:      Bring along any outside films relating to this procedure  New Medications Ordered This Visit   Medications   • acetaminophen (TYLENOL) 500 mg tablet     Sig: Take 2 tablets (1,000 mg total) by mouth 3 (three) times a day as needed for mild pain     Dispense:  180 tablet     Refill:  1       History of Present Illness:  Moises Santos is a 69 y o  female with a history of chronic pain secondary to low back pain, lumbar intervertebral disc disorder with radiculopathy and lumbar disc herniation  She was last seen on 9/8/2022 where she underwent a bilateral L5 TFESI which did provide greater than 50% relief of her pain  She presents to the office with ongoing low back pain and pain that radiates into bilateral lower extremities, right worse than left and right foot pain  She states she fell several years ago and injured her right foot and has been experiencing right foot swelling ever since  She states her pain is the same since her last office visit and constant  She is currently rating her pain an 8/10 on a numeric scale  Current pain medications include Celebrex 200 mg daily, Tylenol as needed and methocarbamol 500 mg as needed for muscle spasms  She states this medication regimen is providing her mild relief of her pain without side effects  I have personally reviewed and/or updated the patient's past medical history, past surgical history, family history, social history, current medications, allergies, and vital signs today  Review of Systems   Respiratory: Negative for shortness of breath  Cardiovascular: Negative for chest pain  Gastrointestinal: Negative for constipation, diarrhea, nausea and vomiting  Musculoskeletal: Positive for back pain and gait problem  Negative for arthralgias, joint swelling and myalgias  Skin: Negative for rash  Neurological: Negative for dizziness, seizures and weakness  All other systems reviewed and are negative  Patient Active Problem List   Diagnosis   • Hyperlipidemia   • Chronic bilateral low back pain without sciatica   • Gastroesophageal reflux disease without esophagitis   • Essential hypertension   • BMI 34 0-34 9,adult   • Chronic pain of both knees   • Obesity, morbid (HCC)   • At risk for falls   • Balance disorder   • Primary osteoarthritis of both knees   • Exposure to COVID-19 virus   • Parenchymal renal hypertension   • Stage 3 chronic kidney disease (HCC)   • Prediabetes   • Lymphedema   • Venous insufficiency   • Menopause   • Intervertebral disc disorder with radiculopathy of lumbar region       Past Medical History:   Diagnosis Date   • Hyperlipidemia    • Hypertension        History reviewed  No pertinent surgical history  Family History   Problem Relation Age of Onset   • No Known Problems Mother    • No Known Problems Father        Social History     Occupational History   • Occupation: retired   Tobacco Use   • Smoking status: Never   • Smokeless tobacco: Never   Substance and Sexual Activity   • Alcohol use: Never   • Drug use: Never   • Sexual activity: Not on file       Current Outpatient Medications on File Prior to Visit   Medication Sig   • atorvastatin (LIPITOR) 20 mg tablet TAKE 1 TABLET (20 MG TOTAL) BY MOUTH DAILY   • benzonatate (TESSALON PERLES) 100 mg capsule Take 1 capsule (100 mg total) by mouth 3 (three) times a day as needed for cough   • celecoxib (CeleBREX) 200 mg capsule Take 1 capsule (200 mg total) by mouth daily   • Cholecalciferol (Vitamin D) 50 MCG (2000 UT) tablet Take 1 tablet (2,000 Units total) by mouth daily   • Diclofenac Sodium (VOLTAREN) 1 % Apply 2 g topically 2 (two) times a day   • fluticasone (FLONASE) 50 mcg/act nasal spray 1 spray into each nostril in the morning     • losartan (COZAAR) 100 MG tablet Take 1 tablet (100 mg total) by mouth daily   • losartan (COZAAR) 50 mg tablet TAKE 1 TABLET (50 MG TOTAL) BY MOUTH DAILY   • magnesium Oxide (MAG-OX) 400 mg TABS Take 400 mg by mouth in the morning and 400 mg in the evening  • methocarbamol (ROBAXIN) 500 mg tablet Take 1 tablet (500 mg total) by mouth 2 (two) times a day as needed for muscle spasms   • naproxen (EC NAPROSYN) 500 MG EC tablet TAKE 1 TABLET (500 MG TOTAL) BY MOUTH 2 (TWO) TIMES A DAY AS NEEDED FOR MODERATE PAIN   • omeprazole (PriLOSEC) 20 mg delayed release capsule TAKE 1 CAPSULE (20 MG TOTAL) BY MOUTH DAILY   • Promethazine-DM (PHENERGAN-DM) 6 25-15 mg/5 mL oral syrup Take 5 mL by mouth 4 (four) times a day as needed for cough   • spironolactone (ALDACTONE) 25 mg tablet Take 1 tablet (25 mg total) by mouth daily   • torsemide (DEMADEX) 5 MG tablet Take 1 tablet (5 mg total) by mouth daily   • [DISCONTINUED] acetaminophen (TYLENOL) 500 mg tablet Take 1 tablet (500 mg total) by mouth every 6 (six) hours as needed for mild pain   • fluticasone-vilanterol (Breo Ellipta) 200-25 MCG/INH inhaler Inhale 1 puff daily Rinse mouth after use  No current facility-administered medications on file prior to visit  No Known Allergies    Physical Exam:    There were no vitals taken for this visit  Constitutional:normal, well developed, well nourished, alert, in no distress and non-toxic and no overt pain behavior    Eyes:anicteric  HEENT:grossly intact  Neck:supple, symmetric, trachea midline and no masses   Pulmonary:even and unlabored  Cardiovascular:No edema or pitting edema present  Skin:Normal without rashes or lesions and well hydrated  Psychiatric:Mood and affect appropriate  Neurologic:Cranial Nerves II-XII grossly intact  Musculoskeletal:antalgic     Lumbar Spine Exam    Appearance:  Normal lordosis  Palpation/Tenderness:  right lumbar paraspinal tenderness  Sensory:  no sensory deficits noted  Range of Motion:  Flexion:  Minimally limited  with pain  Extension:  Minimally limited  with pain  Motor Strength:  Left hip flexion:  5/5  Right hip flexion:  5/5  Left knee flexion:  5/5  Left knee extension:  5/5  Right knee flexion:  5/5  Right knee extension:  5/5  Left foot dorsiflexion:  5/5  Left foot plantar flexion:  5/5  Right foot dorsiflexion:  5/5  Right foot plantar flexion:  5/5      Imaging

## 2023-02-01 ENCOUNTER — TELEPHONE (OUTPATIENT)
Dept: RADIOLOGY | Facility: CLINIC | Age: 68
End: 2023-02-01

## 2023-02-01 DIAGNOSIS — M79.671 RIGHT FOOT PAIN: Primary | ICD-10-CM

## 2023-02-01 NOTE — TELEPHONE ENCOUNTER
Per communication consent, LMOM for Kathryn advising podiatry referral has been placed  Contact info provided

## 2023-02-01 NOTE — TELEPHONE ENCOUNTER
S/w pt's daughter per communication consent  Made aware of the same   Verbalized understanding, states pt has been a podiatrist in the past but was not very fond of that doctor (unsure of name, [facility] doctor was with [facility]), requested referral to another podiatrist  Thank you

## 2023-02-01 NOTE — TELEPHONE ENCOUNTER
----- Message from Cherri Montague, [address] sent at 2/1/2023 12:24 PM EST -----  Please call patient with x-ray of right foot showing degenerative changes of the first MTP joint with plantar and retrocalcaneal spurs noted, otherwise no fractures    Has she seen a podiatrist?

## 2023-02-14 ENCOUNTER — HOSPITAL ENCOUNTER (OUTPATIENT)
Dept: VASCULAR ULTRASOUND | Facility: HOSPITAL | Age: 68
Discharge: HOME | End: 2023-02-14

## 2023-02-14 DIAGNOSIS — R60.0 LOCALIZED EDEMA: ICD-10-CM

## 2023-02-24 DIAGNOSIS — E55.9 VITAMIN D DEFICIENCY: ICD-10-CM

## 2023-02-24 DIAGNOSIS — M48.062 LUMBAR STENOSIS WITH NEUROGENIC CLAUDICATION: ICD-10-CM

## 2023-02-24 RX ORDER — CHOLECALCIFEROL (VITAMIN D3) 125 MCG
CAPSULE ORAL
Qty: 90 TABLET | Refills: 1 | Status: SHIPPED | OUTPATIENT
Start: 2023-02-24

## 2023-03-01 DIAGNOSIS — E78.5 HYPERLIPIDEMIA, UNSPECIFIED HYPERLIPIDEMIA TYPE: ICD-10-CM

## 2023-03-01 DIAGNOSIS — K21.9 GASTROESOPHAGEAL REFLUX DISEASE WITHOUT ESOPHAGITIS: ICD-10-CM

## 2023-03-01 RX ORDER — OMEPRAZOLE 20 MG/1
20 CAPSULE, DELAYED RELEASE ORAL DAILY
Qty: 90 CAPSULE | Refills: 0 | Status: SHIPPED | OUTPATIENT
Start: 2023-03-01

## 2023-03-01 RX ORDER — ATORVASTATIN CALCIUM 20 MG/1
20 TABLET, FILM COATED ORAL DAILY
Qty: 90 TABLET | Refills: 0 | Status: SHIPPED | OUTPATIENT
Start: 2023-03-01

## 2023-03-03 ENCOUNTER — HOSPITAL ENCOUNTER (OUTPATIENT)
Dept: RADIOLOGY | Facility: CLINIC | Age: 68
Discharge: HOME | End: 2023-03-03
Admitting: ANESTHESIOLOGY

## 2023-03-03 ENCOUNTER — TELEPHONE (OUTPATIENT)
Dept: RADIOLOGY | Facility: CLINIC | Age: 68
End: 2023-03-03

## 2023-03-03 VITALS
SYSTOLIC BLOOD PRESSURE: 117 MMHG | HEART RATE: 69 BPM | DIASTOLIC BLOOD PRESSURE: 79 MMHG | TEMPERATURE: 97.4 F | OXYGEN SATURATION: 97 % | RESPIRATION RATE: 20 BRPM

## 2023-03-03 DIAGNOSIS — M51.16 INTERVERTEBRAL DISC DISORDER WITH RADICULOPATHY OF LUMBAR REGION: ICD-10-CM

## 2023-03-03 DIAGNOSIS — M79.671 PAIN IN BOTH FEET: ICD-10-CM

## 2023-03-03 DIAGNOSIS — M48.062 LUMBAR STENOSIS WITH NEUROGENIC CLAUDICATION: ICD-10-CM

## 2023-03-03 DIAGNOSIS — M79.672 PAIN IN BOTH FEET: ICD-10-CM

## 2023-03-03 RX ORDER — 0.9 % SODIUM CHLORIDE 0.9 %
4 VIAL (ML) INJECTION ONCE
Status: COMPLETED | OUTPATIENT
Start: 2023-03-03 | End: 2023-03-03

## 2023-03-03 RX ORDER — METHOCARBAMOL 500 MG/1
500 TABLET, FILM COATED ORAL 2 TIMES DAILY PRN
Qty: 60 TABLET | Refills: 5 | Status: SHIPPED | OUTPATIENT
Start: 2023-03-03

## 2023-03-03 RX ORDER — BUPIVACAINE HCL/PF 2.5 MG/ML
2 VIAL (ML) INJECTION ONCE
Status: COMPLETED | OUTPATIENT
Start: 2023-03-03 | End: 2023-03-03

## 2023-03-03 RX ORDER — METHYLPREDNISOLONE ACETATE 80 MG/ML
80 INJECTION, SUSPENSION INTRA-ARTICULAR; INTRALESIONAL; INTRAMUSCULAR; PARENTERAL; SOFT TISSUE ONCE
Status: COMPLETED | OUTPATIENT
Start: 2023-03-03 | End: 2023-03-03

## 2023-03-03 RX ADMIN — METHYLPREDNISOLONE ACETATE 80 MG: 80 INJECTION, SUSPENSION INTRA-ARTICULAR; INTRALESIONAL; INTRAMUSCULAR; PARENTERAL; SOFT TISSUE at 13:43

## 2023-03-03 RX ADMIN — Medication 4 ML: at 13:41

## 2023-03-03 RX ADMIN — BUPIVACAINE HYDROCHLORIDE 2 ML: 2.5 INJECTION, SOLUTION EPIDURAL; INFILTRATION; INTRACAUDAL at 13:43

## 2023-03-03 RX ADMIN — IOHEXOL 1 ML: 300 INJECTION, SOLUTION INTRAVENOUS at 13:43

## 2023-03-03 NOTE — TELEPHONE ENCOUNTER
Caller: Kathryn    Doctor:     Reason for call: Please reach back out to patient daughter       Call back#: [phone]

## 2023-03-03 NOTE — H&P
History of Present Illness: The patient is a 69 y o  female who presents with complaints of right lower back and leg pain and is here today for right L5-S1 transforaminal epidural steroid injection    Past Medical History:   Diagnosis Date   • Hyperlipidemia    • Hypertension        No past surgical history on file        Current Outpatient Medications:   •  acetaminophen (TYLENOL) 500 mg tablet, Take 2 tablets (1,000 mg total) by mouth 3 (three) times a day as needed for mild pain, Disp: 180 tablet, Rfl: 1  •  atorvastatin (LIPITOR) 20 mg tablet, TAKE 1 TABLET (20 MG TOTAL) BY MOUTH DAILY, Disp: 90 tablet, Rfl: 0  •  benzonatate (TESSALON PERLES) 100 mg capsule, Take 1 capsule (100 mg total) by mouth 3 (three) times a day as needed for cough, Disp: 60 capsule, Rfl: 0  •  celecoxib (CeleBREX) 200 mg capsule, Take 1 capsule (200 mg total) by mouth daily, Disp: 30 capsule, Rfl: 2  •  Cholecalciferol (Vitamin D3) 50 MCG (2000 UT) TABS, TAKE 1 TABLET (2,000 UNITS TOTAL) BY MOUTH DAILY, Disp: 90 tablet, Rfl: 1  •  Diclofenac Sodium (VOLTAREN) 1 %, Apply 2 g topically 2 (two) times a day, Disp: 100 g, Rfl: 1  •  fluticasone (FLONASE) 50 mcg/act nasal spray, 1 spray into each nostril in the morning , Disp: 15 8 mL, Rfl: 0  •  fluticasone-vilanterol (Breo Ellipta) 200-25 MCG/INH inhaler, Inhale 1 puff daily Rinse mouth after use , Disp: 60 blister, Rfl: 0  •  losartan (COZAAR) 100 MG tablet, Take 1 tablet (100 mg total) by mouth daily, Disp: 90 tablet, Rfl: 3  •  losartan (COZAAR) 50 mg tablet, TAKE 1 TABLET (50 MG TOTAL) BY MOUTH DAILY, Disp: 90 tablet, Rfl: 3  •  magnesium Oxide (MAG-OX) 400 mg TABS, Take 400 mg by mouth in the morning and 400 mg in the evening , Disp: , Rfl:   •  methocarbamol (ROBAXIN) 500 mg tablet, Take 1 tablet (500 mg total) by mouth 2 (two) times a day as needed for muscle spasms, Disp: 60 tablet, Rfl: 1  •  naproxen (EC NAPROSYN) 500 MG EC tablet, TAKE 1 TABLET (500 MG TOTAL) BY MOUTH 2 (TWO) TIMES A DAY AS NEEDED FOR MODERATE PAIN, Disp: , Rfl:   •  omeprazole (PriLOSEC) 20 mg delayed release capsule, TAKE 1 CAPSULE (20 MG TOTAL) BY MOUTH DAILY, Disp: 90 capsule, Rfl: 0  •  Promethazine-DM (PHENERGAN-DM) 6 25-15 mg/5 mL oral syrup, Take 5 mL by mouth 4 (four) times a day as needed for cough, Disp: 180 mL, Rfl: 1  •  spironolactone (ALDACTONE) 25 mg tablet, Take 1 tablet (25 mg total) by mouth daily, Disp: 90 tablet, Rfl: 3  •  torsemide (DEMADEX) 5 MG tablet, Take 1 tablet (5 mg total) by mouth daily, Disp: 90 tablet, Rfl: 3    Current Facility-Administered Medications:   •  bupivacaine (PF) (MARCAINE) 0 25 % injection 2 mL, 2 mL, Epidural, Once, Mahamed Narvaez MD  •  iohexol (OMNIPAQUE) 300 mg/mL injection 1 mL, 1 mL, Epidural, Once, Mahamed Narvaez MD  •  lidocaine (PF) (XYLOCAINE-MPF) 2 % injection 4 mL, 4 mL, Infiltration, Once, Mahamed Narvaez MD  •  methylPREDNISolone acetate (DEPO-MEDROL) injection 80 mg, 80 mg, Epidural, Once, Mahamed Narvaez MD  •  sodium chloride (PF) 0 9 % injection 4 mL, 4 mL, Infiltration, Once, Mahamed Narvaez MD    No Known Allergies    Physical Exam:   Vitals:    03/03/23 1320   BP: 118/71   Pulse: 74   Resp: 20   Temp: (!) 97 4 °F (36 3 °C)   SpO2: 95%     General: Awake, Alert, Oriented x 3, Mood and affect appropriate  Respiratory: Respirations even and unlabored  Cardiovascular: Peripheral pulses intact; no edema  Musculoskeletal Exam: Right lower back and leg pain    ASA Score: 3    Patient/Chart Verification  Patient ID Verified: Verbal  Consents Confirmed: To be obtained in the Pre-Procedure area  Allergies Reviewed: Yes  Anticoag/NSAID held?: NA  Currently on antibiotics?: No    Assessment:   1   Intervertebral disc disorder with radiculopathy of lumbar region        Plan: Right L5-S1 TFESI

## 2023-03-03 NOTE — TELEPHONE ENCOUNTER
Pt here for inj, through use of interpretor pt asked if [facility] could send a script for the medication for the spasms  Looks like methocarbamol was prescribed in the past for her  No need to c/b once script sent

## 2023-03-03 NOTE — DISCHARGE INSTR - LAB
Epidural Steroid Injection   WHAT YOU NEED TO KNOW:   An epidural steroid injection (GISELLE) is a procedure to inject steroid medicine into the epidural space  The epidural space is between your spinal cord and vertebrae  Steroids reduce inflammation and fluid buildup in your spine that may be causing pain  You may be given pain medicine along with the steroids  ACTIVITY  Do not drive or operate machinery today  No strenuous activity today - bending, lifting, etc   You may resume normal activites starting tomorrow - start slowly and as tolerated  You may shower today, but no tub baths or hot tubs  You may have numbness for several hours from the local anesthetic  Please use caution and common sense, especially with weight-bearing activities  CARE OF THE INJECTION SITE  If you have soreness or pain, apply ice to the area today (20 minutes on/20 minutes off)  Starting tomorrow, you may use warm, moist heat or ice if needed  You may have an increase or change in your discomfort for 36-48 hours after your treatment  Apply ice and continue with any pain medication you have been prescribed  Notify the [facility] if you have any of the following: redness, drainage, swelling, headache, stiff neck or fever above 100°F     SPECIAL INSTRUCTIONS  Our office will contact you in approximately 7 days for a progress report  MEDICATIONS  Continue to take all routine medications  Our office may have instructed you to hold some medications  As no general anesthesia was used in today's procedure, you should not experience any side effects related to anesthesia  If you are diabetic, the steroids used in today's injection may temporarily increase your blood sugar levels after the first few days after your injection  Please keep a close eye on your sugars and alert the doctor who manages your diabetes if your sugars are significantly high from your baseline or you are symptomatic       If you have a problem specifically related to your procedure, please call our office at [phone]  Problems not related to your procedure should be directed to your primary care physician

## 2023-03-03 NOTE — TELEPHONE ENCOUNTER
RN s/w daughter Kathryn and she confirmed that her sister in law was the the family member that brought her mother to the procedure today  Explained that Dr Veda Blackmon and I wanted to make her aware of a safety issue involving another family that was observed by me when I took her mother out in a w/c to the daughter in [facility] after the procedure  Kathryn will speak with her sister in law about the situation and appreciated the call

## 2023-03-08 NOTE — TELEPHONE ENCOUNTER
S/w daughter Guy Sidhu, says they only receive the robaxin but not the voltaren cream  Daughter thinks mom already had the voltaren cream as OTC, advised to check with mom if the same voltaren prescribed is what she have  Kathryn says she will call [address] [place] [address] to check the voltaren cream, informed the voltaren cream was prescribed 3/3  Advised to cb office if there are any issue with the voltaren script

## 2023-03-08 NOTE — TELEPHONE ENCOUNTER
Caller:  Kathryn  Doctor: Dr Deyvi Ocasio    Reason for call: Medication for the pt finger that the doctor was suppose to prescribe      Call back#: [phone]

## 2023-03-10 ENCOUNTER — TELEPHONE (OUTPATIENT)
Dept: RADIOLOGY | Facility: MEDICAL CENTER | Age: 68
End: 2023-03-10

## 2023-03-14 DIAGNOSIS — M48.062 LUMBAR STENOSIS WITH NEUROGENIC CLAUDICATION: ICD-10-CM

## 2023-03-14 RX ORDER — CELECOXIB 200 MG/1
200 CAPSULE ORAL DAILY
Qty: 30 CAPSULE | Refills: 2 | Status: SHIPPED | OUTPATIENT
Start: 2023-03-14

## 2023-05-08 ENCOUNTER — APPOINTMENT (OUTPATIENT)
Dept: LAB | Facility: CLINIC | Age: 68
End: 2023-05-08

## 2023-05-08 DIAGNOSIS — E78.49 OTHER HYPERLIPIDEMIA: ICD-10-CM

## 2023-05-08 DIAGNOSIS — R73.03 PREDIABETES: ICD-10-CM

## 2023-05-08 LAB
ALBUMIN SERPL BCP-MCNC: 3.6 G/DL (ref 3.5–5)
ALP SERPL-CCNC: 96 U/L (ref 46–116)
ALT SERPL W P-5'-P-CCNC: 29 U/L (ref 12–78)
ANION GAP SERPL CALCULATED.3IONS-SCNC: 4 MMOL/L (ref 4–13)
AST SERPL W P-5'-P-CCNC: 20 U/L (ref 5–45)
BILIRUB SERPL-MCNC: 0.36 MG/DL (ref 0.2–1)
BUN SERPL-MCNC: 15 MG/DL (ref 5–25)
CALCIUM SERPL-MCNC: 9.1 MG/DL (ref 8.3–10.1)
CHLORIDE SERPL-SCNC: 111 MMOL/L (ref 96–108)
CHOLEST SERPL-MCNC: 162 MG/DL
CO2 SERPL-SCNC: 22 MMOL/L (ref 21–32)
CREAT SERPL-MCNC: 0.99 MG/DL (ref 0.6–1.3)
EST. AVERAGE GLUCOSE BLD GHB EST-MCNC: 126 MG/DL
GFR SERPL CREATININE-BSD FRML MDRD: 58 ML/MIN/1.73SQ M
GLUCOSE P FAST SERPL-MCNC: 93 MG/DL (ref 65–99)
HBA1C MFR BLD: 6 %
HDLC SERPL-MCNC: 42 MG/DL
LDLC SERPL CALC-MCNC: 96 MG/DL (ref 0–100)
NONHDLC SERPL-MCNC: 120 MG/DL
POTASSIUM SERPL-SCNC: 4 MMOL/L (ref 3.5–5.3)
PROT SERPL-MCNC: 7.8 G/DL (ref 6.4–8.4)
SODIUM SERPL-SCNC: 137 MMOL/L (ref 135–147)
TRIGL SERPL-MCNC: 122 MG/DL

## 2023-05-09 RX ORDER — CYCLOSPORINE 0.5 MG/ML
EMULSION OPHTHALMIC
COMMUNITY
Start: 2023-03-20

## 2023-05-10 ENCOUNTER — OFFICE VISIT (OUTPATIENT)
Dept: FAMILY MEDICINE CLINIC | Facility: CLINIC | Age: 68
End: 2023-05-10

## 2023-05-10 VITALS
HEART RATE: 70 BPM | BODY MASS INDEX: 34.55 KG/M2 | DIASTOLIC BLOOD PRESSURE: 70 MMHG | RESPIRATION RATE: 16 BRPM | OXYGEN SATURATION: 99 % | WEIGHT: 176 LBS | HEIGHT: 60 IN | SYSTOLIC BLOOD PRESSURE: 120 MMHG

## 2023-05-10 DIAGNOSIS — M79.671 PAIN IN BOTH FEET: ICD-10-CM

## 2023-05-10 DIAGNOSIS — M54.16 LUMBAR RADICULOPATHY: ICD-10-CM

## 2023-05-10 DIAGNOSIS — G89.29 CHRONIC BILATERAL LOW BACK PAIN WITHOUT SCIATICA: ICD-10-CM

## 2023-05-10 DIAGNOSIS — M25.561 CHRONIC PAIN OF BOTH KNEES: ICD-10-CM

## 2023-05-10 DIAGNOSIS — M79.672 PAIN IN BOTH FEET: ICD-10-CM

## 2023-05-10 DIAGNOSIS — K21.9 GASTROESOPHAGEAL REFLUX DISEASE WITHOUT ESOPHAGITIS: ICD-10-CM

## 2023-05-10 DIAGNOSIS — G89.29 CHRONIC PAIN OF BOTH KNEES: ICD-10-CM

## 2023-05-10 DIAGNOSIS — E78.5 HYPERLIPIDEMIA, UNSPECIFIED HYPERLIPIDEMIA TYPE: Primary | ICD-10-CM

## 2023-05-10 DIAGNOSIS — N18.31 STAGE 3A CHRONIC KIDNEY DISEASE (HCC): ICD-10-CM

## 2023-05-10 DIAGNOSIS — M54.50 CHRONIC BILATERAL LOW BACK PAIN WITHOUT SCIATICA: ICD-10-CM

## 2023-05-10 DIAGNOSIS — R73.03 PREDIABETES: ICD-10-CM

## 2023-05-10 DIAGNOSIS — M17.0 PRIMARY OSTEOARTHRITIS OF BOTH KNEES: ICD-10-CM

## 2023-05-10 DIAGNOSIS — Z23 NEED FOR VACCINATION: ICD-10-CM

## 2023-05-10 DIAGNOSIS — M25.562 CHRONIC PAIN OF BOTH KNEES: ICD-10-CM

## 2023-05-10 RX ORDER — ACETAMINOPHEN 500 MG
1000 TABLET ORAL 3 TIMES DAILY PRN
Qty: 180 TABLET | Refills: 1 | Status: SHIPPED | OUTPATIENT
Start: 2023-05-10

## 2023-05-10 RX ORDER — OMEPRAZOLE 20 MG/1
20 CAPSULE, DELAYED RELEASE ORAL DAILY
Qty: 90 CAPSULE | Refills: 1 | Status: SHIPPED | OUTPATIENT
Start: 2023-05-10

## 2023-05-10 RX ORDER — ATORVASTATIN CALCIUM 20 MG/1
20 TABLET, FILM COATED ORAL DAILY
Qty: 90 TABLET | Refills: 1 | Status: SHIPPED | OUTPATIENT
Start: 2023-05-10

## 2023-05-10 NOTE — PROGRESS NOTES
Subjective:      Patient ID: Portillo Escudero is a 69 y o  female  Hyperlipidemia  This is a chronic problem  The current episode started more than 1 year ago  The problem is controlled  Recent lipid tests were reviewed and are normal  There are no known factors aggravating her hyperlipidemia  Current antihyperlipidemic treatment includes statins  The current treatment provides significant improvement of lipids  There are no compliance problems  Risk factors for coronary artery disease include dyslipidemia, post-menopausal, obesity, a sedentary lifestyle and hypertension  Patient has history of pre diabetes  Labs reveal borderline fasting blood sugar, A1c stable, 6 0  Patient denies any symptoms of polyuria or polydipsia  Patient reports symptoms of pain in her thighs and legs specially when she gets up from sitting position  Patient has moderate to severe arthritis in both her knees  Following up with pain management for symptoms of lumbar radiculopathy  Denies any weakness in her legs  Tries to do stationary exercises at home with muscle stretching  Reports no improvement in symptoms  Patient has received injections in her knee and lower back  Following up with both orthopedics and pain management  Past Medical History:   Diagnosis Date   • Hyperlipidemia    • Hypertension        Family History   Problem Relation Age of Onset   • No Known Problems Mother    • No Known Problems Father        History reviewed  No pertinent surgical history  reports that she has never smoked  She has never used smokeless tobacco  She reports that she does not drink alcohol and does not use drugs        Current Outpatient Medications:   •  acetaminophen (TYLENOL) 500 mg tablet, Take 2 tablets (1,000 mg total) by mouth 3 (three) times a day as needed for mild pain, Disp: 180 tablet, Rfl: 1  •  atorvastatin (LIPITOR) 20 mg tablet, Take 1 tablet (20 mg total) by mouth daily, Disp: 90 tablet, Rfl: 1  •  celecoxib (CeleBREX) 200 mg capsule, TAKE 1 CAPSULE (200 MG TOTAL) BY MOUTH DAILY, Disp: 30 capsule, Rfl: 2  •  Cholecalciferol (Vitamin D3) 50 MCG (2000 UT) TABS, TAKE 1 TABLET (2,000 UNITS TOTAL) BY MOUTH DAILY, Disp: 90 tablet, Rfl: 1  •  Diclofenac Sodium (VOLTAREN) 1 %, APPLY 2 G TOPICALLY 4 (FOUR) TIMES A DAY, Disp: 300 g, Rfl: 3  •  fluticasone (FLONASE) 50 mcg/act nasal spray, 1 spray into each nostril in the morning , Disp: 15 8 mL, Rfl: 0  •  losartan (COZAAR) 100 MG tablet, Take 1 tablet (100 mg total) by mouth daily, Disp: 90 tablet, Rfl: 3  •  losartan (COZAAR) 50 mg tablet, TAKE 1 TABLET (50 MG TOTAL) BY MOUTH DAILY, Disp: 90 tablet, Rfl: 3  •  magnesium Oxide (MAG-OX) 400 mg TABS, Take 400 mg by mouth in the morning and 400 mg in the evening , Disp: , Rfl:   •  methocarbamol (ROBAXIN) 500 mg tablet, Take 1 tablet (500 mg total) by mouth 2 (two) times a day as needed for muscle spasms, Disp: 60 tablet, Rfl: 5  •  naproxen (EC NAPROSYN) 500 MG EC tablet, TAKE 1 TABLET (500 MG TOTAL) BY MOUTH 2 (TWO) TIMES A DAY AS NEEDED FOR MODERATE PAIN, Disp: , Rfl:   •  omeprazole (PriLOSEC) 20 mg delayed release capsule, Take 1 capsule (20 mg total) by mouth daily, Disp: 90 capsule, Rfl: 1  •  Restasis 0 05 % ophthalmic emulsion, INSTILL 1 DROP BOTH EYES TWICE A DAY, Disp: , Rfl:   •  spironolactone (ALDACTONE) 25 mg tablet, Take 1 tablet (25 mg total) by mouth daily, Disp: 90 tablet, Rfl: 3  •  torsemide (DEMADEX) 5 MG tablet, Take 1 tablet (5 mg total) by mouth daily, Disp: 90 tablet, Rfl: 3  •  benzonatate (TESSALON PERLES) 100 mg capsule, Take 1 capsule (100 mg total) by mouth 3 (three) times a day as needed for cough (Patient not taking: Reported on 5/10/2023), Disp: 60 capsule, Rfl: 0  •  Promethazine-DM (PHENERGAN-DM) 6 25-15 mg/5 mL oral syrup, Take 5 mL by mouth 4 (four) times a day as needed for cough (Patient not taking: Reported on 5/10/2023), Disp: 180 mL, Rfl: 1    The following portions of the patient's history were reviewed and updated as appropriate: allergies, current medications, past family history, past medical history, past social history, past surgical history and problem list     Review of Systems   Constitutional: Negative  Cardiovascular: Negative  Gastrointestinal: Negative  Musculoskeletal: Positive for back pain  Knee pain           Objective:    /70   Pulse 70   Resp 16   Ht 5' (1 524 m)   Wt 79 8 kg (176 lb)   SpO2 99%   BMI 34 37 kg/m²      Physical Exam  Constitutional:       Appearance: Normal appearance  Cardiovascular:      Heart sounds: Normal heart sounds  Pulmonary:      Breath sounds: Normal breath sounds  Neurological:      Mental Status: She is oriented to person, place, and time  Psychiatric:         Mood and Affect: Mood normal            Recent Results (from the past 1008 hour(s))   Comprehensive metabolic panel    Collection Time: 05/08/23  8:57 AM   Result Value Ref Range    Sodium 137 135 - 147 mmol/L    Potassium 4 0 3 5 - 5 3 mmol/L    Chloride 111 (H) 96 - 108 mmol/L    CO2 22 21 - 32 mmol/L    ANION GAP 4 4 - 13 mmol/L    BUN 15 5 - 25 mg/dL    Creatinine 0 99 0 60 - 1 30 mg/dL    Glucose, Fasting 93 65 - 99 mg/dL    Calcium 9 1 8 3 - 10 1 mg/dL    AST 20 5 - 45 U/L    ALT 29 12 - 78 U/L    Alkaline Phosphatase 96 46 - 116 U/L    Total Protein 7 8 6 4 - 8 4 g/dL    Albumin 3 6 3 5 - 5 0 g/dL    Total Bilirubin 0 36 0 20 - 1 00 mg/dL    eGFR 58 ml/min/1 73sq m   Lipid panel    Collection Time: 05/08/23  8:57 AM   Result Value Ref Range    Cholesterol 162 See Comment mg/dL    Triglycerides 122 See Comment mg/dL    HDL, Direct 42 (L) >=50 mg/dL    LDL Calculated 96 0 - 100 mg/dL    Non-HDL-Chol (CHOL-HDL) 120 mg/dl   Hemoglobin A1C    Collection Time: 05/08/23  8:57 AM   Result Value Ref Range    Hemoglobin A1C 6 0 (H) Normal 3 8-5 6%; PreDiabetic 5 7-6 4%;  Diabetic >=6 5%; Glycemic control for adults with diabetes <7 0% %     mg/dl Assessment/Plan:    No problem-specific Assessment & Plan notes found for this encounter  Problem List Items Addressed This Visit        Digestive    Gastroesophageal reflux disease without esophagitis     Symptoms stable on omeprazole 20 milligram   Will continue same  Suggested to continue with lifestyle modifications, avoid dietary triggers  Relevant Medications    omeprazole (PriLOSEC) 20 mg delayed release capsule       Nervous and Auditory    Lumbar radiculopathy     Continue monitoring/management per pain management  Relevant Orders    Ambulatory Referral to Physical Therapy       Musculoskeletal and Integument    Primary osteoarthritis of both knees     Patient reports pain and difficulty getting up from sitting position  Could be a combination of back issues and arthritis in both her knees  Patient is receiving injections in her knees with Ortho  Recommended to start physical therapy  Fup with orthopedics if symptoms do not improve  Genitourinary    Stage 3 chronic kidney disease [facility])     Lab Results   Component Value Date    EGFR 58 05/08/2023    EGFR 67 07/18/2022    EGFR 65 06/07/2022    CREATININE 0 99 05/08/2023    CREATININE 0 88 07/18/2022    CREATININE 0 90 06/07/2022     Stable  Blood pressure A1c is at goal   Continue losartan  Continue monitoring  Other    Hyperlipidemia - Primary     LDL is at goal, liver function stable  Tolerating atorvastatin 20 milligram without any side effects  Continue same  Relevant Medications    atorvastatin (LIPITOR) 20 mg tablet    Other Relevant Orders    Lipid panel    Chronic bilateral low back pain without sciatica     Patient reporting difficulty standing up from sitting position  Recommended physical therapy for muscle strengthening           Relevant Orders    Ambulatory Referral to Physical Therapy    Chronic pain of both knees    Relevant Orders    Ambulatory Referral to Physical Therapy    Prediabetes     A1c 6 0  Continue with low-carb diet/monitoring           Relevant Orders    Comprehensive metabolic panel    Hemoglobin A1C    Need for vaccination    Relevant Orders    Pneumococcal Conjugate Vaccine 20-valent (Pcv20) (Completed)   Other Visit Diagnoses     Pain in both feet        Relevant Medications    acetaminophen (TYLENOL) 500 mg tablet

## 2023-05-10 NOTE — ASSESSMENT & PLAN NOTE
Lab Results   Component Value Date    EGFR 58 05/08/2023    EGFR 67 07/18/2022    EGFR 65 06/07/2022    CREATININE 0 99 05/08/2023    CREATININE 0 88 07/18/2022    CREATININE 0 90 06/07/2022     Stable  Blood pressure A1c is at goal   Continue losartan  Continue monitoring

## 2023-05-10 NOTE — ASSESSMENT & PLAN NOTE
Patient reports pain and difficulty getting up from sitting position  Could be a combination of back issues and arthritis in both her knees  Patient is receiving injections in her knees with Ortho  Recommended to start physical therapy  Fup with orthopedics if symptoms do not improve

## 2023-05-10 NOTE — ASSESSMENT & PLAN NOTE
Patient reporting difficulty standing up from sitting position  Recommended physical therapy for muscle strengthening

## 2023-05-10 NOTE — ASSESSMENT & PLAN NOTE
Symptoms stable on omeprazole 20 milligram   Will continue same  Suggested to continue with lifestyle modifications, avoid dietary triggers

## 2023-05-23 DIAGNOSIS — M48.062 LUMBAR STENOSIS WITH NEUROGENIC CLAUDICATION: ICD-10-CM

## 2023-05-23 RX ORDER — CELECOXIB 200 MG/1
200 CAPSULE ORAL DAILY
Qty: 30 CAPSULE | Refills: 2 | Status: SHIPPED | OUTPATIENT
Start: 2023-05-23

## 2023-06-05 ENCOUNTER — OFFICE VISIT (OUTPATIENT)
Dept: PODIATRY | Facility: CLINIC | Age: 68
End: 2023-06-05
Payer: MEDICARE

## 2023-06-05 VITALS
SYSTOLIC BLOOD PRESSURE: 125 MMHG | HEART RATE: 68 BPM | OXYGEN SATURATION: 98 % | DIASTOLIC BLOOD PRESSURE: 79 MMHG | HEIGHT: 60 IN | BODY MASS INDEX: 34.37 KG/M2

## 2023-06-05 DIAGNOSIS — E11.42 TYPE 2 DIABETES MELLITUS WITH DIABETIC POLYNEUROPATHY, WITHOUT LONG-TERM CURRENT USE OF INSULIN (HCC): ICD-10-CM

## 2023-06-05 DIAGNOSIS — G57.93 NEUROPATHIC PAIN OF BOTH FEET: Primary | ICD-10-CM

## 2023-06-05 DIAGNOSIS — L85.3 XEROSIS CUTIS: ICD-10-CM

## 2023-06-05 DIAGNOSIS — B35.1 ONYCHOMYCOSIS: ICD-10-CM

## 2023-06-05 PROCEDURE — 99203 OFFICE O/P NEW LOW 30 MIN: CPT | Performed by: PODIATRIST

## 2023-06-05 RX ORDER — EYELID CLEANSER COMBINATION 13
PADS, MEDICATED (EA) TOPICAL
COMMUNITY
Start: 2023-05-24

## 2023-06-05 RX ORDER — AMMONIUM LACTATE 12 G/100G
LOTION TOPICAL 2 TIMES DAILY PRN
Qty: 225 G | Refills: 6 | Status: SHIPPED | OUTPATIENT
Start: 2023-06-05

## 2023-06-05 RX ORDER — NEOMYCIN SULFATE, POLYMYXIN B SULFATE, AND DEXAMETHASONE 3.5; 10000; 1 MG/G; [USP'U]/G; MG/G
OINTMENT OPHTHALMIC
COMMUNITY
Start: 2023-05-25

## 2023-06-05 RX ORDER — DULOXETIN HYDROCHLORIDE 20 MG/1
20 CAPSULE, DELAYED RELEASE ORAL DAILY
Qty: 30 CAPSULE | Refills: 2 | Status: SHIPPED | OUTPATIENT
Start: 2023-06-05

## 2023-06-05 NOTE — PROGRESS NOTES
Assessment/Plan:    The patient's clinical examination today is significant for neuritic pains to the feet bilaterally  Pedal pulses are palpable  Vibratory sensation is diminished bilaterally  Epicritic sensation is intact bilaterally  The pedal nail plates are thickened and dystrophic with some subungual debris consistent with onychomycosis  The interdigital spaces are clear without maceration  There is some dry scaling skin to the plantar forefoot and heels bilaterally without any open fissures  The patient's clinical symptoms are most consistent with neuropathic pain most likely stemming from her history of lumbar radiculopathy  Diabetic peripheral neuropathy may also be at play here  I will start her on a trial course of Cymbalta 20 mg daily for her neuropathic pain  She is also requesting a prescription for diabetic shoes as she feels that this may provide her better support and comfort  I feel this is reasonable and a prescription was answered into her chart  She was [facility] to [facility] for the manufacture  Recommend follow-up in 4 to 6 weeks  We may need to titrate up on Cymbalta as needed  Diagnoses and all orders for this visit:    Neuropathic pain of both feet  -     Diabetic Shoe  -     DULoxetine (CYMBALTA) 20 mg capsule; Take 1 capsule (20 mg total) by mouth daily    Type 2 diabetes mellitus with diabetic polyneuropathy, without long-term current use of insulin (HCC)  -     Diabetic Shoe  -     DULoxetine (CYMBALTA) 20 mg capsule; Take 1 capsule (20 mg total) by mouth daily    Xerosis cutis  -     ammonium lactate (LAC-HYDRIN) 12 % lotion; Apply topically 2 (two) times a day as needed for dry skin    Onychomycosis    Other orders  -     Eyelid Cleansers ([employer] Eyelid Cleansing) PADS; APPLY AT BEDTIME AS DIRECTED SCRUB WITH PAD VS  FOAM ALONG CLOSED EYELIDS NIGHTLY PER DIRECTIONS   OCUSOFT OR SIMILAR BRAND (WIPES VS  FOAM)  -     neomycin-polymyxin-dexamethasone (MAXITROL) 0 35%-10,000 units/g-0 1%; APPLY 0 25 INCH INTO AFFECTED EYE AT BEDTIME X 2 WEEKS THEN D/C (Patient not taking: Reported on 6/5/2023)          Subjective:      Patient ID: Qing Hayden is a 69 y o  female  The patient presents today with a chief complaint of bilateral foot pain that has been present for many years  She states that the pain is present whether she is on her feet or off of it  She notes a stabbing discomfort but denies any burning sensations  She does have a history of chronic low back pain for which she follows with pain management  She also has a history of diabetes  She states that she has tried medications in the past for her nerve pain but she cannot recall the name of the medication  The following portions of the patient's history were reviewed and updated as appropriate: allergies, current medications, past family history, past medical history, past social history, past surgical history and problem list       PAST MEDICAL HISTORY:  Past Medical History:   Diagnosis Date   • Hyperlipidemia    • Hypertension        PAST SURGICAL HISTORY:  History reviewed  No pertinent surgical history  ALLERGIES:  Patient has no known allergies  MEDICATIONS:  Current Outpatient Medications   Medication Sig Dispense Refill   • ammonium lactate (LAC-HYDRIN) 12 % lotion Apply topically 2 (two) times a day as needed for dry skin 225 g 6   • atorvastatin (LIPITOR) 20 mg tablet Take 1 tablet (20 mg total) by mouth daily 90 tablet 1   • Cholecalciferol (Vitamin D3) 50 MCG (2000 UT) TABS TAKE 1 TABLET (2,000 UNITS TOTAL) BY MOUTH DAILY 90 tablet 1   • DULoxetine (CYMBALTA) 20 mg capsule Take 1 capsule (20 mg total) by mouth daily 30 capsule 2   • Eyelid Cleansers ([employer] Eyelid Cleansing) PADS APPLY AT BEDTIME AS DIRECTED SCRUB WITH PAD VS  FOAM ALONG CLOSED EYELIDS NIGHTLY PER DIRECTIONS   OCUSOFT OR SIMILAR BRAND (WIPES VS  FOAM)     • losartan (COZAAR) 50 mg tablet TAKE 1 TABLET (50 MG TOTAL) BY MOUTH DAILY 90 tablet 3   • methocarbamol (ROBAXIN) 500 mg tablet Take 1 tablet (500 mg total) by mouth 2 (two) times a day as needed for muscle spasms 60 tablet 5   • omeprazole (PriLOSEC) 20 mg delayed release capsule Take 1 capsule (20 mg total) by mouth daily 90 capsule 1   • spironolactone (ALDACTONE) 25 mg tablet Take 1 tablet (25 mg total) by mouth daily 90 tablet 3   • torsemide (DEMADEX) 5 MG tablet Take 1 tablet (5 mg total) by mouth daily 90 tablet 3   • acetaminophen (TYLENOL) 500 mg tablet Take 2 tablets (1,000 mg total) by mouth 3 (three) times a day as needed for mild pain (Patient not taking: Reported on 6/5/2023) 180 tablet 1   • benzonatate (TESSALON PERLES) 100 mg capsule Take 1 capsule (100 mg total) by mouth 3 (three) times a day as needed for cough (Patient not taking: Reported on 5/10/2023) 60 capsule 0   • celecoxib (CeleBREX) 200 mg capsule TAKE 1 CAPSULE (200 MG TOTAL) BY MOUTH DAILY (Patient not taking: Reported on 6/5/2023) 30 capsule 2   • Diclofenac Sodium (VOLTAREN) 1 % APPLY 2 G TOPICALLY 4 (FOUR) TIMES A DAY (Patient not taking: Reported on 6/5/2023) 300 g 3   • fluticasone (FLONASE) 50 mcg/act nasal spray 1 spray into each nostril in the morning  (Patient not taking: Reported on 6/5/2023) 15 8 mL 0   • losartan (COZAAR) 100 MG tablet Take 1 tablet (100 mg total) by mouth daily (Patient not taking: Reported on 6/5/2023) 90 tablet 3   • magnesium Oxide (MAG-OX) 400 mg TABS Take 400 mg by mouth in the morning and 400 mg in the evening   (Patient not taking: Reported on 6/5/2023)     • naproxen (EC NAPROSYN) 500 MG EC tablet TAKE 1 TABLET (500 MG TOTAL) BY MOUTH 2 (TWO) TIMES A DAY AS NEEDED FOR MODERATE PAIN (Patient not taking: Reported on 6/5/2023)     • neomycin-polymyxin-dexamethasone (MAXITROL) 0 35%-10,000 units/g-0 1% APPLY 0 25 INCH INTO AFFECTED EYE AT BEDTIME X 2 WEEKS THEN D/C (Patient not taking: Reported on 6/5/2023)     • Promethazine-DM (PHENERGAN-DM) 6 25-15 mg/5 mL oral syrup Take 5 mL by mouth 4 (four) times a day as needed for cough (Patient not taking: Reported on 5/10/2023) 180 mL 1   • Restasis 0 05 % ophthalmic emulsion INSTILL 1 DROP BOTH EYES TWICE A DAY (Patient not taking: Reported on 6/5/2023)       No current facility-administered medications for this visit  SOCIAL HISTORY:  Social History     Socioeconomic History   • Marital status: /Civil Union     Spouse name: None   • Number of children: None   • Years of education: None   • Highest education level: None   Occupational History   • Occupation: retired   Tobacco Use   • Smoking status: Never   • Smokeless tobacco: Never   Substance and Sexual Activity   • Alcohol use: Never   • Drug use: Never   • Sexual activity: None   Other Topics Concern   • None   Social History Narrative   • None     Social Determinants of Health     Financial Resource Strain: Not on file   Food Insecurity: Not on file   Transportation Needs: Not on file   Physical Activity: Not on file   Stress: Not on file   Social Connections: Not on file   Intimate Partner Violence: Not on file   Housing Stability: Not on file        Review of Systems   Constitutional: Negative  HENT: Negative  Eyes: Negative  Respiratory: Negative  Cardiovascular: Negative  Endocrine: Negative  Musculoskeletal: Negative  Neurological: Negative  Hematological: Negative  Psychiatric/Behavioral: Negative  Objective:      /79 (BP Location: Left arm, Patient Position: Sitting, Cuff Size: Standard)   Pulse 68   Ht 5' (1 524 m)   SpO2 98%   BMI 34 37 kg/m²          Physical Exam  Constitutional:       Appearance: Normal appearance  HENT:      Head: Normocephalic and atraumatic  Nose: Nose normal    Cardiovascular:      Pulses: no weak pulses          Dorsalis pedis pulses are 2+ on the right side and 2+ on the left side  Posterior tibial pulses are 1+ on the right side and 1+ on the left side     Pulmonary: Effort: Pulmonary effort is normal    Feet:      Right foot:      Skin integrity: Dry skin present  Toenail Condition: Right toenails are abnormally thick and long  Fungal disease present  Left foot:      Skin integrity: Dry skin present  Toenail Condition: Left toenails are abnormally thick and long  Fungal disease present  Comments: The patient's clinical examination today is significant for neuritic pains to the feet bilaterally  Pedal pulses are palpable  Vibratory sensation is diminished bilaterally  Epicritic sensation is intact bilaterally  The pedal nail plates are thickened and dystrophic with some subungual debris consistent with onychomycosis  The interdigital spaces are clear without maceration  There is some dry scaling skin to the plantar forefoot and heels bilaterally without any open fissures  Skin:     General: Skin is warm  Capillary Refill: Capillary refill takes less than 2 seconds  Neurological:      General: No focal deficit present  Mental Status: She is alert and oriented to person, place, and time  Psychiatric:         Mood and Affect: Mood normal          Behavior: Behavior normal          Thought Content: Thought content normal            Diabetic Foot Exam    Patient's shoes and socks removed  Right Foot/Ankle   Right Foot Inspection  Skin Exam: dry skin  Toe Exam: ROM and strength within normal limits  Sensory   Vibration: diminished  Proprioception: intact  Monofilament testing: intact    Vascular  Capillary refills: < 3 seconds  The right DP pulse is 2+  The right PT pulse is 1+  Left Foot/Ankle  Left Foot Inspection  Skin Exam: dry skin  Toe Exam: ROM and strength within normal limits  Sensory   Vibration: diminished  Proprioception: intact  Monofilament testing: intact    Vascular  Capillary refills: < 3 seconds  The left DP pulse is 2+  The left PT pulse is 1+       Assign Risk Category  No deformity present  No loss of protective sensation  No weak pulses  Risk: 0

## 2023-06-13 ENCOUNTER — RA CDI HCC (OUTPATIENT)
Dept: OTHER | Facility: HOSPITAL | Age: 68
End: 2023-06-13

## 2023-07-05 ENCOUNTER — TELEPHONE (OUTPATIENT)
Dept: FAMILY MEDICINE CLINIC | Facility: CLINIC | Age: 68
End: 2023-07-05

## 2023-07-05 NOTE — TELEPHONE ENCOUNTER
Patient's daughter-in-law called to request orders for a walker and small wheelchair for the patient.

## 2023-07-07 LAB
DME PARACHUTE DELIVERY DATE ACTUAL: NORMAL
DME PARACHUTE DELIVERY DATE EXPECTED: NORMAL
DME PARACHUTE DELIVERY DATE REQUESTED: NORMAL
DME PARACHUTE ITEM DESCRIPTION: NORMAL
DME PARACHUTE ORDER STATUS: NORMAL
DME PARACHUTE SUPPLIER NAME: NORMAL
DME PARACHUTE SUPPLIER PHONE: NORMAL

## 2023-07-11 ENCOUNTER — TELEPHONE (OUTPATIENT)
Dept: PAIN MEDICINE | Facility: CLINIC | Age: 68
End: 2023-07-11

## 2023-07-11 NOTE — TELEPHONE ENCOUNTER
Caller: daughter Kathryn     Doctor: Nataliia Martini    Reason for call: she wants to verify if any imaging was done the Chiropractor wants to see all imaging    Fax #[phone]    Call back#: [phone]

## 2023-07-18 DIAGNOSIS — M48.062 LUMBAR STENOSIS WITH NEUROGENIC CLAUDICATION: ICD-10-CM

## 2023-07-18 RX ORDER — METHOCARBAMOL 500 MG/1
500 TABLET, FILM COATED ORAL 2 TIMES DAILY PRN
Qty: 60 TABLET | Refills: 5 | Status: SHIPPED | OUTPATIENT
Start: 2023-07-18

## 2023-07-25 DIAGNOSIS — M48.062 LUMBAR STENOSIS WITH NEUROGENIC CLAUDICATION: ICD-10-CM

## 2023-07-25 RX ORDER — CELECOXIB 200 MG/1
200 CAPSULE ORAL DAILY
Qty: 30 CAPSULE | Refills: 2 | Status: SHIPPED | OUTPATIENT
Start: 2023-07-25

## 2023-07-27 DIAGNOSIS — E55.9 VITAMIN D DEFICIENCY: ICD-10-CM

## 2023-07-27 RX ORDER — CHOLECALCIFEROL (VITAMIN D3) 125 MCG
CAPSULE ORAL
Qty: 90 TABLET | Refills: 1 | Status: SHIPPED | OUTPATIENT
Start: 2023-07-27

## 2023-08-09 DIAGNOSIS — E11.42 TYPE 2 DIABETES MELLITUS WITH DIABETIC POLYNEUROPATHY, WITHOUT LONG-TERM CURRENT USE OF INSULIN (HCC): ICD-10-CM

## 2023-08-09 DIAGNOSIS — G57.93 NEUROPATHIC PAIN OF BOTH FEET: ICD-10-CM

## 2023-08-09 RX ORDER — DULOXETIN HYDROCHLORIDE 20 MG/1
20 CAPSULE, DELAYED RELEASE ORAL DAILY
Qty: 90 CAPSULE | Refills: 2 | Status: SHIPPED | OUTPATIENT
Start: 2023-08-09

## 2023-08-14 ENCOUNTER — TELEPHONE (OUTPATIENT)
Age: 68
End: 2023-08-14

## 2023-08-14 NOTE — TELEPHONE ENCOUNTER
Caller: Lela Gomez    Doctor: Dr Quiroz    Reason for call: Patient daughter called asking for advise regarding mother finger or any recommendation on finger pain. Patient daughter states it feels like a cyst in her finger please advise     Call back#: [phone]

## 2023-08-14 NOTE — TELEPHONE ENCOUNTER
RN s/w daughter Gogo (ok per comm form) told her they should call ortho office, Dr. Diaz specializes in hand issues. She has their  office number and will call them. She appreciated the c/b.

## 2023-08-21 DIAGNOSIS — M79.671 PAIN IN BOTH FEET: ICD-10-CM

## 2023-08-21 DIAGNOSIS — M79.672 PAIN IN BOTH FEET: ICD-10-CM

## 2023-09-07 DIAGNOSIS — L85.3 XEROSIS CUTIS: ICD-10-CM

## 2023-09-07 DIAGNOSIS — M79.672 PAIN IN BOTH FEET: ICD-10-CM

## 2023-09-07 DIAGNOSIS — M79.671 PAIN IN BOTH FEET: ICD-10-CM

## 2023-09-07 RX ORDER — AMMONIUM LACTATE 12 G/100G
LOTION TOPICAL 2 TIMES DAILY PRN
Qty: 400 G | Refills: 1 | Status: SHIPPED | OUTPATIENT
Start: 2023-09-07

## 2023-09-12 ENCOUNTER — OFFICE VISIT (OUTPATIENT)
Dept: OBGYN CLINIC | Facility: CLINIC | Age: 68
End: 2023-09-12
Payer: MEDICARE

## 2023-09-12 ENCOUNTER — APPOINTMENT (OUTPATIENT)
Dept: RADIOLOGY | Facility: AMBULARY SURGERY CENTER | Age: 68
End: 2023-09-12
Attending: STUDENT IN AN ORGANIZED HEALTH CARE EDUCATION/TRAINING PROGRAM
Payer: MEDICARE

## 2023-09-12 VITALS — WEIGHT: 176 LBS | HEIGHT: 60 IN | BODY MASS INDEX: 34.55 KG/M2

## 2023-09-12 DIAGNOSIS — R22.31 MASS OF FINGER OF RIGHT HAND: Primary | ICD-10-CM

## 2023-09-12 DIAGNOSIS — R22.31 MASS OF FINGER OF RIGHT HAND: ICD-10-CM

## 2023-09-12 PROCEDURE — 73130 X-RAY EXAM OF HAND: CPT

## 2023-09-12 PROCEDURE — 99214 OFFICE O/P EST MOD 30 MIN: CPT | Performed by: STUDENT IN AN ORGANIZED HEALTH CARE EDUCATION/TRAINING PROGRAM

## 2023-09-12 NOTE — PROGRESS NOTES
ORTHOPAEDIC HAND, WRIST, AND ELBOW OFFICE  VISIT       ASSESSMENT/PLAN:      Diagnoses and all orders for this visit:    Mass of finger of right hand  -     XR hand 3+ vw right; Future  -     US MSK limited; Future        77 y.o. 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 The patient verbalized understanding of exam findings and treatment plan. The patient was given the opportunity to ask questions. Questions were answered to the patient's satisfaction. The patient decided to move forward with ultrasound imaging via shared decision making. Patient states may not be uncomfortable for surgery, but discussed may consider aspiration if cystic. Follow Up: After imaging       To Do Next Visit:  Re-evaluation of current issue      Discussions: The anatomy and physiology of the diagnosis(es) was(were) discussed with the patient today in the office. Treatment options and recommendations were discussed, as well as expected future outcomes. Geovanna Myers MD  Attending, Orthopaedic Surgery  Hand, Wrist, and Elbow Surgery  [address] [place] [address]    ____________________________________________________________________________________________________________________________________________      CHIEF COMPLAINT:  Chief Complaint   Patient presents with   • Right Index Finger - Pain       SUBJECTIVE:  Patient is a 77 y.o. RHD female who presents today for evaluation and treatment of right ring finger pain. Her daughter is with her at today's visit to help translate. She has been experiencing discomfort of her right ring finger, especially when flexing the finger. The mass has been on her finger for approximately 2 months.         I have personally reviewed all the relevant PMH, PSH, SH, FH, Medications and allergies      PAST MEDICAL HISTORY:  Past Medical History:   Diagnosis Date   • Hyperlipidemia    • Hypertension PAST SURGICAL HISTORY:  History reviewed. No pertinent surgical history. FAMILY HISTORY:  Family History   Problem Relation Age of Onset   • No Known Problems Mother    • No Known Problems Father        SOCIAL HISTORY:  Social History     Tobacco Use   • Smoking status: Never   • Smokeless tobacco: Never   Substance Use Topics   • Alcohol use: Never   • Drug use: Never       MEDICATIONS:    Current Outpatient Medications:   •  ammonium lactate (LAC-HYDRIN) 12 % lotion, APPLY TOPICALLY 2 (TWO) TIMES A DAY AS NEEDED FOR DRY SKIN, Disp: 400 g, Rfl: 1  •  atorvastatin (LIPITOR) 20 mg tablet, Take 1 tablet (20 mg total) by mouth daily, Disp: 90 tablet, Rfl: 1  •  celecoxib (CeleBREX) 200 mg capsule, TAKE 1 CAPSULE (200 MG TOTAL) BY MOUTH DAILY, Disp: 30 capsule, Rfl: 2  •  Cholecalciferol (Vitamin D3) 50 MCG (2000 UT) TABS, TAKE 1 TABLET (2,000 UNITS TOTAL) BY MOUTH DAILY, Disp: 90 tablet, Rfl: 1  •  Diclofenac Sodium (VOLTAREN) 1 %, APPLY 2 G TOPICALLY 2 (TWO) TIMES A DAY, Disp: 100 g, Rfl: 0  •  DULoxetine (CYMBALTA) 20 mg capsule, TAKE 1 CAPSULE (20 MG TOTAL) BY MOUTH DAILY, Disp: 90 capsule, Rfl: 2  •  Eyelid Cleansers (OcuSoft Eyelid Cleansing) PADS, APPLY AT BEDTIME AS DIRECTED SCRUB WITH PAD VS. FOAM ALONG CLOSED EYELIDS NIGHTLY PER DIRECTIONS.  OCUSOFT OR SIMILAR BRAND (WIPES VS. FOAM), Disp: , Rfl:   •  losartan (COZAAR) 50 mg tablet, TAKE 1 TABLET (50 MG TOTAL) BY MOUTH DAILY, Disp: 90 tablet, Rfl: 3  •  methocarbamol (ROBAXIN) 500 mg tablet, TAKE 1 TABLET (500 MG TOTAL) BY MOUTH 2 (TWO) TIMES A DAY AS NEEDED FOR MUSCLE SPASMS, Disp: 60 tablet, Rfl: 5  •  omeprazole (PriLOSEC) 20 mg delayed release capsule, Take 1 capsule (20 mg total) by mouth daily, Disp: 90 capsule, Rfl: 1  •  spironolactone (ALDACTONE) 25 mg tablet, Take 1 tablet (25 mg total) by mouth daily, Disp: 90 tablet, Rfl: 3  •  torsemide (DEMADEX) 5 MG tablet, Take 1 tablet (5 mg total) by mouth daily, Disp: 90 tablet, Rfl: 3  • acetaminophen (TYLENOL) 500 mg tablet, Take 2 tablets (1,000 mg total) by mouth 3 (three) times a day as needed for mild pain (Patient not taking: Reported on 6/5/2023), Disp: 180 tablet, Rfl: 1  •  benzonatate (TESSALON PERLES) 100 mg capsule, Take 1 capsule (100 mg total) by mouth 3 (three) times a day as needed for cough (Patient not taking: Reported on 5/10/2023), Disp: 60 capsule, Rfl: 0  •  fluticasone (FLONASE) 50 mcg/act nasal spray, 1 spray into each nostril in the morning. (Patient not taking: Reported on 6/5/2023), Disp: 15.8 mL, Rfl: 0  •  losartan (COZAAR) 100 MG tablet, Take 1 tablet (100 mg total) by mouth daily (Patient not taking: Reported on 6/5/2023), Disp: 90 tablet, Rfl: 3  •  magnesium Oxide (MAG-OX) 400 mg TABS, Take 400 mg by mouth in the morning and 400 mg in the evening. (Patient not taking: Reported on 6/5/2023), Disp: , Rfl:   •  naproxen (EC NAPROSYN) 500 MG EC tablet, TAKE 1 TABLET (500 MG TOTAL) BY MOUTH 2 (TWO) TIMES A DAY AS NEEDED FOR MODERATE PAIN (Patient not taking: Reported on 6/5/2023), Disp: , Rfl:   •  neomycin-polymyxin-dexamethasone (MAXITROL) 0.35%-10,000 units/g-0.1%, APPLY 0.25 INCH INTO AFFECTED EYE AT BEDTIME X 2 WEEKS THEN D/C (Patient not taking: Reported on 6/5/2023), Disp: , Rfl:   •  Promethazine-DM (PHENERGAN-DM) 6.25-15 mg/5 mL oral syrup, Take 5 mL by mouth 4 (four) times a day as needed for cough (Patient not taking: Reported on 5/10/2023), Disp: 180 mL, Rfl: 1  •  Restasis 0.05 % ophthalmic emulsion, INSTILL 1 DROP BOTH EYES TWICE A DAY (Patient not taking: Reported on 6/5/2023), Disp: , Rfl:     ALLERGIES:  No Known Allergies        REVIEW OF SYSTEMS:  Musculoskeletal:        As noted in HPI. All other systems reviewed and are negative. VITALS:  There were no vitals filed for this visit.     LABS:  HgA1c:   Lab Results   Component Value Date    HGBA1C 6.0 (H) 05/08/2023     BMP:   Lab Results   Component Value Date    CALCIUM 9.1 05/08/2023    K 4.0 05/08/2023    CO2 22 05/08/2023     (H) 05/08/2023    BUN 15 05/08/2023    CREATININE 0.99 05/08/2023       _____________________________________________________  PHYSICAL EXAMINATION:  General: Well developed and well nourished, alert & oriented x 3, appears comfortable  Psychiatric: Normal  HEENT: Normocephalic, Atraumatic Trachea Midline, No torticollis  Pulmonary: No audible wheezing or respiratory distress   Abdomen/GI: Non tender, non distended   Cardiovascular: No pitting edema, 2+ radial pulse   Skin: No masses, erythema, lacerations, fluctation, ulcerations  Neurovascular: Sensation Intact to the Median, Ulnar, Radial Nerve, Motor Intact to the Median, Ulnar, Radial Nerve and Pulses Intact  Musculoskeletal: Normal, except as noted in detailed exam and in HPI. MUSCULOSKELETAL EXAMINATION:  Right Ring Finger    Examination of the affected extremity was compared to the unaffected extremity. Skin was intact. No swelling or ecchymosis. No deformity. Hand and fingers were warm and well-perfused. Capillary refill was brisk. Full active range of motion of the elbows, forearms, wrists, and fingers. 5/5 elbow flexors/extensors, wrist flexor/extensors, and . Composite fist. 6 mm x 6mm x 2mm mobile mass of the radial aspect of the digit of the proximal phalanx. ___________________________________________________  STUDIES REVIEWED:  Xrays of the right hand were reviewed and independently interpreted in PACS by Dr. Grijalva and demonstrate no acute osseous abnormalities.        PROCEDURES PERFORMED:  No Procedures performed today    _____________________________________________________      [facility] I,:  Betsey Owusu am acting as a scribe while in the presence of the attending physician.:       I,:  Anne Marie Patricia MD personally performed the services described in this documentation    as scribed in my presence.:

## 2023-09-13 DIAGNOSIS — M79.671 PAIN IN BOTH FEET: ICD-10-CM

## 2023-09-13 DIAGNOSIS — M79.672 PAIN IN BOTH FEET: ICD-10-CM

## 2023-09-18 ENCOUNTER — HOSPITAL ENCOUNTER (OUTPATIENT)
Dept: ULTRASOUND IMAGING | Facility: HOSPITAL | Age: 68
Discharge: HOME | End: 2023-09-18
Attending: STUDENT IN AN ORGANIZED HEALTH CARE EDUCATION/TRAINING PROGRAM
Payer: MEDICARE

## 2023-09-18 DIAGNOSIS — R22.31 MASS OF FINGER OF RIGHT HAND: ICD-10-CM

## 2023-09-18 PROCEDURE — 76882 US LMTD JT/FCL EVL NVASC XTR: CPT

## 2023-09-19 DIAGNOSIS — N18.31 STAGE 3A CHRONIC KIDNEY DISEASE (HCC): ICD-10-CM

## 2023-09-19 DIAGNOSIS — N18.9 CKD (CHRONIC KIDNEY DISEASE): ICD-10-CM

## 2023-09-19 DIAGNOSIS — E78.49 OTHER HYPERLIPIDEMIA: ICD-10-CM

## 2023-09-19 DIAGNOSIS — I12.9 PARENCHYMAL RENAL HYPERTENSION, STAGE 1 THROUGH STAGE 4 OR UNSPECIFIED CHRONIC KIDNEY DISEASE: ICD-10-CM

## 2023-09-19 DIAGNOSIS — R60.0 PEDAL EDEMA: ICD-10-CM

## 2023-09-19 DIAGNOSIS — E87.6 HYPOKALEMIA: ICD-10-CM

## 2023-09-19 RX ORDER — SPIRONOLACTONE 25 MG/1
25 TABLET ORAL DAILY
Qty: 90 TABLET | Refills: 3 | Status: SHIPPED | OUTPATIENT
Start: 2023-09-19

## 2023-09-19 RX ORDER — TORSEMIDE 5 MG/1
5 TABLET ORAL DAILY
Qty: 90 TABLET | Refills: 3 | Status: SHIPPED | OUTPATIENT
Start: 2023-09-19

## 2023-09-20 DIAGNOSIS — M79.672 PAIN IN BOTH FEET: ICD-10-CM

## 2023-09-20 DIAGNOSIS — M79.671 PAIN IN BOTH FEET: ICD-10-CM

## 2023-09-26 ENCOUNTER — OFFICE VISIT (OUTPATIENT)
Dept: OBGYN CLINIC | Facility: CLINIC | Age: 68
End: 2023-09-26
Payer: MEDICARE

## 2023-09-26 VITALS — HEIGHT: 60 IN | WEIGHT: 176 LBS | BODY MASS INDEX: 34.55 KG/M2

## 2023-09-26 DIAGNOSIS — M67.449 GANGLION CYST OF FINGER: Primary | ICD-10-CM

## 2023-09-26 PROCEDURE — 99214 OFFICE O/P EST MOD 30 MIN: CPT | Performed by: STUDENT IN AN ORGANIZED HEALTH CARE EDUCATION/TRAINING PROGRAM

## 2023-09-26 PROCEDURE — 20612 ASPIRATE/INJ GANGLION CYST: CPT | Performed by: STUDENT IN AN ORGANIZED HEALTH CARE EDUCATION/TRAINING PROGRAM

## 2023-09-26 RX ORDER — IBUPROFEN 200 MG
200 TABLET ORAL EVERY 6 HOURS PRN
Qty: 30 TABLET | Refills: 1 | Status: SHIPPED | OUTPATIENT
Start: 2023-09-26

## 2023-09-26 RX ORDER — ACETAMINOPHEN 500 MG
500 TABLET ORAL EVERY 6 HOURS PRN
Qty: 30 TABLET | Refills: 1 | Status: SHIPPED | OUTPATIENT
Start: 2023-09-26

## 2023-09-26 RX ORDER — BETAMETHASONE SODIUM PHOSPHATE AND BETAMETHASONE ACETATE 3; 3 MG/ML; MG/ML
6 INJECTION, SUSPENSION INTRA-ARTICULAR; INTRALESIONAL; INTRAMUSCULAR; SOFT TISSUE
Status: COMPLETED | OUTPATIENT
Start: 2023-09-26 | End: 2023-09-26

## 2023-09-26 RX ORDER — BUPIVACAINE HYDROCHLORIDE 2.5 MG/ML
1 INJECTION, SOLUTION EPIDURAL; INFILTRATION; INTRACAUDAL
Status: COMPLETED | OUTPATIENT
Start: 2023-09-26 | End: 2023-09-26

## 2023-09-26 RX ADMIN — BETAMETHASONE SODIUM PHOSPHATE AND BETAMETHASONE ACETATE 6 MG: 3; 3 INJECTION, SUSPENSION INTRA-ARTICULAR; INTRALESIONAL; INTRAMUSCULAR; SOFT TISSUE at 08:45

## 2023-09-26 RX ADMIN — BUPIVACAINE HYDROCHLORIDE 1 ML: 2.5 INJECTION, SOLUTION EPIDURAL; INFILTRATION; INTRACAUDAL at 08:45

## 2023-09-26 NOTE — PROGRESS NOTES
ORTHOPAEDIC HAND, WRIST, AND ELBOW OFFICE  VISIT       ASSESSMENT/PLAN:      Diagnoses and all orders for this visit:    Ganglion cyst of finger  -     Hand/upper extremity injection  -     ibuprofen (MOTRIN) 200 mg tablet; Take 1 tablet (200 mg total) by mouth every 6 (six) hours as needed for mild pain or moderate pain  -     acetaminophen (TYLENOL) 500 mg tablet; Take 1 tablet (500 mg total) by mouth every 6 (six) hours as needed for mild pain or moderate pain  -     bupivacaine (PF) (MARCAINE) 0.25 % injection 1 mL  -     betamethasone acetate-betamethasone sodium phosphate (CELESTONE) injection 6 mg          77 y.o. female with right ring finger ganglion cyst  US results d/w pt and her daughter today  Treatment options and expected outcomes were discussed, including both aspiration and surgical excision  The patient verbalized understanding of exam findings and treatment plan. The patient was given the opportunity to ask questions. Questions were answered to the patient's satisfaction. The patient decided to move forward with aspiration and injx via shared decision making. We did discuss that it could be difficult to get the fluid out if thickened as well as the high possibility of recurrence. During the procedure, fluid was unable to be aspirated today. Pt did elect to still proceed with steroid injx today so this was provided. We discussed that the next step if pt wants to do something would be surgical excision. Follow Up:  PRN       To Do Next Visit:  Re-evaluation of current issue      Discussions:  Ganglion Cysts: The anatomy and physiology of the ganglion was discussed with the patient today in the office. Fluid leaking out of the joint surface typically creates a small sac, which can enlarge and cause pain or limitation of motion. Treatment options include observation, aspiration, or surgical incision were discussed with the patient today.   Observation typically lead to resolution and approximately 10% of patients, aspiration results in resolution of approximately 50% of patients, and surgical excision leads to resolution in approximately 97% of patients. After discussion with the patient today, the patient voiced understanding of all treatment options. Heidi Davila MD  Attending, Orthopaedic Surgery  Hand, Wrist, and Elbow Surgery  [facility]    ____________________________________________________________________________________________________________________________________________      CHIEF COMPLAINT:  Chief Complaint   Patient presents with   • Right Index Finger - Follow-up       SUBJECTIVE:  Patient is a 77 y.o. RHD female who presents today for follow up of right ring finger mass which has been present for approx 2.5 months. U/S was ordered at last visit and pt is here to go over the results and further tx plan. No changes. I have personally reviewed all the relevant PMH, PSH, SH, FH, Medications and allergies      PAST MEDICAL HISTORY:  Past Medical History:   Diagnosis Date   • Hyperlipidemia    • Hypertension        PAST SURGICAL HISTORY:  History reviewed. No pertinent surgical history.     FAMILY HISTORY:  Family History   Problem Relation Age of Onset   • No Known Problems Mother    • No Known Problems Father        SOCIAL HISTORY:  Social History     Tobacco Use   • Smoking status: Never   • Smokeless tobacco: Never   Substance Use Topics   • Alcohol use: Never   • Drug use: Never       MEDICATIONS:    Current Outpatient Medications:   •  acetaminophen (TYLENOL) 500 mg tablet, Take 1 tablet (500 mg total) by mouth every 6 (six) hours as needed for mild pain or moderate pain, Disp: 30 tablet, Rfl: 1  •  ammonium lactate (LAC-HYDRIN) 12 % lotion, APPLY TOPICALLY 2 (TWO) TIMES A DAY AS NEEDED FOR DRY SKIN, Disp: 400 g, Rfl: 1  •  atorvastatin (LIPITOR) 20 mg tablet, Take 1 tablet (20 mg total) by mouth daily, Disp: 90 tablet, Rfl: 1  • celecoxib (CeleBREX) 200 mg capsule, TAKE 1 CAPSULE (200 MG TOTAL) BY MOUTH DAILY, Disp: 30 capsule, Rfl: 2  •  Cholecalciferol (Vitamin D3) 50 MCG (2000 UT) TABS, TAKE 1 TABLET (2,000 UNITS TOTAL) BY MOUTH DAILY, Disp: 90 tablet, Rfl: 1  •  Diclofenac Sodium (VOLTAREN) 1 %, APPLY 2 G TOPICALLY 2 (TWO) TIMES A DAY, Disp: 100 g, Rfl: 0  •  DULoxetine (CYMBALTA) 20 mg capsule, TAKE 1 CAPSULE (20 MG TOTAL) BY MOUTH DAILY, Disp: 90 capsule, Rfl: 2  •  Eyelid Cleansers (OcuSoft Eyelid Cleansing) PADS, APPLY AT BEDTIME AS DIRECTED SCRUB WITH PAD VS. FOAM ALONG CLOSED EYELIDS NIGHTLY PER DIRECTIONS. OCUSOFT OR SIMILAR BRAND (WIPES VS. FOAM), Disp: , Rfl:   •  ibuprofen (MOTRIN) 200 mg tablet, Take 1 tablet (200 mg total) by mouth every 6 (six) hours as needed for mild pain or moderate pain, Disp: 30 tablet, Rfl: 1  •  losartan (COZAAR) 50 mg tablet, TAKE 1 TABLET (50 MG TOTAL) BY MOUTH DAILY, Disp: 90 tablet, Rfl: 3  •  methocarbamol (ROBAXIN) 500 mg tablet, TAKE 1 TABLET (500 MG TOTAL) BY MOUTH 2 (TWO) TIMES A DAY AS NEEDED FOR MUSCLE SPASMS, Disp: 60 tablet, Rfl: 5  •  omeprazole (PriLOSEC) 20 mg delayed release capsule, Take 1 capsule (20 mg total) by mouth daily, Disp: 90 capsule, Rfl: 1  •  spironolactone (ALDACTONE) 25 mg tablet, TAKE 1 TABLET (25 MG TOTAL) BY MOUTH DAILY, Disp: 90 tablet, Rfl: 3  •  torsemide (DEMADEX) 5 MG tablet, TAKE 1 TABLET (5 MG TOTAL) BY MOUTH DAILY, Disp: 90 tablet, Rfl: 3  •  benzonatate (TESSALON PERLES) 100 mg capsule, Take 1 capsule (100 mg total) by mouth 3 (three) times a day as needed for cough (Patient not taking: Reported on 5/10/2023), Disp: 60 capsule, Rfl: 0  •  fluticasone (FLONASE) 50 mcg/act nasal spray, 1 spray into each nostril in the morning.  (Patient not taking: Reported on 6/5/2023), Disp: 15.8 mL, Rfl: 0  •  losartan (COZAAR) 100 MG tablet, Take 1 tablet (100 mg total) by mouth daily (Patient not taking: Reported on 6/5/2023), Disp: 90 tablet, Rfl: 3  •  magnesium Oxide (MAG-OX) 400 mg TABS, Take 400 mg by mouth in the morning and 400 mg in the evening. (Patient not taking: Reported on 6/5/2023), Disp: , Rfl:   •  neomycin-polymyxin-dexamethasone (MAXITROL) 0.35%-10,000 units/g-0.1%, APPLY 0.25 INCH INTO AFFECTED EYE AT BEDTIME X 2 WEEKS THEN D/C (Patient not taking: Reported on 6/5/2023), Disp: , Rfl:   •  Promethazine-DM (PHENERGAN-DM) 6.25-15 mg/5 mL oral syrup, Take 5 mL by mouth 4 (four) times a day as needed for cough (Patient not taking: Reported on 5/10/2023), Disp: 180 mL, Rfl: 1  •  Restasis 0.05 % ophthalmic emulsion, INSTILL 1 DROP BOTH EYES TWICE A DAY (Patient not taking: Reported on 6/5/2023), Disp: , Rfl:   No current facility-administered medications for this visit. ALLERGIES:  No Known Allergies        REVIEW OF SYSTEMS:  Musculoskeletal:        As noted in HPI. All other systems reviewed and are negative. VITALS:  There were no vitals filed for this visit. LABS:  HgA1c:   Lab Results   Component Value Date    HGBA1C 6.0 (H) 05/08/2023     BMP:   Lab Results   Component Value Date    CALCIUM 9.1 05/08/2023    K 4.0 05/08/2023    CO2 22 05/08/2023     (H) 05/08/2023    BUN 15 05/08/2023    CREATININE 0.99 05/08/2023       _____________________________________________________  PHYSICAL EXAMINATION:  General: Well developed and well nourished, alert & oriented x 3, appears comfortable  Psychiatric: Normal  HEENT: Normocephalic, Atraumatic Trachea Midline, No torticollis  Pulmonary: No audible wheezing or respiratory distress   Abdomen/GI: Non tender, non distended   Cardiovascular: No pitting edema, 2+ radial pulse   Skin: No Erythema, No Lacerations, No Fluctuation, No Ulcerations  Neurovascular: Sensation Intact to the Median, Ulnar, Radial Nerve, Motor Intact to the Median, Ulnar, Radial Nerve and Pulses Intact  Musculoskeletal: Normal, except as noted in detailed exam and in HPI.       MUSCULOSKELETAL EXAMINATION:  Right Ring Finger:  No edema, erythema, ecchymosis. Pt with small mobile mass along the radial aspect of the volar digit, near level of proximal phalanx. Pt has FROM of the finger. Brisk capillary refill.     ___________________________________________________  STUDIES REVIEWED:  Ultrasound images were personally reviewed and independently interpreted by Dr. Azar Galvez and demonstrate a ganglion cyst measuring 6 x 5 x 4mm at the level of the ring finger proximal phalanx. PROCEDURES PERFORMED:  Hand/upper extremity injection  Universal Protocol:  Consent: Verbal consent obtained.   Risks and benefits: risks, benefits and alternatives were discussed  Consent given by: patient  Patient understanding: patient states understanding of the procedure being performed  Patient identity confirmed: verbally with patient    Supporting Documentation  Indications: therapeutic   Procedure Details  Condition comment: finger ganglion   Needle size: 22 G  Ultrasound guidance: no  Approach: volar  Medications administered: 1 mL bupivacaine (PF) 0.25 %; 6 mg betamethasone acetate-betamethasone sodium phosphate 6 (3-3) mg/mL  Aspirate amount: 0 mL    Patient tolerance: patient tolerated the procedure well with no immediate complications  Dressing:  Sterile dressing applied              _____________________________________________________      Scribe Attestation    I,:  Fady Millan PA-C am acting as a scribe while in the presence of the attending physician.:       I,:  Eloisa Lennox, MD personally performed the services described in this documentation    as scribed in my presence.:

## 2023-09-27 DIAGNOSIS — M79.671 PAIN IN BOTH FEET: ICD-10-CM

## 2023-09-27 DIAGNOSIS — M79.672 PAIN IN BOTH FEET: ICD-10-CM

## 2023-09-29 ENCOUNTER — TELEPHONE (OUTPATIENT)
Age: 68
End: 2023-09-29

## 2023-09-29 DIAGNOSIS — M67.449 GANGLION CYST OF FINGER: ICD-10-CM

## 2023-09-29 RX ORDER — ACETAMINOPHEN 500 MG
500 TABLET ORAL EVERY 6 HOURS PRN
Qty: 30 TABLET | Refills: 3 | OUTPATIENT
Start: 2023-09-29

## 2023-09-29 NOTE — TELEPHONE ENCOUNTER
Patient daughter in law calling for an apt for [facility]. She has a cough, covid negative. There are no apts today or Monday. Can we overbook? I did suggest an Urgent Care but wanted an apt at office.   Please call Malcolm Bowmanned back at [phone]    Thank you

## 2023-10-07 DIAGNOSIS — E55.9 VITAMIN D DEFICIENCY: ICD-10-CM

## 2023-10-09 RX ORDER — CHOLECALCIFEROL (VITAMIN D3) 125 MCG
CAPSULE ORAL
Qty: 90 TABLET | Refills: 1 | Status: SHIPPED | OUTPATIENT
Start: 2023-10-09

## 2023-10-11 DIAGNOSIS — M79.672 PAIN IN BOTH FEET: ICD-10-CM

## 2023-10-11 DIAGNOSIS — M79.671 PAIN IN BOTH FEET: ICD-10-CM

## 2023-10-11 DIAGNOSIS — L85.3 XEROSIS CUTIS: ICD-10-CM

## 2023-10-11 RX ORDER — AMMONIUM LACTATE 12 G/100G
LOTION TOPICAL 2 TIMES DAILY PRN
Qty: 400 G | Refills: 1 | Status: SHIPPED | OUTPATIENT
Start: 2023-10-11

## 2023-10-16 ENCOUNTER — OFFICE VISIT (OUTPATIENT)
Dept: FAMILY MEDICINE CLINIC | Facility: CLINIC | Age: 68
End: 2023-10-16
Payer: MEDICARE

## 2023-10-16 VITALS
DIASTOLIC BLOOD PRESSURE: 62 MMHG | SYSTOLIC BLOOD PRESSURE: 110 MMHG | OXYGEN SATURATION: 97 % | BODY MASS INDEX: 33.96 KG/M2 | HEART RATE: 105 BPM | HEIGHT: 60 IN | WEIGHT: 173 LBS | TEMPERATURE: 98.8 F

## 2023-10-16 DIAGNOSIS — U07.1 COVID-19: ICD-10-CM

## 2023-10-16 DIAGNOSIS — E78.5 HYPERLIPIDEMIA, UNSPECIFIED HYPERLIPIDEMIA TYPE: ICD-10-CM

## 2023-10-16 DIAGNOSIS — K21.9 GASTROESOPHAGEAL REFLUX DISEASE WITHOUT ESOPHAGITIS: ICD-10-CM

## 2023-10-16 DIAGNOSIS — B97.89 SORE THROAT (VIRAL): ICD-10-CM

## 2023-10-16 DIAGNOSIS — J40 BRONCHITIS: Primary | ICD-10-CM

## 2023-10-16 DIAGNOSIS — J02.8 SORE THROAT (VIRAL): ICD-10-CM

## 2023-10-16 DIAGNOSIS — R05.9 COUGH: ICD-10-CM

## 2023-10-16 DIAGNOSIS — M67.449 GANGLION CYST OF FINGER: ICD-10-CM

## 2023-10-16 PROCEDURE — 99213 OFFICE O/P EST LOW 20 MIN: CPT | Performed by: FAMILY MEDICINE

## 2023-10-16 RX ORDER — AMOXICILLIN AND CLAVULANATE POTASSIUM 875; 125 MG/1; MG/1
1 TABLET, FILM COATED ORAL EVERY 12 HOURS SCHEDULED
Qty: 14 TABLET | Refills: 0 | Status: SHIPPED | OUTPATIENT
Start: 2023-10-16 | End: 2023-10-23

## 2023-10-16 RX ORDER — ATORVASTATIN CALCIUM 20 MG/1
20 TABLET, FILM COATED ORAL DAILY
Qty: 90 TABLET | Refills: 1 | Status: SHIPPED | OUTPATIENT
Start: 2023-10-16

## 2023-10-16 RX ORDER — ACETAMINOPHEN 500 MG
500 TABLET ORAL EVERY 6 HOURS PRN
Qty: 30 TABLET | Refills: 1 | Status: SHIPPED | OUTPATIENT
Start: 2023-10-16

## 2023-10-16 RX ORDER — BENZONATATE 100 MG/1
100 CAPSULE ORAL 3 TIMES DAILY PRN
Qty: 90 CAPSULE | Refills: 1 | Status: SHIPPED | OUTPATIENT
Start: 2023-10-16

## 2023-10-16 RX ORDER — OMEPRAZOLE 20 MG/1
20 CAPSULE, DELAYED RELEASE ORAL DAILY
Qty: 90 CAPSULE | Refills: 1 | Status: SHIPPED | OUTPATIENT
Start: 2023-10-16

## 2023-10-16 RX ORDER — DEXTROMETHORPHAN HYDROBROMIDE AND PROMETHAZINE HYDROCHLORIDE 15; 6.25 MG/5ML; MG/5ML
5 SYRUP ORAL 4 TIMES DAILY PRN
Qty: 240 ML | Refills: 1 | Status: SHIPPED | OUTPATIENT
Start: 2023-10-16

## 2023-10-16 NOTE — ASSESSMENT & PLAN NOTE
Patient started on oral antibiotic and cough syrup. Patient declines inhaler. We will follow-up if symptoms do not improve.

## 2023-10-16 NOTE — PROGRESS NOTES
Subjective:      Patient ID: May Vee is a 77 y.o. female. Cough  This is a new problem. The current episode started in the past 7 days. The problem has been unchanged. The cough is Productive of purulent sputum. Associated symptoms include headaches, nasal congestion, postnasal drip and rhinorrhea. Pertinent negatives include no fever, shortness of breath or wheezing. The symptoms are aggravated by lying down. She has tried rest for the symptoms. The treatment provided no relief. Past Medical History:   Diagnosis Date    Hyperlipidemia     Hypertension        Family History   Problem Relation Age of Onset    No Known Problems Mother     No Known Problems Father        History reviewed. No pertinent surgical history. reports that she has never smoked. She has never used smokeless tobacco. She reports that she does not drink alcohol and does not use drugs.       Current Outpatient Medications:     acetaminophen (TYLENOL) 500 mg tablet, Take 1 tablet (500 mg total) by mouth every 6 (six) hours as needed for mild pain or moderate pain, Disp: 30 tablet, Rfl: 1    ammonium lactate (LAC-HYDRIN) 12 % lotion, APPLY TOPICALLY 2 (TWO) TIMES A DAY AS NEEDED FOR DRY SKIN, Disp: 400 g, Rfl: 1    amoxicillin-clavulanate (AUGMENTIN) 875-125 mg per tablet, Take 1 tablet by mouth every 12 (twelve) hours for 7 days, Disp: 14 tablet, Rfl: 0    atorvastatin (LIPITOR) 20 mg tablet, Take 1 tablet (20 mg total) by mouth daily, Disp: 90 tablet, Rfl: 1    benzonatate (TESSALON PERLES) 100 mg capsule, Take 1 capsule (100 mg total) by mouth 3 (three) times a day as needed for cough, Disp: 90 capsule, Rfl: 1    celecoxib (CeleBREX) 200 mg capsule, TAKE 1 CAPSULE (200 MG TOTAL) BY MOUTH DAILY, Disp: 30 capsule, Rfl: 2    Cholecalciferol (Vitamin D3) 50 MCG (2000 UT) TABS, TAKE 1 TABLET (2,000 UNITS TOTAL) BY MOUTH DAILY, Disp: 90 tablet, Rfl: 1    Diclofenac Sodium (VOLTAREN) 1 %, APPLY 2 G TOPICALLY 2 (TWO) TIMES A DAY, Disp: 100 g, Rfl: 0    DULoxetine (CYMBALTA) 20 mg capsule, TAKE 1 CAPSULE (20 MG TOTAL) BY MOUTH DAILY, Disp: 90 capsule, Rfl: 2    Eyelid Cleansers (OcuSoft Eyelid Cleansing) PADS, APPLY AT BEDTIME AS DIRECTED SCRUB WITH PAD VS. FOAM ALONG CLOSED EYELIDS NIGHTLY PER DIRECTIONS. OCUSOFT OR SIMILAR BRAND (WIPES VS. FOAM), Disp: , Rfl:     ibuprofen (MOTRIN) 200 mg tablet, Take 1 tablet (200 mg total) by mouth every 6 (six) hours as needed for mild pain or moderate pain, Disp: 30 tablet, Rfl: 1    losartan (COZAAR) 50 mg tablet, TAKE 1 TABLET (50 MG TOTAL) BY MOUTH DAILY, Disp: 90 tablet, Rfl: 3    methocarbamol (ROBAXIN) 500 mg tablet, TAKE 1 TABLET (500 MG TOTAL) BY MOUTH 2 (TWO) TIMES A DAY AS NEEDED FOR MUSCLE SPASMS, Disp: 60 tablet, Rfl: 5    omeprazole (PriLOSEC) 20 mg delayed release capsule, Take 1 capsule (20 mg total) by mouth daily, Disp: 90 capsule, Rfl: 1    promethazine-dextromethorphan (PHENERGAN-DM) 6.25-15 mg/5 mL oral syrup, Take 5 mL by mouth 4 (four) times a day as needed for cough, Disp: 240 mL, Rfl: 1    spironolactone (ALDACTONE) 25 mg tablet, TAKE 1 TABLET (25 MG TOTAL) BY MOUTH DAILY, Disp: 90 tablet, Rfl: 3    torsemide (DEMADEX) 5 MG tablet, TAKE 1 TABLET (5 MG TOTAL) BY MOUTH DAILY, Disp: 90 tablet, Rfl: 3    fluticasone (FLONASE) 50 mcg/act nasal spray, 1 spray into each nostril in the morning. (Patient not taking: Reported on 6/5/2023), Disp: 15.8 mL, Rfl: 0    losartan (COZAAR) 100 MG tablet, Take 1 tablet (100 mg total) by mouth daily (Patient not taking: Reported on 10/16/2023), Disp: 90 tablet, Rfl: 3    magnesium Oxide (MAG-OX) 400 mg TABS, Take 400 mg by mouth in the morning and 400 mg in the evening.  (Patient not taking: Reported on 6/5/2023), Disp: , Rfl:     neomycin-polymyxin-dexamethasone (MAXITROL) 0.35%-10,000 units/g-0.1%, APPLY 0.25 INCH INTO AFFECTED EYE AT BEDTIME X 2 WEEKS THEN D/C (Patient not taking: Reported on 6/5/2023), Disp: , Rfl:     Restasis 0.05 % ophthalmic emulsion, INSTILL 1 DROP BOTH EYES TWICE A DAY (Patient not taking: Reported on 6/5/2023), Disp: , Rfl:     The following portions of the patient's history were reviewed and updated as appropriate: allergies, current medications, past family history, past medical history, past social history, past surgical history and problem list.    Review of Systems   Constitutional:  Negative for fever. HENT:  Positive for postnasal drip and rhinorrhea. Respiratory:  Positive for cough. Negative for shortness of breath and wheezing. Neurological:  Positive for headaches. Objective:    /62   Pulse 105   Temp 98.8 °F (37.1 °C)   Ht 5' (1.524 m)   Wt 78.5 kg (173 lb)   SpO2 97%   BMI 33.79 kg/m²      Physical Exam  Constitutional:       Appearance: Normal appearance. Cardiovascular:      Heart sounds: Normal heart sounds. Pulmonary:      Breath sounds: Rhonchi (Basal) present. No results found for this or any previous visit (from the past 1008 hour(s)). Assessment/Plan:           Problem List Items Addressed This Visit          Digestive    Gastroesophageal reflux disease without esophagitis    Relevant Medications    omeprazole (PriLOSEC) 20 mg delayed release capsule       Respiratory    Bronchitis - Primary     Patient started on oral antibiotic and cough syrup. Patient declines inhaler. We will follow-up if symptoms do not improve.          Relevant Medications    benzonatate (TESSALON PERLES) 100 mg capsule    amoxicillin-clavulanate (AUGMENTIN) 875-125 mg per tablet    promethazine-dextromethorphan (PHENERGAN-DM) 6.25-15 mg/5 mL oral syrup       Other    Hyperlipidemia    Relevant Medications    atorvastatin (LIPITOR) 20 mg tablet     Other Visit Diagnoses       Ganglion cyst of finger        Relevant Medications    acetaminophen (TYLENOL) 500 mg tablet    Sore throat (viral)        Relevant Medications    benzonatate (TESSALON PERLES) 100 mg capsule    COVID-19        Relevant Medications    benzonatate (TESSALON PERLES) 100 mg capsule    promethazine-dextromethorphan (PHENERGAN-DM) 6.25-15 mg/5 mL oral syrup    Cough        Relevant Medications    promethazine-dextromethorphan (PHENERGAN-DM) 6.25-15 mg/5 mL oral syrup         Patient requesting medication refills today. Is due for follow-up with labs.

## 2023-11-01 DIAGNOSIS — M79.672 PAIN IN BOTH FEET: ICD-10-CM

## 2023-11-01 DIAGNOSIS — M79.671 PAIN IN BOTH FEET: ICD-10-CM

## 2023-12-05 DIAGNOSIS — I12.9 PARENCHYMAL RENAL HYPERTENSION, STAGE 1 THROUGH STAGE 4 OR UNSPECIFIED CHRONIC KIDNEY DISEASE: ICD-10-CM

## 2023-12-05 DIAGNOSIS — N18.31 STAGE 3A CHRONIC KIDNEY DISEASE (HCC): ICD-10-CM

## 2023-12-05 RX ORDER — LOSARTAN POTASSIUM 50 MG/1
50 TABLET ORAL DAILY
Qty: 90 TABLET | Refills: 3 | Status: SHIPPED | OUTPATIENT
Start: 2023-12-05

## 2023-12-18 DIAGNOSIS — M79.672 PAIN IN BOTH FEET: ICD-10-CM

## 2023-12-18 DIAGNOSIS — L85.3 XEROSIS CUTIS: ICD-10-CM

## 2023-12-18 DIAGNOSIS — M79.671 PAIN IN BOTH FEET: ICD-10-CM

## 2023-12-18 RX ORDER — AMMONIUM LACTATE 12 G/100G
LOTION TOPICAL 2 TIMES DAILY PRN
Qty: 400 G | Refills: 1 | Status: SHIPPED | OUTPATIENT
Start: 2023-12-18

## 2023-12-19 ENCOUNTER — APPOINTMENT (OUTPATIENT)
Dept: LAB | Facility: HOSPITAL | Age: 68
End: 2023-12-19
Payer: MEDICARE

## 2023-12-19 DIAGNOSIS — E78.5 HYPERLIPIDEMIA, UNSPECIFIED HYPERLIPIDEMIA TYPE: ICD-10-CM

## 2023-12-19 DIAGNOSIS — R73.03 PREDIABETES: ICD-10-CM

## 2023-12-19 LAB
ALBUMIN SERPL BCP-MCNC: 4.1 G/DL (ref 3.5–5)
ALP SERPL-CCNC: 76 U/L (ref 34–104)
ALT SERPL W P-5'-P-CCNC: 25 U/L (ref 7–52)
ANION GAP SERPL CALCULATED.3IONS-SCNC: 10 MMOL/L
AST SERPL W P-5'-P-CCNC: 20 U/L (ref 13–39)
BILIRUB SERPL-MCNC: 0.48 MG/DL (ref 0.2–1)
BUN SERPL-MCNC: 17 MG/DL (ref 5–25)
CALCIUM SERPL-MCNC: 9.1 MG/DL (ref 8.4–10.2)
CHLORIDE SERPL-SCNC: 102 MMOL/L (ref 96–108)
CHOLEST SERPL-MCNC: 153 MG/DL
CO2 SERPL-SCNC: 24 MMOL/L (ref 21–32)
CREAT SERPL-MCNC: 0.9 MG/DL (ref 0.6–1.3)
EST. AVERAGE GLUCOSE BLD GHB EST-MCNC: 140 MG/DL
GFR SERPL CREATININE-BSD FRML MDRD: 64 ML/MIN/1.73SQ M
GLUCOSE P FAST SERPL-MCNC: 100 MG/DL (ref 65–99)
HBA1C MFR BLD: 6.5 %
HDLC SERPL-MCNC: 38 MG/DL
LDLC SERPL CALC-MCNC: 79 MG/DL (ref 0–100)
NONHDLC SERPL-MCNC: 115 MG/DL
POTASSIUM SERPL-SCNC: 4.3 MMOL/L (ref 3.5–5.3)
PROT SERPL-MCNC: 7.5 G/DL (ref 6.4–8.4)
SODIUM SERPL-SCNC: 136 MMOL/L (ref 135–147)
TRIGL SERPL-MCNC: 180 MG/DL

## 2023-12-19 PROCEDURE — 36415 COLL VENOUS BLD VENIPUNCTURE: CPT

## 2023-12-19 PROCEDURE — 80053 COMPREHEN METABOLIC PANEL: CPT

## 2023-12-19 PROCEDURE — 80061 LIPID PANEL: CPT

## 2023-12-19 PROCEDURE — 83036 HEMOGLOBIN GLYCOSYLATED A1C: CPT

## 2023-12-26 NOTE — TELEPHONE ENCOUNTER
Pt's family member called in regards to medications Diclofenac Sodium (VOLTAREN) 1 % and ammonium lactate (LAC-HYDRIN) 12 % lotion. Pt's family member wanted to refill medications. Informed family member that they were sent to pharmacy with refills.

## 2024-02-02 ENCOUNTER — TELEPHONE (OUTPATIENT)
Age: 69
End: 2024-02-02

## 2024-02-02 NOTE — TELEPHONE ENCOUNTER
Caller: Crystal daughter     Doctor: Dr Quiroz    Reason for call: The daughter called in to see if the pt can get a script for massage therapy within [facility]. Please advise    Call back#: [phone]

## 2024-02-02 NOTE — TELEPHONE ENCOUNTER
Unfortunately, there is no massage therapy that is done within [facility].  Massage therapy is an out-of-pocket cost

## 2024-02-06 NOTE — TELEPHONE ENCOUNTER
Pt's daughter informed of the same as per message from Julieta.  Daughter is going to call their Ins Co and ask if anything similar is covered by the Insurance and she will c/b if Ins Co can provide her specific details af something that is covered.

## 2024-02-07 PROBLEM — E11.22 TYPE 2 DIABETES MELLITUS WITH CHRONIC KIDNEY DISEASE, WITHOUT LONG-TERM CURRENT USE OF INSULIN (HCC): Status: ACTIVE | Noted: 2024-02-07

## 2024-03-06 DIAGNOSIS — E55.9 VITAMIN D DEFICIENCY: ICD-10-CM

## 2024-03-06 RX ORDER — CHOLECALCIFEROL (VITAMIN D3) 125 MCG
CAPSULE ORAL
Qty: 90 TABLET | Refills: 1 | Status: SHIPPED | OUTPATIENT
Start: 2024-03-06

## 2024-03-12 DIAGNOSIS — E78.5 HYPERLIPIDEMIA, UNSPECIFIED HYPERLIPIDEMIA TYPE: ICD-10-CM

## 2024-03-12 DIAGNOSIS — K21.9 GASTROESOPHAGEAL REFLUX DISEASE WITHOUT ESOPHAGITIS: ICD-10-CM

## 2024-03-12 RX ORDER — ATORVASTATIN CALCIUM 20 MG/1
20 TABLET, FILM COATED ORAL DAILY
Qty: 90 TABLET | Refills: 1 | Status: SHIPPED | OUTPATIENT
Start: 2024-03-12

## 2024-03-12 RX ORDER — OMEPRAZOLE 20 MG/1
20 CAPSULE, DELAYED RELEASE ORAL DAILY
Qty: 90 CAPSULE | Refills: 1 | Status: SHIPPED | OUTPATIENT
Start: 2024-03-12

## 2024-03-21 PROBLEM — N18.31 CHRONIC KIDNEY DISEASE, STAGE 3A (HCC): Status: ACTIVE | Noted: 2022-01-17

## 2024-03-21 PROBLEM — I12.9 HYPERTENSIVE KIDNEY DISEASE WITH CHRONIC KIDNEY DISEASE: Status: ACTIVE | Noted: 2024-03-21

## 2024-03-21 PROBLEM — N18.31 CHRONIC KIDNEY DISEASE, STAGE 3A (HCC): Status: ACTIVE | Noted: 2024-03-21

## 2024-03-25 ENCOUNTER — OFFICE VISIT (OUTPATIENT)
Dept: OBGYN CLINIC | Facility: CLINIC | Age: 69
End: 2024-03-25
Payer: MEDICARE

## 2024-03-25 VITALS — HEIGHT: 60 IN | HEART RATE: 76 BPM | WEIGHT: 176 LBS | BODY MASS INDEX: 34.55 KG/M2 | OXYGEN SATURATION: 98 %

## 2024-03-25 DIAGNOSIS — M67.449 GANGLION CYST OF FINGER: Primary | ICD-10-CM

## 2024-03-25 DIAGNOSIS — Z01.818 PREOP TESTING: ICD-10-CM

## 2024-03-25 PROCEDURE — 99214 OFFICE O/P EST MOD 30 MIN: CPT | Performed by: STUDENT IN AN ORGANIZED HEALTH CARE EDUCATION/TRAINING PROGRAM

## 2024-03-25 RX ORDER — CHLORHEXIDINE GLUCONATE ORAL RINSE 1.2 MG/ML
15 SOLUTION DENTAL ONCE
OUTPATIENT
Start: 2024-03-25 | End: 2024-03-25

## 2024-03-25 NOTE — PROGRESS NOTES
ORTHOPAEDIC HAND, WRIST, AND ELBOW OFFICE  VISIT      ASSESSMENT/PLAN:      Diagnoses and all orders for this visit:    Ganglion cyst of finger  -     Case request operating room: EXCISION RIGHT RING FINGER GANGLION CYST; Standing  -     Case request operating room: EXCISION RIGHT RING FINGER GANGLION CYST    Preop testing  -     CBC and differential; Future  -     Basic metabolic panel; Future  -     EKG 12 lead; Future    Other orders  -     Diet NPO; Sips with meds; Standing  -     Nursing Communication Warmimg Interventions Implemented; Standing  -     Nursing Communication CHG bath, have staff wash entire body (neck down) per pre-op bathing protocol. Routine, evening prior to, and day of surgery.; Standing  -     Nursing Communication Swab both nares with Povidone-Iodine solution, EXCLUDE if patient has shellfish/Iodine allergy. Routine, day of surgery, on call to OR; Standing  -     chlorhexidine (PERIDEX) 0.12 % oral rinse 15 mL  -     Void on call to OR; Standing  -     Insert peripheral IV; Standing  -     ceFAZolin (ANCEF) 2,000 mg in dextrose 5 % 100 mL IVPB          68 y.o. female with right ring finger ganglion cyst  Treatment options and expected outcomes were discussed. At this point, options would include either living with this or surgical excision.   The patient verbalized understanding of exam findings and treatment plan.   The patient was given the opportunity to ask questions.  Questions were answered to the patient's satisfaction.  The patient decided to move forward with surgical excision. Details of surgery and typical postoperative recovery discussed.  Official translation used for consent.      Follow Up:  After surgery       To Do Next Visit:  Re-evaluation of current issue      Discussions:  Ganglion Cyst Excision: The anatomy and physiology of the ganglion was discussed with the patient today in the office.  Fluid leaking out of the joint surface typically creates a small sac, which can  enlarge and cause pain or limitation of motion.  Treatment options include observation, aspiration, or surgical incision were discussed with the patient today.  Observation typically lead to resolution and approximately 10% of patients, aspiration least resolution approximately 50% of patients, and surgical excision lead to resolution in approximately 97% of patients.  After discussion with the patient today, the patient voiced understanding of all treatment options.The patient has elected excision of the ganglion.The risks and benefits of the procedure were explained to the patient, which include, but are not limited to: Bleeding, infection, recurrence, pain, scar, damage to tendons, damage to nerves, and damage to blood vessels, failure to give desired results and complications related to anesthesia.  These risks, along with alternative conservative treatment options, and postoperative protocols were voiced back and understood by the patient.  All questions were answered to the patient's satisfaction.  The patient agrees to comply with a standard postoperative protocol, and is willing to proceed.  Education was provided via written and auditory forms.  There were no barriers to learning. Written handouts regarding wound care, incision and scar care, and general preoperative information was provided to the patient.  Prior to surgery, the patient may be requested to stop all anti-inflammatory medications.  Prophylactic aspirin, Plavix, and Coumadin may be allowed to be continued.  Medications including vitamin E., ginkgo, and fish oil are requested to be stopped approximately one week prior to surgery.  Hypertensive medications and beta blockers, if taken, should be continued.      Cliff Masterson MD  Attending, Orthopaedic Surgery  Hand, Wrist, and Elbow Surgery  [facility]    ______________________________________________________________________________________________    CHIEF  COMPLAINT:  Chief Complaint   Patient presents with   • Right Ring Finger - Pain       SUBJECTIVE:  Patient is a 68 y.o. RHD female who presents today for follow up of right ring finger cyst. Aspiration was attempted at last visit but unsuccessful. Steroid injx provided but this only seemed to help for 1-2 days. Pt describes continued pain along the mass as well as the volar fingertip.     I have personally reviewed all the relevant PMH, PSH, SH, FH, Medications and allergies      PAST MEDICAL HISTORY:  Past Medical History:   Diagnosis Date   • Hyperlipidemia    • Hypertension        PAST SURGICAL HISTORY:  History reviewed. No pertinent surgical history.    FAMILY HISTORY:  Family History   Problem Relation Age of Onset   • No Known Problems Mother    • No Known Problems Father        SOCIAL HISTORY:  Social History     Tobacco Use   • Smoking status: Never   • Smokeless tobacco: Never   Substance Use Topics   • Alcohol use: Never   • Drug use: Never       MEDICATIONS:    Current Outpatient Medications:   •  acetaminophen (TYLENOL) 500 mg tablet, Take 1 tablet (500 mg total) by mouth every 6 (six) hours as needed for mild pain or moderate pain, Disp: 30 tablet, Rfl: 1  •  ammonium lactate (LAC-HYDRIN) 12 % lotion, APPLY TOPICALLY 2 (TWO) TIMES A DAY AS NEEDED FOR DRY SKIN, Disp: 400 g, Rfl: 1  •  atorvastatin (LIPITOR) 20 mg tablet, TAKE 1 TABLET (20 MG TOTAL) BY MOUTH DAILY, Disp: 90 tablet, Rfl: 1  •  benzonatate (TESSALON PERLES) 100 mg capsule, Take 1 capsule (100 mg total) by mouth 3 (three) times a day as needed for cough, Disp: 90 capsule, Rfl: 1  •  celecoxib (CeleBREX) 200 mg capsule, TAKE 1 CAPSULE (200 MG TOTAL) BY MOUTH DAILY, Disp: 30 capsule, Rfl: 2  •  Diclofenac Sodium (VOLTAREN) 1 %, APPLY 2 G TOPICALLY 2 (TWO) TIMES A DAY, Disp: 100 g, Rfl: 5  •  DULoxetine (CYMBALTA) 20 mg capsule, TAKE 1 CAPSULE (20 MG TOTAL) BY MOUTH DAILY, Disp: 90 capsule, Rfl: 2  •  Eyelid Cleansers (OcuSoft Eyelid  Cleansing) PADS, APPLY AT BEDTIME AS DIRECTED SCRUB WITH PAD VS. FOAM ALONG CLOSED EYELIDS NIGHTLY PER DIRECTIONS. OCUSOFT OR SIMILAR BRAND (WIPES VS. FOAM), Disp: , Rfl:   •  fluticasone (FLONASE) 50 mcg/act nasal spray, 1 spray into each nostril in the morning. (Patient not taking: Reported on 6/5/2023), Disp: 15.8 mL, Rfl: 0  •  ibuprofen (MOTRIN) 200 mg tablet, Take 1 tablet (200 mg total) by mouth every 6 (six) hours as needed for mild pain or moderate pain, Disp: 30 tablet, Rfl: 1  •  losartan (COZAAR) 100 MG tablet, Take 1 tablet (100 mg total) by mouth daily (Patient not taking: Reported on 10/16/2023), Disp: 90 tablet, Rfl: 3  •  losartan (COZAAR) 50 mg tablet, TAKE 1 TABLET (50 MG TOTAL) BY MOUTH DAILY, Disp: 90 tablet, Rfl: 3  •  magnesium Oxide (MAG-OX) 400 mg TABS, Take 400 mg by mouth in the morning and 400 mg in the evening. (Patient not taking: Reported on 6/5/2023), Disp: , Rfl:   •  methocarbamol (ROBAXIN) 500 mg tablet, TAKE 1 TABLET (500 MG TOTAL) BY MOUTH 2 (TWO) TIMES A DAY AS NEEDED FOR MUSCLE SPASMS, Disp: 60 tablet, Rfl: 5  •  neomycin-polymyxin-dexamethasone (MAXITROL) 0.35%-10,000 units/g-0.1%, APPLY 0.25 INCH INTO AFFECTED EYE AT BEDTIME X 2 WEEKS THEN D/C (Patient not taking: Reported on 6/5/2023), Disp: , Rfl:   •  omeprazole (PriLOSEC) 20 mg delayed release capsule, TAKE 1 CAPSULE (20 MG TOTAL) BY MOUTH DAILY, Disp: 90 capsule, Rfl: 1  •  promethazine-dextromethorphan (PHENERGAN-DM) 6.25-15 mg/5 mL oral syrup, Take 5 mL by mouth 4 (four) times a day as needed for cough, Disp: 240 mL, Rfl: 1  •  Restasis 0.05 % ophthalmic emulsion, INSTILL 1 DROP BOTH EYES TWICE A DAY (Patient not taking: Reported on 6/5/2023), Disp: , Rfl:   •  spironolactone (ALDACTONE) 25 mg tablet, TAKE 1 TABLET (25 MG TOTAL) BY MOUTH DAILY, Disp: 90 tablet, Rfl: 3  •  torsemide (DEMADEX) 5 MG tablet, TAKE 1 TABLET (5 MG TOTAL) BY MOUTH DAILY, Disp: 90 tablet, Rfl: 3  •  Vitamin D3 50 MCG (2000 UT) TABS, TAKE 1  TABLET (2,000 UNITS TOTAL) BY MOUTH DAILY, Disp: 90 tablet, Rfl: 1    ALLERGIES:  No Known Allergies        REVIEW OF SYSTEMS:  Musculoskeletal:        As noted in HPI.   All other systems reviewed and are negative.    VITALS:  Vitals:    03/25/24 1123   Pulse: 76   SpO2: 98%       LABS:  HgA1c:   Lab Results   Component Value Date    HGBA1C 6.5 (H) 12/19/2023     BMP:   Lab Results   Component Value Date    CALCIUM 9.1 12/19/2023    K 4.3 12/19/2023    CO2 24 12/19/2023     12/19/2023    BUN 17 12/19/2023    CREATININE 0.90 12/19/2023       _____________________________________________________  PHYSICAL EXAMINATION:  General: Well developed and well nourished, alert & oriented x 3, appears comfortable  Psychiatric: Normal  HEENT: Normocephalic, Atraumatic Trachea Midline, No torticollis  Pulmonary: No audible wheezing or respiratory distress   Abdomen/GI: Non tender, non distended   Cardiovascular: No pitting edema, 2+ radial pulse   Skin: No Erythema, No Lacerations, No Fluctuation, No Ulcerations  Neurovascular: Sensation Intact to the Median, Ulnar, Radial Nerve, Motor Intact to the Median, Ulnar, Radial Nerve, and Pulses Intact  Musculoskeletal: Normal, except as noted in detailed exam and in HPI.      MUSCULOSKELETAL EXAMINATION:  Right Ring Finger:  Pt with mobile mass along radial volar aspect of digit, near proximal phalanx.   No edema, erythema, ecchymosis.  + Tinel's along mass.  FROM of the finger.  Brisk capillary refill.   2 point is 5mm on both sides of finger.   ___________________________________________________  STUDIES REVIEWED:  No new studies to review . Previous US confirmed presence of ganglion cyst in area of mass.         PROCEDURES PERFORMED:  Procedures  No Procedures performed today    _____________________________________________________      Scribe Attestation    I,:  Jaqueline Steinberg PA-C am acting as a scribe while in the presence of the attending physician.:       I,:  Cliff  MD Aleja personally performed the services described in this documentation    as scribed in my presence.:

## 2024-03-26 DIAGNOSIS — E11.42 TYPE 2 DIABETES MELLITUS WITH DIABETIC POLYNEUROPATHY, WITHOUT LONG-TERM CURRENT USE OF INSULIN (HCC): ICD-10-CM

## 2024-03-26 DIAGNOSIS — G57.93 NEUROPATHIC PAIN OF BOTH FEET: ICD-10-CM

## 2024-03-26 RX ORDER — DULOXETIN HYDROCHLORIDE 20 MG/1
20 CAPSULE, DELAYED RELEASE ORAL DAILY
Qty: 90 CAPSULE | Refills: 0 | Status: SHIPPED | OUTPATIENT
Start: 2024-03-26

## 2024-03-28 DIAGNOSIS — L85.3 XEROSIS CUTIS: ICD-10-CM

## 2024-03-29 RX ORDER — AMMONIUM LACTATE 12 G/100G
LOTION TOPICAL 2 TIMES DAILY PRN
Qty: 400 G | Refills: 1 | Status: SHIPPED | OUTPATIENT
Start: 2024-03-29

## 2024-04-17 DIAGNOSIS — M67.449 GANGLION CYST OF FINGER: ICD-10-CM

## 2024-04-17 RX ORDER — ACETAMINOPHEN 500 MG
500 TABLET ORAL EVERY 6 HOURS PRN
Qty: 30 TABLET | Refills: 1 | Status: SHIPPED | OUTPATIENT
Start: 2024-04-17

## 2024-04-18 DIAGNOSIS — E78.5 HYPERLIPIDEMIA, UNSPECIFIED HYPERLIPIDEMIA TYPE: Primary | ICD-10-CM

## 2024-04-18 DIAGNOSIS — R73.03 PREDIABETES: ICD-10-CM

## 2024-04-22 DIAGNOSIS — M79.671 PAIN IN BOTH FEET: ICD-10-CM

## 2024-04-22 DIAGNOSIS — M79.672 PAIN IN BOTH FEET: ICD-10-CM

## 2024-04-29 ENCOUNTER — OFFICE VISIT (OUTPATIENT)
Dept: PAIN MEDICINE | Facility: CLINIC | Age: 69
End: 2024-04-29
Payer: MEDICARE

## 2024-04-29 VITALS
SYSTOLIC BLOOD PRESSURE: 124 MMHG | HEART RATE: 86 BPM | BODY MASS INDEX: 34.37 KG/M2 | DIASTOLIC BLOOD PRESSURE: 78 MMHG | WEIGHT: 176 LBS

## 2024-04-29 DIAGNOSIS — M51.16 INTERVERTEBRAL DISC DISORDER WITH RADICULOPATHY OF LUMBAR REGION: Primary | ICD-10-CM

## 2024-04-29 PROCEDURE — G2211 COMPLEX E/M VISIT ADD ON: HCPCS | Performed by: ANESTHESIOLOGY

## 2024-04-29 PROCEDURE — 99214 OFFICE O/P EST MOD 30 MIN: CPT | Performed by: ANESTHESIOLOGY

## 2024-04-29 NOTE — PROGRESS NOTES
Assessment:  1. Intervertebral disc disorder with radiculopathy of lumbar region      Plan:  The patient is experiencing recurrent pain in the lower back traveling down both legs at this time I discussed repeating the bilateral L5 transforaminal epidural steroid injection which helped her previously.  She would like to proceed will be scheduled under fluoroscopic guidance.    Complete risks and benefits including bleeding, infection, tissue reaction, nerve injury and allergic reaction were discussed. The approach was demonstrated using models and literature was provided. Verbal and written consent was obtained.    My impressions and treatment recommendations were discussed in detail with the patient who verbalized understanding and had no further questions.  Discharge instructions were provided. I personally saw and examined the patient and I agree with the above discussed plan of care.    Orders Placed This Encounter   Procedures   • FL spine and pain procedure     Standing Status:   Future     Standing Expiration Date:   4/29/2028     Order Specific Question:   Reason for Exam:     Answer:   Bilateral L5 TF GISELLE     Order Specific Question:   Anticoagulant hold needed?     Answer:   No     No orders of the defined types were placed in this encounter.      History of Present Illness:  Lela Gomez is a 68 y.o. female who presents for a follow up office visit in regards to low back pain into the legs.   The patient has a history of lumbar spinal stenosis who returns for follow-up.  She was last seen in March 2023 at which time she underwent bilateral L5 transforaminal epidural steroid injection.  She reports that this was helpful but symptoms have recurred.  She did try acupuncture but that was not helpful.  Pain is moderate to severe rated 8/10 on a numeric rating scale.    I have personally reviewed and/or updated the patient's past medical history, past surgical history, family history, social history, current  medications, allergies, and vital signs today.     Review of Systems   Respiratory:  Negative for shortness of breath.    Cardiovascular:  Negative for chest pain.   Gastrointestinal:  Negative for constipation, diarrhea, nausea and vomiting.   Musculoskeletal:  Positive for back pain. Negative for arthralgias, gait problem, joint swelling and myalgias.   Skin:  Negative for rash.   Neurological:  Negative for dizziness, seizures and weakness.   All other systems reviewed and are negative.      Patient Active Problem List   Diagnosis   • Hyperlipidemia   • Chronic bilateral low back pain without sciatica   • Gastroesophageal reflux disease without esophagitis   • Essential hypertension   • BMI 34.0-34.9,adult   • Chronic pain of both knees   • Obesity, morbid (HCC)   • At risk for falls   • Balance disorder   • Primary osteoarthritis of both knees   • Exposure to COVID-19 virus   • Parenchymal renal hypertension   • Prediabetes   • Lymphedema   • Venous insufficiency   • Menopause   • Lumbar radiculopathy   • Need for vaccination   • Neuropathic pain of both feet   • Type 2 diabetes mellitus with diabetic polyneuropathy, without long-term current use of insulin (HCC)   • Bronchitis   • Type 2 diabetes mellitus with chronic kidney disease, without long-term current use of insulin (HCC)   • Chronic kidney disease, stage 3a (HCC)   • Hypertensive kidney disease with chronic kidney disease       Past Medical History:   Diagnosis Date   • Hyperlipidemia    • Hypertension        No past surgical history on file.    Family History   Problem Relation Age of Onset   • No Known Problems Mother    • No Known Problems Father        Social History     Occupational History   • Occupation: retired   Tobacco Use   • Smoking status: Never   • Smokeless tobacco: Never   Substance and Sexual Activity   • Alcohol use: Never   • Drug use: Never   • Sexual activity: Not on file       Current Outpatient Medications on File Prior to Visit    Medication Sig   • acetaminophen (TYLENOL) 500 mg tablet TAKE 1 TABLET (500 MG TOTAL) BY MOUTH EVERY 6 (SIX) HOURS AS NEEDED FOR MILD PAIN OR MODERATE PAIN   • ammonium lactate (LAC-HYDRIN) 12 % lotion APPLY TOPICALLY 2 (TWO) TIMES A DAY AS NEEDED FOR DRY SKIN   • atorvastatin (LIPITOR) 20 mg tablet TAKE 1 TABLET (20 MG TOTAL) BY MOUTH DAILY   • benzonatate (TESSALON PERLES) 100 mg capsule Take 1 capsule (100 mg total) by mouth 3 (three) times a day as needed for cough   • celecoxib (CeleBREX) 200 mg capsule TAKE 1 CAPSULE (200 MG TOTAL) BY MOUTH DAILY   • Diclofenac Sodium (VOLTAREN) 1 % APPLY 2 G TOPICALLY 2 (TWO) TIMES A DAY   • DULoxetine (CYMBALTA) 20 mg capsule TAKE 1 CAPSULE (20 MG TOTAL) BY MOUTH DAILY   • Eyelid Cleansers (OcuSoft Eyelid Cleansing) PADS APPLY AT BEDTIME AS DIRECTED SCRUB WITH PAD VS. FOAM ALONG CLOSED EYELIDS NIGHTLY PER DIRECTIONS. OCUSOFT OR SIMILAR BRAND (WIPES VS. FOAM)   • fluticasone (FLONASE) 50 mcg/act nasal spray 1 spray into each nostril in the morning. (Patient not taking: Reported on 6/5/2023)   • ibuprofen (MOTRIN) 200 mg tablet Take 1 tablet (200 mg total) by mouth every 6 (six) hours as needed for mild pain or moderate pain   • losartan (COZAAR) 100 MG tablet Take 1 tablet (100 mg total) by mouth daily (Patient not taking: Reported on 10/16/2023)   • losartan (COZAAR) 50 mg tablet TAKE 1 TABLET (50 MG TOTAL) BY MOUTH DAILY   • magnesium Oxide (MAG-OX) 400 mg TABS Take 400 mg by mouth in the morning and 400 mg in the evening. (Patient not taking: Reported on 6/5/2023)   • methocarbamol (ROBAXIN) 500 mg tablet TAKE 1 TABLET (500 MG TOTAL) BY MOUTH 2 (TWO) TIMES A DAY AS NEEDED FOR MUSCLE SPASMS   • neomycin-polymyxin-dexamethasone (MAXITROL) 0.35%-10,000 units/g-0.1% APPLY 0.25 INCH INTO AFFECTED EYE AT BEDTIME X 2 WEEKS THEN D/C (Patient not taking: Reported on 6/5/2023)   • omeprazole (PriLOSEC) 20 mg delayed release capsule TAKE 1 CAPSULE (20 MG TOTAL) BY MOUTH DAILY   •  promethazine-dextromethorphan (PHENERGAN-DM) 6.25-15 mg/5 mL oral syrup Take 5 mL by mouth 4 (four) times a day as needed for cough   • Restasis 0.05 % ophthalmic emulsion INSTILL 1 DROP BOTH EYES TWICE A DAY (Patient not taking: Reported on 6/5/2023)   • spironolactone (ALDACTONE) 25 mg tablet TAKE 1 TABLET (25 MG TOTAL) BY MOUTH DAILY   • torsemide (DEMADEX) 5 MG tablet TAKE 1 TABLET (5 MG TOTAL) BY MOUTH DAILY   • Vitamin D3 50 MCG (2000 UT) TABS TAKE 1 TABLET (2,000 UNITS TOTAL) BY MOUTH DAILY     No current facility-administered medications on file prior to visit.       No Known Allergies    Physical Exam:    /78   Pulse 86   Wt 79.8 kg (176 lb)   BMI 34.37 kg/m²     Constitutional:normal, well developed, well nourished, alert, in no distress and non-toxic and no overt pain behavior.  Eyes:anicteric  HEENT:grossly intact  Neck:supple, symmetric, trachea midline and no masses   Pulmonary:even and unlabored  Cardiovascular:No edema or pitting edema present  Skin:Normal without rashes or lesions and well hydrated  Psychiatric:Mood and affect appropriate  Neurologic:Cranial Nerves II-XII grossly intact  Musculoskeletal:normal    Lumbar Spine Exam  Appearance:  Normal lordosis  Palpation/Tenderness:  left lumbar paraspinal tenderness  right lumbar paraspinal tenderness  Range of Motion:  Flexion:  Minimally limited  with pain  Extension:  Minimally limited  with pain  Motor Strength:  Left hip flexion:  5/5  Left hip extension:  5/5  Right hip flexion:  5/5  Right hip extension:  5/5  Left knee flexion:  5/5  Left knee extension:  5/5  Right knee flexion:  5/5  Right knee extension:  5/5  Left foot dorsiflexion:  5/5  Left foot plantar flexion:  5/5  Right foot dorsiflexion:  5/5  Right foot plantar flexion:  5/5

## 2024-04-30 ENCOUNTER — ANESTHESIA (OUTPATIENT)
Dept: ANESTHESIOLOGY | Facility: HOSPITAL | Age: 69
End: 2024-04-30

## 2024-04-30 ENCOUNTER — ANESTHESIA EVENT (OUTPATIENT)
Dept: ANESTHESIOLOGY | Facility: HOSPITAL | Age: 69
End: 2024-04-30

## 2024-05-06 DIAGNOSIS — E78.49 OTHER HYPERLIPIDEMIA: ICD-10-CM

## 2024-05-06 DIAGNOSIS — N18.31 STAGE 3A CHRONIC KIDNEY DISEASE (HCC): ICD-10-CM

## 2024-05-06 DIAGNOSIS — I12.9 PARENCHYMAL RENAL HYPERTENSION, STAGE 1 THROUGH STAGE 4 OR UNSPECIFIED CHRONIC KIDNEY DISEASE: ICD-10-CM

## 2024-05-06 DIAGNOSIS — N18.9 CKD (CHRONIC KIDNEY DISEASE): ICD-10-CM

## 2024-05-06 DIAGNOSIS — E87.6 HYPOKALEMIA: ICD-10-CM

## 2024-05-06 DIAGNOSIS — R60.0 PEDAL EDEMA: ICD-10-CM

## 2024-05-07 RX ORDER — SPIRONOLACTONE 25 MG/1
25 TABLET ORAL DAILY
Qty: 90 TABLET | Refills: 1 | Status: SHIPPED | OUTPATIENT
Start: 2024-05-07

## 2024-05-07 RX ORDER — TORSEMIDE 5 MG/1
5 TABLET ORAL DAILY
Qty: 90 TABLET | Refills: 1 | Status: SHIPPED | OUTPATIENT
Start: 2024-05-07

## 2024-05-09 DIAGNOSIS — M79.672 PAIN IN BOTH FEET: ICD-10-CM

## 2024-05-09 DIAGNOSIS — M79.671 PAIN IN BOTH FEET: ICD-10-CM

## 2024-05-09 DIAGNOSIS — L85.3 XEROSIS CUTIS: ICD-10-CM

## 2024-05-09 RX ORDER — AMMONIUM LACTATE 12 G/100G
LOTION TOPICAL 2 TIMES DAILY PRN
Qty: 400 G | Refills: 1 | Status: SHIPPED | OUTPATIENT
Start: 2024-05-09

## 2024-05-13 ENCOUNTER — OFFICE VISIT (OUTPATIENT)
Dept: OBGYN CLINIC | Facility: CLINIC | Age: 69
End: 2024-05-13
Payer: MEDICARE

## 2024-05-13 VITALS
BODY MASS INDEX: 34.55 KG/M2 | WEIGHT: 176 LBS | DIASTOLIC BLOOD PRESSURE: 87 MMHG | SYSTOLIC BLOOD PRESSURE: 135 MMHG | HEIGHT: 60 IN | HEART RATE: 73 BPM

## 2024-05-13 DIAGNOSIS — M17.0 PRIMARY OSTEOARTHRITIS OF BOTH KNEES: Primary | ICD-10-CM

## 2024-05-13 DIAGNOSIS — E66.01 OBESITY, MORBID (HCC): ICD-10-CM

## 2024-05-13 PROCEDURE — 20610 DRAIN/INJ JOINT/BURSA W/O US: CPT | Performed by: PHYSICAL MEDICINE & REHABILITATION

## 2024-05-13 PROCEDURE — 99213 OFFICE O/P EST LOW 20 MIN: CPT | Performed by: PHYSICAL MEDICINE & REHABILITATION

## 2024-05-13 RX ORDER — ROPIVACAINE HYDROCHLORIDE 5 MG/ML
8 INJECTION, SOLUTION EPIDURAL; INFILTRATION; PERINEURAL
Status: COMPLETED | OUTPATIENT
Start: 2024-05-13 | End: 2024-05-13

## 2024-05-13 RX ORDER — TRIAMCINOLONE ACETONIDE 40 MG/ML
80 INJECTION, SUSPENSION INTRA-ARTICULAR; INTRAMUSCULAR
Status: COMPLETED | OUTPATIENT
Start: 2024-05-13 | End: 2024-05-13

## 2024-05-13 RX ADMIN — TRIAMCINOLONE ACETONIDE 80 MG: 40 INJECTION, SUSPENSION INTRA-ARTICULAR; INTRAMUSCULAR at 10:30

## 2024-05-13 RX ADMIN — ROPIVACAINE HYDROCHLORIDE 8 ML: 5 INJECTION, SOLUTION EPIDURAL; INFILTRATION; PERINEURAL at 10:30

## 2024-05-13 NOTE — PROGRESS NOTES
1. Primary osteoarthritis of both knees  Large joint arthrocentesis: bilateral knee    Injection procedure prior authorization      2. Obesity, morbid (HCC)          Orders Placed This Encounter   Procedures    Large joint arthrocentesis: bilateral knee    Injection procedure prior authorization        Impression:  Patient presents in follow-up of chronic bilateral knee pain with right worse than left, secondary to osteoarthritis (R- medial joint L- lateral joint).  She was last seen 6/2022.  Treatment has included right knee steroid injection. She is using a right knee medial  brace.  Today, we decided to see with bilateral knee steroid injections.  She would benefit from physical therapy for which I have placed another order. She should not be taking any NSAIDs due to CKD.     We will obtain authorization for viscosupplementation.      At this juncture, the patient has failed over 3 months of conservative treatment that has included intraarticular steroid injection, home exercises, activity modification, over the counter oral and topical pain medications.  The pain is interfering with their activities of daily living.     They had improvement after their most previous hyaluronic acid injections.  Their pain score was 10/10 and then 4/10 after the hyaluronic acid injections.     Imaging Studies (I personally reviewed images in PACS and report if it was available):  Bilateral knee x-rays obtained on 10/8/2021.  Left knee shows lateral joint space narrowing more than medial joint space.  There is osteophytosis.  There is patellofemoral joint space narrowing.  The right knee shows medial joint space narrowing more than lateral.  There is patellofemoral joint space narrowing with osteophytosis.  No acute osseous abnormalities.  These findings are consistent with bilateral Kellgren Josiah grade 3 osteoarthritis.      No follow-ups on file.    Patient is in agreement with the above plan.    HPI:  Lela Gomez is a  68 y.o. female  who presents in follow up.  Here for   Chief Complaint   Patient presents with    Right Knee - Pain, Follow-up    Left Knee - Pain, Follow-up       Since last visit: See above.    Following history reviewed and updated:  Past Medical History:   Diagnosis Date    Hyperlipidemia     Hypertension      History reviewed. No pertinent surgical history.  Social History   Social History     Substance and Sexual Activity   Alcohol Use Never     Social History     Substance and Sexual Activity   Drug Use Never     Social History     Tobacco Use   Smoking Status Never   Smokeless Tobacco Never     Family History   Problem Relation Age of Onset    No Known Problems Mother     No Known Problems Father      No Known Allergies     Constitutional:  /87   Pulse 73   Ht 5' (1.524 m)   Wt 79.8 kg (176 lb)   BMI 34.37 kg/m²    General: NAD.  Eyes: Clear sclerae.  ENT: No inflammation, lesion, or mass of lips.  No tracheal deviation.  Musculoskeletal: As mentioned below.  Integumentary: No visible rashes or skin lesions.  Pulmonary/Chest: Effort normal. No respiratory distress.   Neuro: CN's grossly intact, GILBERT.  Psych: Normal affect and judgement.  Vascular: WWP.    Right Knee Exam     Tenderness   The patient is experiencing tenderness in the medial joint line.    Range of Motion   Extension:  normal     Tests   Varus: negative Valgus: negative  Patellar apprehension: negative    Other   Erythema: absent  Scars: absent  Sensation: normal  Pulse: present  Swelling: none  Effusion: no effusion present    Comments:  No referred pain into knee with passive hip internal rotation.      Left Knee Exam     Tenderness   The patient is experiencing tenderness in the medial joint line.    Range of Motion   Extension:  normal     Tests   Varus: negative Valgus: negative  Patellar apprehension: negative    Other   Erythema: absent  Scars: absent  Sensation: normal  Pulse: present  Swelling: none  Effusion: no effusion  present    Comments:  No referred pain into knee with passive hip internal rotation.             Large joint arthrocentesis: bilateral knee  Universal Protocol:  Consent: Verbal consent obtained. Written consent not obtained.  Risks and benefits: risks, benefits and alternatives were discussed  Consent given by: patient  Timeout called at: 5/13/2024 10:36 AM.  Patient understanding: patient states understanding of the procedure being performed  Patient consent: the patient's understanding of the procedure matches consent given  Site marked: the operative site was marked  Radiology Images displayed and confirmed. If images not available, report reviewed: imaging studies available  Patient identity confirmed: verbally with patient  Supporting Documentation  Indications: pain   Procedure Details  Location: knee - bilateral knee  Needle size: 22 G  Ultrasound guidance: no  Approach: Inferolateral to patella.    Medications (Right): 8 mL ropivacaine 0.5 %; 80 mg triamcinolone acetonide 40 mg/mLMedications (Left): 8 mL ropivacaine 0.5 %; 80 mg triamcinolone acetonide 40 mg/mL   Patient tolerance: patient tolerated the procedure well with no immediate complications  Dressing:  Sterile dressing applied    There was little to no resistance encountered during the injection.    Risks of this procedure include:    - Risk of bleeding since a needle is involved.  - Risk of infection (1/10,000 chance as per recent studies).  Signs/symptoms were discussed and they would prompt an urgent evaluation at an emergency department.  - Risk of pigmentation or skin dimpling in the skin (2-3% chance as per recent studies) from the steroid.  - Risk of increased pain from steroid flare (1% chance as per recent studies) that typically lasts 24-48 hours.  - Risk of increased blood sugars from the steroid medication that can last for a few weeks.  If the patient is a diabetic or pre-diabetic, they were encouraged to closely monitor their blood  sugars and discuss with PCP if elevated more than usual or if having symptoms.    The benefits outweigh the risks and so the procedure was completed.

## 2024-05-17 ENCOUNTER — HOSPITAL ENCOUNTER (OUTPATIENT)
Dept: RADIOLOGY | Facility: CLINIC | Age: 69
End: 2024-05-17
Payer: MEDICARE

## 2024-05-17 VITALS
HEART RATE: 61 BPM | DIASTOLIC BLOOD PRESSURE: 81 MMHG | SYSTOLIC BLOOD PRESSURE: 155 MMHG | TEMPERATURE: 97.6 F | OXYGEN SATURATION: 97 % | RESPIRATION RATE: 20 BRPM

## 2024-05-17 DIAGNOSIS — M51.16 INTERVERTEBRAL DISC DISORDER WITH RADICULOPATHY OF LUMBAR REGION: ICD-10-CM

## 2024-05-17 PROCEDURE — 64483 NJX AA&/STRD TFRM EPI L/S 1: CPT | Performed by: ANESTHESIOLOGY

## 2024-05-17 RX ORDER — METHYLPREDNISOLONE ACETATE 80 MG/ML
80 INJECTION, SUSPENSION INTRA-ARTICULAR; INTRALESIONAL; INTRAMUSCULAR; PARENTERAL; SOFT TISSUE ONCE
Status: COMPLETED | OUTPATIENT
Start: 2024-05-17 | End: 2024-05-17

## 2024-05-17 RX ORDER — 0.9 % SODIUM CHLORIDE 0.9 %
4 VIAL (ML) INJECTION ONCE
Status: COMPLETED | OUTPATIENT
Start: 2024-05-17 | End: 2024-05-17

## 2024-05-17 RX ORDER — BUPIVACAINE HCL/PF 2.5 MG/ML
2 VIAL (ML) INJECTION ONCE
Status: COMPLETED | OUTPATIENT
Start: 2024-05-17 | End: 2024-05-17

## 2024-05-17 RX ADMIN — Medication 4 ML: at 10:23

## 2024-05-17 RX ADMIN — METHYLPREDNISOLONE ACETATE 80 MG: 80 INJECTION, SUSPENSION INTRA-ARTICULAR; INTRALESIONAL; INTRAMUSCULAR; PARENTERAL; SOFT TISSUE at 10:26

## 2024-05-17 RX ADMIN — IOHEXOL 1 ML: 300 INJECTION, SOLUTION INTRAVENOUS at 10:25

## 2024-05-17 RX ADMIN — BUPIVACAINE HYDROCHLORIDE 2 ML: 2.5 INJECTION, SOLUTION EPIDURAL; INFILTRATION; INTRACAUDAL at 10:26

## 2024-05-17 NOTE — DISCHARGE INSTR - LAB
Epidural Steroid Injection   WHAT YOU NEED TO KNOW:   An epidural steroid injection (GISELLE) is a procedure to inject steroid medicine into the epidural space. The epidural space is between your spinal cord and vertebrae. Steroids reduce inflammation and fluid buildup in your spine that may be causing pain. You may be given pain medicine along with the steroids.          ACTIVITY  Do not drive or operate machinery today.  No strenuous activity today - bending, lifting, etc.  You may resume normal activites starting tomorrow - start slowly and as tolerated.  You may shower today, but no tub baths or hot tubs.  You may have numbness for several hours from the local anesthetic. Please use caution and common sense, especially with weight-bearing activities.    CARE OF THE INJECTION SITE  If you have soreness or pain, apply ice to the area today (20 minutes on/20 minutes off).  Starting tomorrow, you may use warm, moist heat or ice if needed.  You may have an increase or change in your discomfort for 36-48 hours after your treatment.  Apply ice and continue with any pain medication you have been prescribed.  Notify the [facility] if you have any of the following: redness, drainage, swelling, headache, stiff neck or fever above 100°F.    SPECIAL INSTRUCTIONS  Our office will contact you in approximately 7 days for a progress report.    MEDICATIONS  Continue to take all routine medications.  Our office may have instructed you to hold some medications.    As no general anesthesia was used in today's procedure, you should not experience any side effects related to anesthesia.     If you are diabetic, the steroids used in today's injection may temporarily increase your blood sugar levels after the first few days after your injection. Please keep a close eye on your sugars and alert the doctor who manages your diabetes if your sugars are significantly high from your baseline or you are symptomatic.     If you have a  problem specifically related to your procedure, please call our office at [phone].  Problems not related to your procedure should be directed to your primary care physician.

## 2024-05-17 NOTE — H&P
History of Present Illness: The patient is a 68 y.o. female who presents with complaints of lower back pain and is here today for bilateral L5 transforaminal epidural steroid injection    Past Medical History:   Diagnosis Date    Hyperlipidemia     Hypertension        No past surgical history on file.      Current Outpatient Medications:     acetaminophen (TYLENOL) 500 mg tablet, TAKE 1 TABLET (500 MG TOTAL) BY MOUTH EVERY 6 (SIX) HOURS AS NEEDED FOR MILD PAIN OR MODERATE PAIN, Disp: 30 tablet, Rfl: 1    ammonium lactate (LAC-HYDRIN) 12 % lotion, APPLY TOPICALLY 2 (TWO) TIMES A DAY AS NEEDED FOR DRY SKIN, Disp: 400 g, Rfl: 1    atorvastatin (LIPITOR) 20 mg tablet, TAKE 1 TABLET (20 MG TOTAL) BY MOUTH DAILY, Disp: 90 tablet, Rfl: 1    benzonatate (TESSALON PERLES) 100 mg capsule, Take 1 capsule (100 mg total) by mouth 3 (three) times a day as needed for cough, Disp: 90 capsule, Rfl: 1    celecoxib (CeleBREX) 200 mg capsule, TAKE 1 CAPSULE (200 MG TOTAL) BY MOUTH DAILY, Disp: 30 capsule, Rfl: 2    Diclofenac Sodium (VOLTAREN) 1 %, APPLY 2 G TOPICALLY 2 (TWO) TIMES A DAY, Disp: 100 g, Rfl: 5    DULoxetine (CYMBALTA) 20 mg capsule, TAKE 1 CAPSULE (20 MG TOTAL) BY MOUTH DAILY, Disp: 90 capsule, Rfl: 0    Eyelid Cleansers (OcuSoft Eyelid Cleansing) PADS, APPLY AT BEDTIME AS DIRECTED SCRUB WITH PAD VS. FOAM ALONG CLOSED EYELIDS NIGHTLY PER DIRECTIONS. OCUSOFT OR SIMILAR BRAND (WIPES VS. FOAM), Disp: , Rfl:     fluticasone (FLONASE) 50 mcg/act nasal spray, 1 spray into each nostril in the morning. (Patient not taking: Reported on 6/5/2023), Disp: 15.8 mL, Rfl: 0    ibuprofen (MOTRIN) 200 mg tablet, Take 1 tablet (200 mg total) by mouth every 6 (six) hours as needed for mild pain or moderate pain, Disp: 30 tablet, Rfl: 1    losartan (COZAAR) 100 MG tablet, Take 1 tablet (100 mg total) by mouth daily (Patient not taking: Reported on 10/16/2023), Disp: 90 tablet, Rfl: 3    losartan (COZAAR) 50 mg tablet, TAKE 1 TABLET (50 MG  TOTAL) BY MOUTH DAILY, Disp: 90 tablet, Rfl: 3    magnesium Oxide (MAG-OX) 400 mg TABS, Take 400 mg by mouth in the morning and 400 mg in the evening. (Patient not taking: Reported on 6/5/2023), Disp: , Rfl:     methocarbamol (ROBAXIN) 500 mg tablet, TAKE 1 TABLET (500 MG TOTAL) BY MOUTH 2 (TWO) TIMES A DAY AS NEEDED FOR MUSCLE SPASMS, Disp: 60 tablet, Rfl: 5    neomycin-polymyxin-dexamethasone (MAXITROL) 0.35%-10,000 units/g-0.1%, APPLY 0.25 INCH INTO AFFECTED EYE AT BEDTIME X 2 WEEKS THEN D/C (Patient not taking: Reported on 6/5/2023), Disp: , Rfl:     omeprazole (PriLOSEC) 20 mg delayed release capsule, TAKE 1 CAPSULE (20 MG TOTAL) BY MOUTH DAILY, Disp: 90 capsule, Rfl: 1    promethazine-dextromethorphan (PHENERGAN-DM) 6.25-15 mg/5 mL oral syrup, Take 5 mL by mouth 4 (four) times a day as needed for cough, Disp: 240 mL, Rfl: 1    Restasis 0.05 % ophthalmic emulsion, INSTILL 1 DROP BOTH EYES TWICE A DAY (Patient not taking: Reported on 6/5/2023), Disp: , Rfl:     spironolactone (ALDACTONE) 25 mg tablet, TAKE 1 TABLET (25 MG TOTAL) BY MOUTH DAILY, Disp: 90 tablet, Rfl: 1    torsemide (DEMADEX) 5 MG tablet, TAKE 1 TABLET (5 MG TOTAL) BY MOUTH DAILY, Disp: 90 tablet, Rfl: 1    Vitamin D3 50 MCG (2000 UT) TABS, TAKE 1 TABLET (2,000 UNITS TOTAL) BY MOUTH DAILY, Disp: 90 tablet, Rfl: 1    Current Facility-Administered Medications:     bupivacaine (PF) (MARCAINE) 0.25 % injection 2 mL, 2 mL, Epidural, Once, Jordan Quiroz MD    iohexol (OMNIPAQUE) 300 mg/mL injection 1 mL, 1 mL, Epidural, Once, Jordan Quiroz MD    lidocaine (PF) (XYLOCAINE-MPF) 2 % injection 4 mL, 4 mL, Infiltration, Once, Jordan Quiroz MD    methylPREDNISolone acetate (DEPO-MEDROL) injection 80 mg, 80 mg, Epidural, Once, Jordan Quiroz MD    sodium chloride (PF) 0.9 % injection 4 mL, 4 mL, Infiltration, Once, Jordan Quiroz MD    No Known Allergies    Physical Exam:   Vitals:    05/17/24 1007   BP: 146/79   Pulse: 60   Resp: 20   Temp: 97.6 °F  (36.4 °C)   SpO2: 96%     General: Awake, Alert, Oriented x 3, Mood and affect appropriate  Respiratory: Respirations even and unlabored  Cardiovascular: Peripheral pulses intact; no edema  Musculoskeletal Exam: Lower back pain    ASA Score: 3    Patient/Chart Verification  Patient ID Verified: Verbal  Consents Confirmed: To be obtained in the Pre-Procedure area  Interval H&P(within 24 hr) Complete (required for Outpatients and Surgery Admit only): To be obtained in the Pre-Procedure area  Allergies Reviewed: Yes  Anticoag/NSAID held?: NA  Currently on antibiotics?: No    Assessment:   1. Intervertebral disc disorder with radiculopathy of lumbar region        Plan: Bilateral L5 TF GISELLE

## 2024-05-24 ENCOUNTER — TELEPHONE (OUTPATIENT)
Dept: RADIOLOGY | Facility: MEDICAL CENTER | Age: 69
End: 2024-05-24

## 2024-05-30 DIAGNOSIS — E55.9 VITAMIN D DEFICIENCY: ICD-10-CM

## 2024-05-31 RX ORDER — CHOLECALCIFEROL (VITAMIN D3) 125 MCG
CAPSULE ORAL
Qty: 90 TABLET | Refills: 1 | Status: SHIPPED | OUTPATIENT
Start: 2024-05-31

## 2024-06-03 ENCOUNTER — TELEPHONE (OUTPATIENT)
Dept: OBGYN CLINIC | Facility: CLINIC | Age: 69
End: 2024-06-03

## 2024-06-03 NOTE — TELEPHONE ENCOUNTER
Pt's DIL called asking if pt's surgery can be moved up to a sooner date.  Dr. Masterson is booked between now and 6/26, but I will reach out if there are any cancellations.  She is agreeable to this.

## 2024-06-06 ENCOUNTER — RA CDI HCC (OUTPATIENT)
Dept: OTHER | Facility: HOSPITAL | Age: 69
End: 2024-06-06

## 2024-06-11 ENCOUNTER — TELEPHONE (OUTPATIENT)
Dept: OBGYN CLINIC | Facility: CLINIC | Age: 69
End: 2024-06-11

## 2024-06-11 NOTE — TELEPHONE ENCOUNTER
L/m for pt's DIL stressing that the pre-op testing must be done today or tomorrow morning at the latest.  Provided my c/b number for questions.

## 2024-06-12 ENCOUNTER — TELEPHONE (OUTPATIENT)
Dept: OBGYN CLINIC | Facility: CLINIC | Age: 69
End: 2024-06-12

## 2024-06-12 NOTE — TELEPHONE ENCOUNTER
L/m for pt's DIL stating that if pre-op testing is not completed, or if I do not hear back from them by 1 PM today, then tomorrow's surgery will be cancelled.  Provided my direct call back number.

## 2024-06-13 ENCOUNTER — OFFICE VISIT (OUTPATIENT)
Dept: FAMILY MEDICINE CLINIC | Facility: CLINIC | Age: 69
End: 2024-06-13
Payer: MEDICARE

## 2024-06-13 VITALS
HEART RATE: 91 BPM | SYSTOLIC BLOOD PRESSURE: 125 MMHG | DIASTOLIC BLOOD PRESSURE: 72 MMHG | HEIGHT: 60 IN | WEIGHT: 173 LBS | OXYGEN SATURATION: 98 % | BODY MASS INDEX: 33.96 KG/M2

## 2024-06-13 DIAGNOSIS — I10 ESSENTIAL HYPERTENSION: ICD-10-CM

## 2024-06-13 DIAGNOSIS — E78.5 HYPERLIPIDEMIA, UNSPECIFIED HYPERLIPIDEMIA TYPE: ICD-10-CM

## 2024-06-13 DIAGNOSIS — N18.31 TYPE 2 DIABETES MELLITUS WITH STAGE 3A CHRONIC KIDNEY DISEASE, WITHOUT LONG-TERM CURRENT USE OF INSULIN (HCC): ICD-10-CM

## 2024-06-13 DIAGNOSIS — E11.22 TYPE 2 DIABETES MELLITUS WITH STAGE 3A CHRONIC KIDNEY DISEASE, WITHOUT LONG-TERM CURRENT USE OF INSULIN (HCC): ICD-10-CM

## 2024-06-13 DIAGNOSIS — Z78.0 MENOPAUSE: ICD-10-CM

## 2024-06-13 DIAGNOSIS — Z00.00 MEDICARE ANNUAL WELLNESS VISIT, SUBSEQUENT: ICD-10-CM

## 2024-06-13 DIAGNOSIS — E11.42 TYPE 2 DIABETES MELLITUS WITH DIABETIC POLYNEUROPATHY, WITHOUT LONG-TERM CURRENT USE OF INSULIN (HCC): Primary | ICD-10-CM

## 2024-06-13 PROCEDURE — G0439 PPPS, SUBSEQ VISIT: HCPCS | Performed by: FAMILY MEDICINE

## 2024-06-13 PROCEDURE — 99214 OFFICE O/P EST MOD 30 MIN: CPT | Performed by: FAMILY MEDICINE

## 2024-06-13 RX ORDER — ATORVASTATIN CALCIUM 20 MG/1
20 TABLET, FILM COATED ORAL DAILY
Qty: 90 TABLET | Refills: 1 | Status: SHIPPED | OUTPATIENT
Start: 2024-06-13

## 2024-06-13 NOTE — ASSESSMENT & PLAN NOTE
LDL is at goal, liver function stable.  Tolerating atorvastatin 20 milligram without any side effects.  Continue same.

## 2024-06-13 NOTE — PROGRESS NOTES
Labs from December ambulatory Visit  Name: Lela Gomez      : 1955      MRN: [id number]  Encounter Provider: Karolina Pringle MD  Encounter Date: 2024   Encounter department: [facility]    Assessment & Plan   1. Type 2 diabetes mellitus with diabetic polyneuropathy, without long-term current use of insulin (HCC)  Assessment & Plan:    Lab Results   Component Value Date    HGBA1C 6.5 (H) 2023     On diet control. Due for A1c check. Will be contacted with results.   Orders:  -     Ambulatory Referral to Podiatry; Future  -     Hemoglobin A1C; Future; Expected date: 2024  -     Comprehensive metabolic panel; Future; Expected date: 2024  -     Albumin / creatinine urine ratio; Future; Expected date: 2024  2. Hyperlipidemia, unspecified hyperlipidemia type  Assessment & Plan:  LDL is at goal, liver function stable.  Tolerating atorvastatin 20 milligram without any side effects.  Continue same.     Orders:  -     atorvastatin (LIPITOR) 20 mg tablet; Take 1 tablet (20 mg total) by mouth daily  -     Lipid panel; Future; Expected date: 11/10/2024  -     Lipid Panel with Direct LDL reflex; Future; Expected date: 2024  3. Essential hypertension  Assessment & Plan:  Patient's blood pressure is at goal.  Renal functions stable.  Being monitored by Nephrology.  4. Type 2 diabetes mellitus with stage 3a chronic kidney disease, without long-term current use of insulin (HCC)  Assessment & Plan:    Lab Results   Component Value Date    HGBA1C 6.5 (H) 2023   Labs from December reveal stable renal function.  Blood pressure and A1c is at goal.  Continue to avoid nephro toxins.  Continue monitoring per nephrology.  Orders:  -     Comprehensive metabolic panel; Future; Expected date: 11/10/2024  -     Hemoglobin A1C; Future; Expected date: 11/10/2024  5. Menopause  -     DXA bone density spine hip and pelvis; Future; Expected date: 2024  6. Medicare annual  wellness visit, subsequent  Assessment & Plan:  Patient declines both breast and colorectal cancer screening, vaccines today.        Depression Screening and Follow-up Plan: Patient was screened for depression during today's encounter. They screened negative with a PHQ-2 score of 0.      Preventive health issues were discussed with patient, and age appropriate screening tests were ordered as noted in patient's After Visit Summary. Personalized health advice and appropriate referrals for health education or preventive services given if needed, as noted in patient's After Visit Summary.    History of Present Illness     HPI     Patient is following up on her chronic medical problems.  Has history of type 2 diabetes, dyslipidemia, hypertension.  Blood pressure is at goal.  Patient is on losartan, spironolactone, torsemide.  Follows up with nephrology.  LDL is at goal.  Patient is due for metabolic labs.  Previously A1c was 6.5.  Patient continues to have pain  in her foot.  Requesting a referral to establish with podiatrist.  Denies any symptoms of chest pain or shortness of breath.    Patient Care Team:  Karolina Pringle MD as PCP - General (Family Medicine)    Review of Systems  Medical History Reviewed by provider this encounter:       Annual Wellness Visit Questionnaire   Lela is here for her Subsequent Wellness visit. Last Medicare Wellness visit information reviewed, patient interviewed, no change since last AWV.     Health Risk Assessment:   Patient rates overall health as good. Patient feels that their physical health rating is same. Eyesight was rated as same. Hearing was rated as same. Patient feels that their emotional and mental health rating is same. Pain experienced in the last 7 days has been some. Patient's pain rating has been 3/10. Patient states that she has experienced no weight loss or gain in last 6 months.     Depression Screening:   PHQ-2 Score: 0      Fall Risk Screening:   In the past year,  patient has experienced: no history of falling in past year      Urinary Incontinence Screening:   Patient has not leaked urine accidently in the last six months.     Home Safety:  Patient does not have trouble with stairs inside or outside of their home. Patient has working smoke alarms and has working carbon monoxide detector. Home safety hazards include: none.     Nutrition:   Current diet is Regular.     Medications:   Patient is currently taking over-the-counter supplements. OTC medications include: see medication list. Patient is able to manage medications.     Activities of Daily Living (ADLs)/Instrumental Activities of Daily Living (IADLs):   Walk and transfer into and out of bed and chair?: Yes  Dress and groom yourself?: Yes    Bathe or shower yourself?: Yes    Feed yourself? Yes  Do your laundry/housekeeping?: Yes  Manage your money, pay your bills and track your expenses?: Yes  Make your own meals?: Yes    Do your own shopping?: Yes    Previous Hospitalizations:   Any hospitalizations or ED visits within the last 12 months?: Yes    How many hospitalizations have you had in the last year?: 1-2    Advance Care Planning:   Living will: No    Durable POA for healthcare: No    Advanced directive: No      Cognitive Screening:   Provider or family/friend/caregiver concerned regarding cognition?: No    PREVENTIVE SCREENINGS      Cardiovascular Screening:    General: Screening Not Indicated and History Lipid Disorder      Diabetes Screening:     General: Screening Current      Colorectal Cancer Screening:     General: Risks and Benefits Discussed    Due for: Cologuard      Breast Cancer Screening:     General: Risks and Benefits Discussed    Due for: Mammogram        Cervical Cancer Screening:    General: Screening Not Indicated      Osteoporosis Screening:    General: Risks and Benefits Discussed and Patient Declines      Abdominal Aortic Aneurysm (AAA) Screening:        General: Risks and Benefits Discussed       Lung Cancer Screening:     General: Screening Not Indicated      Hepatitis C Screening:    General: Screening Current    Screening, Brief Intervention, and Referral to Treatment (SBIRT)    Screening  Typical number of drinks in a day: 0  Typical number of drinks in a week: 0  Interpretation: Low risk drinking behavior.    Single Item Drug Screening:  How often have you used an illegal drug (including marijuana) or a prescription medication for non-medical reasons in the past year? never    Single Item Drug Screen Score: 0  Interpretation: Negative screen for possible drug use disorder    Other Counseling Topics:   Car/seat belt/driving safety, skin self-exam, sunscreen and calcium and vitamin D intake and regular weightbearing exercise.        No results found.    Objective     There were no vitals taken for this visit.    Physical Exam  Constitutional:       Appearance: Normal appearance.   Cardiovascular:      Heart sounds: Normal heart sounds.   Pulmonary:      Breath sounds: Normal breath sounds.   Musculoskeletal:      Right lower leg: No edema.      Left lower leg: No edema.   Psychiatric:         Mood and Affect: Mood normal.     Patient's shoes and socks removed.          Administrative Statements

## 2024-06-13 NOTE — PATIENT INSTRUCTIONS
Medicare Preventive Visit Patient Instructions  Thank you for completing your Welcome to Medicare Visit or Medicare Annual Wellness Visit today. Your next wellness visit will be due in one year (6/14/2025).  The screening/preventive services that you may require over the next 5-10 years are detailed below. Some tests may not apply to you based off risk factors and/or age. Screening tests ordered at today's visit but not completed yet may show as past due. Also, please note that scanned in results may not display below.  Preventive Screenings:  Service Recommendations Previous Testing/Comments   Colorectal Cancer Screening  * Colonoscopy    * Fecal Occult Blood Test (FOBT)/Fecal Immunochemical Test (FIT)  * Fecal DNA/Cologuard Test  * Flexible Sigmoidoscopy Age: 45-75 years old   Colonoscopy: every 10 years (may be performed more frequently if at higher risk)  OR  FOBT/FIT: every 1 year  OR  Cologuard: every 3 years  OR  Sigmoidoscopy: every 5 years  Screening may be recommended earlier than age 45 if at higher risk for colorectal cancer. Also, an individualized decision between you and your healthcare provider will decide whether screening between the ages of 76-85 would be appropriate. Colonoscopy: Not on file  FOBT/FIT: Not on file  Cologuard: Not on file  Sigmoidoscopy: Not on file    Risks and Benefits Discussed  Due for Cologuard     Breast Cancer Screening Age: 40+ years old  Frequency: every 1-2 years  Not required if history of left and right mastectomy Mammogram: Not on file    Risks and Benefits Discussed  Due for Mammogram   Cervical Cancer Screening Between the ages of 21-29, pap smear recommended once every 3 years.   Between the ages of 30-65, can perform pap smear with HPV co-testing every 5 years.   Recommendations may differ for women with a history of total hysterectomy, cervical cancer, or abnormal pap smears in past. Pap Smear: Not on file    Screening Not Indicated   Hepatitis C Screening Once  for adults born between 1945 and 1965  More frequently in patients at high risk for Hepatitis C Hep C Antibody: Not on file    Screening Current   Diabetes Screening 1-2 times per year if you're at risk for diabetes or have pre-diabetes Fasting glucose: 100 mg/dL (12/19/2023)  A1C: 6.5 % (12/19/2023)  Screening Current   Cholesterol Screening Once every 5 years if you don't have a lipid disorder. May order more often based on risk factors. Lipid panel: 12/19/2023    Screening Not Indicated  History Lipid Disorder     Other Preventive Screenings Covered by Medicare:  Abdominal Aortic Aneurysm (AAA) Screening: covered once if your at risk. You're considered to be at risk if you have a family history of AAA.  Lung Cancer Screening: covers low dose CT scan once per year if you meet all of the following conditions: (1) Age 55-77; (2) No signs or symptoms of lung cancer; (3) Current smoker or have quit smoking within the last 15 years; (4) You have a tobacco smoking history of at least 20 pack years (packs per day multiplied by number of years you smoked); (5) You get a written order from a healthcare provider.  Glaucoma Screening: covered annually if you're considered high risk: (1) You have diabetes OR (2) Family history of glaucoma OR (3)  aged 50 and older OR (4)  American aged 65 and older  Osteoporosis Screening: covered every 2 years if you meet one of the following conditions: (1) You're estrogen deficient and at risk for osteoporosis based off medical history and other findings; (2) Have a vertebral abnormality; (3) On glucocorticoid therapy for more than 3 months; (4) Have primary hyperparathyroidism; (5) On osteoporosis medications and need to assess response to drug therapy.   Last bone density test (DXA Scan): Not on file.  HIV Screening: covered annually if you're between the age of 15-65. Also covered annually if you are younger than 15 and older than 65 with risk factors for HIV  infection. For pregnant patients, it is covered up to 3 times per pregnancy.    Immunizations:  Immunization Recommendations   Influenza Vaccine Annual influenza vaccination during flu season is recommended for all persons aged >= 6 months who do not have contraindications   Pneumococcal Vaccine   * Pneumococcal conjugate vaccine = PCV13 (Prevnar 13), PCV15 (Vaxneuvance), PCV20 (Prevnar 20)  * Pneumococcal polysaccharide vaccine = PPSV23 (Pneumovax) Adults 19-65 yo with certain risk factors or if 65+ yo  If never received any pneumonia vaccine: recommend Prevnar 20 (PCV20)  Give PCV20 if previously received 1 dose of PCV13 or PPSV23   Hepatitis B Vaccine 3 dose series if at intermediate or high risk (ex: diabetes, end stage renal disease, liver disease)   Respiratory syncytial virus (RSV) Vaccine - COVERED BY MEDICARE PART D  * RSVPreF3 (Arexvy) CDC recommends that adults 60 years of age and older may receive a single dose of RSV vaccine using shared clinical decision-making (SCDM)   Tetanus (Td) Vaccine - COST NOT COVERED BY MEDICARE PART B Following completion of primary series, a booster dose should be given every 10 years to maintain immunity against tetanus. Td may also be given as tetanus wound prophylaxis.   Tdap Vaccine - COST NOT COVERED BY MEDICARE PART B Recommended at least once for all adults. For pregnant patients, recommended with each pregnancy.   Shingles Vaccine (Shingrix) - COST NOT COVERED BY MEDICARE PART B  2 shot series recommended in those 19 years and older who have or will have weakened immune systems or those 50 years and older     Health Maintenance Due:      Topic Date Due   • Hepatitis C Screening  Never done   • Colorectal Cancer Screening  06/12/2030 (Originally 8/4/1998)   • Breast Cancer Screening: Mammogram  06/12/2030 (Originally 8/4/1993)     Immunizations Due:      Topic Date Due   • COVID-19 Vaccine (3 - 2023-24 season) 09/01/2023   • Influenza Vaccine (Season Ended) 09/01/2024      Advance Directives   What are advance directives?  Advance directives are legal documents that state your wishes and plans for medical care. These plans are made ahead of time in case you lose your ability to make decisions for yourself. Advance directives can apply to any medical decision, such as the treatments you want, and if you want to donate organs.   What are the types of advance directives?  There are many types of advance directives, and each state has rules about how to use them. You may choose a combination of any of the following:  Living will:  This is a written record of the treatment you want. You can also choose which treatments you do not want, which to limit, and which to stop at a certain time. This includes surgery, medicine, IV fluid, and tube feedings.   Durable power of  for healthcare (DPAHC):  This is a written record that states who you want to make healthcare choices for you when you are unable to make them for yourself. This person, called a proxy, is usually a family member or a friend. You may choose more than 1 proxy.  Do not resuscitate (DNR) order:  A DNR order is used in case your heart stops beating or you stop breathing. It is a request not to have certain forms of treatment, such as CPR. A DNR order may be included in other types of advance directives.  Medical directive:  This covers the care that you want if you are in a coma, near death, or unable to make decisions for yourself. You can list the treatments you want for each condition. Treatment may include pain medicine, surgery, blood transfusions, dialysis, IV or tube feedings, and a ventilator (breathing machine).  Values history:  This document has questions about your views, beliefs, and how you feel and think about life. This information can help others choose the care that you would choose.  Why are advance directives important?  An advance directive helps you control your care. Although spoken wishes may  be used, it is better to have your wishes written down. Spoken wishes can be misunderstood, or not followed. Treatments may be given even if you do not want them. An advance directive may make it easier for your family to make difficult choices about your care.   Weight Management   Why it is important to manage your weight:  Being overweight increases your risk of health conditions such as heart disease, high blood pressure, type 2 diabetes, and certain types of cancer. It can also increase your risk for osteoarthritis, sleep apnea, and other respiratory problems. Aim for a slow, steady weight loss. Even a small amount of weight loss can lower your risk of health problems.  How to lose weight safely:  A safe and healthy way to lose weight is to eat fewer calories and get regular exercise. You can lose up about 1 pound a week by decreasing the number of calories you eat by 500 calories each day.   Healthy meal plan for weight management:  A healthy meal plan includes a variety of foods, contains fewer calories, and helps you stay healthy. A healthy meal plan includes the following:  Eat whole-grain foods more often.  A healthy meal plan should contain fiber. Fiber is the part of grains, fruits, and vegetables that is not broken down by your body. Whole-grain foods are healthy and provide extra fiber in your diet. Some examples of whole-grain foods are whole-wheat breads and pastas, oatmeal, brown rice, and bulgur.  Eat a variety of vegetables every day.  Include dark, leafy greens such as spinach, kale, kai greens, and mustard greens. Eat yellow and orange vegetables such as carrots, sweet potatoes, and winter squash.   Eat a variety of fruits every day.  Choose fresh or canned fruit (canned in its own juice or light syrup) instead of juice. Fruit juice has very little or no fiber.  Eat low-fat dairy foods.  Drink fat-free (skim) milk or 1% milk. Eat fat-free yogurt and low-fat cottage cheese. Try low-fat  cheeses such as mozzarella and other reduced-fat cheeses.  Choose meat and other protein foods that are low in fat.  Choose beans or other legumes such as split peas or lentils. Choose fish, skinless poultry (chicken or turkey), or lean cuts of red meat (beef or pork). Before you cook meat or poultry, cut off any visible fat.   Use less fat and oil.  Try baking foods instead of frying them. Add less fat, such as margarine, sour cream, regular salad dressing and mayonnaise to foods. Eat fewer high-fat foods. Some examples of high-fat foods include french fries, doughnuts, ice cream, and cakes.  Eat fewer sweets.  Limit foods and drinks that are high in sugar. This includes candy, cookies, regular soda, and sweetened drinks.  Exercise:  Exercise at least 30 minutes per day on most days of the week. Some examples of exercise include walking, biking, dancing, and swimming. You can also fit in more physical activity by taking the stairs instead of the elevator or parking farther away from stores. Ask your healthcare provider about the best exercise plan for you.      © Copyright [employer] 2018 Information is for End User's use only and may not be sold, redistributed or otherwise used for commercial purposes. All illustrations and images included in CareNotes® are the copyrighted property of A.D.A.M., Inc. or [employer]

## 2024-06-13 NOTE — ASSESSMENT & PLAN NOTE
Lab Results   Component Value Date    HGBA1C 6.5 (H) 12/19/2023     On diet control. Due for A1c check. Will be contacted with results.

## 2024-06-13 NOTE — ASSESSMENT & PLAN NOTE
Lab Results   Component Value Date    HGBA1C 6.5 (H) 12/19/2023   Labs from December reveal stable renal function.  Blood pressure and A1c is at goal.  Continue to avoid nephro toxins.  Continue monitoring per nephrology.

## 2024-06-17 ENCOUNTER — TELEPHONE (OUTPATIENT)
Dept: LAB | Facility: HOSPITAL | Age: 69
End: 2024-06-17

## 2024-06-24 DIAGNOSIS — K21.9 GASTROESOPHAGEAL REFLUX DISEASE WITHOUT ESOPHAGITIS: ICD-10-CM

## 2024-06-24 RX ORDER — OMEPRAZOLE 20 MG/1
20 CAPSULE, DELAYED RELEASE ORAL DAILY
Qty: 90 CAPSULE | Refills: 1 | Status: SHIPPED | OUTPATIENT
Start: 2024-06-24

## 2024-06-27 ENCOUNTER — APPOINTMENT (OUTPATIENT)
Dept: LAB | Facility: HOSPITAL | Age: 69
End: 2024-06-27
Payer: MEDICARE

## 2024-06-27 ENCOUNTER — TELEPHONE (OUTPATIENT)
Dept: FAMILY MEDICINE CLINIC | Facility: CLINIC | Age: 69
End: 2024-06-27

## 2024-06-27 DIAGNOSIS — Z01.818 PREOP TESTING: ICD-10-CM

## 2024-06-27 DIAGNOSIS — E11.42 TYPE 2 DIABETES MELLITUS WITH DIABETIC POLYNEUROPATHY, WITHOUT LONG-TERM CURRENT USE OF INSULIN (HCC): ICD-10-CM

## 2024-06-27 DIAGNOSIS — E78.5 HYPERLIPIDEMIA, UNSPECIFIED HYPERLIPIDEMIA TYPE: ICD-10-CM

## 2024-06-27 LAB
ALBUMIN SERPL BCG-MCNC: 4 G/DL (ref 3.5–5)
ALP SERPL-CCNC: 67 U/L (ref 34–104)
ALT SERPL W P-5'-P-CCNC: 24 U/L (ref 7–52)
ANION GAP SERPL CALCULATED.3IONS-SCNC: 10 MMOL/L (ref 4–13)
AST SERPL W P-5'-P-CCNC: 23 U/L (ref 13–39)
BASOPHILS # BLD AUTO: 0.04 THOUSANDS/ÂΜL (ref 0–0.1)
BASOPHILS NFR BLD AUTO: 1 % (ref 0–1)
BILIRUB SERPL-MCNC: 0.46 MG/DL (ref 0.2–1)
BUN SERPL-MCNC: 19 MG/DL (ref 5–25)
CALCIUM SERPL-MCNC: 9.2 MG/DL (ref 8.4–10.2)
CHLORIDE SERPL-SCNC: 104 MMOL/L (ref 96–108)
CHOLEST SERPL-MCNC: 140 MG/DL
CO2 SERPL-SCNC: 24 MMOL/L (ref 21–32)
CREAT SERPL-MCNC: 0.9 MG/DL (ref 0.6–1.3)
CREAT UR-MCNC: 77.2 MG/DL
EOSINOPHIL # BLD AUTO: 0.1 THOUSAND/ÂΜL (ref 0–0.61)
EOSINOPHIL NFR BLD AUTO: 2 % (ref 0–6)
ERYTHROCYTE [DISTWIDTH] IN BLOOD BY AUTOMATED COUNT: 17.6 % (ref 11.6–15.1)
EST. AVERAGE GLUCOSE BLD GHB EST-MCNC: 126 MG/DL
GFR SERPL CREATININE-BSD FRML MDRD: 64 ML/MIN/1.73SQ M
GLUCOSE P FAST SERPL-MCNC: 67 MG/DL (ref 65–99)
HBA1C MFR BLD: 6 %
HCT VFR BLD AUTO: 38 % (ref 34.8–46.1)
HDLC SERPL-MCNC: 48 MG/DL
HGB BLD-MCNC: 11.8 G/DL (ref 11.5–15.4)
IMM GRANULOCYTES # BLD AUTO: 0.03 THOUSAND/UL (ref 0–0.2)
IMM GRANULOCYTES NFR BLD AUTO: 1 % (ref 0–2)
LDLC SERPL CALC-MCNC: 73 MG/DL (ref 0–100)
LYMPHOCYTES # BLD AUTO: 2.66 THOUSANDS/ÂΜL (ref 0.6–4.47)
LYMPHOCYTES NFR BLD AUTO: 40 % (ref 14–44)
MCH RBC QN AUTO: 28.8 PG (ref 26.8–34.3)
MCHC RBC AUTO-ENTMCNC: 31.1 G/DL (ref 31.4–37.4)
MCV RBC AUTO: 93 FL (ref 82–98)
MICROALBUMIN UR-MCNC: <7 MG/L
MONOCYTES # BLD AUTO: 0.65 THOUSAND/ÂΜL (ref 0.17–1.22)
MONOCYTES NFR BLD AUTO: 10 % (ref 4–12)
NEUTROPHILS # BLD AUTO: 3.11 THOUSANDS/ÂΜL (ref 1.85–7.62)
NEUTS SEG NFR BLD AUTO: 46 % (ref 43–75)
NRBC BLD AUTO-RTO: 0 /100 WBCS
PLATELET # BLD AUTO: 139 THOUSANDS/UL (ref 149–390)
PMV BLD AUTO: 13.6 FL (ref 8.9–12.7)
POTASSIUM SERPL-SCNC: 4.2 MMOL/L (ref 3.5–5.3)
PROT SERPL-MCNC: 7 G/DL (ref 6.4–8.4)
RBC # BLD AUTO: 4.1 MILLION/UL (ref 3.81–5.12)
SODIUM SERPL-SCNC: 138 MMOL/L (ref 135–147)
TRIGL SERPL-MCNC: 94 MG/DL
WBC # BLD AUTO: 6.59 THOUSAND/UL (ref 4.31–10.16)

## 2024-06-27 PROCEDURE — 85025 COMPLETE CBC W/AUTO DIFF WBC: CPT

## 2024-06-27 PROCEDURE — 82570 ASSAY OF URINE CREATININE: CPT

## 2024-06-27 PROCEDURE — 82043 UR ALBUMIN QUANTITATIVE: CPT

## 2024-06-27 PROCEDURE — 80061 LIPID PANEL: CPT

## 2024-06-27 NOTE — TELEPHONE ENCOUNTER
----- Message from Karolina Pringle MD sent at 6/27/2024  6:17 PM EDT -----  Please call patient with normal results.  A1c improved.  Continue with low-carb low-fat diet.

## 2024-07-05 DIAGNOSIS — I12.9 PARENCHYMAL RENAL HYPERTENSION, STAGE 1 THROUGH STAGE 4 OR UNSPECIFIED CHRONIC KIDNEY DISEASE: ICD-10-CM

## 2024-07-05 DIAGNOSIS — N18.31 STAGE 3A CHRONIC KIDNEY DISEASE (HCC): ICD-10-CM

## 2024-07-05 RX ORDER — LOSARTAN POTASSIUM 50 MG/1
50 TABLET ORAL DAILY
Qty: 90 TABLET | Refills: 1 | Status: SHIPPED | OUTPATIENT
Start: 2024-07-05

## 2024-07-08 DIAGNOSIS — M79.671 PAIN IN BOTH FEET: ICD-10-CM

## 2024-07-08 DIAGNOSIS — M79.672 PAIN IN BOTH FEET: ICD-10-CM

## 2024-07-12 DIAGNOSIS — M79.672 PAIN IN BOTH FEET: ICD-10-CM

## 2024-07-12 DIAGNOSIS — M79.671 PAIN IN BOTH FEET: ICD-10-CM

## 2024-07-13 PROBLEM — Z00.00 MEDICARE ANNUAL WELLNESS VISIT, SUBSEQUENT: Status: RESOLVED | Noted: 2024-06-13 | Resolved: 2024-07-13

## 2024-07-25 ENCOUNTER — NURSE TRIAGE (OUTPATIENT)
Age: 69
End: 2024-07-25

## 2024-07-25 NOTE — TELEPHONE ENCOUNTER
Call transferred for leg swelling.  I started to triage the call and the daughter in law stated she will call back and hung up.        No triage call .         Reason for Disposition   Caller has cancelled the call before the first contact    Protocols used: No Contact or Duplicate Contact Call-ADULT-[place]

## 2024-08-11 ENCOUNTER — OFFICE VISIT (OUTPATIENT)
Dept: URGENT CARE | Facility: CLINIC | Age: 69
End: 2024-08-11
Payer: MEDICARE

## 2024-08-11 VITALS
HEIGHT: 60 IN | RESPIRATION RATE: 14 BRPM | OXYGEN SATURATION: 99 % | DIASTOLIC BLOOD PRESSURE: 55 MMHG | BODY MASS INDEX: 34.36 KG/M2 | WEIGHT: 175 LBS | SYSTOLIC BLOOD PRESSURE: 108 MMHG | TEMPERATURE: 98.4 F | HEART RATE: 78 BPM

## 2024-08-11 DIAGNOSIS — R11.0 NAUSEA: ICD-10-CM

## 2024-08-11 DIAGNOSIS — R52 GENERALIZED BODY ACHES: Primary | ICD-10-CM

## 2024-08-11 PROCEDURE — 99213 OFFICE O/P EST LOW 20 MIN: CPT | Performed by: STUDENT IN AN ORGANIZED HEALTH CARE EDUCATION/TRAINING PROGRAM

## 2024-08-11 PROCEDURE — G0463 HOSPITAL OUTPT CLINIC VISIT: HCPCS | Performed by: STUDENT IN AN ORGANIZED HEALTH CARE EDUCATION/TRAINING PROGRAM

## 2024-08-11 RX ORDER — ONDANSETRON 4 MG/1
4 TABLET, FILM COATED ORAL EVERY 8 HOURS PRN
Qty: 20 TABLET | Refills: 0 | Status: SHIPPED | OUTPATIENT
Start: 2024-08-11

## 2024-08-11 NOTE — PROGRESS NOTES
[facility]        NAME: Lela Gomez is a 69 y.o. female  : 1955    MRN: [id number]  DATE: 2024  TIME: 4:19 PM    Assessment and Orders   Generalized body aches [R52]  1. Generalized body aches  ondansetron (ZOFRAN) 4 mg tablet      2. Nausea  ondansetron (ZOFRAN) 4 mg tablet            Plan and Discussion    Patient with generalized bodyaches starting yesterday.  Also has a bitter taste in her mouth.  COVID test was negative.  Will prescribe Zofran for nausea.  Encourage patient to follow-up with PCP for lab work or if symptoms are worsening OR if she has reoccurring episodes of dizziness I strongly encourage patient be evaluated in the emergency room for full evaluation.    Risks and benefits discussed. Patient understands and agrees with the plan.     PATIENT INSTRUCTIONS  If tests have been performed at [facility], our office will contact you with results if changes need to be made to the care plan discussed with you at the visit.  [facility]    Follow up with PCP.     If any of the following occur, please report to your nearest ED for evaluation or call 911.   Difficultly breathing or shortness of breath  Chest pain  Acutely worsening symptoms.         Chief Complaint     Chief Complaint   Patient presents with    Malaise - 7 years or greater     Pt reports of chills body aches, denies any fever started yesterday. Pt also reports of poor appetite.         History of Present Illness       Malaise - 7 years or greater  This is a new problem. The current episode started yesterday. The problem occurs constantly. The problem has been unchanged. Associated symptoms include anorexia (bitter taste in the mouth), headaches, myalgias, nausea and vertigo (one time yesterday). Pertinent negatives include no chest pain, congestion, coughing, fever, sore throat or vomiting. Nothing aggravates the symptoms. She has tried acetaminophen for the symptoms. The  treatment provided mild relief.       Review of Systems   Review of Systems   Constitutional:  Negative for fever.   HENT:  Negative for congestion and sore throat.    Respiratory:  Negative for cough.    Cardiovascular:  Negative for chest pain.   Gastrointestinal:  Positive for anorexia (bitter taste in the mouth) and nausea. Negative for vomiting.   Musculoskeletal:  Positive for myalgias.   Neurological:  Positive for vertigo (one time yesterday) and headaches.         Current Medications       Current Outpatient Medications:     ondansetron (ZOFRAN) 4 mg tablet, Take 1 tablet (4 mg total) by mouth every 8 (eight) hours as needed for nausea or vomiting, Disp: 20 tablet, Rfl: 0    acetaminophen (TYLENOL) 500 mg tablet, TAKE 1 TABLET (500 MG TOTAL) BY MOUTH EVERY 6 (SIX) HOURS AS NEEDED FOR MILD PAIN OR MODERATE PAIN, Disp: 30 tablet, Rfl: 1    ammonium lactate (LAC-HYDRIN) 12 % lotion, APPLY TOPICALLY 2 (TWO) TIMES A DAY AS NEEDED FOR DRY SKIN, Disp: 400 g, Rfl: 1    atorvastatin (LIPITOR) 20 mg tablet, Take 1 tablet (20 mg total) by mouth daily, Disp: 90 tablet, Rfl: 1    benzonatate (TESSALON PERLES) 100 mg capsule, Take 1 capsule (100 mg total) by mouth 3 (three) times a day as needed for cough, Disp: 90 capsule, Rfl: 1    celecoxib (CeleBREX) 200 mg capsule, TAKE 1 CAPSULE (200 MG TOTAL) BY MOUTH DAILY, Disp: 30 capsule, Rfl: 2    Cholecalciferol (Vitamin D3) 50 MCG (2000 UT) TABS, TAKE 1 TABLET (2,000 UNITS TOTAL) BY MOUTH DAILY, Disp: 90 tablet, Rfl: 1    Diclofenac Sodium (VOLTAREN) 1 %, APPLY 2 G TOPICALLY 2 (TWO) TIMES A DAY, Disp: 100 g, Rfl: 5    DULoxetine (CYMBALTA) 20 mg capsule, TAKE 1 CAPSULE (20 MG TOTAL) BY MOUTH DAILY, Disp: 90 capsule, Rfl: 0    Eyelid Cleansers (OcuSoft Eyelid Cleansing) PADS, APPLY AT BEDTIME AS DIRECTED SCRUB WITH PAD VS. FOAM ALONG CLOSED EYELIDS NIGHTLY PER DIRECTIONS. OCUSOFT OR SIMILAR BRAND (WIPES VS. FOAM), Disp: , Rfl:     fluticasone (FLONASE) 50 mcg/act nasal  spray, 1 spray into each nostril in the morning. (Patient not taking: Reported on 6/5/2023), Disp: 15.8 mL, Rfl: 0    ibuprofen (MOTRIN) 200 mg tablet, Take 1 tablet (200 mg total) by mouth every 6 (six) hours as needed for mild pain or moderate pain, Disp: 30 tablet, Rfl: 1    losartan (COZAAR) 100 MG tablet, Take 1 tablet (100 mg total) by mouth daily (Patient not taking: Reported on 10/16/2023), Disp: 90 tablet, Rfl: 3    losartan (COZAAR) 50 mg tablet, TAKE 1 TABLET (50 MG TOTAL) BY MOUTH DAILY, Disp: 90 tablet, Rfl: 1    magnesium Oxide (MAG-OX) 400 mg TABS, Take 400 mg by mouth in the morning and 400 mg in the evening. (Patient not taking: Reported on 6/5/2023), Disp: , Rfl:     methocarbamol (ROBAXIN) 500 mg tablet, TAKE 1 TABLET (500 MG TOTAL) BY MOUTH 2 (TWO) TIMES A DAY AS NEEDED FOR MUSCLE SPASMS, Disp: 60 tablet, Rfl: 5    neomycin-polymyxin-dexamethasone (MAXITROL) 0.35%-10,000 units/g-0.1%, APPLY 0.25 INCH INTO AFFECTED EYE AT BEDTIME X 2 WEEKS THEN D/C (Patient not taking: Reported on 6/5/2023), Disp: , Rfl:     omeprazole (PriLOSEC) 20 mg delayed release capsule, TAKE 1 CAPSULE (20 MG TOTAL) BY MOUTH DAILY, Disp: 90 capsule, Rfl: 1    promethazine-dextromethorphan (PHENERGAN-DM) 6.25-15 mg/5 mL oral syrup, Take 5 mL by mouth 4 (four) times a day as needed for cough, Disp: 240 mL, Rfl: 1    Restasis 0.05 % ophthalmic emulsion, INSTILL 1 DROP BOTH EYES TWICE A DAY (Patient not taking: Reported on 6/5/2023), Disp: , Rfl:     spironolactone (ALDACTONE) 25 mg tablet, TAKE 1 TABLET (25 MG TOTAL) BY MOUTH DAILY, Disp: 90 tablet, Rfl: 1    torsemide (DEMADEX) 5 MG tablet, TAKE 1 TABLET (5 MG TOTAL) BY MOUTH DAILY, Disp: 90 tablet, Rfl: 1    Current Allergies     Allergies as of 08/11/2024    (No Known Allergies)            The following portions of the patient's history were reviewed and updated as appropriate: allergies, current medications, past family history, past medical history, past social history,  past surgical history and problem list.     Past Medical History:   Diagnosis Date    Hyperlipidemia     Hypertension        History reviewed. No pertinent surgical history.    Family History   Problem Relation Age of Onset    No Known Problems Mother     No Known Problems Father          Medications have been verified.        Objective   /55   Pulse 78   Temp 98.4 °F (36.9 °C)   Resp 14   Ht 5' (1.524 m)   Wt 79.4 kg (175 lb)   SpO2 99%   BMI 34.18 kg/m²   No LMP recorded. Patient is postmenopausal.       Physical Exam     Physical Exam  Constitutional:       General: She is not in acute distress.     Appearance: She is not ill-appearing.   HENT:      Mouth/Throat:      Pharynx: No oropharyngeal exudate or posterior oropharyngeal erythema.   Cardiovascular:      Rate and Rhythm: Normal rate and regular rhythm.   Pulmonary:      Effort: Pulmonary effort is normal. No respiratory distress.   Musculoskeletal:         General: Tenderness (bilateral knees) present. No swelling.   Neurological:      General: No focal deficit present.      Mental Status: She is alert and oriented to person, place, and time.   Psychiatric:         Mood and Affect: Mood normal.         Behavior: Behavior normal.               Lisa Garcia DO

## 2024-08-12 ENCOUNTER — PROCEDURE VISIT (OUTPATIENT)
Dept: OBGYN CLINIC | Facility: CLINIC | Age: 69
End: 2024-08-12
Payer: MEDICARE

## 2024-08-12 VITALS — HEIGHT: 60 IN | BODY MASS INDEX: 34.36 KG/M2 | WEIGHT: 175 LBS

## 2024-08-12 DIAGNOSIS — M17.0 PRIMARY OSTEOARTHRITIS OF BOTH KNEES: Primary | ICD-10-CM

## 2024-08-12 PROCEDURE — 20610 DRAIN/INJ JOINT/BURSA W/O US: CPT | Performed by: PHYSICAL MEDICINE & REHABILITATION

## 2024-08-12 NOTE — PROGRESS NOTES
1. Primary osteoarthritis of both knees          Orders Placed This Encounter   Procedures    Large joint arthrocentesis        Impression:  Patient presents in follow-up of chronic bilateral knee pain with right worse than left, secondary to osteoarthritis (R- medial joint L- lateral joint). Treatment has included bilateral knee steroid injections. She is using a right knee medial  brace.  She would benefit from physical therapy for which there is an order. She should not be taking any NSAIDs due to CKD. Today we started HA injections.     Imaging Studies (I personally reviewed images in PACS and report if it was available):  Bilateral knee x-rays obtained on 10/8/2021.  Left knee shows lateral joint space narrowing more than medial joint space.  There is osteophytosis.  There is patellofemoral joint space narrowing.  The right knee shows medial joint space narrowing more than lateral.  There is patellofemoral joint space narrowing with osteophytosis.  No acute osseous abnormalities.  These findings are consistent with bilateral Kellgren Josiah grade 3 osteoarthritis.      No follow-ups on file.    Patient is in agreement with the above plan.    HPI:  Lela Gomez is a 69 y.o. female  who presents in follow up.  Here for   Chief Complaint   Patient presents with    Right Knee - Pain, Injections    Left Knee - Pain, Injections       Since last visit: See above.    Following history reviewed and updated:  Past Medical History:   Diagnosis Date    Hyperlipidemia     Hypertension      History reviewed. No pertinent surgical history.  Social History   Social History     Substance and Sexual Activity   Alcohol Use Never     Social History     Substance and Sexual Activity   Drug Use Never     Social History     Tobacco Use   Smoking Status Never   Smokeless Tobacco Never     Family History   Problem Relation Age of Onset    No Known Problems Mother     No Known Problems Father      No Known Allergies      Constitutional:  Ht 5' (1.524 m)   Wt 79.4 kg (175 lb)   BMI 34.18 kg/m²    General: NAD.  Eyes: Clear sclerae.  ENT: No inflammation, lesion, or mass of lips.  No tracheal deviation.  Musculoskeletal: As mentioned below.  Integumentary: No visible rashes or skin lesions.  Pulmonary/Chest: Effort normal. No respiratory distress.   Neuro: CN's grossly intact, GILBERT.  Psych: Normal affect and judgement.  Vascular: WWP.    Right Knee Exam     Tenderness   The patient is experiencing tenderness in the medial joint line.    Range of Motion   Extension:  normal     Tests   Varus: negative Valgus: negative  Patellar apprehension: negative    Other   Erythema: absent  Scars: absent  Sensation: normal  Pulse: present  Swelling: none  Effusion: no effusion present    Comments:  No referred pain into knee with passive hip internal rotation.      Left Knee Exam     Tenderness   The patient is experiencing tenderness in the medial joint line.    Range of Motion   Extension:  normal     Tests   Varus: negative Valgus: negative  Patellar apprehension: negative    Other   Erythema: absent  Scars: absent  Sensation: normal  Pulse: present  Swelling: none  Effusion: no effusion present    Comments:  No referred pain into knee with passive hip internal rotation.             Large joint arthrocentesis: bilateral knee  Universal Protocol:  Consent: Verbal consent obtained. Written consent not obtained.  Risks and benefits: risks, benefits and alternatives were discussed  Consent given by: patient  Timeout called at: 8/12/2024 12:44 PM.  Patient understanding: patient states understanding of the procedure being performed  Site marked: the operative site was marked  Radiology Images displayed and confirmed. If images not available, report reviewed: imaging studies available  Patient identity confirmed: verbally with patient  Supporting Documentation  Indications: pain   Procedure Details  Location: knee - bilateral knee  Needle size: 22  G  Ultrasound guidance: no  Approach: Inferolateral to patella.    Medications (Right): 30 mg sodium hyaluronate 30 mg/2 mLMedications (Left): 30 mg sodium hyaluronate 30 mg/2 mL   Patient tolerance: patient tolerated the procedure well with no immediate complications  Dressing:  Sterile dressing applied    Risks of this procedure include:    - Risk of bleeding since a needle is involved.  - Risk of infection (1/10,000 chance as per recent studies).  Signs/symptoms were discussed and they would prompt an urgent evaluation at an emergency department.  - Risk of synovitis from the viscosupplementation.    The benefits outweigh the risks and so we proceeded with the procedure.

## 2024-08-19 ENCOUNTER — PROCEDURE VISIT (OUTPATIENT)
Dept: OBGYN CLINIC | Facility: CLINIC | Age: 69
End: 2024-08-19
Payer: MEDICARE

## 2024-08-19 DIAGNOSIS — M17.0 PRIMARY OSTEOARTHRITIS OF BOTH KNEES: Primary | ICD-10-CM

## 2024-08-19 PROCEDURE — 20610 DRAIN/INJ JOINT/BURSA W/O US: CPT | Performed by: PHYSICAL MEDICINE & REHABILITATION

## 2024-08-19 NOTE — PROGRESS NOTES
1. Primary osteoarthritis of both knees          Orders Placed This Encounter   Procedures    Large joint arthrocentesis        Impression:  Patient presents in follow-up of chronic bilateral knee pain with right worse than left, secondary to osteoarthritis (R- medial joint L- lateral joint). Treatment has included bilateral knee steroid injections. She is using a right knee medial  brace.  She would benefit from physical therapy for which there is an order. She should not be taking any NSAID's due to CKD. Today we continued HA injections.     Imaging Studies (I personally reviewed images in PACS and report if it was available):  Bilateral knee x-rays obtained on 10/8/2021.  Left knee shows lateral joint space narrowing more than medial joint space.  There is osteophytosis.  There is patellofemoral joint space narrowing.  The right knee shows medial joint space narrowing more than lateral.  There is patellofemoral joint space narrowing with osteophytosis.  No acute osseous abnormalities.  These findings are consistent with bilateral Kellgren Josiah grade 3 osteoarthritis.      No follow-ups on file.    Patient is in agreement with the above plan.    HPI:  Lela Gomez is a 69 y.o. adult  who presents in follow up.  Here for   Chief Complaint   Patient presents with    Right Knee - Pain, Injections    Left Knee - Pain, Injections       Since last visit: See above.    Following history reviewed and updated:  Past Medical History:   Diagnosis Date    Hyperlipidemia     Hypertension      History reviewed. No pertinent surgical history.  Social History   Social History     Substance and Sexual Activity   Alcohol Use Never     Social History     Substance and Sexual Activity   Drug Use Never     Social History     Tobacco Use   Smoking Status Never   Smokeless Tobacco Never     Family History   Problem Relation Age of Onset    No Known Problems Mother     No Known Problems Father      No Known Allergies      Constitutional:  There were no vitals taken for this visit.   General: NAD.  Eyes: Clear sclerae.  ENT: No inflammation, lesion, or mass of lips.  No tracheal deviation.  Musculoskeletal: As mentioned below.  Integumentary: No visible rashes or skin lesions.  Pulmonary/Chest: Effort normal. No respiratory distress.   Neuro: CN's grossly intact, GILBERT.  Psych: Normal affect and judgement.  Vascular: WWP.    Ortho Exam     Large joint arthrocentesis: bilateral knee  Universal Protocol:  procedure performed by consultantConsent: Verbal consent obtained. Written consent not obtained.  Risks and benefits: risks, benefits and alternatives were discussed  Consent given by: patient  Timeout called at: 8/19/2024 11:56 AM.  Patient understanding: patient states understanding of the procedure being performed  Site marked: the operative site was marked  Radiology Images displayed and confirmed. If images not available, report reviewed: imaging studies available  Patient identity confirmed: verbally with patient  Supporting Documentation  Indications: pain   Procedure Details  Location: knee - bilateral knee  Needle size: 22 G  Ultrasound guidance: no  Approach: Inferolateral to patella.    Medications (Right): 30 mg sodium hyaluronate 30 mg/2 mLMedications (Left): 30 mg sodium hyaluronate 30 mg/2 mL   Patient tolerance: patient tolerated the procedure well with no immediate complications  Dressing:  Sterile dressing applied    Risks of this procedure include:    - Risk of bleeding since a needle is involved.  - Risk of infection (1/10,000 chance as per recent studies).  Signs/symptoms were discussed and they would prompt an urgent evaluation at an emergency department.  - Risk of synovitis from the viscosupplementation.    The benefits outweigh the risks and so we proceeded with the procedure.

## 2024-08-23 DIAGNOSIS — E78.5 HYPERLIPIDEMIA, UNSPECIFIED HYPERLIPIDEMIA TYPE: ICD-10-CM

## 2024-08-23 RX ORDER — ATORVASTATIN CALCIUM 20 MG/1
20 TABLET, FILM COATED ORAL DAILY
Qty: 90 TABLET | Refills: 1 | Status: SHIPPED | OUTPATIENT
Start: 2024-08-23

## 2024-08-26 ENCOUNTER — PROCEDURE VISIT (OUTPATIENT)
Dept: OBGYN CLINIC | Facility: CLINIC | Age: 69
End: 2024-08-26
Payer: MEDICARE

## 2024-08-26 VITALS
SYSTOLIC BLOOD PRESSURE: 113 MMHG | WEIGHT: 175 LBS | HEART RATE: 80 BPM | HEIGHT: 60 IN | DIASTOLIC BLOOD PRESSURE: 72 MMHG | BODY MASS INDEX: 34.36 KG/M2

## 2024-08-26 DIAGNOSIS — M17.0 PRIMARY OSTEOARTHRITIS OF BOTH KNEES: Primary | ICD-10-CM

## 2024-08-26 PROCEDURE — 20610 DRAIN/INJ JOINT/BURSA W/O US: CPT | Performed by: PHYSICAL MEDICINE & REHABILITATION

## 2024-08-26 NOTE — PROGRESS NOTES
1. Primary osteoarthritis of both knees  Large joint arthrocentesis: bilateral knee    Ambulatory referral to Physical Therapy        Orders Placed This Encounter   Procedures    Large joint arthrocentesis: bilateral knee    Ambulatory referral to Physical Therapy        Impression:  Patient presents in follow-up of chronic bilateral knee pain with right worse than left, secondary to osteoarthritis (R- medial joint L- lateral joint). Treatment has included bilateral knee steroid injections. She is using a right knee medial  brace.  She would benefit from physical therapy for which there is an order. She should not be taking any NSAID's due to CKD. Today we completed HA injections. If no improvement, could consider referral to total joint surgery vs IA steroid injections.     Imaging Studies (I personally reviewed images in PACS and report if it was available):  Bilateral knee x-rays obtained on 10/8/2021.  Left knee shows lateral joint space narrowing more than medial joint space.  There is osteophytosis.  There is patellofemoral joint space narrowing.  The right knee shows medial joint space narrowing more than lateral.  There is patellofemoral joint space narrowing with osteophytosis.  No acute osseous abnormalities.  These findings are consistent with bilateral Kellgren Josiah grade 3 osteoarthritis.      No follow-ups on file.    Patient is in agreement with the above plan.    HPI:  Lela Gomez is a 69 y.o. adult  who presents in follow up.  Here for   Chief Complaint   Patient presents with    Right Knee - Pain, Injections    Left Knee - Pain, Injections       Since last visit: See above.    Following history reviewed and updated:  Past Medical History:   Diagnosis Date    Hyperlipidemia     Hypertension      History reviewed. No pertinent surgical history.  Social History   Social History     Substance and Sexual Activity   Alcohol Use Never     Social History     Substance and Sexual Activity   Drug  Use Never     Social History     Tobacco Use   Smoking Status Never   Smokeless Tobacco Never     Family History   Problem Relation Age of Onset    No Known Problems Mother     No Known Problems Father      No Known Allergies     Constitutional:  /72   Pulse 80   Ht 5' (1.524 m)   Wt 79.4 kg (175 lb)   BMI 34.18 kg/m²    General: NAD.  Eyes: Clear sclerae.  ENT: No inflammation, lesion, or mass of lips.  No tracheal deviation.  Musculoskeletal: As mentioned below.  Integumentary: No visible rashes or skin lesions.  Pulmonary/Chest: Effort normal. No respiratory distress.   Neuro: CN's grossly intact, GILBERT.  Psych: Normal affect and judgement.  Vascular: WWP.    Right Knee Exam     Tenderness   The patient is experiencing tenderness in the medial joint line.    Range of Motion   Extension:  normal     Tests   Varus: negative Valgus: negative  Patellar apprehension: negative    Other   Erythema: absent  Scars: absent  Sensation: normal  Pulse: present  Swelling: none  Effusion: no effusion present    Comments:  No referred pain into knee with passive hip internal rotation.      Left Knee Exam     Tenderness   The patient is experiencing tenderness in the medial joint line.    Range of Motion   Extension:  normal     Tests   Varus: negative Valgus: negative  Patellar apprehension: negative    Other   Erythema: absent  Scars: absent  Sensation: normal  Pulse: present  Swelling: none  Effusion: no effusion present    Comments:  No referred pain into knee with passive hip internal rotation.             Large joint arthrocentesis: bilateral knee  Universal Protocol:  procedure performed by consultantConsent: Verbal consent obtained. Written consent not obtained.  Risks and benefits: risks, benefits and alternatives were discussed  Consent given by: patient  Timeout called at: 8/19/2024 11:56 AM.  Patient understanding: patient states understanding of the procedure being performed  Site marked: the operative site  was marked  Radiology Images displayed and confirmed. If images not available, report reviewed: imaging studies available  Patient identity confirmed: verbally with patient  Supporting Documentation  Indications: pain   Procedure Details  Location: knee - bilateral knee  Needle size: 22 G  Ultrasound guidance: no  Approach: Inferolateral to patella.    Medications (Right): 30 mg sodium hyaluronate 30 mg/2 mLMedications (Left): 30 mg sodium hyaluronate 30 mg/2 mL   Patient tolerance: patient tolerated the procedure well with no immediate complications  Dressing:  Sterile dressing applied    Risks of this procedure include:    - Risk of bleeding since a needle is involved.  - Risk of infection (1/10,000 chance as per recent studies).  Signs/symptoms were discussed and they would prompt an urgent evaluation at an emergency department.  - Risk of synovitis from the viscosupplementation.    The benefits outweigh the risks and so we proceeded with the procedure.

## 2024-09-12 ENCOUNTER — EVALUATION (OUTPATIENT)
Dept: PHYSICAL THERAPY | Facility: CLINIC | Age: 69
End: 2024-09-12
Payer: MEDICARE

## 2024-09-12 DIAGNOSIS — M17.0 PRIMARY OSTEOARTHRITIS OF BOTH KNEES: ICD-10-CM

## 2024-09-12 PROCEDURE — 97530 THERAPEUTIC ACTIVITIES: CPT | Performed by: PHYSICAL THERAPIST

## 2024-09-12 PROCEDURE — 97162 PT EVAL MOD COMPLEX 30 MIN: CPT | Performed by: PHYSICAL THERAPIST

## 2024-09-12 NOTE — PROGRESS NOTES
PT Evaluation     Today's date: 2024  Patient name: Lela Gomez  : 1955  MRN: [id number]  Referring provider: David Rajput DO  Dx:   Encounter Diagnosis     ICD-10-CM    1. Primary osteoarthritis of both knees  M17.0 Ambulatory referral to Physical Therapy          Start Time: 1400  Stop Time: 1445  Total time in clinic (min): 45 minutes    Assessment  Impairments: abnormal gait, abnormal or restricted ROM, activity intolerance, impaired balance, impaired physical strength, lacks appropriate home exercise program, pain with function, weight-bearing intolerance, poor posture , poor body mechanics, participation limitations, activity limitations and endurance    Assessment details: Patient is a 69 y.o. adult who presents to outpatient PT with reports of chronic bilateral knee pain that started a few years ago and has been ongoing and severe in nature over the years. She recently received gel injections 3 series on both knees, however patient reports no difference in pain noted. Please note that patient has back pain as well, that relieved slightly with injection. Patient presented to initial evaluation in wheelchair due to difficulty walking long distances from parking lot into clinic. She reports she performs all ADLs and self-care tasks independently, however has home health aide to assist with other tasks as needed. She reports she lives with her son, daughter-in-law, and , however her  has certain health issues that she has attend to and assist him as well. Her current pain impairments affect her tolerance with standing, walking, and other Wbing tasks for prolonged periods of time. Patient unable to perform stair negotiation, thus lives in 1st floor set-up. Pain noted with knee mobility, thus limited flexion ROM. Weakness noted primarily in bilateral quads and hip flexors with difficulty to hold LAQ or supine SLR upon moderate resistance. She required some assistance with transfers, bed  mobility, and navigating short distances between table and wheelchair. Patient was educated on etiology, prognosis, pain science, and POC. Patient will benefit from skilled PT services to improve pain levels, improve LE mobility, improve strength and functional stability, and improve overall ease and independence with ADLs, household chores, and taking care of family to improve efficiency and reduce falls risk. Patient would benefit from skilled PT services to address these impairments and to maximize function.  Thank you for the referral.    Understanding of Dx/Px/POC: good     Prognosis: good    Goals  Impairment Goals 4-6 weeks   In order to maximize function patient will be able to...   - Decrease intensity/duration/frequency of pain to 4/10  - Demonstrate symmetrical knee AROM without pain  - Increase hip/knee strength to 4/5 throughout  - Demonstrate improved hip flexibility as demonstrated by increased ROM through therapeutic exercise    Functional Goals 6-8 weeks  In order to return to prior level of function patient will be able to...   - Participate in ADL's/IADL's/sport specific activities with no greater than 2/10 pain.    - Increase Functional Status Measure (FOTO) to: anticipated at discharge  - Demonstrate independence and compliant with HEP  - Demonstrate a squat and or sit to stand with good mechanics and eccentric control without pain/difficulty/compensation  - Demonstrate functional activities with good core and glute strength without compensation/pain/difficulty   - Patient will be able to demonstrate good gait mechanics without compensations.     Plan  Patient would benefit from: skilled PT  Planned modality interventions: cryotherapy, electrical stimulation/Russian stimulation and low level laser therapy  Other planned modality interventions: moist heat    Planned therapy interventions: joint mobilization, manual therapy, neuromuscular re-education, patient education, strengthening, stretching,  therapeutic activities, therapeutic exercise, home exercise program, functional ROM exercises, Lyle taping, postural training, balance/weight bearing training, body mechanics training, flexibility, IASTM, kinesiology taping, nerve gliding and massage    Frequency: 1-2x week  Duration in weeks: 4  Treatment plan discussed with: patient, PTA and referring physician        Subjective Evaluation    History of Present Illness  Mechanism of injury: Lela Gomez is a 69 y.o. year-old adult who presents with reports of chronic knee pain that started 3-4 years ago. She reports she was working where she frequently standing/walking and does a lot of standing for cooking at home too. Patient reports her pain is so severe at this time she cannot stand or walk for prolonged periods of time. She also is not able to do stairs and thus stays on the 1st floor. She received 3 series of gel injection over the past few months that patient reports did not change her symptoms. She was recommended for PT at this time. Please note that patient also has ongoing low back pain that she received injection for that relieved some of symptoms slightly.   Quality of life: good    Patient Goals  Patient goals for therapy: decreased pain, increased motion, improved balance, increased strength, independence with ADLs/IADLs and return to sport/leisure activities    Pain  Current pain ratin  At best pain ratin  At worst pain ratin  Location: both knees  Quality: throbbing, grinding, dull ache and sharp  Relieving factors: heat  Aggravating factors: sitting, standing, stair climbing, walking and lifting  Progression: worsening    Social Support  Steps to enter house: yes  Stairs in house: yes   Lives in: multiple-level home  Lives with: adult children, young children and spouse    Employment status: not working  Treatments  Previous treatment: physical therapy  Current treatment: physical therapy        Objective     Observations      Additional Observation Details  Patient presented to evaluation in wheelchair accompanied by her home health aide. She reports she was not able to walk from car to clinic due to knee pain. She is able to walk short distances for wheelchair to table transfers and short distances in clinic.    Standing Posture: bilateral knee valgus, medial patellar tracking    Palpation   Left   Muscle spasm in the distal biceps femoris, distal semimembranosus, distal semitendinosus, lateral gastrocnemius, medial gastrocnemius, rectus femoris, vastus lateralis and vastus medialis.     Right   Muscle spasm in the distal biceps femoris, distal semimembranosus, distal semitendinosus, lateral gastrocnemius, medial gastrocnemius, rectus femoris, vastus lateralis and vastus medialis.   Tenderness of the rectus femoris.     Tenderness   Left Knee   Tenderness in the inferior patella, ITB, lateral joint line, lateral patella, medial joint line, medial patella, patellar tendon, quadriceps tendon and superior patella.     Right Knee   Tenderness in the inferior patella, ITB, lateral joint line, lateral patella, medial joint line, medial patella, patellar tendon, quadriceps tendon and superior patella.     Neurological Testing     Sensation     Knee   Left Knee   Intact: Light touch    Right Knee   Intact: light touch     Active Range of Motion   Left Knee   Flexion: 85 degrees with pain  Extension: WFL and with pain    Right Knee   Flexion: 85 degrees with pain  Extension: WFL and with pain    Passive Range of Motion   Left Knee   Flexion: 90 degrees with pain  Extension: WFL    Right Knee   Flexion: 90 degrees with pain  Extension: WFL    Mobility   Patellar Mobility:   Left Knee   Hypomobile: left medial, left lateral, left superior and left inferior    Right Knee   Hypomobile: medial, lateral, superior and inferior     Patellar Static Positioning   Left Knee: medial tilt  Right Knee: medial tilt    Strength/Myotome Testing     Left Knee    Flexion: 3  Extension: 3  Quadriceps contraction: fair    Right Knee   Flexion: 3  Extension: 3  Quadriceps contraction: fair    Additional Strength Details  Hip Flexion MMT: bilaterally 3/5  Hip ABD/ER MMT: bilaterally 3-/5  Hip Extension: unable to assess due to pain              POC Expires Auth Status Start Date Expiration Date PT Visit Limit    10/12/2024 No Auth Req.   BOMN   Date 9/12/2024       Used 1       Remaining           Diagnosis: chronic bilateral knee pain   Precautions: *Paul/Tyree speaking only*; HTN, OA, GERD   Next Physician's Appt:   [facility] HEP:   Manuals 9/12       STM        PROM        Taping?                        Neuro Re-Ed        Quad sets        Glute sets        TA ball press        Hip ADD ball squeeze        BKFO/clams        LAQs        Wobble Board                        Ther Ex        Calf stretch        HS stretch        DKTC w/ ball        LTRs        Heel Raises        Side-stepping        Standing hip ABD, Ext                         Ther Activity             Bike for LE mobility             Mini Squats                Pt Ed HEP, POC       Re-Evaluation             Modalities             Heat/ice (PRN)         TENS

## 2024-09-17 ENCOUNTER — OFFICE VISIT (OUTPATIENT)
Dept: PHYSICAL THERAPY | Facility: CLINIC | Age: 69
End: 2024-09-17
Payer: MEDICARE

## 2024-09-17 DIAGNOSIS — M17.0 PRIMARY OSTEOARTHRITIS OF BOTH KNEES: Primary | ICD-10-CM

## 2024-09-17 PROCEDURE — 97140 MANUAL THERAPY 1/> REGIONS: CPT | Performed by: PHYSICAL THERAPIST

## 2024-09-17 PROCEDURE — 97110 THERAPEUTIC EXERCISES: CPT | Performed by: PHYSICAL THERAPIST

## 2024-09-17 PROCEDURE — 97530 THERAPEUTIC ACTIVITIES: CPT | Performed by: PHYSICAL THERAPIST

## 2024-09-17 NOTE — PROGRESS NOTES
"Daily Note     Today's date: 2024  Patient name: Lela Gomez  : 1955  MRN: [id number]  Referring provider: David Rajput DO  Dx:   Encounter Diagnosis     ICD-10-CM    1. Primary osteoarthritis of both knees  M17.0           Start Time: 1030  Stop Time: 1118  Total time in clinic (min): 48 minutes    Subjective: Patient reports no changes in pain since IE.      Objective: Treatment conducted communicating in Paul with this bilingual PT. See treatment diary below      Assessment: Tolerated treatment fair. Educated patient on pain science and symptom etiology and importance of strength training. Patient tolerated self-stretches and initiation of quad sets at this time. Good response overall to today's session with reduced pain reported end of session compared to on arrival. Patient exhibited good technique with therapeutic exercises and would benefit from continued PT      Plan: Continue per plan of care.  Progress treatment as tolerated.        POC Expires Auth Status Start Date Expiration Date PT Visit Limit    10/12/2024 No Auth Req.   BOMN   Date 2024      Used 1 2      Remaining           Diagnosis: chronic bilateral knee pain   Precautions: *Paul/Tyree speaking only*; HTN, OA, GERD   Next Physician's Appt:   [facility] HEP:   Manuals       STM  DK, bilateral knees      PROM        Taping?                        Neuro Re-Ed        Quad sets  3\" x10 ea cues      Glute sets        TA ball press  3\" 2x10      Hip ADD ball squeeze        BKFO/clams        LAQs        Wobble Board                        Ther Ex        Calf stretch  Strap 10\"x10 ea      HS stretch        DKTC w/ ball        LTRs  Alt x10 ea      Heel Raises        Side-stepping        Standing hip ABD, Ext                         Ther Activity             Bike for LE mobility             Transfers  Assistance CG(A) from W/C to table      Mini Squats                Pt Ed HEP, POC Pain science, etiology    "   Re-Evaluation             Modalities             Heat/ice (PRN)  Heat x10 min        TENS

## 2024-09-18 ENCOUNTER — APPOINTMENT (OUTPATIENT)
Dept: PHYSICAL THERAPY | Facility: CLINIC | Age: 69
End: 2024-09-18
Payer: MEDICARE

## 2024-09-19 ENCOUNTER — OFFICE VISIT (OUTPATIENT)
Dept: PHYSICAL THERAPY | Facility: CLINIC | Age: 69
End: 2024-09-19
Payer: MEDICARE

## 2024-09-19 DIAGNOSIS — M17.0 PRIMARY OSTEOARTHRITIS OF BOTH KNEES: Primary | ICD-10-CM

## 2024-09-19 PROCEDURE — 97140 MANUAL THERAPY 1/> REGIONS: CPT | Performed by: PHYSICAL THERAPIST

## 2024-09-19 PROCEDURE — 97530 THERAPEUTIC ACTIVITIES: CPT | Performed by: PHYSICAL THERAPIST

## 2024-09-19 PROCEDURE — 97110 THERAPEUTIC EXERCISES: CPT | Performed by: PHYSICAL THERAPIST

## 2024-09-19 NOTE — PROGRESS NOTES
"Daily Note     Today's date: 2024  Patient name: Lela Gomez  : 1955  MRN: [id number]  Referring provider: David Rajput DO  Dx:   Encounter Diagnosis     ICD-10-CM    1. Primary osteoarthritis of both knees  M17.0           Start Time: 1400  Stop Time: 1448  Total time in clinic (min): 48 minutes    Subjective: Patient reports she felt a little better after last session, but then started having pain again. She reports her back has been bothering her as well.      Objective: See treatment diary below      Assessment: Tolerated treatment fair. Education on overall pain science and etiology of symptoms. Reviewed postural deviations and compensations affect and contribute to pain levels. Difficulty with bending knees which impact overall activity participation and exercise progression. Trialed laser treatment today with reports of some reduce pain after. Patient exhibited good technique with therapeutic exercises and would benefit from continued PT      Plan: Continue per plan of care.  Progress treatment as tolerated.        POC Expires Auth Status Start Date Expiration Date PT Visit Limit    10/12/2024 No Auth Req.   BOMN   Date 2024     Used 1 2 3     Remaining           Diagnosis: chronic bilateral knee pain   Precautions: *Paul/Tyree speaking only*; HTN, OA, GERD   Next Physician's Appt:   [employer] HEP:   Manuals      STM  DK, bilateral knees      PROM        Taping?        Laser   DK, L 3600J, R 4000J x4min ea             Neuro Re-Ed        Quad sets  3\" x10 ea cues 3\" x10 ea cues     Glute sets        TA ball press  3\" 2x10      Hip ADD ball squeeze        BKFO/clams        LAQs        Wobble Board                        Ther Ex        Calf stretch  Strap 10\"x10 ea Strap 10\"x10 ea     HS stretch        DKTC w/ ball        LTRs  Alt x10 ea Alt x10 ea     Heel Raises        Side-stepping        Standing hip ABD, Ext                         Ther Activity       "       Bike for LE mobility             Transfers  Assistance CG(A) from W/C to table Assistance CG(A) from W/C to table     Mini Squats                Pt Ed HEP, POC Pain science, etiology Pain science, etiology     Re-Evaluation             Modalities             Heat/ice (PRN)  Heat x10 min  Heat x10 min      TENS

## 2024-09-20 PROBLEM — E87.6 HYPOKALEMIA: Status: ACTIVE | Noted: 2024-09-20

## 2024-09-20 PROBLEM — E78.5 HYPERLIPIDEMIA: Status: RESOLVED | Noted: 2021-02-03 | Resolved: 2024-09-20

## 2024-09-20 PROBLEM — E11.21 DIABETIC NEPHROPATHY ASSOCIATED WITH TYPE 2 DIABETES MELLITUS (HCC): Status: ACTIVE | Noted: 2024-09-20

## 2024-09-20 PROBLEM — E78.5 DYSLIPIDEMIA: Status: ACTIVE | Noted: 2024-09-20

## 2024-09-20 NOTE — PROGRESS NOTES
RENAL FOLLOW UP NOTE:.td    ASSESSMENT AND PLAN:  66 y.o. year old female with a history of hypertension/dyslipidemia/peripheral edema: We are asked to see regarding peripheral edema and hypertension.   We are seeing regarding hypertension/edema/CKD     1.  CKD stage 3A   I HAVE PERSONALLY REVIEWED  AND ANALYZED THE DATA/ LABS FOR THIS VISIT: DISCUSSION AS FOLLOWS  Etiology:  Hypertensive nephrosclerosis/arteriolar nephrosclerosis.  Baseline creatinine:  1.0  Current creatinine: 0.90 as of 6/27/2024 at baseline  Urine protein creatinine ratio: Negative albuminuria as of 6/27/2024  UA: Trace proteinuria but no hematuria or cell seen  Renal ultrasound:  Negative, bilateral renal vascular calcification otherwise unremarkable  Renal artery duplex:: Negative  Recommendations:  Treat hypertension-please see below  Treat dyslipidemia-please see below  Maintain proteinuria less than 1 g or as low as possible  Avoid nephrotoxic agents such as NSAIDs, and proton pump inhibitors if possible; patient counseled as such     2.  Volume:  Peripheral edema:  Seems compatible with lymphedema  ?  Medication related including dihydropyridine calcium channel blockers/Lyrica  Negative DVT:  Follow-up negative  Echo 02/07/2022: EF 65% diastolic function mildly abnormal with grade 1 relaxation and aortic valve sclerosis  Renal workup as outlined in particular ruling out proteinuria  Albumin is normal  Thyroid profile normal  Treatment:  Low-sodium diet  Leg elevation when able  Support hose especially if standing  Diuretics:  Torsemide 5 mg daily  Most likely lymphedema/venous stasis: Now seeing vascular surgery        3.  Hypertension:       Current blood pressure averages:  None today     Goal blood pressure:  Less than 130/80 given CKD     Recommendations:  Push nonmedical regimen including weight loss, isotonic exercise and a low sodium diet.  Patient has been counseled the such.  MedicationChanges today:    Decrease losartan to half  or 25 mg daily  If any symptoms of lightheadedness I advised to take blood pressure at that time     4.  Electrolytes:  Hypokalemia: Resolved at a level of 4.2 prior workup had been negative     5.  Mineral bone disorder:  Of chronic kidney disease:  Calcium/magnesium/phosphorus: Pending labs      6.  Dyslipidemia:  Goal LDL:  Less than 100 given CKD  Current lipid profile:  LDL 73/HDL 48/triglycerides 94 from 6/27/2024  Recommendations:   Low-cholesterol/low-fat diet / weight loss as appropriate and isotonic exercise   Medication changes today:  No changes as patient is at goal     7.  Anemia:  Current hemoglobin:  Normal at 11.8 from 6/27/2024     8.  Other problems:  Lymphedema  Dyslipidemia  GERD  Arthritis         GI HEALTH MAINTENANCE:  Patient has refused a colonoscopy understanding the potential risk of colon cancer and subsequent death if she does have colon cancer without being found.  She still refuses a colonoscopy at this time.          PATIENT INSTRUCTIONS:    Patient Instructions   Visit summary:  - Based on labs in June kidney function looks great  - Blood pressure is actually low I am going to decrease some of her medication    If you notice dizziness or lightheadedness please check her blood pressure to make sure it is not too low        1. Medication changes today:  Please stop the losartan 50 mg daily but begin losartan 25 mg daily in its place I did send in a prescription    2.  General instructions:  Continue to avoid salt  Continue to remain as active as possible    3.  Please go for fasting  lab work 1-2 weeks after making the above medication change.    4.  Please take 1 week a blood pressure readings in the next 4 to 6 weeks, sitting and standing, using the left arm morning and evening before medications    AS FOLLOWS  MORNING AND EVENING, SITTING AND STANDING as follows:  TAKE THE MORNING READINGS BEFORE ANY MEDICATIONS AND WHEN YOU ARE RELAXED FOR SEVERAL MINUTES  TAKE THE EVENING  READINGS:  BETWEEN 7-10 P.M.; PRIOR TO ANY MEDICATIONS; AT LEAST IN OUR  FROM DINNER; AND CERTAINLY AFTER RELAXING FOR A FEW MINUTES  PLEASE INCLUDE HEART RATE WITH YOUR BLOOD PRESSURE READINGS  When taking standing readings, keep your arm supported at heart level and not dangling  Make sure you are sitting with your back supported and feet on the ground and do not cross your legs or feet  Make sure you have not taken any coffee or caffeine products or exercised or smoke cigarettes at least 30 minutes before taking your blood pressure  Then please mail these readings into the office      5.  In 3 months:    Please take 1 week a blood pressure readings as outlined above and mail those into the office      6.  Follow-up in 6 months  Please bring in 1 week a blood pressure readings morning evening, sitting and standing is outlined above  PLEASE BRING AN YOUR BLOOD PRESSURE MACHINE TO CORRELATE WITH THE OFFICE MACHINE AT THIS NEXT SCHEDULED VISIT  Please go for fasting lab work 1-2 weeks prior to your appointment      7.  General non medical recommendations:  AVOID SALT BUT NOT ADDING AN READING LABELS TO MAKE SURE THERE IS LOW-SALT IN THE FOOD THAT YOU ARE EATING  Goal is less than 2 g of sodium intake or less than 5 g of sodium chloride intake per day    Avoid nonsteroidal anti-inflammatory drugs such as Naprosyn, ibuprofen, Aleve, Advil, Celebrex, Meloxicam (Mobic) etc.  You can use Tylenol as needed if you do not have any liver condition to be concerned about    Avoid medications such as Sudafed or decongestants and antihistamines that contained the D component which is the decongestant.  You can take antihistamines without the decongestant or D component.    Try to avoid medications such as pantoprazole or  Protonix/Nexium or Esomeprazole)/Prilosec or omeprazole/Prevacid or lansoprazole/AcipHex or Rabeprazole.  If you are able to, use Pepcid as this is safer for your kidneys.    Try to exercise at least 30  minutes 3 days a week to begin with with an ultimate goal of 5 days a week for at least 30 minutes    Please do not drink more than 2 glasses of alcohol/wine on a daily basis as this may contribute to your high blood pressure.            Subjective:   There has been no hospitalizations or acute illnesses since last visit.  The patient has back and leg pain and foot pain  No fevers, chills, or cough or colds.  Good appetite and good energy  No hematuria, dysuria, voiding symptoms or foamy urine  No gastrointestinal symptoms  No cardiovascular symptoms some mild leg/ankle swelling  Occasional headaches, dizziness or lightheadedness  Blood pressure medications:  Torsemide 5 mg daily  Spironolactone 25 mg daily in the morning  Losartan 50 mg daily in the morning    Renal pertinent medications:  Omeprazole 20 mg daily  Atorvastatin 20 mg daily    ROS:  See HPI, otherwise review of systems as completely reviewed with the patient are negative    Past Medical History:   Diagnosis Date    Hyperlipidemia     Hypertension      No past surgical history on file.  Family History   Problem Relation Age of Onset    No Known Problems Mother     No Known Problems Father       reports that she has never smoked. She has never used smokeless tobacco. She reports that she does not drink alcohol and does not use drugs.    I COMPLETELY REVIEWED THE PAST MEDICAL HISTORY/PAST SURGICAL HISTORY/SOCIAL HISTORY/FAMILY HISTORY/AND MEDICATIONS  AND UPDATED ALL    Objective:     Vitals:   BP sitting on left: 118/68 with a heart rate of 88 and regular  BP standing on left: 102/72 with a heart rate of 88 and regular    Weight (last 2 days)       Date/Time Weight    09/23/24 1340 76.7 (169)          Wt Readings from Last 3 Encounters:   09/23/24 76.7 kg (169 lb)   08/26/24 79.4 kg (175 lb)   08/12/24 79.4 kg (175 lb)       Body mass index is 33.01 kg/m².    Physical Exam: General:  No acute distress/obese  Skin:  No acute rash  Eyes:  No scleral  icterus, noninjected, no discharge from eyes  ENT:  Moist mucous membranes  Neck:  Supple, no jugular venous distention, trachea is midline, no lymphadenopathy and no thyromegaly  Back   No CVAT  Chest:  Clear to auscultation and percussion, good respiratory effort  CVS:  Regular rate and rhythm without a rub, or gallops or murmurs  Abdomen:  obese,Soft and nontender with normal bowel sounds  Extremities:  No cyanosis and no edema, no arthritic changes, normal range of motion  Neuro:  Grossly intact  Psych:  Alert, oriented x3 and appropriate      Medications:    Current Outpatient Medications:     acetaminophen (TYLENOL) 500 mg tablet, TAKE 1 TABLET (500 MG TOTAL) BY MOUTH EVERY 6 (SIX) HOURS AS NEEDED FOR MILD PAIN OR MODERATE PAIN, Disp: 30 tablet, Rfl: 1    atorvastatin (LIPITOR) 20 mg tablet, TAKE 1 TABLET (20 MG TOTAL) BY MOUTH DAILY, Disp: 90 tablet, Rfl: 1    Blood Pressure KIT, Use 1 (one) time for 1 dose, Disp: 1 kit, Rfl: 1    losartan (COZAAR) 25 mg tablet, Take 1 tablet (25 mg total) by mouth daily, Disp: 90 tablet, Rfl: 3    omeprazole (PriLOSEC) 20 mg delayed release capsule, TAKE 1 CAPSULE (20 MG TOTAL) BY MOUTH DAILY, Disp: 90 capsule, Rfl: 1    spironolactone (ALDACTONE) 25 mg tablet, TAKE 1 TABLET (25 MG TOTAL) BY MOUTH DAILY, Disp: 90 tablet, Rfl: 1    torsemide (DEMADEX) 5 MG tablet, TAKE 1 TABLET (5 MG TOTAL) BY MOUTH DAILY, Disp: 90 tablet, Rfl: 1    ammonium lactate (LAC-HYDRIN) 12 % lotion, APPLY TOPICALLY 2 (TWO) TIMES A DAY AS NEEDED FOR DRY SKIN (Patient not taking: Reported on 9/23/2024), Disp: 400 g, Rfl: 1    benzonatate (TESSALON PERLES) 100 mg capsule, Take 1 capsule (100 mg total) by mouth 3 (three) times a day as needed for cough (Patient not taking: Reported on 9/23/2024), Disp: 90 capsule, Rfl: 1    celecoxib (CeleBREX) 200 mg capsule, TAKE 1 CAPSULE (200 MG TOTAL) BY MOUTH DAILY (Patient not taking: Reported on 9/23/2024), Disp: 30 capsule, Rfl: 2    Cholecalciferol (Vitamin D3)  50 MCG (2000 UT) TABS, TAKE 1 TABLET (2,000 UNITS TOTAL) BY MOUTH DAILY (Patient not taking: Reported on 9/23/2024), Disp: 90 tablet, Rfl: 1    Diclofenac Sodium (VOLTAREN) 1 %, APPLY 2 G TOPICALLY 2 (TWO) TIMES A DAY (Patient not taking: Reported on 9/23/2024), Disp: 100 g, Rfl: 5    DULoxetine (CYMBALTA) 20 mg capsule, TAKE 1 CAPSULE (20 MG TOTAL) BY MOUTH DAILY (Patient not taking: Reported on 9/23/2024), Disp: 90 capsule, Rfl: 0    Eyelid Cleansers ([employer]cuSoft Eyelid Cleansing) PADS, APPLY AT BEDTIME AS DIRECTED SCRUB WITH PAD VS. FOAM ALONG CLOSED EYELIDS NIGHTLY PER DIRECTIONS. OCUSOFT OR SIMILAR BRAND (WIPES VS. FOAM) (Patient not taking: Reported on 9/23/2024), Disp: , Rfl:     fluticasone (FLONASE) 50 mcg/act nasal spray, 1 spray into each nostril in the morning. (Patient not taking: Reported on 6/5/2023), Disp: 15.8 mL, Rfl: 0    ibuprofen (MOTRIN) 200 mg tablet, Take 1 tablet (200 mg total) by mouth every 6 (six) hours as needed for mild pain or moderate pain (Patient not taking: Reported on 9/23/2024), Disp: 30 tablet, Rfl: 1    losartan (COZAAR) 100 MG tablet, Take 1 tablet (100 mg total) by mouth daily (Patient not taking: Reported on 10/16/2023), Disp: 90 tablet, Rfl: 3    magnesium Oxide (MAG-OX) 400 mg TABS, Take 400 mg by mouth in the morning and 400 mg in the evening. (Patient not taking: Reported on 6/5/2023), Disp: , Rfl:     methocarbamol (ROBAXIN) 500 mg tablet, TAKE 1 TABLET (500 MG TOTAL) BY MOUTH 2 (TWO) TIMES A DAY AS NEEDED FOR MUSCLE SPASMS (Patient not taking: Reported on 9/23/2024), Disp: 60 tablet, Rfl: 5    neomycin-polymyxin-dexamethasone (MAXITROL) 0.35%-10,000 units/g-0.1%, APPLY 0.25 INCH INTO AFFECTED EYE AT BEDTIME X 2 WEEKS THEN D/C (Patient not taking: Reported on 6/5/2023), Disp: , Rfl:     ondansetron (ZOFRAN) 4 mg tablet, Take 1 tablet (4 mg total) by mouth every 8 (eight) hours as needed for nausea or vomiting (Patient not taking: Reported on 9/23/2024), Disp: 20 tablet,  "Rfl: 0    promethazine-dextromethorphan (PHENERGAN-DM) 6.25-15 mg/5 mL oral syrup, Take 5 mL by mouth 4 (four) times a day as needed for cough (Patient not taking: Reported on 9/23/2024), Disp: 240 mL, Rfl: 1    Restasis 0.05 % ophthalmic emulsion, INSTILL 1 DROP BOTH EYES TWICE A DAY (Patient not taking: Reported on 6/5/2023), Disp: , Rfl:     Lab, Imaging and other studies: I have personally reviewed pertinent labs.  Laboratory Results:  Results for orders placed or performed in visit on 06/27/24   CBC and differential   Result Value Ref Range    WBC 6.59 4.31 - 10.16 Thousand/uL    RBC 4.10 3.81 - 5.12 Million/uL    Hemoglobin 11.8 11.5 - 15.4 g/dL    Hematocrit 38.0 34.8 - 46.1 %    MCV 93 82 - 98 fL    MCH 28.8 26.8 - 34.3 pg    MCHC 31.1 (L) 31.4 - 37.4 g/dL    RDW 17.6 (H) 11.6 - 15.1 %    MPV 13.6 (H) 8.9 - 12.7 fL    Platelets 139 (L) 149 - 390 Thousands/uL    nRBC 0 /100 WBCs    Segmented % 46 43 - 75 %    Immature Grans % 1 0 - 2 %    Lymphocytes % 40 14 - 44 %    Monocytes % 10 4 - 12 %    Eosinophils Relative 2 0 - 6 %    Basophils Relative 1 0 - 1 %    Absolute Neutrophils 3.11 1.85 - 7.62 Thousands/µL    Absolute Immature Grans 0.03 0.00 - 0.20 Thousand/uL    Absolute Lymphocytes 2.66 0.60 - 4.47 Thousands/µL    Absolute Monocytes 0.65 0.17 - 1.22 Thousand/µL    Eosinophils Absolute 0.10 0.00 - 0.61 Thousand/µL    Basophils Absolute 0.04 0.00 - 0.10 Thousands/µL   Lipid Panel with Direct LDL reflex   Result Value Ref Range    Cholesterol 140 See Comment mg/dL    Triglycerides 94 See Comment mg/dL    HDL, Direct 48 (L) >=50 mg/dL    LDL Calculated 73 0 - 100 mg/dL   Albumin / creatinine urine ratio   Result Value Ref Range    Creatinine, Ur 77.2 Reference range not established. mg/dL    Albumin,U,Random <7.0 <20.0 mg/L    Albumin Creat Ratio               Invalid input(s): \"ALBUMIN\"      Radiology review:   chest X-ray    Ultrasound      Portions of the record may have been created with voice " "recognition software.  Occasional wrong word or \"sound a like\" substitutions may have occurred due to the inherent limitations of voice recognition software.  Read the chart carefully and recognize, using context, where substitutions have occurred.                    "

## 2024-09-23 ENCOUNTER — OFFICE VISIT (OUTPATIENT)
Dept: NEPHROLOGY | Facility: CLINIC | Age: 69
End: 2024-09-23
Payer: MEDICARE

## 2024-09-23 VITALS — BODY MASS INDEX: 33.18 KG/M2 | WEIGHT: 169 LBS | HEIGHT: 60 IN

## 2024-09-23 DIAGNOSIS — E78.5 DYSLIPIDEMIA: ICD-10-CM

## 2024-09-23 DIAGNOSIS — E55.9 VITAMIN D DEFICIENCY: ICD-10-CM

## 2024-09-23 DIAGNOSIS — N18.31 CHRONIC KIDNEY DISEASE, STAGE 3A (HCC): ICD-10-CM

## 2024-09-23 DIAGNOSIS — E66.01 OBESITY, MORBID (HCC): ICD-10-CM

## 2024-09-23 DIAGNOSIS — E87.6 HYPOKALEMIA: ICD-10-CM

## 2024-09-23 DIAGNOSIS — I12.9 PARENCHYMAL RENAL HYPERTENSION, STAGE 1 THROUGH STAGE 4 OR UNSPECIFIED CHRONIC KIDNEY DISEASE: Primary | ICD-10-CM

## 2024-09-23 DIAGNOSIS — I12.9 HYPERTENSIVE RENAL DISEASE, STAGE 1 THROUGH STAGE 4 OR UNSPECIFIED CHRONIC KIDNEY DISEASE: ICD-10-CM

## 2024-09-23 DIAGNOSIS — I89.0 LYMPHEDEMA: ICD-10-CM

## 2024-09-23 DIAGNOSIS — E11.21 DIABETIC NEPHROPATHY ASSOCIATED WITH TYPE 2 DIABETES MELLITUS (HCC): ICD-10-CM

## 2024-09-23 PROCEDURE — 99214 OFFICE O/P EST MOD 30 MIN: CPT | Performed by: INTERNAL MEDICINE

## 2024-09-23 PROCEDURE — G2211 COMPLEX E/M VISIT ADD ON: HCPCS | Performed by: INTERNAL MEDICINE

## 2024-09-23 RX ORDER — BLOOD PRESSURE TEST KIT
KIT MISCELLANEOUS ONCE
Qty: 1 KIT | Refills: 1 | Status: SHIPPED | OUTPATIENT
Start: 2024-09-23 | End: 2024-09-23

## 2024-09-23 RX ORDER — LOSARTAN POTASSIUM 25 MG/1
25 TABLET ORAL DAILY
Qty: 90 TABLET | Refills: 3 | Status: SHIPPED | OUTPATIENT
Start: 2024-09-23

## 2024-09-23 NOTE — LETTER
September 23, 2024     Karolina Pringle MD  [address] [facility] [address]    Patient: Lela Gomez   YOB: 1955   Date of Visit: 9/23/2024       Dear Dr. Pringle:    Thank you for referring Lela Gomez to me for evaluation. Below are my notes for this consultation.    If you have questions, please do not hesitate to call me. I look forward to following your patient along with you.         Sincerely,        Ruben Patel MD        CC: No Recipients    Ruben Patel MD  9/23/2024  2:22 PM  Sign when Signing Visit  RENAL FOLLOW UP NOTE:.td    ASSESSMENT AND PLAN:  66 y.o. year old female with a history of hypertension/dyslipidemia/peripheral edema: We are asked to see regarding peripheral edema and hypertension.   We are seeing regarding hypertension/edema/CKD     1.  CKD stage 3A   I HAVE PERSONALLY REVIEWED  AND ANALYZED THE DATA/ LABS FOR THIS VISIT: DISCUSSION AS FOLLOWS  Etiology:  Hypertensive nephrosclerosis/arteriolar nephrosclerosis.  Baseline creatinine:  1.0  Current creatinine: 0.90 as of 6/27/2024 at baseline  Urine protein creatinine ratio: Negative albuminuria as of 6/27/2024  UA: Trace proteinuria but no hematuria or cell seen  Renal ultrasound:  Negative, bilateral renal vascular calcification otherwise unremarkable  Renal artery duplex:: Negative  Recommendations:  Treat hypertension-please see below  Treat dyslipidemia-please see below  Maintain proteinuria less than 1 g or as low as possible  Avoid nephrotoxic agents such as NSAIDs, and proton pump inhibitors if possible; patient counseled as such     2.  Volume:  Peripheral edema:  Seems compatible with lymphedema  ?  Medication related including dihydropyridine calcium channel blockers/Lyrica  Negative DVT:  Follow-up negative  Echo 02/07/2022: EF 65% diastolic function mildly abnormal with grade 1 relaxation and aortic valve sclerosis  Renal workup as outlined in particular ruling out proteinuria  Albumin is normal  Thyroid profile  normal  Treatment:  Low-sodium diet  Leg elevation when able  Support hose especially if standing  Diuretics:  Torsemide 5 mg daily  Most likely lymphedema/venous stasis: Now seeing vascular surgery        3.  Hypertension:       Current blood pressure averages:  None today     Goal blood pressure:  Less than 130/80 given CKD     Recommendations:  Push nonmedical regimen including weight loss, isotonic exercise and a low sodium diet.  Patient has been counseled the such.  MedicationChanges today:    Decrease losartan to half or 25 mg daily  If any symptoms of lightheadedness I advised to take blood pressure at that time     4.  Electrolytes:  Hypokalemia: Resolved at a level of 4.2 prior workup had been negative     5.  Mineral bone disorder:  Of chronic kidney disease:  Calcium/magnesium/phosphorus: Pending labs      6.  Dyslipidemia:  Goal LDL:  Less than 100 given CKD  Current lipid profile:  LDL 73/HDL 48/triglycerides 94 from 6/27/2024  Recommendations:   Low-cholesterol/low-fat diet / weight loss as appropriate and isotonic exercise   Medication changes today:  No changes as patient is at goal     7.  Anemia:  Current hemoglobin:  Normal at 11.8 from 6/27/2024     8.  Other problems:  Lymphedema  Dyslipidemia  GERD  Arthritis         GI HEALTH MAINTENANCE:  Patient has refused a colonoscopy understanding the potential risk of colon cancer and subsequent death if she does have colon cancer without being found.  She still refuses a colonoscopy at this time.          PATIENT INSTRUCTIONS:    Patient Instructions   Visit summary:  - Based on labs in June kidney function looks great  - Blood pressure is actually low I am going to decrease some of her medication    If you notice dizziness or lightheadedness please check her blood pressure to make sure it is not too low        1. Medication changes today:  Please stop the losartan 50 mg daily but begin losartan 25 mg daily in its place I did send in a  prescription    2.  General instructions:  Continue to avoid salt  Continue to remain as active as possible    3.  Please go for fasting  lab work 1-2 weeks after making the above medication change.    4.  Please take 1 week a blood pressure readings in the next 4 to 6 weeks, sitting and standing, using the left arm morning and evening before medications    AS FOLLOWS  MORNING AND EVENING, SITTING AND STANDING as follows:  TAKE THE MORNING READINGS BEFORE ANY MEDICATIONS AND WHEN YOU ARE RELAXED FOR SEVERAL MINUTES  TAKE THE EVENING READINGS:  BETWEEN 7-10 P.M.; PRIOR TO ANY MEDICATIONS; AT LEAST IN OUR  FROM DINNER; AND CERTAINLY AFTER RELAXING FOR A FEW MINUTES  PLEASE INCLUDE HEART RATE WITH YOUR BLOOD PRESSURE READINGS  When taking standing readings, keep your arm supported at heart level and not dangling  Make sure you are sitting with your back supported and feet on the ground and do not cross your legs or feet  Make sure you have not taken any coffee or caffeine products or exercised or smoke cigarettes at least 30 minutes before taking your blood pressure  Then please mail these readings into the office      5.  In 3 months:    Please take 1 week a blood pressure readings as outlined above and mail those into the office      6.  Follow-up in 6 months  Please bring in 1 week a blood pressure readings morning evening, sitting and standing is outlined above  PLEASE BRING AN YOUR BLOOD PRESSURE MACHINE TO CORRELATE WITH THE OFFICE MACHINE AT THIS NEXT SCHEDULED VISIT  Please go for fasting lab work 1-2 weeks prior to your appointment      7.  General non medical recommendations:  AVOID SALT BUT NOT ADDING AN READING LABELS TO MAKE SURE THERE IS LOW-SALT IN THE FOOD THAT YOU ARE EATING  Goal is less than 2 g of sodium intake or less than 5 g of sodium chloride intake per day    Avoid nonsteroidal anti-inflammatory drugs such as Naprosyn, ibuprofen, Aleve, Advil, Celebrex, Meloxicam (Mobic) etc.  You can  use Tylenol as needed if you do not have any liver condition to be concerned about    Avoid medications such as Sudafed or decongestants and antihistamines that contained the D component which is the decongestant.  You can take antihistamines without the decongestant or D component.    Try to avoid medications such as pantoprazole or  Protonix/Nexium or Esomeprazole)/Prilosec or omeprazole/Prevacid or lansoprazole/AcipHex or Rabeprazole.  If you are able to, use Pepcid as this is safer for your kidneys.    Try to exercise at least 30 minutes 3 days a week to begin with with an ultimate goal of 5 days a week for at least 30 minutes    Please do not drink more than 2 glasses of alcohol/wine on a daily basis as this may contribute to your high blood pressure.            Subjective:   There has been no hospitalizations or acute illnesses since last visit.  The patient has back and leg pain and foot pain  No fevers, chills, or cough or colds.  Good appetite and good energy  No hematuria, dysuria, voiding symptoms or foamy urine  No gastrointestinal symptoms  No cardiovascular symptoms some mild leg/ankle swelling  Occasional headaches, dizziness or lightheadedness  Blood pressure medications:  Torsemide 5 mg daily  Spironolactone 25 mg daily in the morning  Losartan 50 mg daily in the morning    Renal pertinent medications:  Omeprazole 20 mg daily  Atorvastatin 20 mg daily    ROS:  See HPI, otherwise review of systems as completely reviewed with the patient are negative    Past Medical History:   Diagnosis Date   • Hyperlipidemia    • Hypertension      No past surgical history on file.  Family History   Problem Relation Age of Onset   • No Known Problems Mother    • No Known Problems Father       reports that she has never smoked. She has never used smokeless tobacco. She reports that she does not drink alcohol and does not use drugs.    I COMPLETELY REVIEWED THE PAST MEDICAL HISTORY/PAST SURGICAL HISTORY/SOCIAL  HISTORY/FAMILY HISTORY/AND MEDICATIONS  AND UPDATED ALL    Objective:     Vitals:   BP sitting on left: 118/68 with a heart rate of 88 and regular  BP standing on left: 102/72 with a heart rate of 88 and regular    Weight (last 2 days)       Date/Time Weight    09/23/24 1340 76.7 (169)          Wt Readings from Last 3 Encounters:   09/23/24 76.7 kg (169 lb)   08/26/24 79.4 kg (175 lb)   08/12/24 79.4 kg (175 lb)       Body mass index is 33.01 kg/m².    Physical Exam: General:  No acute distress/obese  Skin:  No acute rash  Eyes:  No scleral icterus, noninjected, no discharge from eyes  ENT:  Moist mucous membranes  Neck:  Supple, no jugular venous distention, trachea is midline, no lymphadenopathy and no thyromegaly  Back   No CVAT  Chest:  Clear to auscultation and percussion, good respiratory effort  CVS:  Regular rate and rhythm without a rub, or gallops or murmurs  Abdomen:  obese,Soft and nontender with normal bowel sounds  Extremities:  No cyanosis and no edema, no arthritic changes, normal range of motion  Neuro:  Grossly intact  Psych:  Alert, oriented x3 and appropriate      Medications:    Current Outpatient Medications:   •  acetaminophen (TYLENOL) 500 mg tablet, TAKE 1 TABLET (500 MG TOTAL) BY MOUTH EVERY 6 (SIX) HOURS AS NEEDED FOR MILD PAIN OR MODERATE PAIN, Disp: 30 tablet, Rfl: 1  •  atorvastatin (LIPITOR) 20 mg tablet, TAKE 1 TABLET (20 MG TOTAL) BY MOUTH DAILY, Disp: 90 tablet, Rfl: 1  •  Blood Pressure KIT, Use 1 (one) time for 1 dose, Disp: 1 kit, Rfl: 1  •  losartan (COZAAR) 25 mg tablet, Take 1 tablet (25 mg total) by mouth daily, Disp: 90 tablet, Rfl: 3  •  omeprazole (PriLOSEC) 20 mg delayed release capsule, TAKE 1 CAPSULE (20 MG TOTAL) BY MOUTH DAILY, Disp: 90 capsule, Rfl: 1  •  spironolactone (ALDACTONE) 25 mg tablet, TAKE 1 TABLET (25 MG TOTAL) BY MOUTH DAILY, Disp: 90 tablet, Rfl: 1  •  torsemide (DEMADEX) 5 MG tablet, TAKE 1 TABLET (5 MG TOTAL) BY MOUTH DAILY, Disp: 90 tablet, Rfl: 1  •   ammonium lactate (LAC-HYDRIN) 12 % lotion, APPLY TOPICALLY 2 (TWO) TIMES A DAY AS NEEDED FOR DRY SKIN (Patient not taking: Reported on 9/23/2024), Disp: 400 g, Rfl: 1  •  benzonatate (TESSALON PERLES) 100 mg capsule, Take 1 capsule (100 mg total) by mouth 3 (three) times a day as needed for cough (Patient not taking: Reported on 9/23/2024), Disp: 90 capsule, Rfl: 1  •  celecoxib (CeleBREX) 200 mg capsule, TAKE 1 CAPSULE (200 MG TOTAL) BY MOUTH DAILY (Patient not taking: Reported on 9/23/2024), Disp: 30 capsule, Rfl: 2  •  Cholecalciferol (Vitamin D3) 50 MCG (2000 UT) TABS, TAKE 1 TABLET (2,000 UNITS TOTAL) BY MOUTH DAILY (Patient not taking: Reported on 9/23/2024), Disp: 90 tablet, Rfl: 1  •  Diclofenac Sodium (VOLTAREN) 1 %, APPLY 2 G TOPICALLY 2 (TWO) TIMES A DAY (Patient not taking: Reported on 9/23/2024), Disp: 100 g, Rfl: 5  •  DULoxetine (CYMBALTA) 20 mg capsule, TAKE 1 CAPSULE (20 MG TOTAL) BY MOUTH DAILY (Patient not taking: Reported on 9/23/2024), Disp: 90 capsule, Rfl: 0  •  Eyelid Cleansers ([employer] Eyelid Cleansing) PADS, APPLY AT BEDTIME AS DIRECTED SCRUB WITH PAD VS. FOAM ALONG CLOSED EYELIDS NIGHTLY PER DIRECTIONS. OCUSOFT OR SIMILAR BRAND (WIPES VS. FOAM) (Patient not taking: Reported on 9/23/2024), Disp: , Rfl:   •  fluticasone (FLONASE) 50 mcg/act nasal spray, 1 spray into each nostril in the morning. (Patient not taking: Reported on 6/5/2023), Disp: 15.8 mL, Rfl: 0  •  ibuprofen (MOTRIN) 200 mg tablet, Take 1 tablet (200 mg total) by mouth every 6 (six) hours as needed for mild pain or moderate pain (Patient not taking: Reported on 9/23/2024), Disp: 30 tablet, Rfl: 1  •  losartan (COZAAR) 100 MG tablet, Take 1 tablet (100 mg total) by mouth daily (Patient not taking: Reported on 10/16/2023), Disp: 90 tablet, Rfl: 3  •  magnesium Oxide (MAG-OX) 400 mg TABS, Take 400 mg by mouth in the morning and 400 mg in the evening. (Patient not taking: Reported on 6/5/2023), Disp: , Rfl:   •  methocarbamol  (ROBAXIN) 500 mg tablet, TAKE 1 TABLET (500 MG TOTAL) BY MOUTH 2 (TWO) TIMES A DAY AS NEEDED FOR MUSCLE SPASMS (Patient not taking: Reported on 9/23/2024), Disp: 60 tablet, Rfl: 5  •  neomycin-polymyxin-dexamethasone (MAXITROL) 0.35%-10,000 units/g-0.1%, APPLY 0.25 INCH INTO AFFECTED EYE AT BEDTIME X 2 WEEKS THEN D/C (Patient not taking: Reported on 6/5/2023), Disp: , Rfl:   •  ondansetron (ZOFRAN) 4 mg tablet, Take 1 tablet (4 mg total) by mouth every 8 (eight) hours as needed for nausea or vomiting (Patient not taking: Reported on 9/23/2024), Disp: 20 tablet, Rfl: 0  •  promethazine-dextromethorphan (PHENERGAN-DM) 6.25-15 mg/5 mL oral syrup, Take 5 mL by mouth 4 (four) times a day as needed for cough (Patient not taking: Reported on 9/23/2024), Disp: 240 mL, Rfl: 1  •  Restasis 0.05 % ophthalmic emulsion, INSTILL 1 DROP BOTH EYES TWICE A DAY (Patient not taking: Reported on 6/5/2023), Disp: , Rfl:     Lab, Imaging and other studies: I have personally reviewed pertinent labs.  Laboratory Results:  Results for orders placed or performed in visit on 06/27/24   CBC and differential   Result Value Ref Range    WBC 6.59 4.31 - 10.16 Thousand/uL    RBC 4.10 3.81 - 5.12 Million/uL    Hemoglobin 11.8 11.5 - 15.4 g/dL    Hematocrit 38.0 34.8 - 46.1 %    MCV 93 82 - 98 fL    MCH 28.8 26.8 - 34.3 pg    MCHC 31.1 (L) 31.4 - 37.4 g/dL    RDW 17.6 (H) 11.6 - 15.1 %    MPV 13.6 (H) 8.9 - 12.7 fL    Platelets 139 (L) 149 - 390 Thousands/uL    nRBC 0 /100 WBCs    Segmented % 46 43 - 75 %    Immature Grans % 1 0 - 2 %    Lymphocytes % 40 14 - 44 %    Monocytes % 10 4 - 12 %    Eosinophils Relative 2 0 - 6 %    Basophils Relative 1 0 - 1 %    Absolute Neutrophils 3.11 1.85 - 7.62 Thousands/µL    Absolute Immature Grans 0.03 0.00 - 0.20 Thousand/uL    Absolute Lymphocytes 2.66 0.60 - 4.47 Thousands/µL    Absolute Monocytes 0.65 0.17 - 1.22 Thousand/µL    Eosinophils Absolute 0.10 0.00 - 0.61 Thousand/µL    Basophils Absolute 0.04 0.00  "- 0.10 Thousands/µL   Lipid Panel with Direct LDL reflex   Result Value Ref Range    Cholesterol 140 See Comment mg/dL    Triglycerides 94 See Comment mg/dL    HDL, Direct 48 (L) >=50 mg/dL    LDL Calculated 73 0 - 100 mg/dL   Albumin / creatinine urine ratio   Result Value Ref Range    Creatinine, Ur 77.2 Reference range not established. mg/dL    Albumin,U,Random <7.0 <20.0 mg/L    Albumin Creat Ratio               Invalid input(s): \"ALBUMIN\"      Radiology review:   chest X-ray    Ultrasound      Portions of the record may have been created with voice recognition software.  Occasional wrong word or \"sound a like\" substitutions may have occurred due to the inherent limitations of voice recognition software.  Read the chart carefully and recognize, using context, where substitutions have occurred.                    "

## 2024-09-23 NOTE — PATIENT INSTRUCTIONS
Visit summary:  - Based on labs in June kidney function looks great  - Blood pressure is actually low I am going to decrease some of her medication    If you notice dizziness or lightheadedness please check her blood pressure to make sure it is not too low        1. Medication changes today:  Please stop the losartan 50 mg daily but begin losartan 25 mg daily in its place I did send in a prescription    2.  General instructions:  Continue to avoid salt  Continue to remain as active as possible    3.  Please go for fasting  lab work 1-2 weeks after making the above medication change.    4.  Please take 1 week a blood pressure readings in the next 4 to 6 weeks, sitting and standing, using the left arm morning and evening before medications    AS FOLLOWS  MORNING AND EVENING, SITTING AND STANDING as follows:  TAKE THE MORNING READINGS BEFORE ANY MEDICATIONS AND WHEN YOU ARE RELAXED FOR SEVERAL MINUTES  TAKE THE EVENING READINGS:  BETWEEN 7-10 P.M.; PRIOR TO ANY MEDICATIONS; AT LEAST IN OUR  FROM DINNER; AND CERTAINLY AFTER RELAXING FOR A FEW MINUTES  PLEASE INCLUDE HEART RATE WITH YOUR BLOOD PRESSURE READINGS  When taking standing readings, keep your arm supported at heart level and not dangling  Make sure you are sitting with your back supported and feet on the ground and do not cross your legs or feet  Make sure you have not taken any coffee or caffeine products or exercised or smoke cigarettes at least 30 minutes before taking your blood pressure  Then please mail these readings into the office      5.  In 3 months:    Please take 1 week a blood pressure readings as outlined above and mail those into the office      6.  Follow-up in 6 months  Please bring in 1 week a blood pressure readings morning evening, sitting and standing is outlined above  PLEASE BRING AN YOUR BLOOD PRESSURE MACHINE TO CORRELATE WITH THE OFFICE MACHINE AT THIS NEXT SCHEDULED VISIT  Please go for fasting lab work 1-2 weeks prior to  your appointment      7.  General non medical recommendations:  AVOID SALT BUT NOT ADDING AN READING LABELS TO MAKE SURE THERE IS LOW-SALT IN THE FOOD THAT YOU ARE EATING  Goal is less than 2 g of sodium intake or less than 5 g of sodium chloride intake per day    Avoid nonsteroidal anti-inflammatory drugs such as Naprosyn, ibuprofen, Aleve, Advil, Celebrex, Meloxicam (Mobic) etc.  You can use Tylenol as needed if you do not have any liver condition to be concerned about    Avoid medications such as Sudafed or decongestants and antihistamines that contained the D component which is the decongestant.  You can take antihistamines without the decongestant or D component.    Try to avoid medications such as pantoprazole or  Protonix/Nexium or Esomeprazole)/Prilosec or omeprazole/Prevacid or lansoprazole/AcipHex or Rabeprazole.  If you are able to, use Pepcid as this is safer for your kidneys.    Try to exercise at least 30 minutes 3 days a week to begin with with an ultimate goal of 5 days a week for at least 30 minutes    Please do not drink more than 2 glasses of alcohol/wine on a daily basis as this may contribute to your high blood pressure.

## 2024-09-24 DIAGNOSIS — L85.3 XEROSIS CUTIS: ICD-10-CM

## 2024-09-24 RX ORDER — AMMONIUM LACTATE 12 G/100G
LOTION TOPICAL 2 TIMES DAILY PRN
Qty: 400 G | Refills: 1 | Status: SHIPPED | OUTPATIENT
Start: 2024-09-24

## 2024-10-01 ENCOUNTER — OFFICE VISIT (OUTPATIENT)
Dept: PHYSICAL THERAPY | Facility: CLINIC | Age: 69
End: 2024-10-01
Payer: MEDICARE

## 2024-10-01 DIAGNOSIS — M17.0 PRIMARY OSTEOARTHRITIS OF BOTH KNEES: Primary | ICD-10-CM

## 2024-10-01 PROCEDURE — 97140 MANUAL THERAPY 1/> REGIONS: CPT | Performed by: PHYSICAL THERAPIST

## 2024-10-01 PROCEDURE — 97530 THERAPEUTIC ACTIVITIES: CPT | Performed by: PHYSICAL THERAPIST

## 2024-10-01 PROCEDURE — 97110 THERAPEUTIC EXERCISES: CPT | Performed by: PHYSICAL THERAPIST

## 2024-10-01 NOTE — PROGRESS NOTES
Daily Note     Today's date: 10/1/2024  Patient name: Lela Gomez  : 1955  MRN: [id number]  Referring provider: David Rajput DO  Dx:   Encounter Diagnosis     ICD-10-CM    1. Primary osteoarthritis of both knees  M17.0           Start Time: 1400  Stop Time: 1445  Total time in clinic (min): 45 minutes    Subjective: Patient reports pain has not changed in the past week. She reports she felt better after last PT session, but had pain again the next day. She reports somewhat compliance with HEP at home.      Objective: See treatment diary below      Assessment: Tolerated treatment well. Patient noted to have good mobility with self-stretches Left knee flexion, some difficulty with initiating Right knee flexion due to pain but improved with repetitions and self-stretches. Ended session with TENS and moist heat with reduced pain reported after. Patient demonstrates impairment of bilateral knee ROM and weakness due to atrophy of bilateral quadriceps and associated muscles that have lead to an inability to complete functional activity. Examination shows that the nerves to the muscles are intact and atrophy is present in these areas. This patient would benefit from a home ESTIM unit for treatment compliance in order to address muscle re-education (atrophy), weakness, ROM, and blood circulation. Patient exhibited good technique with therapeutic exercises and would benefit from continued PT      Plan: Continue per plan of care.  Progress treatment as tolerated.        POC Expires Auth Status Start Date Expiration Date PT Visit Limit    10/12/2024 No Auth Req.   BOMN   Date 2024 2024 2024 10/1/2024    Used 1 2 3 4    Remaining           Diagnosis: chronic bilateral knee pain   Precautions: *Paul/Tyree speaking only*; HTN, OA, GERD   Next Physician's Appt:   [id number] HEP:   Manuals 9/12 9/17 9/19 10/1    STM  DK, bilateral knees  DK, bilateral knees    PROM        Taping?        Laser   DK, L 3600J,  "R 4000J x4min ea             Neuro Re-Ed        Quad sets  3\" x10 ea cues 3\" x10 ea cues 3\" 2x10 ea cues    Glute sets        TA ball press  3\" 2x10      Hip ADD ball squeeze        BKFO/clams        LAQs        Wobble Board                        Ther Ex        Calf stretch  Strap 10\"x10 ea Strap 10\"x10 ea Strap 10\"x10 ea    HS stretch        DKTC w/ ball        LTRs  Alt x10 ea Alt x10 ea     Heel slides with strap    AAROM x10 ea    Heel Raises        Side-stepping        Standing hip ABD, Ext                         Ther Activity             Bike for LE mobility             Transfers  Assistance CG(A) from W/C to table Assistance CG(A) from W/C to table Assistance CG(A) from W/C to table    Gait Training    Short distance in clinic from W/C to table    Mini Squats                Pt Ed HEP, POC Pain science, etiology Pain science, etiology Pain science, etiology    Re-Evaluation             Modalities             Heat/ice (PRN)  Heat x10 min  Heat x10 min Heat x8 min     TENS        with heat                   "

## 2024-10-03 ENCOUNTER — OFFICE VISIT (OUTPATIENT)
Dept: PHYSICAL THERAPY | Facility: CLINIC | Age: 69
End: 2024-10-03
Payer: MEDICARE

## 2024-10-03 DIAGNOSIS — M17.0 PRIMARY OSTEOARTHRITIS OF BOTH KNEES: Primary | ICD-10-CM

## 2024-10-03 PROCEDURE — 97530 THERAPEUTIC ACTIVITIES: CPT | Performed by: PHYSICAL THERAPIST

## 2024-10-03 PROCEDURE — 97112 NEUROMUSCULAR REEDUCATION: CPT | Performed by: PHYSICAL THERAPIST

## 2024-10-03 PROCEDURE — 97110 THERAPEUTIC EXERCISES: CPT | Performed by: PHYSICAL THERAPIST

## 2024-10-03 NOTE — PROGRESS NOTES
"Daily Note     Today's date: 10/3/2024  Patient name: Lela Gomez  : 1955  MRN: [id number]  Referring provider: David Rajput DO  Dx:   Encounter Diagnosis     ICD-10-CM    1. Primary osteoarthritis of both knees  M17.0           Start Time: 1220  Stop Time: 1300  Total time in clinic (min): 40 minutes    Subjective: Patient reports TENS unit last visit provided some relief in pain, however started to feel pain again yesterday.       Objective: See treatment diary below      Assessment: Tolerated treatment well. Introduced standing heel raises, patient unable to do bilateral at same time, but perform unilaterally with UE support due to pain. Improved knee flexion mobility with self-stretch noted with Left knee, limitations noted due to pain on Right knee, but improved slightly with repetitions. Ended session with TENS and heat that provided some relief in pain. Patient exhibited good technique with therapeutic exercises and would benefit from continued PT      Plan: Continue per plan of care.  Progress treatment as tolerated.        POC Expires Auth Status Start Date Expiration Date PT Visit Limit    10/12/2024 No Auth Req.   BOMN   Date 2024 2024 2024 10/1/2024 10/3/2024   Used 1 2 3 4 5   Remaining           Diagnosis: chronic bilateral knee pain   Precautions: *Paul/Tyree speaking only*; HTN, OA, GERD   Next Physician's Appt:   [email or web address] HEP:   Manuals 9/12 9/17 9/19 10/1 10/3   STM  DK, bilateral knees  DK, bilateral knees    PROM        Taping?        Laser   DK, L 3600J, R 4000J x4min ea             Neuro Re-Ed        Quad sets  3\" x10 ea cues 3\" x10 ea cues 3\" 2x10 ea cues 3\" 2x10 ea   Supine SLR     +QS R AAROM x5, L AROM x7   Glute sets        TA ball press  3\" 2x10      Hip ADD ball squeeze     H/L 2\" 2x10   BKFO/clams        LAQs        Wobble Board                        Ther Ex        Calf stretch  Strap 10\"x10 ea Strap 10\"x10 ea Strap 10\"x10 ea Strap 10\"x10 ea   HS stretch     "    DKTC w/ ball        LTRs  Alt x10 ea Alt x10 ea     Heel slides with strap    AAROM x10 ea AAROM x15 ea   Heel Raises     Unilat with UE support x10 ea   Side-stepping        Standing hip ABD, Ext                         Ther Activity             Bike for LE mobility             Transfers  Assistance CG(A) from W/C to table Assistance CG(A) from W/C to table Assistance CG(A) from W/C to table    Gait Training    Short distance in clinic from W/C to table Short distance in clinic from W/C to table   Mini Squats                Pt Ed HEP, POC Pain science, etiology Pain science, etiology Pain science, etiology Pain science   Re-Evaluation             Modalities             Heat/ice (PRN)  Heat x10 min  Heat x10 min Heat x8 min Heat x8 min    TENS        with heat  with heat

## 2024-10-08 ENCOUNTER — OFFICE VISIT (OUTPATIENT)
Dept: PHYSICAL THERAPY | Facility: CLINIC | Age: 69
End: 2024-10-08
Payer: MEDICARE

## 2024-10-08 DIAGNOSIS — M17.0 PRIMARY OSTEOARTHRITIS OF BOTH KNEES: Primary | ICD-10-CM

## 2024-10-08 PROCEDURE — 97110 THERAPEUTIC EXERCISES: CPT | Performed by: PHYSICAL THERAPIST

## 2024-10-08 PROCEDURE — 97112 NEUROMUSCULAR REEDUCATION: CPT | Performed by: PHYSICAL THERAPIST

## 2024-10-08 PROCEDURE — 97530 THERAPEUTIC ACTIVITIES: CPT | Performed by: PHYSICAL THERAPIST

## 2024-10-08 NOTE — PROGRESS NOTES
"Daily Note     Today's date: 10/8/2024  Patient name: Lela Gomez  : 1955  MRN: [id number]  Referring provider: David Rajput DO  Dx:   Encounter Diagnosis     ICD-10-CM    1. Primary osteoarthritis of both knees  M17.0           Start Time: 1415  Stop Time: 1500  Total time in clinic (min): 45 minutes    Subjective: Patient reports her muscles are more achy and sore at night.      Objective: See treatment diary below      Assessment: Tolerated treatment well. Educated patient on DOMS and need for muscle strengthening in order to tolerate increased walking and standing activities/household chores at home. Introduced nustep beginning of session with difficulty initially but improved with repetitions.  Added standing side-stepping to improve hip abductor strength and weight-bearing. Cuing required on posture. Patient exhibited good technique with therapeutic exercises and would benefit from continued PT      Plan: Continue per plan of care.  Progress note during next visit.       POC Expires Auth Status Start Date Expiration Date PT Visit Limit    10/12/2024 No Auth Req.   BOMN   Date 10/8/2024 2024 2024 10/1/2024 10/3/2024   Used 6 2 3 4 5   Remaining           Diagnosis: chronic bilateral knee pain   Precautions: *Paul/Tyree speaking only*; HTN, OA, GERD   Next Physician's Appt:   [facility] HEP:   Manuals 10/8 9/17 9/19 10/1 10/3   STM  DK, bilateral knees  DK, bilateral knees    PROM        Taping?        Laser   DK, L 3600J, R 4000J x4min ea             Neuro Re-Ed        Quad sets 3\" x20 ea 3\" x10 ea cues 3\" x10 ea cues 3\" 2x10 ea cues 3\" 2x10 ea   Supine SLR     +QS R AAROM x5, L AROM x7   Glute sets        TA ball press  3\" 2x10      Hip ADD ball squeeze     H/L 2\" 2x10   BKFO/clams        LAQs AROM x15 ea       Wobble Board                        Ther Ex        Calf stretch Strap 10\"x10 ea Strap 10\"x10 ea Strap 10\"x10 ea Strap 10\"x10 ea Strap 10\"x10 ea   HS stretch        DKTC w/ ball      "   LTRs  Alt x10 ea Alt x10 ea     Heel slides with strap AAROM x15 ea   AAROM x10 ea AAROM x15 ea   Heel Raises Unilat with UE support x10 ea    Unilat with UE support x10 ea   Side-stepping @ table x3 laps cues       Standing hip ABD, Ext                         Ther Activity            Bike for LE mobility NuStep Lv1 x5min cues           Transfers  Assistance CG(A) from W/C to table Assistance CG(A) from W/C to table Assistance CG(A) from W/C to table    Gait Training Short distance in clinic from W/C to table   Short distance in clinic from W/C to table Short distance in clinic from W/C to table   Mini Squats                Pt Ed  Pain science, etiology Pain science, etiology Pain science, etiology Pain science   Re-Evaluation            Modalities            Heat/ice (PRN) Heat x8 min Heat x10 min  Heat x10 min Heat x8 min Heat x8 min    TENS with heat      with heat  with heat

## 2024-10-10 ENCOUNTER — EVALUATION (OUTPATIENT)
Dept: PHYSICAL THERAPY | Facility: CLINIC | Age: 69
End: 2024-10-10
Payer: MEDICARE

## 2024-10-10 DIAGNOSIS — M17.0 PRIMARY OSTEOARTHRITIS OF BOTH KNEES: Primary | ICD-10-CM

## 2024-10-10 PROCEDURE — 97110 THERAPEUTIC EXERCISES: CPT | Performed by: PHYSICAL THERAPIST

## 2024-10-10 PROCEDURE — 97530 THERAPEUTIC ACTIVITIES: CPT | Performed by: PHYSICAL THERAPIST

## 2024-10-10 PROCEDURE — 97140 MANUAL THERAPY 1/> REGIONS: CPT | Performed by: PHYSICAL THERAPIST

## 2024-10-10 NOTE — PROGRESS NOTES
PT Re-Evaluation     Today's date: 10/10/2024  Patient name: Lela Gomez  : 1955  MRN: [id number]  Referring provider: David Rajput DO  Dx:   Encounter Diagnosis     ICD-10-CM    1. Primary osteoarthritis of both knees  M17.0           Start Time: 1000  Stop Time: 1045  Total time in clinic (min): 45 minutes    Assessment  Impairments: abnormal gait, abnormal or restricted ROM, activity intolerance, impaired balance, impaired physical strength, lacks appropriate home exercise program, pain with function, weight-bearing intolerance, poor posture , poor body mechanics, participation limitations, activity limitations and endurance    Assessment details: Patient is a 69 y.o. adult who presents to outpatient PT with reports of chronic bilateral knee pain that started a few years ago and has been ongoing and severe in nature over the years. She recently received gel injections 3 series on both knees, however patient reports no difference in pain noted. Please note that patient has back pain as well, that relieved slightly with injection. Patient presented to re-evaluation in wheelchair due to difficulty walking long distances from parking lot into clinic. She has attended PT for the past 1 month with consistent and compliance to scheduled appointments. Noted slight improvements in knee ROM especially Left knee, however Right continues to be limited with ERP. Slight improving LE strength noted with improved quad contraction bilaterally. She continues to require close supervision for short navigation in PT clinic from wheelchair to table, etc. She reports she performs all ADLs and self-care tasks independently, however has home health aide to assist with other tasks as needed. She reports she lives with her son, daughter-in-law, and , however her  has certain health issues that she has attend to and assist him as well. Her current pain impairments affect her tolerance with standing, walking, and other  Wbing tasks for prolonged periods of time. Patient unable to perform stair negotiation, thus lives in 1st floor set-up. Patient was educated on etiology, prognosis, pain science, and POC. She responds well to pain management interventions and is steadily improving tolerance to PT exercises. Patient will benefit from continued skilled PT services to further improve pain levels, improve LE mobility, improve strength and functional stability, and improve overall ease and independence with ADLs, household chores, and taking care of family to improve efficiency and reduce falls risk. Patient would benefit from skilled PT services to address these impairments and to maximize function.  Thank you for the referral.    Understanding of Dx/Px/POC: good     Prognosis: good    Goals  Impairment Goals 4-6 weeks   In order to maximize function patient will be able to...   - Decrease intensity/duration/frequency of pain to 4/10. Slightly Improved  - Demonstrate symmetrical knee AROM without pain. Improved Left knee, not improved Right knee  - Increase hip/knee strength to 4/5 throughout. Slightly Improved  - Demonstrate improved hip flexibility as demonstrated by increased ROM through therapeutic exercise. Improved    Functional Goals 6-8 weeks  In order to return to prior level of function patient will be able to...   - Participate in ADL's/IADL's/sport specific activities with no greater than 2/10 pain.  Ongoing  - Demonstrate independence and compliant with HEP. Partially Met  - Demonstrate a squat and or sit to stand with good mechanics and eccentric control without pain/difficulty/compensation. Slightly Improved  - Demonstrate functional activities with good core and glute strength without compensation/pain/difficulty . Slightly Improved  - Patient will be able to demonstrate good gait mechanics without compensations. Slightly Improved    Plan  Patient would benefit from: skilled PT  Planned modality interventions:  cryotherapy, electrical stimulation/Russian stimulation and low level laser therapy  Other planned modality interventions: moist heat    Planned therapy interventions: joint mobilization, manual therapy, neuromuscular re-education, patient education, strengthening, stretching, therapeutic activities, therapeutic exercise, home exercise program, functional ROM exercises, Lyle taping, postural training, balance/weight bearing training, body mechanics training, flexibility, IASTM, kinesiology taping, nerve gliding and massage    Frequency: 1-2x week  Duration in weeks: 4  Treatment plan discussed with: patient, PTA and referring physician        Subjective Evaluation    History of Present Illness  Mechanism of injury: Lela Gomez is a 69 y.o. year-old adult who presents with reports of chronic knee pain that started 3-4 years ago. She reports she was working where she frequently standing/walking and does a lot of standing for cooking at home too. Patient reports her pain is so severe at this time she cannot stand or walk for prolonged periods of time. She also is not able to do stairs and thus stays on the 1st floor. She received 3 series of gel injection over the past few months that patient reports did not change her symptoms. She was recommended for PT at this time. Please note that patient also has ongoing low back pain that she received injection for that relieved some of symptoms slightly.   Quality of life: good    Patient Goals  Patient goals for therapy: decreased pain, increased motion, improved balance, increased strength, independence with ADLs/IADLs and return to sport/leisure activities    Pain  Current pain ratin  At best pain ratin  At worst pain ratin  Location: both knees  Quality: throbbing, grinding, dull ache and sharp  Relieving factors: heat  Aggravating factors: sitting, standing, stair climbing, walking and lifting  Progression: worsening    Social Support  Steps to enter house:  yes  Stairs in house: yes   Lives in: multiple-level home  Lives with: adult children, young children and spouse    Employment status: not working  Treatments  Previous treatment: physical therapy  Current treatment: physical therapy        Objective     Observations     Additional Observation Details  Patient presented to evaluation in wheelchair accompanied by her home health aide. She reports she was not able to walk from car to clinic due to knee pain. She is able to walk short distances for wheelchair to table transfers and short distances in clinic.    Standing Posture: bilateral knee valgus, medial patellar tracking    Palpation   Left   Muscle spasm in the distal biceps femoris, distal semimembranosus, distal semitendinosus, lateral gastrocnemius, medial gastrocnemius, rectus femoris, vastus lateralis and vastus medialis.     Right   Muscle spasm in the distal biceps femoris, distal semimembranosus, distal semitendinosus, lateral gastrocnemius, medial gastrocnemius, rectus femoris, vastus lateralis and vastus medialis.   Tenderness of the rectus femoris.     Tenderness   Left Knee   Tenderness in the inferior patella, lateral joint line, lateral patella, medial joint line, medial patella and patellar tendon. No tenderness in the ITB, quadriceps tendon and superior patella.     Right Knee   Tenderness in the inferior patella, lateral joint line, lateral patella, medial joint line, medial patella and patellar tendon. No tenderness in the ITB, quadriceps tendon and superior patella.     Neurological Testing     Sensation     Knee   Left Knee   Intact: Light touch    Right Knee   Intact: light touch     Active Range of Motion   Left Knee   Flexion: 110 degrees with pain  Extension: WFL and with pain    Right Knee   Flexion: 85 degrees with pain  Extension: WFL and with pain    Passive Range of Motion   Left Knee   Flexion: 112 degrees with pain  Extension: WFL    Right Knee   Flexion: 90 degrees with  "pain  Extension: WFL    Mobility   Patellar Mobility:   Left Knee   Hypomobile: left medial, left lateral, left superior and left inferior    Right Knee   Hypomobile: medial, lateral, superior and inferior     Patellar Static Positioning   Left Knee: medial tilt  Right Knee: medial tilt    Strength/Myotome Testing     Left Knee   Flexion: 3  Extension: 3  Quadriceps contraction: good    Right Knee   Flexion: 3  Extension: 3  Quadriceps contraction: good    Additional Strength Details  Hip Flexion MMT: Right 3/5, Left 3+/5  Hip ABD/ER MMT: bilaterally 3/5  Hip Extension: unable to assess due to pain              POC Expires Auth Status Start Date Expiration Date PT Visit Limit    11/10/2024 No Auth Req.   BOMN   Date 10/8/2024 10/10/2024 9/19/2024 10/1/2024 10/3/2024   Used 6 9 3 4 5   Remaining           Diagnosis: chronic bilateral knee pain   Precautions: *Paul/Tyree speaking only*; HTN, OA, GERD   Next Physician's Appt:   [facility] HEP:   Manuals 10/8 10/10 9/19 10/1 10/3   STM  DK  DK, bilateral knees    PROM        Taping?        Laser   DK, L 3600J, R 4000J x4min ea             Neuro Re-Ed        Quad sets 3\" x20 ea 3\" x20 ea 3\" x10 ea cues 3\" 2x10 ea cues 3\" 2x10 ea   Supine SLR     +QS R AAROM x5, L AROM x7   Glute sets        TA ball press        Hip ADD ball squeeze     H/L 2\" 2x10   BKFO/clams        LAQs AROM x15 ea       Wobble Board                        Ther Ex        Calf stretch Strap 10\"x10 ea Strap 10\"x10 ea Strap 10\"x10 ea Strap 10\"x10 ea Strap 10\"x10 ea   HS stretch        DKTC w/ ball        LTRs   Alt x10 ea     Heel slides with strap AAROM x15 ea AAROM x20 ea  AAROM x10 ea AAROM x15 ea   Heel Raises Unilat with UE support x10 ea    Unilat with UE support x10 ea   Side-stepping @ table x3 laps cues       Standing hip ABD, Ext                         Ther Activity           Bike for LE mobility NuStep Lv1 x5min cues          Transfers   Assistance CG(A) from W/C to table Assistance CG(A) " from W/C to table    Gait Training Short distance in clinic from W/C to table Short distance in clinic from W/C to table  Short distance in clinic from W/C to table Short distance in clinic from W/C to table   Mini Squats                Pt Ed  POC, HEP Pain science, etiology Pain science, etiology Pain science   Re-Evaluation  DK         Modalities           Heat/ice (PRN) Heat x8 min Heat x8 min Heat x10 min Heat x8 min Heat x8 min    TENS with heat with heat    with heat  with heat

## 2024-10-14 ENCOUNTER — APPOINTMENT (OUTPATIENT)
Dept: PHYSICAL THERAPY | Facility: CLINIC | Age: 69
End: 2024-10-14
Payer: MEDICARE

## 2024-10-15 ENCOUNTER — APPOINTMENT (OUTPATIENT)
Dept: PHYSICAL THERAPY | Facility: CLINIC | Age: 69
End: 2024-10-15
Payer: MEDICARE

## 2024-10-17 ENCOUNTER — APPOINTMENT (OUTPATIENT)
Dept: PHYSICAL THERAPY | Facility: CLINIC | Age: 69
End: 2024-10-17
Payer: MEDICARE

## 2024-10-17 DIAGNOSIS — U07.1 COVID-19: ICD-10-CM

## 2024-10-17 DIAGNOSIS — J02.8 SORE THROAT (VIRAL): ICD-10-CM

## 2024-10-17 DIAGNOSIS — B97.89 SORE THROAT (VIRAL): ICD-10-CM

## 2024-10-17 RX ORDER — BENZONATATE 100 MG/1
100 CAPSULE ORAL 3 TIMES DAILY PRN
Qty: 90 CAPSULE | Refills: 1 | Status: SHIPPED | OUTPATIENT
Start: 2024-10-17 | End: 2024-10-24 | Stop reason: SDUPTHER

## 2024-10-22 ENCOUNTER — OFFICE VISIT (OUTPATIENT)
Dept: PHYSICAL THERAPY | Facility: CLINIC | Age: 69
End: 2024-10-22
Payer: MEDICARE

## 2024-10-22 DIAGNOSIS — M17.0 PRIMARY OSTEOARTHRITIS OF BOTH KNEES: Primary | ICD-10-CM

## 2024-10-22 PROCEDURE — 97530 THERAPEUTIC ACTIVITIES: CPT | Performed by: PHYSICAL THERAPIST

## 2024-10-22 PROCEDURE — 97110 THERAPEUTIC EXERCISES: CPT | Performed by: PHYSICAL THERAPIST

## 2024-10-22 PROCEDURE — 97112 NEUROMUSCULAR REEDUCATION: CPT | Performed by: PHYSICAL THERAPIST

## 2024-10-22 NOTE — PROGRESS NOTES
"Daily Note     Today's date: 10/22/2024  Patient name: Lela Gomez  : 1955  MRN: [id number]  Referring provider: David Rajput DO  Dx:   Encounter Diagnosis     ICD-10-CM    1. Primary osteoarthritis of both knees  M17.0           Start Time: 1400  Stop Time: 1450  Total time in clinic (min): 50 minutes    Subjective: Patient reports pain on arrival. She reports her caretaker was unable to drop her off for appointments last week.      Objective: See treatment diary below      Assessment: Tolerated treatment well. Initiated session reviewing home e-stim unit and how to use. Patient continues to have some pain with transitional movements, but is able to walk short distances without significant unsteadiness. She continues to exhibit antalgic gait pattern with decreased knee flexion in swing noted throughout. Improved ability to engage quad muscles bilaterally. Patient exhibited good technique with therapeutic exercises and would benefit from continued PT      Plan: Continue per plan of care.  Progress treatment as tolerated.        POC Expires Auth Status Start Date Expiration Date PT Visit Limit    11/10/2024 No Auth Req.   BOMN   Date 10/8/2024 10/10/2024 10/22/2024 10/1/2024 10/3/2024   Used 6 9 10 4 5   Remaining           Diagnosis: chronic bilateral knee pain   Precautions: *Paul/Tyree speaking only*; HTN, OA, GERD   Next Physician's Appt:   [employer] HEP:   Manuals 10/8 10/10 10/22 10/1 10/3   STM  DK  DK, bilateral knees    PROM        Taping?        Laser                Neuro Re-Ed        Quad sets 3\" x20 ea 3\" x20 ea 3\" x20 ea 3\" 2x10 ea cues 3\" 2x10 ea   Supine SLR     +QS R AAROM x5, L AROM x7   TA ball press        Hip ADD ball squeeze   H/L 2\" 2x10  H/L 2\" 2x10   BKFO/clams        LAQs AROM x15 ea  AROM x15 ea     Wobble Board                        Ther Ex        Calf stretch Strap 10\"x10 ea Strap 10\"x10 ea Strap 10\"x10 ea Strap 10\"x10 ea Strap 10\"x10 ea   HS stretch        DKTC w/ ball      "   LTRs        Heel slides with strap AAROM x15 ea AAROM x20 ea  AAROM x10 ea AAROM x15 ea   Heel Raises Unilat with UE support x10 ea  Unilat with UE support x10 ea  Unilat with UE support x10 ea   Side-stepping @ table x3 laps cues  @ table x3 laps cues     Standing hip ABD, Ext                         Ther Activity          Bike for LE mobility NuStep Lv1 x5min cues  NuStep Lv1 x5min       Transfers    Assistance CG(A) from W/C to table    Gait Training Short distance in clinic from W/C to table Short distance in clinic from W/C to table  Short distance in clinic from W/C to table Short distance in clinic from W/C to table   Mini Squats                Pt Ed  POC, HEP Home e-stim unit Pain science, etiology Pain science   Re-Evaluation  DK        Modalities          Heat/ice (PRN) Heat x8 min Heat x8 min Heat x10 min Heat x8 min Heat x8 min    TENS with heat with heat   with heat  with heat

## 2024-10-23 ENCOUNTER — OFFICE VISIT (OUTPATIENT)
Dept: PODIATRY | Facility: CLINIC | Age: 69
End: 2024-10-23
Payer: MEDICARE

## 2024-10-23 ENCOUNTER — OFFICE VISIT (OUTPATIENT)
Dept: FAMILY MEDICINE CLINIC | Facility: CLINIC | Age: 69
End: 2024-10-23

## 2024-10-23 VITALS
HEIGHT: 60 IN | BODY MASS INDEX: 33.38 KG/M2 | WEIGHT: 170 LBS | OXYGEN SATURATION: 96 % | RESPIRATION RATE: 16 BRPM | HEART RATE: 67 BPM | DIASTOLIC BLOOD PRESSURE: 76 MMHG | SYSTOLIC BLOOD PRESSURE: 120 MMHG

## 2024-10-23 VITALS
HEIGHT: 60 IN | BODY MASS INDEX: 33.38 KG/M2 | DIASTOLIC BLOOD PRESSURE: 82 MMHG | WEIGHT: 170 LBS | HEART RATE: 77 BPM | OXYGEN SATURATION: 98 % | SYSTOLIC BLOOD PRESSURE: 120 MMHG

## 2024-10-23 DIAGNOSIS — R73.03 PREDIABETES: ICD-10-CM

## 2024-10-23 DIAGNOSIS — E11.42 TYPE 2 DIABETES MELLITUS WITH DIABETIC POLYNEUROPATHY, WITHOUT LONG-TERM CURRENT USE OF INSULIN (HCC): Primary | ICD-10-CM

## 2024-10-23 DIAGNOSIS — L85.3 XEROSIS CUTIS: ICD-10-CM

## 2024-10-23 DIAGNOSIS — E11.9 ENCOUNTER FOR DIABETIC FOOT EXAM (HCC): ICD-10-CM

## 2024-10-23 DIAGNOSIS — G57.93 NEUROPATHIC PAIN OF BOTH FEET: ICD-10-CM

## 2024-10-23 DIAGNOSIS — J01.10 ACUTE NON-RECURRENT FRONTAL SINUSITIS: Primary | ICD-10-CM

## 2024-10-23 DIAGNOSIS — F43.21 GRIEF REACTION: ICD-10-CM

## 2024-10-23 DIAGNOSIS — B35.1 ONYCHOMYCOSIS: ICD-10-CM

## 2024-10-23 DIAGNOSIS — E11.42 TYPE 2 DIABETES MELLITUS WITH DIABETIC POLYNEUROPATHY, WITHOUT LONG-TERM CURRENT USE OF INSULIN (HCC): ICD-10-CM

## 2024-10-23 DIAGNOSIS — E78.5 HYPERLIPIDEMIA, UNSPECIFIED HYPERLIPIDEMIA TYPE: ICD-10-CM

## 2024-10-23 PROBLEM — D69.6 PLATELETS DECREASED (HCC): Status: RESOLVED | Noted: 2024-10-23 | Resolved: 2024-10-23

## 2024-10-23 PROBLEM — D69.6 PLATELETS DECREASED (HCC): Status: ACTIVE | Noted: 2024-10-23

## 2024-10-23 PROCEDURE — 99213 OFFICE O/P EST LOW 20 MIN: CPT | Performed by: PODIATRIST

## 2024-10-23 RX ORDER — AZITHROMYCIN 250 MG/1
TABLET, FILM COATED ORAL
Qty: 6 TABLET | Refills: 0 | Status: SHIPPED | OUTPATIENT
Start: 2024-10-23 | End: 2024-10-28

## 2024-10-23 RX ORDER — AMMONIUM LACTATE 12 G/100G
LOTION TOPICAL 2 TIMES DAILY PRN
Qty: 400 G | Refills: 1 | Status: SHIPPED | OUTPATIENT
Start: 2024-10-23

## 2024-10-23 RX ORDER — GABAPENTIN 100 MG/1
100 CAPSULE ORAL
Qty: 30 CAPSULE | Refills: 2 | Status: SHIPPED | OUTPATIENT
Start: 2024-10-23

## 2024-10-23 NOTE — PROGRESS NOTES
Assessment/Plan:     The patient's clinical examination today is significant for neuritic pains to the feet bilaterally.  Pedal pulses are palpable.  Vibratory sensation is diminished bilaterally.  Epicritic sensation is intact bilaterally.  The pedal nail plates are thickened and dystrophic with subungual debris consistent with onychomycosis x 10.  The interdigital spaces are clear without maceration.  There is some dry scaling skin to the plantar forefoot and heels bilaterally without any open fissures.     The patient's clinical symptoms are most consistent with neuropathic pain most likely stemming from her history of lumbar radiculopathy.  Diabetic peripheral neuropathy is also be at play here.  She does note that her the pain in her legs tends to keep her up at nighttime.  She is currently not taking any medications for neuropathic pain.  She was amenable to trying gabapentin 100 mg at bedtime today.  A prescription was sent to her pharmacy.  Her most recent hemoglobin A1c from June 2024 was 6.0, prior to that was 6.5 in December 2023.    She is also requesting a prescription for diabetic shoes as she feels that this may provide her better support and comfort.  I feel this is reasonable and a prescription was answered into her chart.  She was referred to  clinic.    Recommend follow-up in 3 to 4 months for at risk diabetic footcare.     Diagnoses and all orders for this visit:    Type 2 diabetes mellitus with diabetic polyneuropathy, without long-term current use of insulin (HCC)  -     Diabetic Shoe  -     gabapentin (Neurontin) 100 mg capsule; Take 1 capsule (100 mg total) by mouth daily at bedtime    Neuropathic pain of both feet  -     Diabetic Shoe  -     gabapentin (Neurontin) 100 mg capsule; Take 1 capsule (100 mg total) by mouth daily at bedtime    Onychomycosis  -     Diabetic Shoe    Xerosis cutis  -     ammonium lactate (LAC-HYDRIN) 12 % lotion; Apply topically 2 (two) times a day as needed  for dry skin    Encounter for diabetic foot exam (HCC)          Subjective:     Patient ID: Lela Gomez is a 69 y.o. adult.    Patient resents today for follow-up at risk diabetic footcare.  She notes that her pedal nail plates of her long and thick and she is unable to trim it herself.  She does note consistent burning sensations in her lower extremities that tends to be worse at nighttime.  She is also requesting new diabetic shoes.      PAST MEDICAL HISTORY:  Past Medical History:   Diagnosis Date    Hyperlipidemia     Hypertension        PAST SURGICAL HISTORY:  History reviewed. No pertinent surgical history.     ALLERGIES:  Patient has no known allergies.    MEDICATIONS:  Current Outpatient Medications   Medication Sig Dispense Refill    acetaminophen (TYLENOL) 500 mg tablet TAKE 1 TABLET (500 MG TOTAL) BY MOUTH EVERY 6 (SIX) HOURS AS NEEDED FOR MILD PAIN OR MODERATE PAIN 30 tablet 1    ammonium lactate (LAC-HYDRIN) 12 % lotion Apply topically 2 (two) times a day as needed for dry skin 400 g 1    atorvastatin (LIPITOR) 20 mg tablet TAKE 1 TABLET (20 MG TOTAL) BY MOUTH DAILY 90 tablet 1    azithromycin (Zithromax) 250 mg tablet Take 2 tablets (500 mg total) by mouth daily for 1 day, THEN 1 tablet (250 mg total) daily for 4 days. 6 tablet 0    benzonatate (TESSALON PERLES) 100 mg capsule TAKE 1 CAPSULE (100 MG TOTAL) BY MOUTH 3 (THREE) TIMES A DAY AS NEEDED FOR COUGH 90 capsule 1    gabapentin (Neurontin) 100 mg capsule Take 1 capsule (100 mg total) by mouth daily at bedtime 30 capsule 2    losartan (COZAAR) 25 mg tablet Take 1 tablet (25 mg total) by mouth daily 90 tablet 3    omeprazole (PriLOSEC) 20 mg delayed release capsule TAKE 1 CAPSULE (20 MG TOTAL) BY MOUTH DAILY 90 capsule 1    spironolactone (ALDACTONE) 25 mg tablet TAKE 1 TABLET (25 MG TOTAL) BY MOUTH DAILY 90 tablet 1    torsemide (DEMADEX) 5 MG tablet TAKE 1 TABLET (5 MG TOTAL) BY MOUTH DAILY 90 tablet 1    celecoxib (CeleBREX) 200 mg capsule TAKE 1  CAPSULE (200 MG TOTAL) BY MOUTH DAILY (Patient not taking: Reported on 9/23/2024) 30 capsule 2    Cholecalciferol (Vitamin D3) 50 MCG (2000 UT) TABS TAKE 1 TABLET (2,000 UNITS TOTAL) BY MOUTH DAILY (Patient not taking: Reported on 9/23/2024) 90 tablet 1    Diclofenac Sodium (VOLTAREN) 1 % APPLY 2 G TOPICALLY 2 (TWO) TIMES A DAY (Patient not taking: Reported on 9/23/2024) 100 g 5    DULoxetine (CYMBALTA) 20 mg capsule TAKE 1 CAPSULE (20 MG TOTAL) BY MOUTH DAILY (Patient not taking: Reported on 9/23/2024) 90 capsule 0    Eyelid Cleansers ([employer] Eyelid Cleansing) PADS APPLY AT BEDTIME AS DIRECTED SCRUB WITH PAD VS. FOAM ALONG CLOSED EYELIDS NIGHTLY PER DIRECTIONS. OCUSOFT OR SIMILAR BRAND (WIPES VS. FOAM) (Patient not taking: Reported on 9/23/2024)      fluticasone (FLONASE) 50 mcg/act nasal spray 1 spray into each nostril in the morning. (Patient not taking: Reported on 6/5/2023) 15.8 mL 0    ibuprofen (MOTRIN) 200 mg tablet Take 1 tablet (200 mg total) by mouth every 6 (six) hours as needed for mild pain or moderate pain (Patient not taking: Reported on 9/23/2024) 30 tablet 1    losartan (COZAAR) 100 MG tablet Take 1 tablet (100 mg total) by mouth daily (Patient not taking: Reported on 10/16/2023) 90 tablet 3    magnesium Oxide (MAG-OX) 400 mg TABS Take 400 mg by mouth in the morning and 400 mg in the evening. (Patient not taking: Reported on 6/5/2023)      methocarbamol (ROBAXIN) 500 mg tablet TAKE 1 TABLET (500 MG TOTAL) BY MOUTH 2 (TWO) TIMES A DAY AS NEEDED FOR MUSCLE SPASMS (Patient not taking: Reported on 9/23/2024) 60 tablet 5    neomycin-polymyxin-dexamethasone (MAXITROL) 0.35%-10,000 units/g-0.1% APPLY 0.25 INCH INTO AFFECTED EYE AT BEDTIME X 2 WEEKS THEN D/C (Patient not taking: Reported on 6/5/2023)      ondansetron (ZOFRAN) 4 mg tablet Take 1 tablet (4 mg total) by mouth every 8 (eight) hours as needed for nausea or vomiting (Patient not taking: Reported on 9/23/2024) 20 tablet 0     promethazine-dextromethorphan (PHENERGAN-DM) 6.25-15 mg/5 mL oral syrup Take 5 mL by mouth 4 (four) times a day as needed for cough (Patient not taking: Reported on 9/23/2024) 240 mL 1    Restasis 0.05 % ophthalmic emulsion INSTILL 1 DROP BOTH EYES TWICE A DAY (Patient not taking: Reported on 6/5/2023)       No current facility-administered medications for this visit.       SOCIAL HISTORY:  Social History     Socioeconomic History    Marital status: /Civil Union     Spouse name: None    Number of children: None    Years of education: None    Highest education level: None   Occupational History    Occupation: retired   Tobacco Use    Smoking status: Never    Smokeless tobacco: Never   Vaping Use    Vaping status: Never Used   Substance and Sexual Activity    Alcohol use: Never    Drug use: Never    Sexual activity: None   Other Topics Concern    None   Social History Narrative    None     Social Determinants of Health     Financial Resource Strain: Not on file   Food Insecurity: No Food Insecurity (6/13/2024)    Nursing - Inadequate Food Risk Classification     Worried About Running Out of Food in the Last Year: Never true     Ran Out of Food in the Last Year: Never true     Ran Out of Food in the Last Year: Not on file   Transportation Needs: No Transportation Needs (6/13/2024)    PRAPARE - Transportation     Lack of Transportation (Medical): No     Lack of Transportation (Non-Medical): No   Physical Activity: Not on file   Stress: Not on file   Social Connections: Not on file   Intimate Partner Violence: Not on file   Housing Stability: Low Risk  (6/13/2024)    Housing Stability Vital Sign     Unable to Pay for Housing in the Last Year: No     Number of Times Moved in the Last Year: 1     Homeless in the Last Year: No        Review of Systems   Constitutional: Negative.    HENT: Negative.     Eyes: Negative.    Respiratory: Negative.     Cardiovascular: Negative.    Endocrine: Negative.    Musculoskeletal:  Negative.    Neurological: Negative.    Hematological: Negative.    Psychiatric/Behavioral: Negative.           Objective:     Physical Exam  Constitutional:       Appearance: Normal appearance.   HENT:      Head: Normocephalic and atraumatic.      Nose: Nose normal.   Cardiovascular:      Pulses: no weak pulses.           Dorsalis pedis pulses are 2+ on the right side and 2+ on the left side.        Posterior tibial pulses are 1+ on the right side and 1+ on the left side.   Pulmonary:      Effort: Pulmonary effort is normal.   Feet:      Right foot:      Skin integrity: Dry skin present. No ulcer, skin breakdown, erythema, warmth or callus.      Toenail Condition: Right toenails are abnormally thick and long. Fungal disease present.     Left foot:      Skin integrity: Dry skin present. No ulcer, skin breakdown, erythema, warmth or callus.      Toenail Condition: Left toenails are abnormally thick and long.      Comments: The patient's clinical examination today is significant for neuritic pains to the feet bilaterally.  Pedal pulses are palpable.  Vibratory sensation is diminished bilaterally.  Epicritic sensation is intact bilaterally.  The pedal nail plates are thickened and dystrophic with some subungual debris consistent with onychomycosis.  The interdigital spaces are clear without maceration.  There is some dry scaling skin to the plantar forefoot and heels bilaterally without any open fissures.     Skin:     General: Skin is warm.      Capillary Refill: Capillary refill takes 2 to 3 seconds.   Neurological:      General: No focal deficit present.      Mental Status: She is alert and oriented to person, place, and time.   Psychiatric:         Mood and Affect: Mood normal.         Behavior: Behavior normal.         Thought Content: Thought content normal.         Diabetic Foot Exam    Patient's shoes and socks removed.    Right Foot/Ankle   Right Foot Inspection  Skin Exam: skin normal, skin intact and dry  skin. No warmth, no callus, no erythema, no maceration, no abnormal color, no pre-ulcer, no ulcer and no callus.     Toe Exam: ROM and strength within normal limits.     Sensory   Vibration: diminished  Monofilament testing: intact    Vascular  Capillary refills: < 3 seconds  The right DP pulse is 2+. The right PT pulse is 1+.     Left Foot/Ankle  Left Foot Inspection  Skin Exam: skin normal, skin intact and dry skin. No warmth, no erythema, no maceration, normal color, no pre-ulcer, no ulcer and no callus.     Toe Exam: ROM and strength within normal limits.     Sensory   Vibration: diminished  Monofilament testing: intact    Vascular  Capillary refills: < 3 seconds  The left DP pulse is 2+. The left PT pulse is 1+.     Assign Risk Category  No deformity present  No loss of protective sensation  No weak pulses  Risk: 1

## 2024-10-23 NOTE — PROGRESS NOTES
"Assessment/Plan:   Diagnoses and all orders for this visit:    Acute non-recurrent frontal sinusitis  -     azithromycin (Zithromax) 250 mg tablet; Take 2 tablets (500 mg total) by mouth daily for 1 day, THEN 1 tablet (250 mg total) daily for 4 days.  - eRx for Zpak sent to pharmacy on file   - OTC Zyrtec, Flonase, Mucinex (no \"D\"), cool mist humidifier     Grief reaction  - pt's sister passed away last night in [place] (her SALBADOR had passed away 1month prior)   - pt unable to go as worried about her  who depends on her     Type 2 diabetes mellitus with diabetic polyneuropathy, without long-term current use of insulin (HCC)    Hyperlipidemia, unspecified hyperlipidemia type  -     Lipid panel    Prediabetes  -     Hemoglobin A1C  -     Comprehensive metabolic panel          Subjective:    Patient ID: Lela Gomez is a 69 y.o. adult.  HPI  70yo F presents to the office with her CareGiver for eval of sick s/s   - of note, pt's sister passed away last night in [place]   - has been coughing for the past week   - (+) HA, congestion, weakness  - denies F/C/N/V/ear pain/sore throat/CP/palpitations/SOB/wheezing        The following portions of the patient's history were reviewed and updated as appropriate: allergies, current medications, past family history, past medical history, past social history, past surgical history and problem list.    Review of Systems  as per HPI    Objective:  /76   Pulse 67   Resp 16   Ht 5' (1.524 m)   Wt 77.1 kg (170 lb)   SpO2 96%   BMI 33.20 kg/m²    Physical Exam  Vitals reviewed.   Constitutional:       General: She is not in acute distress.     Appearance: Normal appearance. She is not ill-appearing, toxic-appearing or diaphoretic.   HENT:      Head: Normocephalic and atraumatic.      Right Ear: No drainage, swelling or tenderness. A middle ear effusion is present. There is no impacted cerumen.      Left Ear: No drainage, swelling or tenderness. A middle ear effusion is present. " There is no impacted cerumen.      Nose: Congestion present.      Right Sinus: Frontal sinus tenderness present. No maxillary sinus tenderness.      Left Sinus: Frontal sinus tenderness present. No maxillary sinus tenderness.      Mouth/Throat:      Mouth: Mucous membranes are dry.      Pharynx: Oropharynx is clear. Posterior oropharyngeal erythema present. No oropharyngeal exudate.   Eyes:      General: No scleral icterus.        Right eye: No discharge.         Left eye: No discharge.      Extraocular Movements: Extraocular movements intact.      Conjunctiva/sclera: Conjunctivae normal.   Cardiovascular:      Rate and Rhythm: Normal rate and regular rhythm.      Heart sounds: Normal heart sounds.   Pulmonary:      Effort: Pulmonary effort is normal. No respiratory distress.      Breath sounds: Normal breath sounds. No stridor. No wheezing, rhonchi or rales.   Musculoskeletal:      Cervical back: Normal range of motion.      Comments: In wheelchair    Skin:     General: Skin is warm.   Neurological:      General: No focal deficit present.      Mental Status: She is alert and oriented to person, place, and time.   Psychiatric:         Mood and Affect: Mood is depressed. Affect is tearful.

## 2024-10-24 ENCOUNTER — APPOINTMENT (OUTPATIENT)
Dept: PHYSICAL THERAPY | Facility: CLINIC | Age: 69
End: 2024-10-24
Payer: MEDICARE

## 2024-10-24 DIAGNOSIS — E55.9 VITAMIN D DEFICIENCY: ICD-10-CM

## 2024-10-24 DIAGNOSIS — B35.4 TINEA CORPORIS: Primary | ICD-10-CM

## 2024-10-24 DIAGNOSIS — R05.9 COUGH: ICD-10-CM

## 2024-10-24 DIAGNOSIS — B97.89 SORE THROAT (VIRAL): ICD-10-CM

## 2024-10-24 DIAGNOSIS — J02.8 SORE THROAT (VIRAL): ICD-10-CM

## 2024-10-24 DIAGNOSIS — U07.1 COVID-19: ICD-10-CM

## 2024-10-24 RX ORDER — DEXTROMETHORPHAN HYDROBROMIDE AND PROMETHAZINE HYDROCHLORIDE 15; 6.25 MG/5ML; MG/5ML
5 SYRUP ORAL 4 TIMES DAILY PRN
Qty: 240 ML | Refills: 1 | Status: SHIPPED | OUTPATIENT
Start: 2024-10-24

## 2024-10-24 RX ORDER — CLOTRIMAZOLE AND BETAMETHASONE DIPROPIONATE 10; .64 MG/G; MG/G
CREAM TOPICAL 2 TIMES DAILY
Qty: 45 G | Refills: 1 | Status: SHIPPED | OUTPATIENT
Start: 2024-10-24 | End: 2024-10-31

## 2024-10-24 RX ORDER — CHOLECALCIFEROL (VITAMIN D3) 50 MCG
TABLET ORAL
Qty: 90 TABLET | Refills: 1 | Status: SHIPPED | OUTPATIENT
Start: 2024-10-24

## 2024-10-24 RX ORDER — BENZONATATE 100 MG/1
100 CAPSULE ORAL 3 TIMES DAILY PRN
Qty: 90 CAPSULE | Refills: 1 | Status: SHIPPED | OUTPATIENT
Start: 2024-10-24

## 2024-10-29 ENCOUNTER — OFFICE VISIT (OUTPATIENT)
Dept: PHYSICAL THERAPY | Facility: CLINIC | Age: 69
End: 2024-10-29
Payer: MEDICARE

## 2024-10-29 DIAGNOSIS — M17.0 PRIMARY OSTEOARTHRITIS OF BOTH KNEES: Primary | ICD-10-CM

## 2024-10-29 PROCEDURE — 97110 THERAPEUTIC EXERCISES: CPT | Performed by: PHYSICAL THERAPIST

## 2024-10-29 NOTE — PROGRESS NOTES
"Daily Note     Today's date: 10/29/2024  Patient name: Lela Gomez  : 1955  MRN: [id number]  Referring provider: David Rajput DO  Dx:   Encounter Diagnosis     ICD-10-CM    1. Primary osteoarthritis of both knees  M17.0           Start Time: 945  Stop Time: 1010  Total time in clinic (min): 25 minutes    Subjective: Patient arrived 30 min late due to transportation. She reports her pain is slowly getting better, but still has points in her day where she notices a lot of pain, especially end of day.      Objective: See treatment diary below      Assessment: Tolerated treatment well. Modified treatment session today due to patient's late arrival. Worked on functional strengthening with side-stepping tolerated well, however cuing required to avoid compensations/ walking with feet at angle. Patient exhibited good technique with therapeutic exercises and would benefit from continued PT      Plan: Continue per plan of care.  Progress treatment as tolerated.        POC Expires Auth Status Start Date Expiration Date PT Visit Limit    11/10/2024 No Auth Req.   BOMN   Date 10/8/2024 10/10/2024 10/22/2024 10/29/2024 10/3/2024   Used 6 9 10 11 5   Remaining           Diagnosis: chronic bilateral knee pain   Precautions: *Paul/Tyree speaking only*; HTN, OA, GERD   Next Physician's Appt:   [employer] HEP:   Manuals 10/8 10/10 10/22 10/29 10/3   STM  DK      PROM        Taping?        Laser                Neuro Re-Ed        Quad sets 3\" x20 ea 3\" x20 ea 3\" x20 ea  3\" 2x10 ea   Supine SLR     +QS R AAROM x5, L AROM x7   TA ball press        Hip ADD ball squeeze   H/L 2\" 2x10  H/L 2\" 2x10   BKFO/clams        LAQs AROM x15 ea  AROM x15 ea AROM x15 ea    Wobble Board                        Ther Ex        Calf stretch Strap 10\"x10 ea Strap 10\"x10 ea Strap 10\"x10 ea Strap 10\"x10 ea Strap 10\"x10 ea   HS stretch    Strap 10\"x10 ea    DKTC w/ ball        LTRs        Heel slides with strap AAROM x15 ea AAROM x20 ea   AAROM x15 " ea   Heel Raises Unilat with UE support x10 ea  Unilat with UE support x10 ea  Unilat with UE support x10 ea   Side-stepping @ table x3 laps cues  @ table x3 laps cues @ mirror x3 laps cues    Standing hip ABD, Ext                         Ther Activity         Bike for LE mobility NuStep Lv1 x5min cues  NuStep Lv1 x5min      Transfers        Gait Training Short distance in clinic from W/C to table Short distance in clinic from W/C to table   Short distance in clinic from W/C to table   Mini Squats                Pt Ed  POC, HEP Home e-stim unit  Pain science   Re-Evaluation  DK       Modalities         Heat/ice (PRN) Heat x8 min Heat x8 min Heat x10 min Heat x10 min Heat x8 min    TENS with heat with heat    with heat

## 2024-10-30 DIAGNOSIS — B35.4 TINEA CORPORIS: ICD-10-CM

## 2024-10-31 ENCOUNTER — OFFICE VISIT (OUTPATIENT)
Dept: PHYSICAL THERAPY | Facility: CLINIC | Age: 69
End: 2024-10-31
Payer: MEDICARE

## 2024-10-31 DIAGNOSIS — M17.0 PRIMARY OSTEOARTHRITIS OF BOTH KNEES: Primary | ICD-10-CM

## 2024-10-31 PROCEDURE — 97530 THERAPEUTIC ACTIVITIES: CPT | Performed by: PHYSICAL THERAPIST

## 2024-10-31 PROCEDURE — 97112 NEUROMUSCULAR REEDUCATION: CPT | Performed by: PHYSICAL THERAPIST

## 2024-10-31 PROCEDURE — 97110 THERAPEUTIC EXERCISES: CPT | Performed by: PHYSICAL THERAPIST

## 2024-10-31 RX ORDER — CLOTRIMAZOLE AND BETAMETHASONE DIPROPIONATE 10; .64 MG/G; MG/G
CREAM TOPICAL 2 TIMES DAILY
Qty: 45 G | Refills: 1 | Status: SHIPPED | OUTPATIENT
Start: 2024-10-31

## 2024-10-31 NOTE — PROGRESS NOTES
"Daily Note     Today's date: 10/31/2024  Patient name: Lela Gomez  : 1955  MRN: [id number]  Referring provider: David Rajput DO  Dx:   Encounter Diagnosis     ICD-10-CM    1. Primary osteoarthritis of both knees  M17.0           Start Time: 1000  Stop Time: 1040  Total time in clinic (min): 40 minutes    Subjective: Patient reports no new changes.       Objective: See treatment diary below      Assessment: Tolerated treatment well. Introduced supine clams with resistance to improve glute activation and control. Completed all other exercises with good tolerance. Continues to have pain with Right knee flexion more than left. Improved form with side-stepping but still requires cuing to avoid altered gait. Patient requested to leave a little early as she wanted to visit a family member, thus deferred moist heat end of session. Patient exhibited good technique with therapeutic exercises and would benefit from continued PT      Plan: Continue per plan of care.  Progress treatment as tolerated.        POC Expires Auth Status Start Date Expiration Date PT Visit Limit    11/10/2024 No Auth Req.   BOMN   Date 10/8/2024 10/10/2024 10/22/2024 10/29/2024 10/3/2024   Used 6 9 10 11 5   Remaining           Diagnosis: chronic bilateral knee pain   Precautions: *Paul/Tyree speaking only*; HTN, OA, GERD   Next Physician's Appt:   [employer] HEP:   Manuals 10/8 10/10 10/22 10/29 10/31   STM  DK      PROM        Taping?        Laser                Neuro Re-Ed        Quad sets 3\" x20 ea 3\" x20 ea 3\" x20 ea  3\" x20   Supine SLR        TA ball press        Hip ADD ball squeeze   H/L 2\" 2x10     BKFO/clams     Supine clams GTB 2x10   LAQs AROM x15 ea  AROM x15 ea AROM x15 ea    Wobble Board                        Ther Ex        Calf stretch Strap 10\"x10 ea Strap 10\"x10 ea Strap 10\"x10 ea Strap 10\"x10 ea Strap 10\"x10 ea   HS stretch    Strap 10\"x10 ea    DKTC w/ ball        LTRs        Heel slides with strap AAROM x15 ea AAROM " x20 ea   AAROM x20 ea   Heel Raises Unilat with UE support x10 ea  Unilat with UE support x10 ea  Unilat with UE support x10 ea   Side-stepping @ table x3 laps cues  @ table x3 laps cues @ mirror x3 laps cues @ mirror x3 laps cues   Standing hip ABD, Ext                         Ther Activity        Bike for LE mobility NuStep Lv1 x5min cues  NuStep Lv1 x5min  NuStep Lv1 x5min   Transfers        Gait Training Short distance in clinic from W/C to table Short distance in clinic from W/C to table   Short distance in clinic from W/C to table   Mini Squats                Pt Ed  POC, HEP Home e-stim unit     Re-Evaluation  DK      Modalities        Heat/ice (PRN) Heat x8 min Heat x8 min Heat x10 min Heat x10 min deferred    TENS with heat with heat

## 2024-11-01 ENCOUNTER — TELEPHONE (OUTPATIENT)
Age: 69
End: 2024-11-01

## 2024-11-01 NOTE — TELEPHONE ENCOUNTER
Pt's daughter called to ask if a B12 and Iron test could be ordered for pt. [facility] will be coming [facility] next week. Please, advise.

## 2024-11-03 DIAGNOSIS — D51.8 OTHER VITAMIN B12 DEFICIENCY ANEMIAS: ICD-10-CM

## 2024-11-03 DIAGNOSIS — R53.83 OTHER FATIGUE: Primary | ICD-10-CM

## 2024-11-03 DIAGNOSIS — D50.9 IRON DEFICIENCY ANEMIA, UNSPECIFIED IRON DEFICIENCY ANEMIA TYPE: ICD-10-CM

## 2024-11-04 ENCOUNTER — OFFICE VISIT (OUTPATIENT)
Dept: PHYSICAL THERAPY | Facility: CLINIC | Age: 69
End: 2024-11-04
Payer: MEDICARE

## 2024-11-04 DIAGNOSIS — M17.0 PRIMARY OSTEOARTHRITIS OF BOTH KNEES: Primary | ICD-10-CM

## 2024-11-04 PROCEDURE — 97110 THERAPEUTIC EXERCISES: CPT | Performed by: PHYSICAL THERAPIST

## 2024-11-04 PROCEDURE — 97112 NEUROMUSCULAR REEDUCATION: CPT | Performed by: PHYSICAL THERAPIST

## 2024-11-04 PROCEDURE — 97530 THERAPEUTIC ACTIVITIES: CPT | Performed by: PHYSICAL THERAPIST

## 2024-11-04 NOTE — PROGRESS NOTES
"Daily Note     Today's date: 2024  Patient name: Lela Gomez  : 1955  MRN: [id number]  Referring provider: David Rajput DO  Dx:   Encounter Diagnosis     ICD-10-CM    1. Primary osteoarthritis of both knees  M17.0           Start Time: 1400  Stop Time: 1445  Total time in clinic (min): 45 minutes    Subjective: Patient reports no new changes on arrival. Patient was scheduled for tomorrow, however arrives today for appointment. She was accommodated by this therapist at this time.       Objective: See treatment diary below      Assessment: Tolerated treatment well. Worked on functional strengthening and cuing for posture and mechanics with exercises. Added supine SLR, required assistance due to pain with initiating movement especially on Left LE. Educated patient on importance of quad and glute muscle strengthening ad general posture especially with standing to avoid stress on knee joints bilaterally especially with standing and walking tasks. Patient exhibited good technique with therapeutic exercises and would benefit from continued PT      Plan: Continue per plan of care.  Progress treatment as tolerated.        POC Expires Auth Status Start Date Expiration Date PT Visit Limit    11/10/2024 No Auth Req.   BOMN   Date 2024 10/10/2024 10/22/2024 10/29/2024 2024   Used 13 9 10 11 12   Remaining           Diagnosis: chronic bilateral knee pain   Precautions: *Paul/Tyree speaking only*; HTN, OA, GERD   Next Physician's Appt:   [employer] HEP:   Manuals 11/4 10/10 10/22 10/29 10/31   STM  DK      PROM        Taping?        Laser                Neuro Re-Ed        Quad sets 3\" x20 3\" x20 ea 3\" x20 ea  3\" x20   Supine SLR Supine AAROM 2\" x5       TA ball press        Hip ADD ball squeeze   H/L 2\" 2x10     BKFO/clams     Supine clams GTB 2x10   LAQs AROM x20 ea  AROM x15 ea AROM x15 ea    Wobble Board                        Ther Ex        Calf stretch Strap 10\"x10 ea Strap 10\"x10 ea Strap 10\"x10 ea " "Strap 10\"x10 ea Strap 10\"x10 ea   HS stretch    Strap 10\"x10 ea    DKTC w/ ball        LTRs        Heel slides with strap  AAROM x20 ea   AAROM x20 ea   Heel Raises   Unilat with UE support x10 ea  Unilat with UE support x10 ea   Side-stepping @ table x3 laps  @ table x3 laps cues @ mirror x3 laps cues @ mirror x3 laps cues   Standing hip ABD, Ext Standing hip ABD x20 ea                        Ther Activity        Bike for LE mobility NuStep Lv1 x5min  NuStep Lv1 x5min  NuStep Lv1 x5min   Transfers        Gait Training Short distance in clinic from W/C to table Short distance in clinic from W/C to table   Short distance in clinic from W/C to table   Mini Squats                Pt Ed Posture, pain science POC, HEP Home e-stim unit     Re-Evaluation  DK      Modalities        Heat/ice (PRN) Heat x8 min Heat x8 min Heat x10 min Heat x10 min deferred    TENS  with heat                   "

## 2024-11-05 ENCOUNTER — APPOINTMENT (OUTPATIENT)
Dept: PHYSICAL THERAPY | Facility: CLINIC | Age: 69
End: 2024-11-05
Payer: MEDICARE

## 2024-11-07 ENCOUNTER — APPOINTMENT (OUTPATIENT)
Dept: PHYSICAL THERAPY | Facility: CLINIC | Age: 69
End: 2024-11-07
Payer: MEDICARE

## 2024-11-08 ENCOUNTER — TELEPHONE (OUTPATIENT)
Dept: NEPHROLOGY | Facility: CLINIC | Age: 69
End: 2024-11-08

## 2024-11-08 ENCOUNTER — APPOINTMENT (OUTPATIENT)
Dept: LAB | Facility: HOSPITAL | Age: 69
End: 2024-11-08
Payer: MEDICARE

## 2024-11-08 DIAGNOSIS — E78.5 DYSLIPIDEMIA: ICD-10-CM

## 2024-11-08 DIAGNOSIS — D51.8 OTHER VITAMIN B12 DEFICIENCY ANEMIAS: ICD-10-CM

## 2024-11-08 DIAGNOSIS — N18.31 TYPE 2 DIABETES MELLITUS WITH STAGE 3A CHRONIC KIDNEY DISEASE, WITHOUT LONG-TERM CURRENT USE OF INSULIN (HCC): ICD-10-CM

## 2024-11-08 DIAGNOSIS — E87.6 HYPOKALEMIA: ICD-10-CM

## 2024-11-08 DIAGNOSIS — E11.22 TYPE 2 DIABETES MELLITUS WITH STAGE 3A CHRONIC KIDNEY DISEASE, WITHOUT LONG-TERM CURRENT USE OF INSULIN (HCC): ICD-10-CM

## 2024-11-08 DIAGNOSIS — R53.83 OTHER FATIGUE: ICD-10-CM

## 2024-11-08 DIAGNOSIS — I89.0 LYMPHEDEMA: ICD-10-CM

## 2024-11-08 DIAGNOSIS — E78.5 HYPERLIPIDEMIA, UNSPECIFIED HYPERLIPIDEMIA TYPE: ICD-10-CM

## 2024-11-08 DIAGNOSIS — E11.21 DIABETIC NEPHROPATHY ASSOCIATED WITH TYPE 2 DIABETES MELLITUS (HCC): ICD-10-CM

## 2024-11-08 DIAGNOSIS — I12.9 HYPERTENSIVE RENAL DISEASE, STAGE 1 THROUGH STAGE 4 OR UNSPECIFIED CHRONIC KIDNEY DISEASE: ICD-10-CM

## 2024-11-08 DIAGNOSIS — E66.01 OBESITY, MORBID (HCC): ICD-10-CM

## 2024-11-08 DIAGNOSIS — D50.9 IRON DEFICIENCY ANEMIA, UNSPECIFIED IRON DEFICIENCY ANEMIA TYPE: ICD-10-CM

## 2024-11-08 DIAGNOSIS — N18.31 CHRONIC KIDNEY DISEASE, STAGE 3A (HCC): ICD-10-CM

## 2024-11-08 DIAGNOSIS — I12.9 PARENCHYMAL RENAL HYPERTENSION, STAGE 1 THROUGH STAGE 4 OR UNSPECIFIED CHRONIC KIDNEY DISEASE: ICD-10-CM

## 2024-11-08 DIAGNOSIS — E55.9 VITAMIN D DEFICIENCY: ICD-10-CM

## 2024-11-08 LAB
25(OH)D3 SERPL-MCNC: 24.9 NG/ML (ref 30–100)
ALBUMIN SERPL BCG-MCNC: 4.4 G/DL (ref 3.5–5)
ALP SERPL-CCNC: 86 U/L (ref 34–104)
ALT SERPL W P-5'-P-CCNC: 20 U/L (ref 7–52)
ANION GAP SERPL CALCULATED.3IONS-SCNC: 7 MMOL/L (ref 4–13)
AST SERPL W P-5'-P-CCNC: 19 U/L (ref 5–45)
BILIRUB SERPL-MCNC: 0.49 MG/DL (ref 0.2–1)
BUN SERPL-MCNC: 15 MG/DL (ref 5–25)
CALCIUM SERPL-MCNC: 9.7 MG/DL (ref 8.4–10.2)
CHLORIDE SERPL-SCNC: 104 MMOL/L (ref 96–108)
CHOLEST SERPL-MCNC: 144 MG/DL
CO2 SERPL-SCNC: 28 MMOL/L (ref 21–32)
CREAT SERPL-MCNC: 0.88 MG/DL (ref 0.6–1.3)
ERYTHROCYTE [DISTWIDTH] IN BLOOD BY AUTOMATED COUNT: 14 % (ref 11.6–15.1)
EST. AVERAGE GLUCOSE BLD GHB EST-MCNC: 137 MG/DL
FERRITIN SERPL-MCNC: 9 NG/ML (ref 24–307)
GLUCOSE P FAST SERPL-MCNC: 92 MG/DL (ref 65–99)
HBA1C MFR BLD: 6.4 %
HCT VFR BLD AUTO: 36.7 % (ref 36.5–46.1)
HDLC SERPL-MCNC: 43 MG/DL
HGB BLD-MCNC: 12 G/DL (ref 12–15.4)
IRON SATN MFR SERPL: 12 % (ref 15–50)
IRON SERPL-MCNC: 55 UG/DL (ref 50–212)
LDLC SERPL CALC-MCNC: 68 MG/DL (ref 0–100)
MAGNESIUM SERPL-MCNC: 2 MG/DL (ref 1.9–2.7)
MCH RBC QN AUTO: 28.6 PG (ref 26.8–34.3)
MCHC RBC AUTO-ENTMCNC: 32.7 G/DL (ref 31.4–37.4)
MCV RBC AUTO: 88 FL (ref 82–98)
NONHDLC SERPL-MCNC: 101 MG/DL
PLATELET # BLD AUTO: 139 THOUSANDS/UL (ref 149–390)
PMV BLD AUTO: 14.3 FL (ref 8.9–12.7)
POTASSIUM SERPL-SCNC: 4.4 MMOL/L (ref 3.5–5.3)
PROT SERPL-MCNC: 8 G/DL (ref 6.4–8.4)
RBC # BLD AUTO: 4.19 MILLION/UL (ref 3.88–5.12)
SODIUM SERPL-SCNC: 139 MMOL/L (ref 135–147)
TIBC SERPL-MCNC: 467 UG/DL (ref 250–450)
TRIGL SERPL-MCNC: 166 MG/DL
UIBC SERPL-MCNC: 412 UG/DL (ref 155–355)
VIT B12 SERPL-MCNC: 192 PG/ML (ref 180–914)
WBC # BLD AUTO: 6.17 THOUSAND/UL (ref 4.31–10.16)

## 2024-11-08 PROCEDURE — 82728 ASSAY OF FERRITIN: CPT

## 2024-11-08 PROCEDURE — 83550 IRON BINDING TEST: CPT

## 2024-11-08 PROCEDURE — 83735 ASSAY OF MAGNESIUM: CPT

## 2024-11-08 PROCEDURE — 85027 COMPLETE CBC AUTOMATED: CPT

## 2024-11-08 PROCEDURE — 82306 VITAMIN D 25 HYDROXY: CPT

## 2024-11-08 PROCEDURE — 83540 ASSAY OF IRON: CPT

## 2024-11-08 PROCEDURE — 82607 VITAMIN B-12: CPT

## 2024-11-08 PROCEDURE — 36415 COLL VENOUS BLD VENIPUNCTURE: CPT

## 2024-11-08 NOTE — TELEPHONE ENCOUNTER
LM for patient's daughter-in-law, Becca, about the following, asked her to call back if she has any questions:    ----- Message from Ruben Patel MD sent at 11/8/2024  1:48 PM EST -----  Labs look good  ----- Message -----  From: Lab, Background User  Sent: 11/8/2024  11:56 AM EST  To: Ruben Patel MD

## 2024-11-12 ENCOUNTER — EVALUATION (OUTPATIENT)
Dept: PHYSICAL THERAPY | Facility: CLINIC | Age: 69
End: 2024-11-12
Payer: MEDICARE

## 2024-11-12 DIAGNOSIS — M17.0 PRIMARY OSTEOARTHRITIS OF BOTH KNEES: Primary | ICD-10-CM

## 2024-11-12 PROCEDURE — 97110 THERAPEUTIC EXERCISES: CPT | Performed by: PHYSICAL THERAPIST

## 2024-11-12 PROCEDURE — 97530 THERAPEUTIC ACTIVITIES: CPT | Performed by: PHYSICAL THERAPIST

## 2024-11-12 PROCEDURE — 97140 MANUAL THERAPY 1/> REGIONS: CPT | Performed by: PHYSICAL THERAPIST

## 2024-11-12 NOTE — PROGRESS NOTES
PT Re-Evaluation     Today's date: 2024  Patient name: Lela Gomez  : 1955  MRN: [id number]  Referring provider: David Rajput DO  Dx:   Encounter Diagnosis     ICD-10-CM    1. Primary osteoarthritis of both knees  M17.0             Start Time: 1400  Stop Time: 1445  Total time in clinic (min): 45 minutes    Assessment  Impairments: abnormal gait, abnormal or restricted ROM, activity intolerance, impaired balance, impaired physical strength, lacks appropriate home exercise program, pain with function, weight-bearing intolerance, poor posture , poor body mechanics, participation limitations, activity limitations and endurance    Assessment details: Patient is a 69 y.o. adult who presents to outpatient PT with reports of chronic bilateral knee pain that started a few years ago and has been ongoing and severe in nature over the years. She recently received gel injections 3 series on both knees, however patient reports no difference in pain noted. Please note that patient has back pain as well, that relieved slightly with injection. Patient presented to re-evaluation in wheelchair due to difficulty walking long distances from parking lot into clinic. She has attended PT for the past 2 months with intermittent compliance to scheduled appointments due to transportation. She reports slightly improving pain levels and LE strength overall especially on Left LE with Adls. Noted slight improvements in knee ROM especially Left knee, however Right continues to be limited with ERP. Slight improving LE strength noted with improved quad contraction bilaterally. She continues to require close supervision for short navigation in PT clinic from wheelchair to table, etc. She reports she performs all ADLs and self-care tasks independently, however has home health aide to assist with other tasks as needed. She reports she lives with her son, daughter-in-law, and , however her  has certain health issues that  she has attend to and assist him as well. Her current pain impairments affect her tolerance with standing, walking, and other Wbing tasks for prolonged periods of time. Patient unable to perform stair negotiation, thus lives in 1st floor set-up. Patient was educated on etiology, prognosis, pain science, and POC. She responds well to pain management interventions and is steadily improving tolerance to PT exercises. Patient will benefit from continued skilled PT services to further improve pain levels, improve LE mobility, improve strength and functional stability, and improve overall ease and independence with ADLs, household chores, and taking care of family to improve efficiency and reduce falls risk. Patient would benefit from skilled PT services to address these impairments and to maximize function.  Thank you for the referral.    Understanding of Dx/Px/POC: good     Prognosis: good    Goals  Impairment Goals 4-6 weeks   In order to maximize function patient will be able to...   - Decrease intensity/duration/frequency of pain to 4/10. Improved  - Demonstrate symmetrical knee AROM without pain. Improved Left knee, not improved Right knee  - Increase hip/knee strength to 4/5 throughout. Slightly Improved  - Demonstrate improved hip flexibility as demonstrated by increased ROM through therapeutic exercise. Improved    Functional Goals 6-8 weeks  In order to return to prior level of function patient will be able to...   - Participate in ADL's/IADL's/sport specific activities with no greater than 2/10 pain.  Ongoing  - Demonstrate independence and compliant with HEP. Partially Met  - Demonstrate a squat and or sit to stand with good mechanics and eccentric control without pain/difficulty/compensation. Slightly Improved  - Demonstrate functional activities with good core and glute strength without compensation/pain/difficulty . Slightly Improved  - Patient will be able to demonstrate good gait mechanics without  compensations. Improved    Plan  Patient would benefit from: skilled PT  Planned modality interventions: cryotherapy, electrical stimulation/Russian stimulation and low level laser therapy  Other planned modality interventions: moist heat    Planned therapy interventions: joint mobilization, manual therapy, neuromuscular re-education, patient education, strengthening, stretching, therapeutic activities, therapeutic exercise, home exercise program, functional ROM exercises, Lyle taping, postural training, balance/weight bearing training, body mechanics training, flexibility, IASTM, kinesiology taping, nerve gliding and massage    Frequency: 1-2x week  Duration in weeks: 4  Treatment plan discussed with: patient, PTA and referring physician        Subjective Evaluation    History of Present Illness  Mechanism of injury: Lela Gomez is a 69 y.o. year-old adult who presents with reports of chronic knee pain that started 3-4 years ago. She reports she was working where she frequently standing/walking and does a lot of standing for cooking at home too. Patient reports her pain is so severe at this time she cannot stand or walk for prolonged periods of time. She also is not able to do stairs and thus stays on the 1st floor. She received 3 series of gel injection over the past few months that patient reports did not change her symptoms. She was recommended for PT at this time. Please note that patient also has ongoing low back pain that she received injection for that relieved some of symptoms slightly.   Quality of life: good    Patient Goals  Patient goals for therapy: decreased pain, increased motion, improved balance, increased strength, independence with ADLs/IADLs and return to sport/leisure activities    Pain  Current pain ratin  At best pain ratin  At worst pain ratin  Location: both knees  Quality: throbbing, grinding, dull ache and sharp  Relieving factors: heat  Aggravating factors: sitting,  standing, stair climbing, walking and lifting  Progression: worsening    Social Support  Steps to enter house: yes  Stairs in house: yes   Lives in: multiple-level home  Lives with: adult children, young children and spouse    Employment status: not working  Treatments  Previous treatment: physical therapy  Current treatment: physical therapy        Objective     Observations     Additional Observation Details  Patient presented to evaluation in wheelchair accompanied by her home health aide. She reports she was not able to walk from car to clinic due to knee pain. She is able to walk short distances for wheelchair to table transfers and short distances in clinic.    Standing Posture: bilateral knee valgus, medial patellar tracking    Palpation   Left   Muscle spasm in the distal biceps femoris, distal semimembranosus, distal semitendinosus, lateral gastrocnemius, medial gastrocnemius, rectus femoris, vastus lateralis and vastus medialis.     Right   Muscle spasm in the distal biceps femoris, distal semimembranosus, distal semitendinosus, lateral gastrocnemius, medial gastrocnemius, rectus femoris, vastus lateralis and vastus medialis.   Tenderness of the rectus femoris.     Tenderness   Left Knee   Tenderness in the lateral joint line and medial joint line. No tenderness in the inferior patella, ITB, lateral patella, medial patella, patellar tendon, quadriceps tendon and superior patella.     Right Knee   Tenderness in the inferior patella, lateral joint line, lateral patella, medial joint line, medial patella and patellar tendon. No tenderness in the ITB, quadriceps tendon and superior patella.     Neurological Testing     Sensation     Knee   Left Knee   Intact: Light touch    Right Knee   Intact: light touch     Active Range of Motion   Left Knee   Flexion: 110 degrees   Extension: WFL    Right Knee   Flexion: 75 degrees with pain  Extension: WFL and with pain    Passive Range of Motion   Left Knee   Flexion:  "110 degrees with pain  Extension: WFL    Right Knee   Flexion: 75 degrees with pain  Extension: WFL    Mobility   Patellar Mobility:   Left Knee   Hypomobile: left medial, left lateral, left superior and left inferior    Right Knee   Hypomobile: medial, lateral, superior and inferior     Patellar Static Positioning   Left Knee: medial tilt  Right Knee: medial tilt    Strength/Myotome Testing     Left Knee   Flexion: 3  Extension: 3  Quadriceps contraction: good    Right Knee   Flexion: 3  Extension: 3  Quadriceps contraction: good    Additional Strength Details  Hip Flexion MMT: Right 3/5, Left 3+/5  Hip ABD/ER MMT: bilaterally 3/5  Hip Extension: unable to assess due to pain              POC Expires Auth Status Start Date Expiration Date PT Visit Limit    12/12/2024 No Auth Req.   BOMN   Date 11/4/2024 11/12/2024 10/22/2024 10/29/2024 11/4/2024   Used 13 14 10 11 12   Remaining           Diagnosis: chronic bilateral knee pain   Precautions: *Paul/Tyree speaking only*; HTN, OA, GERD   Next Physician's Appt:   IPPLEX HEP:   Manuals 11/4 11/12 10/22 10/29 10/31   STM  DK, R knee      PROM        Taping?        Laser                Neuro Re-Ed        Quad sets 3\" x20 3\" x20 3\" x20 ea  3\" x20   Supine SLR Supine AAROM 2\" x5 Supine AAROM 2\" x5      TA ball press        Hip ADD ball squeeze   H/L 2\" 2x10     BKFO/clams     Supine clams GTB 2x10   LAQs AROM x20 ea  AROM x15 ea AROM x15 ea    Wobble Board                        Ther Ex        Calf stretch Strap 10\"x10 ea Strap 10\"x10 ea Strap 10\"x10 ea Strap 10\"x10 ea Strap 10\"x10 ea   HS stretch    Strap 10\"x10 ea    DKTC w/ ball        LTRs        Heel slides with strap  AAROM x20 ea   AAROM x20 ea   Heel Raises   Unilat with UE support x10 ea  Unilat with UE support x10 ea   Side-stepping @ table x3 laps  @ table x3 laps cues @ mirror x3 laps cues @ mirror x3 laps cues   Standing hip ABD, Ext Standing hip ABD x20 ea                        Ther Activity        Bike " for LE mobility NuStep Lv1 x5min NuStep Lv3 x5min NuStep Lv1 x5min  NuStep Lv1 x5min   Transfers        Gait Training Short distance in clinic from W/C to table Short distance in clinic from W/C to table   Short distance in clinic from W/C to table   Mini Squats                Pt Ed Posture, pain science POC, pain science Home e-stim unit     Re-Evaluation  DK      Modalities        Heat/ice (PRN) Heat x8 min  Heat x10 min Heat x10 min deferred    TENS

## 2024-11-13 ENCOUNTER — RESULTS FOLLOW-UP (OUTPATIENT)
Dept: LAB | Facility: HOSPITAL | Age: 69
End: 2024-11-13

## 2024-11-13 NOTE — TELEPHONE ENCOUNTER
----- Message from Karolina Pringle MD sent at 11/12/2024  7:48 PM EST -----  Please offer OV to discuss results.

## 2024-11-14 ENCOUNTER — OFFICE VISIT (OUTPATIENT)
Dept: PHYSICAL THERAPY | Facility: CLINIC | Age: 69
End: 2024-11-14
Payer: MEDICARE

## 2024-11-14 DIAGNOSIS — M17.0 PRIMARY OSTEOARTHRITIS OF BOTH KNEES: Primary | ICD-10-CM

## 2024-11-14 PROCEDURE — 97140 MANUAL THERAPY 1/> REGIONS: CPT

## 2024-11-14 PROCEDURE — 97110 THERAPEUTIC EXERCISES: CPT

## 2024-11-14 PROCEDURE — 97112 NEUROMUSCULAR REEDUCATION: CPT

## 2024-11-14 NOTE — PROGRESS NOTES
"Daily Note     Today's date: 2024  Patient name: Lela Gomez  : 1955  MRN: [id number]  Referring provider: David Rajput DO  Dx:   Encounter Diagnosis     ICD-10-CM    1. Primary osteoarthritis of both knees  M17.0                      Subjective: Pt states that her back is sore along with her knees.        Objective: See treatment diary below      Assessment: Tolerated treatment well.  Continued with outlined program to improve hip and knee strength.  Pt challenged with form and requires multiple cues.  Fair carryover is noted.  Pt notes R knee pain with heel slides, tolerable on the L.  Pt progressing slowly towards her goals and will benefit from continued therapy.        Plan: Continue per plan of care.         POC Expires Auth Status Start Date Expiration Date PT Visit Limit    2024 No Auth Req.   BOMN   Date 2024 2024 2024 10/29/2024 2024   Used 13 14 15 11 12   Remaining           Diagnosis: chronic bilateral knee pain   Precautions: *Paul/Tyree speaking only*; HTN, OA, GERD   Next Physician's Appt:   [facility] HEP:   Manuals 11/4 11/12 11/14 10/29 10/31   STM  DK, R knee TH, R knee     PROM        Taping?        Laser                Neuro Re-Ed        Quad sets 3\" x20 3\" x20 3\"x20  3\" x20   Supine SLR Supine AAROM 2\" x5 Supine AAROM 2\" x5 Supine AAROM 2\"x5      TA ball press        Hip ADD ball squeeze        BKFO/clams   Supine clams   GTB 2x10  Supine clams GTB 2x10   LAQs AROM x20 ea   AROM x15 ea    Wobble Board                        Ther Ex        Calf stretch Strap 10\"x10 ea Strap 10\"x10 ea Strap 10\"x10 ea Strap 10\"x10 ea Strap 10\"x10 ea   HS stretch    Strap 10\"x10 ea    DKTC w/ ball        LTRs        Heel slides with strap  AAROM x20 ea AAROM x20 ea  AAROM x20 ea   Heel Raises   Unilateral with UE support 10x ea  Unilat with UE support x10 ea   Side-stepping @ table x3 laps  @ mirror x3 laps @ mirror x3 laps cues @ mirror x3 laps cues   Standing hip ABD, " Ext Standing hip ABD x20 ea                        Ther Activity        Bike for LE mobility NuStep Lv1 x5min NuStep Lv3 x5min NuStep Lv1 x5min  NuStep Lv1 x5min   Transfers        Gait Training Short distance in clinic from W/C to table Short distance in clinic from W/C to table Short distance in clinic from W/C to table  Short distance in clinic from W/C to table   Mini Squats                Pt Ed Posture, pain science POC, pain science      Re-Evaluation  DK      Modalities        Heat/ice (PRN) Heat x8 min  Heat 8 mins Heat x10 min deferred    TENS

## 2024-11-19 ENCOUNTER — APPOINTMENT (OUTPATIENT)
Dept: PHYSICAL THERAPY | Facility: CLINIC | Age: 69
End: 2024-11-19
Payer: MEDICARE

## 2024-11-21 ENCOUNTER — APPOINTMENT (OUTPATIENT)
Dept: PHYSICAL THERAPY | Facility: CLINIC | Age: 69
End: 2024-11-21
Payer: MEDICARE

## 2024-11-26 ENCOUNTER — OFFICE VISIT (OUTPATIENT)
Dept: PHYSICAL THERAPY | Facility: CLINIC | Age: 69
End: 2024-11-26
Payer: MEDICARE

## 2024-11-26 DIAGNOSIS — M17.0 PRIMARY OSTEOARTHRITIS OF BOTH KNEES: Primary | ICD-10-CM

## 2024-11-26 PROCEDURE — 97112 NEUROMUSCULAR REEDUCATION: CPT | Performed by: PHYSICAL THERAPIST

## 2024-11-26 PROCEDURE — 97140 MANUAL THERAPY 1/> REGIONS: CPT | Performed by: PHYSICAL THERAPIST

## 2024-11-26 PROCEDURE — 97110 THERAPEUTIC EXERCISES: CPT | Performed by: PHYSICAL THERAPIST

## 2024-11-26 NOTE — PROGRESS NOTES
"Daily Note     Today's date: 2024  Patient name: Lela Gomez  : 1955  MRN: [id number]  Referring provider: David Rajput DO  Dx:   Encounter Diagnosis     ICD-10-CM    1. Primary osteoarthritis of both knees  M17.0           Start Time: 1335  Stop Time: 1420  Total time in clinic (min): 45 minutes    Subjective: Patient reports her caregiver was sick last week and was unable to come for PT. She reports pain primarily in Right knee.      Objective: See treatment diary below      Assessment: Tolerated treatment well. Patient noted to have slightly improved walking tolerance within PT clinic. Correcting form with side-stepping to keep feet aligned forward. Significant muscle tension and pain noted Right distal quad that relieved with STM. Patient exhibited good technique with therapeutic exercises and would benefit from continued PT      Plan: Continue per plan of care.  Progress treatment as tolerated.          POC Expires Auth Status Start Date Expiration Date PT Visit Limit    2024 No Auth Req.   BOMN   Date 2024   Used 13 14 15 16 12   Remaining           Diagnosis: chronic bilateral knee pain   Precautions: *Paul/Tyree speaking only*; HTN, OA, GERD   Next Physician's Appt:   [facility] HEP:   Manuals 11/4 11/12 11/14 11/26 10/31   STM  DK, R knee TH, R knee DK, R knee    PROM        Taping?        Laser                Neuro Re-Ed        Quad sets 3\" x20 3\" x20 3\"x20 3\"x20 3\" x20   Supine SLR Supine AAROM 2\" x5 Supine AAROM 2\" x5 Supine AAROM 2\"x5      TA ball press        Hip ADD ball squeeze        BKFO/clams   Supine clams   GTB 2x10  Supine clams GTB 2x10   LAQs AROM x20 ea   AROM x20 ea    Wobble Board    Cues 1.5' ea                    Ther Ex        Calf stretch Strap 10\"x10 ea Strap 10\"x10 ea Strap 10\"x10 ea Strap 10\"x10 ea Strap 10\"x10 ea   HS stretch        DKTC w/ ball        LTRs        Heel slides with strap  AAROM x20 ea AAROM x20 " ea AAROM x20 ea AAROM x20 ea   Heel Raises   Unilateral with UE support 10x ea  Unilat with UE support x10 ea   Side-stepping @ table x3 laps  @ mirror x3 laps @ mirror x3 laps @ mirror x3 laps cues   Standing hip ABD, Ext Standing hip ABD x20 ea       Standing Knee Bends    AROM x10 ea                     Ther Activity        Bike for LE mobility NuStep Lv1 x5min NuStep Lv3 x5min NuStep Lv1 x5min NuStep Lv1 x5min NuStep Lv1 x5min   Transfers        Gait Training Short distance in clinic from W/C to table Short distance in clinic from W/C to table Short distance in clinic from W/C to table Short distance in clinic from W/C to table Short distance in clinic from W/C to table   Mini Squats                Pt Ed Posture, pain science POC, pain science      Re-Evaluation  DK      Modalities        Heat/ice (PRN) Heat x8 min  Heat 8 mins Heat Right knee only 5 mins deferred    TENS

## 2024-11-27 DIAGNOSIS — B97.89 SORE THROAT (VIRAL): ICD-10-CM

## 2024-11-27 DIAGNOSIS — R05.3 CHRONIC COUGH: Primary | ICD-10-CM

## 2024-11-27 DIAGNOSIS — D51.8 OTHER VITAMIN B12 DEFICIENCY ANEMIAS: ICD-10-CM

## 2024-11-27 DIAGNOSIS — E55.9 VITAMIN D DEFICIENCY: ICD-10-CM

## 2024-11-27 DIAGNOSIS — J02.8 SORE THROAT (VIRAL): ICD-10-CM

## 2024-11-27 DIAGNOSIS — D50.9 IRON DEFICIENCY ANEMIA, UNSPECIFIED IRON DEFICIENCY ANEMIA TYPE: ICD-10-CM

## 2024-11-27 DIAGNOSIS — U07.1 COVID-19: ICD-10-CM

## 2024-11-27 RX ORDER — ERGOCALCIFEROL 1.25 MG/1
50000 CAPSULE, LIQUID FILLED ORAL WEEKLY
Qty: 10 CAPSULE | Refills: 1 | Status: SHIPPED | OUTPATIENT
Start: 2024-11-27

## 2024-11-27 RX ORDER — BENZONATATE 100 MG/1
100 CAPSULE ORAL 3 TIMES DAILY PRN
Qty: 60 CAPSULE | Refills: 1 | Status: SHIPPED | OUTPATIENT
Start: 2024-11-27

## 2024-11-27 RX ORDER — FERROUS SULFATE 324(65)MG
324 TABLET, DELAYED RELEASE (ENTERIC COATED) ORAL
Qty: 90 TABLET | Refills: 1 | Status: SHIPPED | OUTPATIENT
Start: 2024-11-27

## 2024-11-27 RX ORDER — MAGNESIUM 200 MG
1 TABLET ORAL DAILY
Qty: 90 TABLET | Refills: 1 | Status: SHIPPED | OUTPATIENT
Start: 2024-11-27

## 2025-01-03 ENCOUNTER — OFFICE VISIT (OUTPATIENT)
Dept: URGENT CARE | Facility: CLINIC | Age: 70
End: 2025-01-03
Payer: MEDICARE

## 2025-01-03 VITALS
SYSTOLIC BLOOD PRESSURE: 118 MMHG | RESPIRATION RATE: 16 BRPM | HEART RATE: 87 BPM | DIASTOLIC BLOOD PRESSURE: 63 MMHG | OXYGEN SATURATION: 99 % | WEIGHT: 170 LBS | BODY MASS INDEX: 33.38 KG/M2 | HEIGHT: 60 IN | TEMPERATURE: 98.3 F

## 2025-01-03 DIAGNOSIS — R05.1 ACUTE COUGH: Primary | ICD-10-CM

## 2025-01-03 PROCEDURE — 99213 OFFICE O/P EST LOW 20 MIN: CPT | Performed by: FAMILY MEDICINE

## 2025-01-03 PROCEDURE — G0463 HOSPITAL OUTPT CLINIC VISIT: HCPCS | Performed by: FAMILY MEDICINE

## 2025-01-03 PROCEDURE — 87636 SARSCOV2 & INF A&B AMP PRB: CPT | Performed by: FAMILY MEDICINE

## 2025-01-03 RX ORDER — AZITHROMYCIN 250 MG/1
TABLET, FILM COATED ORAL
Qty: 6 TABLET | Refills: 0 | Status: SHIPPED | OUTPATIENT
Start: 2025-01-03 | End: 2025-01-07

## 2025-01-03 RX ORDER — BENZONATATE 100 MG/1
100 CAPSULE ORAL 3 TIMES DAILY PRN
Qty: 20 CAPSULE | Refills: 0 | Status: SHIPPED | OUTPATIENT
Start: 2025-01-03

## 2025-01-03 NOTE — PROGRESS NOTES
[facility]        NAME: Lela Gomez is a 69 y.o. female  : 1955    MRN: [id number]  DATE: January 3, 2025  TIME: 12:54 PM    Assessment and Plan   Acute cough [R05.1]  1. Acute cough  azithromycin (ZITHROMAX) 250 mg tablet    benzonatate (TESSALON PERLES) 100 mg capsule    Covid/Flu- Office Collect Normal        Await covid/flu results - if neg - can start antibiotics since patient has had significant exposures at hospital and concern for infection to  who had a stroke.  If test positve  - do not start antibiotics  Follow up with PCP if not improving.       Patient Instructions       Follow up with PCP in 3-5 days.  Proceed to  ER if symptoms worsen.    If tests have been performed at [facility], our office will contact you with results if changes need to be made to the care plan discussed with you at the visit.  You can review your full results on [facility] MyChart.    Chief Complaint     Chief Complaint   Patient presents with   • Fever     Fever with headaches, leg pain and body aches.          History of Present Illness       70 yo with one week of body aches and worsening cough.  Taking tylenol OTC   had a stroke recently so she has been in the hospital a lot and is concerned about exposure.     Cough  This is a new problem. The current episode started 1 to 4 weeks ago. The problem has been gradually worsening. The problem occurs every few minutes. The cough is Non-productive. Associated symptoms include chills, ear pain, headaches and myalgias. Pertinent negatives include no chest pain, ear congestion, fever, heartburn, hemoptysis, nasal congestion, postnasal drip, rash, rhinorrhea, sore throat, shortness of breath, sweats, weight loss or wheezing.       Review of Systems   Review of Systems   Constitutional:  Positive for chills. Negative for fever and weight loss.   HENT:  Positive for ear pain. Negative for postnasal drip, rhinorrhea and sore throat.    Respiratory:  Positive  for cough. Negative for hemoptysis, shortness of breath and wheezing.    Cardiovascular:  Negative for chest pain.   Gastrointestinal:  Negative for heartburn.   Musculoskeletal:  Positive for myalgias.   Skin:  Negative for rash.   Neurological:  Positive for headaches.         Current Medications       Current Outpatient Medications:   •  azithromycin (ZITHROMAX) 250 mg tablet, Take 2 tablets today then 1 tablet daily x 4 days, Disp: 6 tablet, Rfl: 0  •  benzonatate (TESSALON PERLES) 100 mg capsule, Take 1 capsule (100 mg total) by mouth 3 (three) times a day as needed for cough, Disp: 20 capsule, Rfl: 0  •  acetaminophen (TYLENOL) 500 mg tablet, TAKE 1 TABLET (500 MG TOTAL) BY MOUTH EVERY 6 (SIX) HOURS AS NEEDED FOR MILD PAIN OR MODERATE PAIN, Disp: 30 tablet, Rfl: 1  •  ammonium lactate (LAC-HYDRIN) 12 % lotion, Apply topically 2 (two) times a day as needed for dry skin, Disp: 400 g, Rfl: 1  •  atorvastatin (LIPITOR) 20 mg tablet, TAKE 1 TABLET (20 MG TOTAL) BY MOUTH DAILY, Disp: 90 tablet, Rfl: 1  •  celecoxib (CeleBREX) 200 mg capsule, TAKE 1 CAPSULE (200 MG TOTAL) BY MOUTH DAILY (Patient not taking: Reported on 9/23/2024), Disp: 30 capsule, Rfl: 2  •  Cholecalciferol (Vitamin D3) 50 MCG (2000 UT) TABS, TAKE 1 TABLET (2,000 UNITS TOTAL) BY MOUTH DAILY, Disp: 90 tablet, Rfl: 1  •  clotrimazole-betamethasone (LOTRISONE) 1-0.05 % cream, APPLY TOPICALLY 2 (TWO) TIMES A DAY, Disp: 45 g, Rfl: 1  •  Cyanocobalamin (B-12) 1000 MCG SUBL, Place 1 tablet (1,000 mcg total) under the tongue in the morning, Disp: 90 tablet, Rfl: 1  •  Diclofenac Sodium (VOLTAREN) 1 %, APPLY 2 G TOPICALLY 2 (TWO) TIMES A DAY (Patient not taking: Reported on 9/23/2024), Disp: 100 g, Rfl: 5  •  DULoxetine (CYMBALTA) 20 mg capsule, TAKE 1 CAPSULE (20 MG TOTAL) BY MOUTH DAILY (Patient not taking: Reported on 9/23/2024), Disp: 90 capsule, Rfl: 0  •  ergocalciferol (VITAMIN D2) 50,000 units, Take 1 capsule (50,000 Units total) by mouth once a  week, Disp: 10 capsule, Rfl: 1  •  Eyelid Cleansers (OcuSoft Eyelid Cleansing) PADS, APPLY AT BEDTIME AS DIRECTED SCRUB WITH PAD VS. FOAM ALONG CLOSED EYELIDS NIGHTLY PER DIRECTIONS. OCUSOFT OR SIMILAR BRAND (WIPES VS. FOAM) (Patient not taking: Reported on 9/23/2024), Disp: , Rfl:   •  ferrous sulfate 324 (65 Fe) mg, Take 1 tablet (324 mg total) by mouth daily before breakfast, Disp: 90 tablet, Rfl: 1  •  fluticasone (FLONASE) 50 mcg/act nasal spray, 1 spray into each nostril in the morning. (Patient not taking: Reported on 6/5/2023), Disp: 15.8 mL, Rfl: 0  •  gabapentin (Neurontin) 100 mg capsule, Take 1 capsule (100 mg total) by mouth daily at bedtime, Disp: 30 capsule, Rfl: 2  •  ibuprofen (MOTRIN) 200 mg tablet, Take 1 tablet (200 mg total) by mouth every 6 (six) hours as needed for mild pain or moderate pain (Patient not taking: Reported on 9/23/2024), Disp: 30 tablet, Rfl: 1  •  losartan (COZAAR) 100 MG tablet, Take 1 tablet (100 mg total) by mouth daily (Patient not taking: Reported on 10/16/2023), Disp: 90 tablet, Rfl: 3  •  losartan (COZAAR) 25 mg tablet, Take 1 tablet (25 mg total) by mouth daily, Disp: 90 tablet, Rfl: 3  •  magnesium Oxide (MAG-OX) 400 mg TABS, Take 400 mg by mouth in the morning and 400 mg in the evening. (Patient not taking: Reported on 6/5/2023), Disp: , Rfl:   •  methocarbamol (ROBAXIN) 500 mg tablet, TAKE 1 TABLET (500 MG TOTAL) BY MOUTH 2 (TWO) TIMES A DAY AS NEEDED FOR MUSCLE SPASMS (Patient not taking: Reported on 9/23/2024), Disp: 60 tablet, Rfl: 5  •  neomycin-polymyxin-dexamethasone (MAXITROL) 0.35%-10,000 units/g-0.1%, APPLY 0.25 INCH INTO AFFECTED EYE AT BEDTIME X 2 WEEKS THEN D/C (Patient not taking: Reported on 6/5/2023), Disp: , Rfl:   •  omeprazole (PriLOSEC) 20 mg delayed release capsule, TAKE 1 CAPSULE (20 MG TOTAL) BY MOUTH DAILY, Disp: 90 capsule, Rfl: 1  •  ondansetron (ZOFRAN) 4 mg tablet, Take 1 tablet (4 mg total) by mouth every 8 (eight) hours as needed for  nausea or vomiting (Patient not taking: Reported on 9/23/2024), Disp: 20 tablet, Rfl: 0  •  promethazine-dextromethorphan (PHENERGAN-DM) 6.25-15 mg/5 mL oral syrup, Take 5 mL by mouth 4 (four) times a day as needed for cough, Disp: 240 mL, Rfl: 1  •  Restasis 0.05 % ophthalmic emulsion, INSTILL 1 DROP BOTH EYES TWICE A DAY (Patient not taking: Reported on 6/5/2023), Disp: , Rfl:   •  spironolactone (ALDACTONE) 25 mg tablet, TAKE 1 TABLET (25 MG TOTAL) BY MOUTH DAILY, Disp: 90 tablet, Rfl: 1  •  torsemide (DEMADEX) 5 MG tablet, TAKE 1 TABLET (5 MG TOTAL) BY MOUTH DAILY, Disp: 90 tablet, Rfl: 1    Current Allergies     Allergies as of 01/03/2025   • (No Known Allergies)            The following portions of the patient's history were reviewed and updated as appropriate: allergies, current medications, past family history, past medical history, past social history, past surgical history and problem list.     Past Medical History:   Diagnosis Date   • Hyperlipidemia    • Hypertension        History reviewed. No pertinent surgical history.    Family History   Problem Relation Age of Onset   • No Known Problems Mother    • No Known Problems Father          Medications have been verified.        Objective   /63   Pulse 87   Temp 98.3 °F (36.8 °C)   Resp 16   Ht 5' (1.524 m)   Wt 77.1 kg (170 lb)   SpO2 99%   BMI 33.20 kg/m²   No LMP recorded. Patient is postmenopausal.       Physical Exam     Physical Exam  Vitals reviewed.   Constitutional:       General: She is not in acute distress.     Appearance: Normal appearance. She is not ill-appearing, toxic-appearing or diaphoretic.   HENT:      Head: Normocephalic and atraumatic.      Right Ear: Tympanic membrane normal.      Left Ear: Tympanic membrane normal.      Nose: Congestion and rhinorrhea present.      Mouth/Throat:      Mouth: Mucous membranes are moist.      Pharynx: Posterior oropharyngeal erythema present. No oropharyngeal exudate.   Eyes:      Pupils:  Pupils are equal, round, and reactive to light.   Cardiovascular:      Rate and Rhythm: Normal rate and regular rhythm.      Heart sounds: No murmur heard.  Pulmonary:      Effort: Pulmonary effort is normal. No respiratory distress.      Breath sounds: Normal breath sounds.   Abdominal:      General: Abdomen is flat.      Palpations: Abdomen is soft.      Tenderness: There is no abdominal tenderness.   Musculoskeletal:         General: Normal range of motion.      Cervical back: Normal range of motion. No rigidity or tenderness.   Lymphadenopathy:      Cervical: No cervical adenopathy.   Skin:     General: Skin is warm and dry.      Capillary Refill: Capillary refill takes less than 2 seconds.   Neurological:      General: No focal deficit present.      Mental Status: She is alert and oriented to person, place, and time. Mental status is at baseline.   Psychiatric:         Mood and Affect: Mood normal.         Behavior: Behavior normal.         Thought Content: Thought content normal.

## 2025-01-03 NOTE — PATIENT INSTRUCTIONS
Wait for flu/covid test that was sent  - should be back tomorrow. If that is negative, you can start the antibiotics (azithromycin) if that is positive, you don't need the antibiotics (azithromycin)  Also given medication for cough  You can continue to take tylenol as needed.

## 2025-01-04 ENCOUNTER — RESULTS FOLLOW-UP (OUTPATIENT)
Dept: URGENT CARE | Facility: CLINIC | Age: 70
End: 2025-01-04

## 2025-01-04 DIAGNOSIS — E11.42 TYPE 2 DIABETES MELLITUS WITH DIABETIC POLYNEUROPATHY, WITHOUT LONG-TERM CURRENT USE OF INSULIN (HCC): ICD-10-CM

## 2025-01-04 DIAGNOSIS — G57.93 NEUROPATHIC PAIN OF BOTH FEET: ICD-10-CM

## 2025-01-04 DIAGNOSIS — U07.1 COVID: Primary | ICD-10-CM

## 2025-01-04 LAB
FLUAV RNA RESP QL NAA+PROBE: NEGATIVE
FLUBV RNA RESP QL NAA+PROBE: NEGATIVE
SARS-COV-2 RNA RESP QL NAA+PROBE: POSITIVE

## 2025-01-04 RX ORDER — NIRMATRELVIR AND RITONAVIR 300-100 MG
3 KIT ORAL 2 TIMES DAILY
Qty: 30 TABLET | Refills: 0 | Status: SHIPPED | OUTPATIENT
Start: 2025-01-04 | End: 2025-01-09

## 2025-01-06 RX ORDER — GABAPENTIN 100 MG/1
100 CAPSULE ORAL
Qty: 30 CAPSULE | Refills: 2 | Status: SHIPPED | OUTPATIENT
Start: 2025-01-06

## 2025-01-24 DIAGNOSIS — N18.9 CKD (CHRONIC KIDNEY DISEASE): ICD-10-CM

## 2025-01-24 DIAGNOSIS — I12.9 PARENCHYMAL RENAL HYPERTENSION, STAGE 1 THROUGH STAGE 4 OR UNSPECIFIED CHRONIC KIDNEY DISEASE: ICD-10-CM

## 2025-01-24 DIAGNOSIS — N18.31 STAGE 3A CHRONIC KIDNEY DISEASE (HCC): ICD-10-CM

## 2025-01-24 DIAGNOSIS — R60.0 PEDAL EDEMA: ICD-10-CM

## 2025-01-24 DIAGNOSIS — E87.6 HYPOKALEMIA: ICD-10-CM

## 2025-01-24 DIAGNOSIS — E78.49 OTHER HYPERLIPIDEMIA: ICD-10-CM

## 2025-01-24 RX ORDER — TORSEMIDE 5 MG/1
5 TABLET ORAL DAILY
Qty: 90 TABLET | Refills: 1 | Status: SHIPPED | OUTPATIENT
Start: 2025-01-24

## 2025-01-28 DIAGNOSIS — M48.062 LUMBAR STENOSIS WITH NEUROGENIC CLAUDICATION: ICD-10-CM

## 2025-01-31 NOTE — TELEPHONE ENCOUNTER
S/w Pt's daughter BeccaLamni is unsure of Pt's medications as she is not currently with the Pt. Pt's daughter to C/b.

## 2025-02-05 NOTE — TELEPHONE ENCOUNTER
Left detailed vmom for KYLE Garcia asking if her PRATIMA-I-HARPREET is taking methocarbamol and if they need the RF that [facility] requested.   C/b # and OH provided.

## 2025-02-07 DIAGNOSIS — N18.9 CKD (CHRONIC KIDNEY DISEASE): ICD-10-CM

## 2025-02-07 DIAGNOSIS — E78.49 OTHER HYPERLIPIDEMIA: ICD-10-CM

## 2025-02-07 DIAGNOSIS — E87.6 HYPOKALEMIA: ICD-10-CM

## 2025-02-07 DIAGNOSIS — N18.31 STAGE 3A CHRONIC KIDNEY DISEASE (HCC): ICD-10-CM

## 2025-02-07 DIAGNOSIS — R60.0 PEDAL EDEMA: ICD-10-CM

## 2025-02-07 DIAGNOSIS — I12.9 PARENCHYMAL RENAL HYPERTENSION, STAGE 1 THROUGH STAGE 4 OR UNSPECIFIED CHRONIC KIDNEY DISEASE: ICD-10-CM

## 2025-02-10 RX ORDER — SPIRONOLACTONE 25 MG/1
25 TABLET ORAL DAILY
Qty: 90 TABLET | Refills: 1 | Status: SHIPPED | OUTPATIENT
Start: 2025-02-10

## 2025-02-10 RX ORDER — METHOCARBAMOL 500 MG/1
500 TABLET, FILM COATED ORAL 2 TIMES DAILY PRN
Qty: 60 TABLET | Refills: 5 | OUTPATIENT
Start: 2025-02-10

## 2025-02-10 NOTE — TELEPHONE ENCOUNTER
FQ, can you refuse this refill request. I have reached out to daughter multiple times asking if her mom is still taking this with no response.

## 2025-02-11 ENCOUNTER — TELEPHONE (OUTPATIENT)
Dept: PAIN MEDICINE | Facility: CLINIC | Age: 70
End: 2025-02-11

## 2025-02-11 DIAGNOSIS — M48.062 LUMBAR STENOSIS WITH NEUROGENIC CLAUDICATION: ICD-10-CM

## 2025-02-11 RX ORDER — METHOCARBAMOL 500 MG/1
500 TABLET, FILM COATED ORAL 2 TIMES DAILY PRN
Qty: 60 TABLET | Refills: 5 | Status: SHIPPED | OUTPATIENT
Start: 2025-02-11

## 2025-02-11 NOTE — TELEPHONE ENCOUNTER
Left message for patient to contact the scheduling office at [phone] to schedule their procedure.

## 2025-02-11 NOTE — TELEPHONE ENCOUNTER
Caller: Daughter danuta    Doctor: Dr. AMAYA    Reason for call: Returning Call  Robaxin 500mg  Currently has none patient stated was taking that script a year ago, patient been getting a lot of spasm on her legs. Stated patient gets a lot of ralph horse     Would like to schedule a procedure for her back pain    Transfer to RN       Call back#:

## 2025-02-11 NOTE — TELEPHONE ENCOUNTER
S/W patients daughter per medical consent form.  She states pt is requesting a refill of Robaxin for leg cramps at night.   She denies taking this medication for the past year.      She is also requesting a repeat injection     Injection type: Bilateral L5 TFESI  Last injection date: 5/17/24  Last office visit: 4/29/24  Where is pain located: Right lower back radiating to Right lower leg  Is the pain the same area as before: Yes  Current pain level: 7/10  How much % of relief did the last injection provide: 50%  Duration of relief from last injection: 1 month  When did pain return: severe about 2 months ago  Is the pain effecting your ADLs: yes     Blood thinners/NSAIDS? No  Diabetes? No              Please advise on med refill request and if able to repeat injection at this time    Please call daughter Darian back  [phone]

## 2025-02-11 NOTE — TELEPHONE ENCOUNTER
Pt's daughter called refill line and is asking for a muscle relaxer to be sent to the pharmacy for the pt. Daughter stated pt has had it before but they do not know the name of it. [facility], [facility] [place] - [address] [facility]

## 2025-03-05 ENCOUNTER — OFFICE VISIT (OUTPATIENT)
Dept: FAMILY MEDICINE CLINIC | Facility: CLINIC | Age: 70
End: 2025-03-05
Payer: MEDICARE

## 2025-03-05 VITALS
BODY MASS INDEX: 33.18 KG/M2 | OXYGEN SATURATION: 97 % | RESPIRATION RATE: 16 BRPM | DIASTOLIC BLOOD PRESSURE: 60 MMHG | SYSTOLIC BLOOD PRESSURE: 110 MMHG | HEIGHT: 60 IN | WEIGHT: 169 LBS | HEART RATE: 82 BPM

## 2025-03-05 DIAGNOSIS — E11.42 TYPE 2 DIABETES MELLITUS WITH DIABETIC POLYNEUROPATHY, WITHOUT LONG-TERM CURRENT USE OF INSULIN (HCC): ICD-10-CM

## 2025-03-05 DIAGNOSIS — E11.21 DIABETIC NEPHROPATHY ASSOCIATED WITH TYPE 2 DIABETES MELLITUS (HCC): ICD-10-CM

## 2025-03-05 DIAGNOSIS — M17.10 ARTHRITIS OF KNEE: ICD-10-CM

## 2025-03-05 DIAGNOSIS — L30.9 ECZEMA, UNSPECIFIED TYPE: ICD-10-CM

## 2025-03-05 DIAGNOSIS — I12.9 PARENCHYMAL RENAL HYPERTENSION, STAGE 1 THROUGH STAGE 4 OR UNSPECIFIED CHRONIC KIDNEY DISEASE: ICD-10-CM

## 2025-03-05 DIAGNOSIS — R73.03 PREDIABETES: Primary | ICD-10-CM

## 2025-03-05 DIAGNOSIS — E78.5 DYSLIPIDEMIA: ICD-10-CM

## 2025-03-05 DIAGNOSIS — M79.672 PAIN IN BOTH FEET: ICD-10-CM

## 2025-03-05 DIAGNOSIS — N18.31 CHRONIC KIDNEY DISEASE, STAGE 3A (HCC): ICD-10-CM

## 2025-03-05 DIAGNOSIS — I10 ESSENTIAL HYPERTENSION: ICD-10-CM

## 2025-03-05 DIAGNOSIS — Z11.59 NEED FOR HEPATITIS C SCREENING TEST: ICD-10-CM

## 2025-03-05 DIAGNOSIS — E66.01 OBESITY, MORBID (HCC): ICD-10-CM

## 2025-03-05 DIAGNOSIS — G57.93 NEUROPATHIC PAIN OF BOTH FEET: ICD-10-CM

## 2025-03-05 DIAGNOSIS — E55.9 VITAMIN D DEFICIENCY: ICD-10-CM

## 2025-03-05 DIAGNOSIS — M79.671 PAIN IN BOTH FEET: ICD-10-CM

## 2025-03-05 DIAGNOSIS — K21.9 GASTROESOPHAGEAL REFLUX DISEASE WITHOUT ESOPHAGITIS: ICD-10-CM

## 2025-03-05 PROBLEM — J40 BRONCHITIS: Status: RESOLVED | Noted: 2023-10-16 | Resolved: 2025-03-05

## 2025-03-05 LAB
LEFT EYE DIABETIC RETINOPATHY: NORMAL
LEFT EYE IMAGE QUALITY: NORMAL
LEFT EYE MACULAR EDEMA: NORMAL
LEFT EYE OTHER RETINOPATHY: NORMAL
RIGHT EYE DIABETIC RETINOPATHY: NORMAL
RIGHT EYE IMAGE QUALITY: NORMAL
RIGHT EYE MACULAR EDEMA: NORMAL
RIGHT EYE OTHER RETINOPATHY: NORMAL
SEVERITY (EYE EXAM): NORMAL

## 2025-03-05 PROCEDURE — G2211 COMPLEX E/M VISIT ADD ON: HCPCS | Performed by: FAMILY MEDICINE

## 2025-03-05 PROCEDURE — 99214 OFFICE O/P EST MOD 30 MIN: CPT | Performed by: FAMILY MEDICINE

## 2025-03-05 PROCEDURE — 92250 FUNDUS PHOTOGRAPHY W/I&R: CPT | Performed by: FAMILY MEDICINE

## 2025-03-05 RX ORDER — GABAPENTIN 100 MG/1
100 CAPSULE ORAL
Qty: 30 CAPSULE | Refills: 2 | Status: SHIPPED | OUTPATIENT
Start: 2025-03-05

## 2025-03-05 RX ORDER — TRIAMCINOLONE ACETONIDE 1 MG/G
OINTMENT TOPICAL 2 TIMES DAILY
Qty: 30 G | Refills: 1 | Status: SHIPPED | OUTPATIENT
Start: 2025-03-05

## 2025-03-05 NOTE — ASSESSMENT & PLAN NOTE
Orders:  •  CBC and Platelet; Future  •  Comprehensive metabolic panel; Future  •  TSH, 3rd generation with Free T4 reflex; Future  •  Lipid panel; Future  •  Hemoglobin A1C; Future

## 2025-03-05 NOTE — ASSESSMENT & PLAN NOTE
Lab Results   Component Value Date    HGBA1C 6.4 (H) 11/08/2024     Orders:  •  CBC and Platelet; Future  •  Comprehensive metabolic panel; Future  •  TSH, 3rd generation with Free T4 reflex; Future  •  Lipid panel; Future  •  Hemoglobin A1C; Future  She is controlling with the diet, continue same with lifestyle modification

## 2025-03-05 NOTE — ASSESSMENT & PLAN NOTE
Lab Results   Component Value Date    HGBA1C 6.4 (H) 11/08/2024     Orders:  •  IRIS Diabetic eye exam

## 2025-03-05 NOTE — ASSESSMENT & PLAN NOTE
Lab Results   Component Value Date    EGFR 64 06/27/2024    EGFR 64 12/19/2023    EGFR 58 05/08/2023    CREATININE 0.88 11/08/2024    CREATININE 0.90 06/27/2024    CREATININE 0.90 12/19/2023

## 2025-03-05 NOTE — PROGRESS NOTES
Name: Lela Gomez      : 1955      MRN: [id number]  Encounter Provider: Chelle Villarreal MD  Encounter Date: 3/5/2025   Encounter department: [facility]  :  Assessment & Plan  Vitamin D deficiency  She will continue now with the vitamin D maintenance dose       Essential hypertension  Blood pressure is stable, continue same medication       Parenchymal renal hypertension, stage 1 through stage 4 or unspecified chronic kidney disease  Lab Results   Component Value Date    EGFR 64 2024    EGFR 64 2023    EGFR 58 2023    CREATININE 0.88 2024    CREATININE 0.90 2024    CREATININE 0.90 2023          Gastroesophageal reflux disease without esophagitis  Stable on medication       Diabetic nephropathy associated with type 2 diabetes mellitus (HCC)    Lab Results   Component Value Date    HGBA1C 6.4 (H) 2024     Orders:  •  IRIS Diabetic eye exam    Type 2 diabetes mellitus with diabetic polyneuropathy, without long-term current use of insulin (HCC)    Lab Results   Component Value Date    HGBA1C 6.4 (H) 2024     Orders:  •  CBC and Platelet; Future  •  Comprehensive metabolic panel; Future  •  TSH, 3rd generation with Free T4 reflex; Future  •  Lipid panel; Future  •  Hemoglobin A1C; Future  She is controlling with the diet, continue same with lifestyle modification  Dyslipidemia    Orders:  •  CBC and Platelet; Future  •  Comprehensive metabolic panel; Future  •  TSH, 3rd generation with Free T4 reflex; Future  •  Lipid panel; Future  •  Hemoglobin A1C; Future    Prediabetes  Currently in prediabetic range       Need for hepatitis C screening test    Orders:  •  Hepatitis C antibody; Future    Obesity, morbid (HCC)  Weight has been stable       Chronic kidney disease, stage 3a (HCC)  Lab Results   Component Value Date    EGFR 64 2024    EGFR 64 2023    EGFR 58 2023    CREATININE 0.88 2024    CREATININE 0.90 2024     CREATININE 0.90 12/19/2023          Arthritis of knee   has lot of arthritis in knees and ankles, previously she had injection in the knee, advised to go back to orthopedic to get injection  Orders:  •  Ambulatory Referral to Orthopedic Surgery; Future    Pain in both feet  She has chronic pain in both feet most likely arthritis she is on gabapentin  Orders:  •  Diclofenac Sodium (VOLTAREN) 1 %; Apply 2 g topically 4 (four) times a day  She can use topical creams and also Tylenol  Eczema, unspecified type  She has cracking skin on the feet  Orders:  •  triamcinolone (KENALOG) 0.1 % ointment; Apply topically 2 (two) times a day           History of Present Illness   To get established with a new provider, she has lot of arthritis in the knees and feet, she has been taking all her medications, still complain of pain, she has been told in the past to get knee replacement and a nodule in the finger which is painful in the right hand needs to be removed, she refused for surgery, she got injection in the knee in the past which helped but now she probably needs another   She says she is not diabetic, she is controlling her sugar with the diet  She has a lot of responsibility of her  had stroke and uncontrolled diabetic she takes care of him and feels a lot of stress her children also live with her      Review of Systems   Constitutional:  Negative for activity change, appetite change, chills, fatigue, fever and unexpected weight change.   HENT:  Negative for congestion, ear discharge, ear pain, nosebleeds, postnasal drip, rhinorrhea, sinus pressure, sneezing, sore throat, trouble swallowing and voice change.    Eyes:  Negative for photophobia, pain, discharge, redness and itching.   Respiratory:  Negative for cough, chest tightness, shortness of breath and wheezing.    Cardiovascular:  Negative for chest pain, palpitations and leg swelling.   Gastrointestinal:  Negative for abdominal pain, constipation, diarrhea,  nausea and vomiting.   Endocrine: Negative for polyuria.   Genitourinary:  Negative for dysuria, frequency and urgency.   Musculoskeletal:  Negative for arthralgias, back pain, myalgias and neck pain.   Skin:  Negative for color change, pallor and rash.   Allergic/Immunologic: Negative for environmental allergies and food allergies.   Neurological:  Negative for dizziness, weakness, light-headedness and headaches.   Hematological:  Negative for adenopathy. Does not bruise/bleed easily.   Psychiatric/Behavioral:  Negative for behavioral problems. The patient is not nervous/anxious.        Objective   /60   Pulse 82   Resp 16   Ht 5' (1.524 m)   Wt 76.7 kg (169 lb)   SpO2 97%   BMI 33.01 kg/m²      Physical Exam  Vitals and nursing note reviewed.   Constitutional:       General: She is not in acute distress.     Appearance: Normal appearance. She is not ill-appearing.   HENT:      Head: Normocephalic and atraumatic.      Right Ear: Tympanic membrane and ear canal normal.      Left Ear: Tympanic membrane and ear canal normal.      Nose: Nose normal. No congestion or rhinorrhea.      Mouth/Throat:      Mouth: Mucous membranes are moist.      Pharynx: Oropharynx is clear. No oropharyngeal exudate or posterior oropharyngeal erythema.   Eyes:      General: No scleral icterus.        Right eye: No discharge.         Left eye: No discharge.      Extraocular Movements: Extraocular movements intact.      Conjunctiva/sclera: Conjunctivae normal.   Cardiovascular:      Rate and Rhythm: Normal rate and regular rhythm.      Heart sounds: Normal heart sounds. No murmur heard.  Pulmonary:      Effort: Pulmonary effort is normal.      Breath sounds: Normal breath sounds. No wheezing or rales.   Abdominal:      General: Abdomen is flat. There is no distension.      Palpations: Abdomen is soft. There is no mass.      Tenderness: There is no abdominal tenderness.   Musculoskeletal:         General: No swelling, tenderness or  deformity.      Cervical back: Normal range of motion and neck supple. No muscular tenderness.      Right lower leg: No edema.      Left lower leg: No edema.      Comments: Limping while walking   Lymphadenopathy:      Cervical: No cervical adenopathy.   Skin:     Coloration: Skin is not jaundiced or pale.      Findings: No erythema, lesion or rash.      Comments: Dry cracked skin on the feet   Neurological:      General: No focal deficit present.      Mental Status: She is alert and oriented to person, place, and time.      Gait: Gait normal.   Psychiatric:         Mood and Affect: Mood normal.         Behavior: Behavior normal.

## 2025-03-06 PROBLEM — L30.9 ECZEMA: Status: ACTIVE | Noted: 2025-03-06

## 2025-03-06 PROBLEM — M17.10 ARTHRITIS OF KNEE: Status: ACTIVE | Noted: 2025-03-06

## 2025-03-06 NOTE — ASSESSMENT & PLAN NOTE
She has cracking skin on the feet  Orders:  •  triamcinolone (KENALOG) 0.1 % ointment; Apply topically 2 (two) times a day

## 2025-03-06 NOTE — ASSESSMENT & PLAN NOTE
She has chronic pain in both feet most likely arthritis she is on gabapentin  Orders:  •  Diclofenac Sodium (VOLTAREN) 1 %; Apply 2 g topically 4 (four) times a day  She can use topical creams and also Tylenol

## 2025-03-06 NOTE — ASSESSMENT & PLAN NOTE
has lot of arthritis in knees and ankles, previously she had injection in the knee, advised to go back to orthopedic to get injection  Orders:  •  Ambulatory Referral to Orthopedic Surgery; Future

## 2025-03-10 ENCOUNTER — OFFICE VISIT (OUTPATIENT)
Dept: OBGYN CLINIC | Facility: CLINIC | Age: 70
End: 2025-03-10
Payer: MEDICARE

## 2025-03-10 VITALS — HEIGHT: 60 IN | WEIGHT: 169 LBS | BODY MASS INDEX: 33.18 KG/M2

## 2025-03-10 DIAGNOSIS — M17.0 PRIMARY OSTEOARTHRITIS OF BOTH KNEES: Primary | ICD-10-CM

## 2025-03-10 DIAGNOSIS — M17.10 ARTHRITIS OF KNEE: ICD-10-CM

## 2025-03-10 PROCEDURE — 20610 DRAIN/INJ JOINT/BURSA W/O US: CPT | Performed by: PHYSICAL MEDICINE & REHABILITATION

## 2025-03-10 PROCEDURE — 99213 OFFICE O/P EST LOW 20 MIN: CPT | Performed by: PHYSICAL MEDICINE & REHABILITATION

## 2025-03-10 RX ORDER — ROPIVACAINE HYDROCHLORIDE 5 MG/ML
8 INJECTION, SOLUTION EPIDURAL; INFILTRATION; PERINEURAL
Status: COMPLETED | OUTPATIENT
Start: 2025-03-10 | End: 2025-03-10

## 2025-03-10 RX ORDER — TRIAMCINOLONE ACETONIDE 40 MG/ML
80 INJECTION, SUSPENSION INTRA-ARTICULAR; INTRAMUSCULAR
Status: COMPLETED | OUTPATIENT
Start: 2025-03-10 | End: 2025-03-10

## 2025-03-10 RX ADMIN — ROPIVACAINE HYDROCHLORIDE 8 ML: 5 INJECTION, SOLUTION EPIDURAL; INFILTRATION; PERINEURAL at 12:00

## 2025-03-10 RX ADMIN — TRIAMCINOLONE ACETONIDE 80 MG: 40 INJECTION, SUSPENSION INTRA-ARTICULAR; INTRAMUSCULAR at 12:00

## 2025-03-10 NOTE — PROGRESS NOTES
1. Primary osteoarthritis of both knees        2. Arthritis of knee  Ambulatory Referral to Orthopedic Surgery        Orders Placed This Encounter   Procedures    Large joint arthrocentesis        Impression:  Patient presents in follow-up of chronic bilateral knee pain with right worse than left, secondary to osteoarthritis (R- medial joint L- lateral joint). Treatment has included bilateral knee steroid injections, HA injections, right knee medial  brace. She should not be taking any NSAID's due to CKD. Today we repeated bilateral steroid injections. If no improvement, could consider referral to total joint surgery.     Imaging Studies (I personally reviewed images in PACS and report if it was available):  Bilateral knee x-rays obtained on 10/8/2021.  Left knee shows lateral joint space narrowing more than medial joint space.  There is osteophytosis.  There is patellofemoral joint space narrowing.  The right knee shows medial joint space narrowing more than lateral.  There is patellofemoral joint space narrowing with osteophytosis.  No acute osseous abnormalities.  These findings are consistent with bilateral Kellgren Josiah grade 3 osteoarthritis.      No follow-ups on file.    Patient is in agreement with the above plan.    HPI:  Lela Gomez is a 69 y.o. female  who presents in follow up.  Here for   Chief Complaint   Patient presents with    Right Knee - Pain, Injections, Follow-up    Left Knee - Pain, Injections, Follow-up       Since last visit: See above.    Following history reviewed and updated:  Past Medical History:   Diagnosis Date    Hyperlipidemia     Hypertension      History reviewed. No pertinent surgical history.  Social History   Social History     Substance and Sexual Activity   Alcohol Use Never     Social History     Substance and Sexual Activity   Drug Use Never     Social History     Tobacco Use   Smoking Status Never   Smokeless Tobacco Never     Family History   Problem Relation  Age of Onset    No Known Problems Mother     No Known Problems Father      No Known Allergies     Constitutional:  Ht 5' (1.524 m)   Wt 76.7 kg (169 lb)   BMI 33.01 kg/m²    General: NAD.  Eyes: Clear sclerae.  ENT: No inflammation, lesion, or mass of lips.  No tracheal deviation.  Musculoskeletal: As mentioned below.  Integumentary: No visible rashes or skin lesions.  Pulmonary/Chest: Effort normal. No respiratory distress.   Neuro: CN's grossly intact, GILBERT.  Psych: Normal affect and judgement.  Vascular: WWP.    Right Knee Exam     Tenderness   The patient is experiencing tenderness in the medial joint line.    Range of Motion   Extension:  normal     Tests   Varus: negative Valgus: negative  Patellar apprehension: negative    Other   Erythema: absent  Scars: absent  Sensation: normal  Pulse: present  Swelling: none  Effusion: no effusion present    Comments:  No referred pain into knee with passive hip internal rotation.      Left Knee Exam     Tenderness   The patient is experiencing tenderness in the medial joint line.    Range of Motion   Extension:  normal     Tests   Varus: negative Valgus: negative  Patellar apprehension: negative    Other   Erythema: absent  Scars: absent  Sensation: normal  Pulse: present  Swelling: none  Effusion: no effusion present    Comments:  No referred pain into knee with passive hip internal rotation.             Large joint arthrocentesis: bilateral knee  Universal Protocol:  procedure performed by consultantConsent: Verbal consent obtained. Written consent not obtained.  Risks and benefits: risks, benefits and alternatives were discussed  Consent given by: patient  Timeout called at: 3/10/2025 12:03 PM.  Patient understanding: patient states understanding of the procedure being performed  Patient consent: the patient's understanding of the procedure matches consent given  Site marked: the operative site was marked  Radiology Images displayed and confirmed. If images not  available, report reviewed: imaging studies available  Patient identity confirmed: verbally with patient  Supporting Documentation  Indications: pain   Procedure Details  Location: knee - bilateral knee  Needle size: 22 G  Ultrasound guidance: no  Approach: Inferolateral to patella.    Medications (Right): 8 mL ropivacaine 0.5 %; 80 mg triamcinolone acetonide 40 mg/mLMedications (Left): 8 mL ropivacaine 0.5 %; 80 mg triamcinolone acetonide 40 mg/mL   Patient tolerance: patient tolerated the procedure well with no immediate complications  Dressing:  Sterile dressing applied    There was little to no resistance encountered during the injection.    Risks of this procedure include:    - Risk of bleeding since a needle is involved.  - Risk of infection (1/10,000 chance as per recent studies).  Signs/symptoms were discussed and they would prompt an urgent evaluation at an emergency department.  - Risk of pigmentation or skin dimpling in the skin (2-3% chance as per recent studies) from the steroid.  - Risk of increased pain from steroid flare (1% chance as per recent studies) that typically lasts 24-48 hours.  - Risk of increased blood sugars from the steroid medication that can last for a few weeks.  If the patient is a diabetic or pre-diabetic, they were encouraged to closely monitor their blood sugars and discuss with PCP if elevated more than usual or if having symptoms.    The benefits outweigh the risks and so the procedure was completed.

## 2025-03-17 ENCOUNTER — NURSE TRIAGE (OUTPATIENT)
Age: 70
End: 2025-03-17

## 2025-03-17 DIAGNOSIS — M79.671 PAIN IN BOTH FEET: ICD-10-CM

## 2025-03-17 DIAGNOSIS — L30.9 ECZEMA, UNSPECIFIED TYPE: ICD-10-CM

## 2025-03-17 DIAGNOSIS — M79.672 PAIN IN BOTH FEET: ICD-10-CM

## 2025-03-17 RX ORDER — TRIAMCINOLONE ACETONIDE 1 MG/G
OINTMENT TOPICAL 2 TIMES DAILY
Qty: 30 G | Refills: 1 | Status: SHIPPED | OUTPATIENT
Start: 2025-03-17

## 2025-03-17 NOTE — TELEPHONE ENCOUNTER
"FOLLOW UP: Send meds to new pharmacy     REASON FOR CONVERSATION: Medication Problem    SYMPTOMS: Voltaren and Triamcinolone     OTHER: Meds were sent to wrong pharm.  Please resend to [facility].  Request send to office per RN protocol.     DISPOSITION: Home Care    Reason for Disposition   Prescription prescribed recently is not at pharmacy and triager has access to patient's EMR and prescription is recorded in the EMR    Answer Assessment - Initial Assessment Questions  1. NAME of MEDICINE: \"What medicine(s) are you calling about?\"      Voltaren and triamcinolone cream  2. QUESTION: \"What is your question?\" (e.g., double dose of medicine, side effect)      Meds were sent to the wrong pharm   3. PRESCRIBER: \"Who prescribed the medicine?\" Reason: if prescribed by specialist, call should be referred to that group.      Cherelle    Protocols used: Medication Question Call-Adult-[place]    "

## 2025-03-25 ENCOUNTER — HOSPITAL ENCOUNTER (OUTPATIENT)
Dept: RADIOLOGY | Facility: CLINIC | Age: 70
Discharge: HOME | End: 2025-03-25
Payer: MEDICARE

## 2025-03-25 VITALS
OXYGEN SATURATION: 97 % | HEART RATE: 73 BPM | TEMPERATURE: 97.6 F | RESPIRATION RATE: 20 BRPM | SYSTOLIC BLOOD PRESSURE: 132 MMHG | DIASTOLIC BLOOD PRESSURE: 85 MMHG

## 2025-03-25 DIAGNOSIS — M51.16 INTERVERTEBRAL DISC DISORDER WITH RADICULOPATHY OF LUMBAR REGION: ICD-10-CM

## 2025-03-25 PROCEDURE — 64483 NJX AA&/STRD TFRM EPI L/S 1: CPT | Performed by: ANESTHESIOLOGY

## 2025-03-25 RX ORDER — BUPIVACAINE HCL/PF 2.5 MG/ML
2 VIAL (ML) INJECTION ONCE
Status: COMPLETED | OUTPATIENT
Start: 2025-03-25 | End: 2025-03-25

## 2025-03-25 RX ORDER — METHYLPREDNISOLONE ACETATE 80 MG/ML
80 INJECTION, SUSPENSION INTRA-ARTICULAR; INTRALESIONAL; INTRAMUSCULAR; PARENTERAL; SOFT TISSUE ONCE
Status: COMPLETED | OUTPATIENT
Start: 2025-03-25 | End: 2025-03-25

## 2025-03-25 RX ORDER — 0.9 % SODIUM CHLORIDE 0.9 %
4 VIAL (ML) INJECTION ONCE
Status: COMPLETED | OUTPATIENT
Start: 2025-03-25 | End: 2025-03-25

## 2025-03-25 RX ADMIN — IOHEXOL 1 ML: 300 INJECTION, SOLUTION INTRAVENOUS at 11:48

## 2025-03-25 RX ADMIN — Medication 4 ML: at 11:47

## 2025-03-25 RX ADMIN — METHYLPREDNISOLONE ACETATE 80 MG: 80 INJECTION, SUSPENSION INTRA-ARTICULAR; INTRALESIONAL; INTRAMUSCULAR; SOFT TISSUE at 11:48

## 2025-03-25 RX ADMIN — LIDOCAINE HYDROCHLORIDE 4 ML: 20 INJECTION, SOLUTION EPIDURAL; INFILTRATION; INTRACAUDAL at 11:47

## 2025-03-25 RX ADMIN — BUPIVACAINE HYDROCHLORIDE 2 ML: 2.5 INJECTION, SOLUTION EPIDURAL; INFILTRATION; INTRACAUDAL at 11:48

## 2025-03-25 NOTE — H&P
History of Present Illness: The patient is a 69 y.o. female who presents with complaints of lower back pain and is here today for bilateral L5 transforaminal epidural steroid injection    Past Medical History:   Diagnosis Date    Hyperlipidemia     Hypertension        No past surgical history on file.      Current Outpatient Medications:     acetaminophen (TYLENOL) 500 mg tablet, TAKE 1 TABLET (500 MG TOTAL) BY MOUTH EVERY 6 (SIX) HOURS AS NEEDED FOR MILD PAIN OR MODERATE PAIN, Disp: 30 tablet, Rfl: 1    ammonium lactate (LAC-HYDRIN) 12 % lotion, Apply topically 2 (two) times a day as needed for dry skin, Disp: 400 g, Rfl: 1    atorvastatin (LIPITOR) 20 mg tablet, TAKE 1 TABLET (20 MG TOTAL) BY MOUTH DAILY, Disp: 90 tablet, Rfl: 1    celecoxib (CeleBREX) 200 mg capsule, TAKE 1 CAPSULE (200 MG TOTAL) BY MOUTH DAILY (Patient not taking: Reported on 9/23/2024), Disp: 30 capsule, Rfl: 2    Cholecalciferol (Vitamin D3) 50 MCG (2000 UT) TABS, TAKE 1 TABLET (2,000 UNITS TOTAL) BY MOUTH DAILY, Disp: 90 tablet, Rfl: 1    clotrimazole-betamethasone (LOTRISONE) 1-0.05 % cream, APPLY TOPICALLY 2 (TWO) TIMES A DAY, Disp: 45 g, Rfl: 1    Cyanocobalamin (B-12) 1000 MCG SUBL, Place 1 tablet (1,000 mcg total) under the tongue in the morning, Disp: 90 tablet, Rfl: 1    Diclofenac Sodium (VOLTAREN) 1 %, Apply 2 g topically 4 (four) times a day, Disp: 100 g, Rfl: 5    DULoxetine (CYMBALTA) 20 mg capsule, TAKE 1 CAPSULE (20 MG TOTAL) BY MOUTH DAILY (Patient not taking: Reported on 9/23/2024), Disp: 90 capsule, Rfl: 0    Eyelid Cleansers (OcuSoft Eyelid Cleansing) PADS, APPLY AT BEDTIME AS DIRECTED SCRUB WITH PAD VS. FOAM ALONG CLOSED EYELIDS NIGHTLY PER DIRECTIONS. OCUSOFT OR SIMILAR BRAND (WIPES VS. FOAM) (Patient not taking: Reported on 9/23/2024), Disp: , Rfl:     ferrous sulfate 324 (65 Fe) mg, Take 1 tablet (324 mg total) by mouth daily before breakfast, Disp: 90 tablet, Rfl: 1    fluticasone (FLONASE) 50 mcg/act nasal spray, 1  spray into each nostril in the morning. (Patient not taking: Reported on 6/5/2023), Disp: 15.8 mL, Rfl: 0    gabapentin (NEURONTIN) 100 mg capsule, TAKE 1 CAPSULE (100 MG TOTAL) BY MOUTH DAILY AT BEDTIME, Disp: 30 capsule, Rfl: 2    ibuprofen (MOTRIN) 200 mg tablet, Take 1 tablet (200 mg total) by mouth every 6 (six) hours as needed for mild pain or moderate pain (Patient not taking: Reported on 9/23/2024), Disp: 30 tablet, Rfl: 1    losartan (COZAAR) 25 mg tablet, Take 1 tablet (25 mg total) by mouth daily, Disp: 90 tablet, Rfl: 3    magnesium Oxide (MAG-OX) 400 mg TABS, Take 400 mg by mouth in the morning and 400 mg in the evening. (Patient not taking: Reported on 6/5/2023), Disp: , Rfl:     methocarbamol (ROBAXIN) 500 mg tablet, Take 1 tablet (500 mg total) by mouth 2 (two) times a day as needed for muscle spasms, Disp: 60 tablet, Rfl: 5    neomycin-polymyxin-dexamethasone (MAXITROL) 0.35%-10,000 units/g-0.1%, APPLY 0.25 INCH INTO AFFECTED EYE AT BEDTIME X 2 WEEKS THEN D/C (Patient not taking: Reported on 6/5/2023), Disp: , Rfl:     omeprazole (PriLOSEC) 20 mg delayed release capsule, TAKE 1 CAPSULE (20 MG TOTAL) BY MOUTH DAILY, Disp: 90 capsule, Rfl: 1    ondansetron (ZOFRAN) 4 mg tablet, Take 1 tablet (4 mg total) by mouth every 8 (eight) hours as needed for nausea or vomiting (Patient not taking: Reported on 9/23/2024), Disp: 20 tablet, Rfl: 0    promethazine-dextromethorphan (PHENERGAN-DM) 6.25-15 mg/5 mL oral syrup, Take 5 mL by mouth 4 (four) times a day as needed for cough, Disp: 240 mL, Rfl: 1    Restasis 0.05 % ophthalmic emulsion, INSTILL 1 DROP BOTH EYES TWICE A DAY (Patient not taking: Reported on 6/5/2023), Disp: , Rfl:     spironolactone (ALDACTONE) 25 mg tablet, TAKE 1 TABLET (25 MG TOTAL) BY MOUTH DAILY, Disp: 90 tablet, Rfl: 1    torsemide (DEMADEX) 5 MG tablet, TAKE 1 TABLET (5 MG TOTAL) BY MOUTH DAILY, Disp: 90 tablet, Rfl: 1    triamcinolone (KENALOG) 0.1 % ointment, Apply topically 2 (two)  times a day, Disp: 30 g, Rfl: 1    Current Facility-Administered Medications:     bupivacaine (PF) (MARCAINE) 0.25 % injection 2 mL, 2 mL, Epidural, Once, Jordan Quiroz MD    iohexol (OMNIPAQUE) 300 mg/mL injection 1 mL, 1 mL, Epidural, Once, Jordan Quiroz MD    lidocaine (PF) (XYLOCAINE-MPF) 2 % injection 4 mL, 4 mL, Infiltration, Once, Jordan Quiroz MD    methylPREDNISolone acetate (DEPO-MEDROL) injection 80 mg, 80 mg, Epidural, Once, Jordan Quiroz MD    sodium chloride (PF) 0.9 % injection 4 mL, 4 mL, Infiltration, Once, Jordan Quiroz MD    No Known Allergies    Physical Exam:   Vitals:    03/25/25 1127   BP: 117/76   Pulse: 72   Resp: 20   Temp: 97.6 °F (36.4 °C)   SpO2: 95%     General: Awake, Alert, Oriented x 3, Mood and affect appropriate  Respiratory: Respirations even and unlabored  Cardiovascular: Peripheral pulses intact; no edema  Musculoskeletal Exam: Lower back pain    ASA Score: 3    Patient/Chart Verification  Patient ID Verified: Verbal  Consents Confirmed: To be obtained in the Procedural area  Interval H&P(within 24 hr) Complete (required for Outpatients and Surgery Admit only): To be obtained in the Procedural area  Allergies Reviewed: Yes  Anticoag/NSAID held?: NA  Currently on antibiotics?: No    Assessment:   1. Intervertebral disc disorder with radiculopathy of lumbar region        Plan: Bilateral L5 TFESI

## 2025-03-25 NOTE — DISCHARGE INSTR - LAB
Epidural Steroid Injection   WHAT YOU NEED TO KNOW:   An epidural steroid injection (GISELLE) is a procedure to inject steroid medicine into the epidural space. The epidural space is between your spinal cord and vertebrae. Steroids reduce inflammation and fluid buildup in your spine that may be causing pain. You may be given pain medicine along with the steroids.          ACTIVITY  Do not drive or operate machinery today.  No strenuous activity today - bending, lifting, etc.  You may resume normal activites starting tomorrow - start slowly and as tolerated.  You may shower today, but no tub baths or hot tubs.  You may have numbness for several hours from the local anesthetic. Please use caution and common sense, especially with weight-bearing activities.    CARE OF THE INJECTION SITE  If you have soreness or pain, apply ice to the area today (20 minutes on/20 minutes off).  Starting tomorrow, you may use warm, moist heat or ice if needed.  You may have an increase or change in your discomfort for 36-48 hours after your treatment.  Apply ice and continue with any pain medication you have been prescribed.  Notify the [facility] if you have any of the following: redness, drainage, swelling, headache, stiff neck or fever above 100°F.    SPECIAL INSTRUCTIONS  Our office will contact you in approximately 14 days for a progress report.    MEDICATIONS  Continue to take all routine medications.  Our office may have instructed you to hold some medications.    As no general anesthesia was used in today's procedure, you should not experience any side effects related to anesthesia.     If you are diabetic, the steroids used in today's injection may temporarily increase your blood sugar levels after the first few days after your injection. Please keep a close eye on your sugars and alert the doctor who manages your diabetes if your sugars are significantly high from your baseline or you are symptomatic.     If you have a  problem specifically related to your procedure, please call our office at [phone].  Problems not related to your procedure should be directed to your primary care physician.

## 2025-03-27 ENCOUNTER — HOSPITAL ENCOUNTER (EMERGENCY)
Facility: HOSPITAL | Age: 70
Discharge: HOME | End: 2025-03-27
Attending: EMERGENCY MEDICINE
Payer: MEDICARE

## 2025-03-27 ENCOUNTER — APPOINTMENT (EMERGENCY)
Dept: RADIOLOGY | Facility: HOSPITAL | Age: 70
End: 2025-03-27
Payer: MEDICARE

## 2025-03-27 VITALS
OXYGEN SATURATION: 97 % | SYSTOLIC BLOOD PRESSURE: 140 MMHG | HEART RATE: 67 BPM | DIASTOLIC BLOOD PRESSURE: 60 MMHG | RESPIRATION RATE: 17 BRPM | TEMPERATURE: 97.9 F

## 2025-03-27 DIAGNOSIS — G89.29 CHRONIC FOOT PAIN, RIGHT: Primary | ICD-10-CM

## 2025-03-27 DIAGNOSIS — M79.671 CHRONIC FOOT PAIN, RIGHT: Primary | ICD-10-CM

## 2025-03-27 PROCEDURE — 73620 X-RAY EXAM OF FOOT: CPT

## 2025-03-27 PROCEDURE — 96372 THER/PROPH/DIAG INJ SC/IM: CPT

## 2025-03-27 PROCEDURE — 99284 EMERGENCY DEPT VISIT MOD MDM: CPT | Performed by: EMERGENCY MEDICINE

## 2025-03-27 PROCEDURE — 99283 EMERGENCY DEPT VISIT LOW MDM: CPT

## 2025-03-27 RX ORDER — KETOROLAC TROMETHAMINE 30 MG/ML
15 INJECTION, SOLUTION INTRAMUSCULAR; INTRAVENOUS ONCE
Status: COMPLETED | OUTPATIENT
Start: 2025-03-27 | End: 2025-03-27

## 2025-03-27 RX ORDER — ACETAMINOPHEN 325 MG/1
975 TABLET ORAL ONCE
Status: COMPLETED | OUTPATIENT
Start: 2025-03-27 | End: 2025-03-27

## 2025-03-27 RX ORDER — METHOCARBAMOL 500 MG/1
500 TABLET, FILM COATED ORAL ONCE
Status: COMPLETED | OUTPATIENT
Start: 2025-03-27 | End: 2025-03-27

## 2025-03-27 RX ORDER — NAPROXEN 500 MG/1
500 TABLET ORAL 2 TIMES DAILY PRN
Qty: 30 TABLET | Refills: 0 | Status: SHIPPED | OUTPATIENT
Start: 2025-03-27

## 2025-03-27 RX ADMIN — KETOROLAC TROMETHAMINE 15 MG: 30 INJECTION, SOLUTION INTRAMUSCULAR at 12:44

## 2025-03-27 RX ADMIN — ACETAMINOPHEN 975 MG: 325 TABLET, FILM COATED ORAL at 12:44

## 2025-03-27 RX ADMIN — METHOCARBAMOL 500 MG: 500 TABLET ORAL at 12:44

## 2025-03-27 NOTE — DISCHARGE INSTRUCTIONS
X-ray showed no fracture per my read of the x-ray.    Follow-up with vascular surgery and your primary care provider soon as possible.  Come back for new or worsening symptoms such as cold foot, weakness, numbness or if you feel you need to be placed into assisted living/ feel unsafe at home and need additional help.    Take the naproxen twice daily as needed for pain.  Continue Tylenol.    Instead of the naproxen you can use the Voltaren cream 3 times daily if you get no benefit from the naproxen.

## 2025-03-27 NOTE — ED PROVIDER NOTES
Time reflects when diagnosis was documented in both MDM as applicable and the Disposition within this note       Time User Action Codes Description Comment    3/27/2025 12:40 PM García Gabriele Add [M79.671,  G89.29] Chronic foot pain, right           ED Disposition       ED Disposition   Discharge    Condition   Stable    Date/Time   Thu Mar 27, 2025 12:40 PM    Comment   Lela Quirozi discharge to home/self care.                   Assessment & Plan       Medical Decision Making  69-year-old female presenting with chronic right foot pain.  She reports 3 months of pain unchanged from baseline.  Not worsening. Worse with ambulation. Has taken tylenol in the past for the paijn but nothing today.    On exam she has pain on palpation of the right foot.  Denies any trauma.  Denies falls.    She has no neurological deficits.  2 days ago she had epidural injections.  She denies any back pain beyond her baseline or fevers.  Denies any central spinal tenderness when I palpate.    She has a decreased DP pulse but palpable PT pulse.  She reports a history of vascular disease.  Her foot is warm.  Neurologically intact.    Differential including but not limited to peripheral arterial disease, diabetic neuropathy, injury of the foot, chronic pain.    X-ray of the foot without acute findings.    Will discharge home.  Recommend follow-up with primary care provider and vascular surgery.    History obtained via [facility] .    Problems Addressed:  Chronic foot pain, right: acute illness or injury    Amount and/or Complexity of Data Reviewed  Radiology: ordered and independent interpretation performed.    Risk  OTC drugs.  Prescription drug management.             Medications   Diclofenac Sodium (VOLTAREN) 1 % topical gel 2 g (has no administration in time range)   ketorolac (TORADOL) injection 15 mg (15 mg Intramuscular Given 3/27/25 1244)   methocarbamol (ROBAXIN) tablet 500 mg (500 mg Oral Given 3/27/25 1244)   acetaminophen  (TYLENOL) tablet 975 mg (975 mg Oral Given 3/27/25 1244)       ED Risk Strat Scores                            SBIRT 22yo+      Flowsheet Row Most Recent Value   Initial Alcohol Screen: US AUDIT-C     1. How often do you have a drink containing alcohol? 0 Filed at: 03/27/2025 1116   2. How many drinks containing alcohol do you have on a typical day you are drinking?  0 Filed at: 03/27/2025 1116   3a. Male UNDER 65: How often do you have five or more drinks on one occasion? 0 Filed at: 03/27/2025 1116   3b. FEMALE Any Age, or MALE 65+: How often do you have 4 or more drinks on one occassion? 0 Filed at: 03/27/2025 1116   Audit-C Score 0 Filed at: 03/27/2025 1116   KYLE: How many times in the past year have you...    Used an illegal drug or used a prescription medication for non-medical reasons? Never Filed at: 03/27/2025 1116                            History of Present Illness       Chief Complaint   Patient presents with    Foot Pain     Pt states right foot pain for months.       Past Medical History:   Diagnosis Date    Hyperlipidemia     Hypertension       History reviewed. No pertinent surgical history.   Family History   Problem Relation Age of Onset    No Known Problems Mother     No Known Problems Father       Social History     Tobacco Use    Smoking status: Never    Smokeless tobacco: Never   Vaping Use    Vaping status: Never Used   Substance Use Topics    Alcohol use: Never    Drug use: Never      E-Cigarette/Vaping    E-Cigarette Use Never User       E-Cigarette/Vaping Substances    Nicotine No     THC No     CBD No     Flavoring No     Other No     Unknown No       I have reviewed and agree with the history as documented.     69-year-old female history of diabetes, chronic knee pain, L5 stenosis status post IR injections 2 days ago, reported vascular disease,     Patient presents emergency department with 3 months of right foot pain.  Unchanged from baseline.  She is able to ambulate on her own and  declines cane, walker, placement.    She denies neurological deficits.  No numbness, urinary continence, fevers problems with bowel movements.    She has tried nothing for the pain.    Pain worse with ambulation and movement.    Denies any central spinal tenderness, fevers.  Chronic back pain unchanged from baseline.       used: Yes ([facility] interpeter - jim)        Review of Systems   Musculoskeletal:  Positive for arthralgias, gait problem and myalgias.   All other systems reviewed and are negative.          Objective       ED Triage Vitals   Temperature Pulse Blood Pressure Respirations SpO2 Patient Position - Orthostatic VS   03/27/25 1117 03/27/25 1114 03/27/25 1114 03/27/25 1114 03/27/25 1114 03/27/25 1114   97.9 °F (36.6 °C) 67 140/60 17 97 % Sitting      Temp src Heart Rate Source BP Location FiO2 (%) Pain Score    -- 03/27/25 1114 03/27/25 1114 -- --     Monitor Left arm        Vitals      Date and Time Temp Pulse SpO2 Resp BP Pain Score FACES Pain Rating User   03/27/25 1117 97.9 °F (36.6 °C) -- -- -- -- -- -- DB   03/27/25 1114 -- 67 97 % 17 140/60 -- -- DB            Physical Exam  Vitals and nursing note reviewed.   Constitutional:       General: She is not in acute distress.     Appearance: Normal appearance. She is not ill-appearing.   HENT:      Head: Normocephalic and atraumatic.      Right Ear: External ear normal.      Left Ear: External ear normal.      Nose: Nose normal.      Mouth/Throat:      Mouth: Mucous membranes are moist.   Eyes:      General:         Right eye: No discharge.         Left eye: No discharge.      Conjunctiva/sclera: Conjunctivae normal.   Cardiovascular:      Rate and Rhythm: Normal rate and regular rhythm.      Heart sounds: No murmur heard.     Comments: Palpable right PT pulse.  DP is not felt.    Normal capillary refill of right foot.  Pulmonary:      Effort: Pulmonary effort is normal.      Breath sounds: Normal breath sounds.   Abdominal:       General: Abdomen is flat. There is no distension.      Tenderness: There is no abdominal tenderness.   Musculoskeletal:         General: Tenderness present. No swelling, deformity or signs of injury. Normal range of motion.      Cervical back: Normal range of motion.      Comments: Pain with palpation to the right foot.  No central spinal tenderness.   Skin:     General: Skin is warm.      Capillary Refill: Capillary refill takes less than 2 seconds.      Findings: No rash.   Neurological:      General: No focal deficit present.      Mental Status: She is alert. Mental status is at baseline.      Comments: Normal muscle strength in the lower extremities.  Normal sensation in lower extremities including within the medial thighs.   Psychiatric:         Mood and Affect: Mood normal.         Behavior: Behavior normal.         Results Reviewed       None            XR foot 2 views RIGHT   ED Interpretation by Gabriele Mariano DO (03/27 1218)   No acute orthopedic finding.          Procedures    ED Medication and Procedure Management   Prior to Admission Medications   Prescriptions Last Dose Informant Patient Reported? Taking?   Cholecalciferol (Vitamin D3) 50 MCG (2000 UT) TABS   No No   Sig: TAKE 1 TABLET (2,000 UNITS TOTAL) BY MOUTH DAILY   Cyanocobalamin (B-12) 1000 MCG SUBL   No No   Sig: Place 1 tablet (1,000 mcg total) under the tongue in the morning   DULoxetine (CYMBALTA) 20 mg capsule  Self, Child No No   Sig: TAKE 1 CAPSULE (20 MG TOTAL) BY MOUTH DAILY   Patient not taking: Reported on 9/23/2024   Diclofenac Sodium (VOLTAREN) 1 %   No No   Sig: Apply 2 g topically 4 (four) times a day   Eyelid Cleansers ([employer] Eyelid Cleansing) PADS  Child, Self Yes No   Sig: APPLY AT BEDTIME AS DIRECTED SCRUB WITH PAD VS. FOAM ALONG CLOSED EYELIDS NIGHTLY PER DIRECTIONS. OCUSOFT OR SIMILAR BRAND (WIPES VS. FOAM)   Patient not taking: Reported on 9/23/2024   Restasis 0.05 % ophthalmic emulsion  Child, Self Yes No   Sig:  INSTILL 1 DROP BOTH EYES TWICE A DAY   Patient not taking: Reported on 2023   acetaminophen (TYLENOL) 500 mg tablet  Self, Child No No   Sig: TAKE 1 TABLET (500 MG TOTAL) BY MOUTH EVERY 6 (SIX) HOURS AS NEEDED FOR MILD PAIN OR MODERATE PAIN   ammonium lactate (LAC-HYDRIN) 12 % lotion   No No   Sig: Apply topically 2 (two) times a day as needed for dry skin   atorvastatin (LIPITOR) 20 mg tablet  Self, Child No No   Sig: TAKE 1 TABLET (20 MG TOTAL) BY MOUTH DAILY   celecoxib (CeleBREX) 200 mg capsule  Self, Child No No   Sig: TAKE 1 CAPSULE (200 MG TOTAL) BY MOUTH DAILY   Patient not taking: Reported on 2024   clotrimazole-betamethasone (LOTRISONE) 1-0.05 % cream   No No   Sig: APPLY TOPICALLY 2 (TWO) TIMES A DAY   ferrous sulfate 324 (65 Fe) mg   No No   Sig: Take 1 tablet (324 mg total) by mouth daily before breakfast   fluticasone (FLONASE) 50 mcg/act nasal spray  Child, Self No No   Si spray into each nostril in the morning.   Patient not taking: Reported on 2023   gabapentin (NEURONTIN) 100 mg capsule   No No   Sig: TAKE 1 CAPSULE (100 MG TOTAL) BY MOUTH DAILY AT BEDTIME   losartan (COZAAR) 25 mg tablet  Self, Child No No   Sig: Take 1 tablet (25 mg total) by mouth daily   magnesium Oxide (MAG-OX) 400 mg TABS  Child, Self Yes No   Sig: Take 400 mg by mouth in the morning and 400 mg in the evening.   Patient not taking: Reported on 2023   methocarbamol (ROBAXIN) 500 mg tablet   No No   Sig: Take 1 tablet (500 mg total) by mouth 2 (two) times a day as needed for muscle spasms   neomycin-polymyxin-dexamethasone (MAXITROL) 0.35%-10,000 units/g-0.1%  Child, Self Yes No   Sig: APPLY 0.25 INCH INTO AFFECTED EYE AT BEDTIME X 2 WEEKS THEN D/C   Patient not taking: Reported on 2023   omeprazole (PriLOSEC) 20 mg delayed release capsule  Self, Child No No   Sig: TAKE 1 CAPSULE (20 MG TOTAL) BY MOUTH DAILY   ondansetron (ZOFRAN) 4 mg tablet  Self, Child No No   Sig: Take 1 tablet (4 mg total) by mouth  every 8 (eight) hours as needed for nausea or vomiting   Patient not taking: Reported on 9/23/2024   promethazine-dextromethorphan (PHENERGAN-DM) 6.25-15 mg/5 mL oral syrup   No No   Sig: Take 5 mL by mouth 4 (four) times a day as needed for cough   spironolactone (ALDACTONE) 25 mg tablet   No No   Sig: TAKE 1 TABLET (25 MG TOTAL) BY MOUTH DAILY   torsemide (DEMADEX) 5 MG tablet   No No   Sig: TAKE 1 TABLET (5 MG TOTAL) BY MOUTH DAILY   triamcinolone (KENALOG) 0.1 % ointment   No No   Sig: Apply topically 2 (two) times a day      Facility-Administered Medications: None     Discharge Medication List as of 3/27/2025 12:44 PM        START taking these medications    Details   naproxen (Naprosyn) 500 mg tablet Take 1 tablet (500 mg total) by mouth 2 (two) times a day as needed for mild pain, Starting Thu 3/27/2025, Normal           CONTINUE these medications which have NOT CHANGED    Details   acetaminophen (TYLENOL) 500 mg tablet TAKE 1 TABLET (500 MG TOTAL) BY MOUTH EVERY 6 (SIX) HOURS AS NEEDED FOR MILD PAIN OR MODERATE PAIN, Starting Wed 4/17/2024, Normal      ammonium lactate (LAC-HYDRIN) 12 % lotion Apply topically 2 (two) times a day as needed for dry skin, Starting Wed 10/23/2024, Normal      atorvastatin (LIPITOR) 20 mg tablet TAKE 1 TABLET (20 MG TOTAL) BY MOUTH DAILY, Starting Fri 8/23/2024, Normal      celecoxib (CeleBREX) 200 mg capsule TAKE 1 CAPSULE (200 MG TOTAL) BY MOUTH DAILY, Starting Tue 7/25/2023, Normal      Cholecalciferol (Vitamin D3) 50 MCG (2000 UT) TABS TAKE 1 TABLET (2,000 UNITS TOTAL) BY MOUTH DAILY, Normal      clotrimazole-betamethasone (LOTRISONE) 1-0.05 % cream APPLY TOPICALLY 2 (TWO) TIMES A DAY, Starting Thu 10/31/2024, Normal      Cyanocobalamin (B-12) 1000 MCG SUBL Place 1 tablet (1,000 mcg total) under the tongue in the morning, Starting Wed 11/27/2024, Normal      Diclofenac Sodium (VOLTAREN) 1 % Apply 2 g topically 4 (four) times a day, Starting Mon 3/17/2025, Normal       DULoxetine (CYMBALTA) 20 mg capsule TAKE 1 CAPSULE (20 MG TOTAL) BY MOUTH DAILY, Starting Tue 3/26/2024, Normal      Eyelid Cleansers ([employer] Eyelid Cleansing) PADS APPLY AT BEDTIME AS DIRECTED SCRUB WITH PAD VS. FOAM ALONG CLOSED EYELIDS NIGHTLY PER DIRECTIONS. OCUSOFT OR SIMILAR BRAND (WIPES VS. FOAM), Historical Med      ferrous sulfate 324 (65 Fe) mg Take 1 tablet (324 mg total) by mouth daily before breakfast, Starting Wed 11/27/2024, Normal      fluticasone (FLONASE) 50 mcg/act nasal spray 1 spray into each nostril in the morning., Starting Mon 5/16/2022, Normal      gabapentin (NEURONTIN) 100 mg capsule TAKE 1 CAPSULE (100 MG TOTAL) BY MOUTH DAILY AT BEDTIME, Starting Wed 3/5/2025, Normal      losartan (COZAAR) 25 mg tablet Take 1 tablet (25 mg total) by mouth daily, Starting Mon 9/23/2024, Normal      magnesium Oxide (MAG-OX) 400 mg TABS Take 400 mg by mouth in the morning and 400 mg in the evening., Starting Thu 5/5/2022, Historical Med      methocarbamol (ROBAXIN) 500 mg tablet Take 1 tablet (500 mg total) by mouth 2 (two) times a day as needed for muscle spasms, Starting Tue 2/11/2025, Normal      neomycin-polymyxin-dexamethasone (MAXITROL) 0.35%-10,000 units/g-0.1% APPLY 0.25 INCH INTO AFFECTED EYE AT BEDTIME X 2 WEEKS THEN D/C, Historical Med      omeprazole (PriLOSEC) 20 mg delayed release capsule TAKE 1 CAPSULE (20 MG TOTAL) BY MOUTH DAILY, Starting Mon 6/24/2024, Normal      ondansetron (ZOFRAN) 4 mg tablet Take 1 tablet (4 mg total) by mouth every 8 (eight) hours as needed for nausea or vomiting, Starting Sun 8/11/2024, Normal      promethazine-dextromethorphan (PHENERGAN-DM) 6.25-15 mg/5 mL oral syrup Take 5 mL by mouth 4 (four) times a day as needed for cough, Starting Thu 10/24/2024, Normal      Restasis 0.05 % ophthalmic emulsion INSTILL 1 DROP BOTH EYES TWICE A DAY, Historical Med      spironolactone (ALDACTONE) 25 mg tablet TAKE 1 TABLET (25 MG TOTAL) BY MOUTH DAILY, Starting Mon 2/10/2025,  Normal      torsemide (DEMADEX) 5 MG tablet TAKE 1 TABLET (5 MG TOTAL) BY MOUTH DAILY, Starting Fri 1/24/2025, Normal      triamcinolone (KENALOG) 0.1 % ointment Apply topically 2 (two) times a day, Starting Mon 3/17/2025, Normal             ED SEPSIS DOCUMENTATION   Time reflects when diagnosis was documented in both MDM as applicable and the Disposition within this note       Time User Action Codes Description Comment    3/27/2025 12:40 PM Gabriele Mariano Add [M79.671,  G89.29] Chronic foot pain, right                  Gabriele Mariano, DO  03/27/25 1348       Gabriele Mariano, DO  03/27/25 1348

## 2025-03-27 NOTE — ED NOTES
Reviewed discharge instructions at bedside with patient, patient verbalized understanding. No futher questions or concerns at this time. Patient ambulated off unit with steady gait.       Ivone Frost RN  03/27/25 [id number]

## 2025-04-03 DIAGNOSIS — K21.9 GASTROESOPHAGEAL REFLUX DISEASE WITHOUT ESOPHAGITIS: ICD-10-CM

## 2025-04-04 PROBLEM — Z11.59 NEED FOR HEPATITIS C SCREENING TEST: Status: RESOLVED | Noted: 2025-03-05 | Resolved: 2025-04-04

## 2025-04-04 RX ORDER — OMEPRAZOLE 20 MG/1
20 CAPSULE, DELAYED RELEASE ORAL DAILY
Qty: 90 CAPSULE | Refills: 1 | Status: SHIPPED | OUTPATIENT
Start: 2025-04-04

## 2025-04-08 ENCOUNTER — TELEPHONE (OUTPATIENT)
Dept: RADIOLOGY | Facility: MEDICAL CENTER | Age: 70
End: 2025-04-08

## 2025-04-08 DIAGNOSIS — L30.9 ECZEMA, UNSPECIFIED TYPE: ICD-10-CM

## 2025-04-09 ENCOUNTER — TELEPHONE (OUTPATIENT)
Age: 70
End: 2025-04-09

## 2025-04-09 NOTE — TELEPHONE ENCOUNTER
Pt's daughter called in stating the pt is complaining of her hands twisting and she says she will need a rheumatology referral. She's requesting a call back.

## 2025-04-10 NOTE — TELEPHONE ENCOUNTER
Patient Reports       50-60  %     improvement post injection    Pain Level    better she could not give a number /10

## 2025-04-14 PROBLEM — E11.21 DIABETIC NEPHROPATHY ASSOCIATED WITH TYPE 2 DIABETES MELLITUS (HCC): Status: RESOLVED | Noted: 2024-09-20 | Resolved: 2025-04-14

## 2025-04-14 PROBLEM — I12.9 HYPERTENSIVE KIDNEY DISEASE WITH CHRONIC KIDNEY DISEASE: Status: RESOLVED | Noted: 2024-03-21 | Resolved: 2025-04-14

## 2025-04-14 NOTE — ASSESSMENT & PLAN NOTE
At goal no changes monitor  Orders:    Vitamin D 25 hydroxy; Future    Comprehensive metabolic panel; Future    Protein / creatinine ratio, urine; Future    Lipid Panel with Direct LDL reflex; Future    Magnesium; Future    Magnesium; Future    Basic metabolic panel; Future    CBC; Future

## 2025-04-14 NOTE — PROGRESS NOTES
Name: Lela Gomez      : 1955      MRN: [id number]  Encounter Provider: Ruben Patel MD  Encounter Date: 2025   Encounter department: [facility] [place]  :  Assessment & Plan  Chronic kidney disease, stage 3a (HCC)  To be determined we need labs patient counseled as such  Orders:    Vitamin D 25 hydroxy; Future    Comprehensive metabolic panel; Future    Protein / creatinine ratio, urine; Future    Lipid Panel with Direct LDL reflex; Future    Magnesium; Future    Magnesium; Future    Basic metabolic panel; Future    CBC; Future    Parenchymal renal hypertension, stage 1 through stage 4 or unspecified chronic kidney disease  Good in the office today she will send in a week of blood pressure readings    Type 2 diabetes mellitus with stage 3a chronic kidney disease, without long-term current use of insulin (HCC)  To consider SGLT2 inhibitor but will hide to get more compliance with labs and medication list    Dyslipidemia  At goal no changes monitor  Orders:    Vitamin D 25 hydroxy; Future    Comprehensive metabolic panel; Future    Protein / creatinine ratio, urine; Future    Lipid Panel with Direct LDL reflex; Future    Magnesium; Future    Magnesium; Future    Basic metabolic panel; Future    CBC; Future      All the above done through an   History of Present Illness   HPI  Lela Gomez is a 69 y.o. female who presents regarding follow-up with CKD 3 A  There has been no hospitalizations or acute illnesses since last visit.  The patient overall is feeling well.  Except for diffuse joint pain including her knees back and legs  Good appetite and good energy  No fevers, chills, or cough or colds.  No hematuria, dysuria, voiding symptoms or foamy urine  No gastrointestinal symptoms  No cardiovascular symptoms including swelling of the legs  No headaches, dizziness or lightheadedness  Blood pressure medications:  To be determined    Renal pertinent medications:  To be  determined      Review of Systems  Please see HPI, otherwise the review of systems as completely reviewed with the patient are negative    Pertinent Medical History   69y.o. year old female with a history of hypertension/dyslipidemia/peripheral edema: We are asked to see regarding peripheral edema and hypertension.   We are seeing regarding hypertension/edema/CKD     1.  CKD stage 3A: No labs since November 2024!   I HAVE PERSONALLY REVIEWED  AND ANALYZED THE DATA/ LABS FOR THIS VISIT: DISCUSSION AS FOLLOWS  Etiology:  Hypertensive nephrosclerosis/arteriolar nephrosclerosis.  Baseline creatinine:  1.0  Current creatinine: 0.90 as of 6/27/2024 at baseline  Urine protein creatinine ratio: Negative albuminuria as of 6/27/2024  UA: Trace proteinuria but no hematuria or cell seen  Renal ultrasound:  Negative, bilateral renal vascular calcification otherwise unremarkable  Renal artery duplex:: Negative  Recommendations:  Treat hypertension-please see below  Treat dyslipidemia-please see below  Maintain proteinuria less than 1 g or as low as possible  Avoid nephrotoxic agents such as NSAIDs, and proton pump inhibitors if possible; patient counseled as such     2.  Volume:  Peripheral edema:  Seems compatible with lymphedema  ?  Medication related including dihydropyridine calcium channel blockers/Lyrica  Negative DVT:  Follow-up negative  Echo 02/07/2022: EF 65% diastolic function mildly abnormal with grade 1 relaxation and aortic valve sclerosis  Renal workup as outlined in particular ruling out proteinuria  Albumin is normal  Thyroid profile normal  Treatment:  Low-sodium diet  Leg elevation when able  Support hose especially if standing  Diuretics:  Torsemide 5 mg daily  Most likely lymphedema/venous stasis: Now seeing vascular surgery        3.  Hypertension:       Current blood pressure averages:  None today:     Goal blood pressure:  Less than 130/80 given CKD     Recommendations:  Push nonmedical regimen including  weight loss, isotonic exercise and a low sodium diet.  Patient has been counseled the such.  MedicationChanges today:    No changes patient's blood pressure acceptable in the office but I will have her send week blood pressure readings     4.  Electrolytes:  Hypokalemia: Resolved at a level of 4.2 prior workup had been negative     5.  Mineral bone disorder:  Of chronic kidney disease:  Calcium/magnesium/phosphorus: Pending labs      6.  Dyslipidemia:  Goal LDL:  Less than 100 given CKD  Current lipid profile:  LDL 68/HDL 43/triglycerides 166 from 11/8/2024  Recommendations:   Low-cholesterol/low-fat diet / weight loss as appropriate and isotonic exercise   Medication changes today:  No changes as patient is at goal     7.  Anemia:  Current hemoglobin:  Normal at 11.8 from 6/27/2024     8.  Other problems:  Lymphedema  GERD  Arthritis  Diabetes mellitus: To consider SGLT2 inhibitor but patient has not shown of any of her meds so unable to be considered in the future         GI HEALTH MAINTENANCE:  Patient has refused a colonoscopy understanding the potential risk of colon cancer and subsequent death if she does have colon cancer without being found.  She still refuses a colonoscopy at this time.         Medical History Reviewed by provider this encounter:  Tobacco  Allergies  Meds  Problems  Med Hx  Surg Hx  Fam Hx     .  Past Medical History   Past Medical History:   Diagnosis Date    Hyperlipidemia     Hypertension      History reviewed. No pertinent surgical history.  Family History   Problem Relation Age of Onset    No Known Problems Mother     No Known Problems Father       reports that she has never smoked. She has never used smokeless tobacco. She reports that she does not drink alcohol and does not use drugs.  Current Outpatient Medications   Medication Instructions    acetaminophen (TYLENOL) 500 mg, Oral, Every 6 hours PRN    ammonium lactate (LAC-HYDRIN) 12 % lotion Topical, 2 times daily PRN     atorvastatin (LIPITOR) 20 mg, Oral, Daily    B-12 1,000 mcg, Sublingual, Daily    celecoxib (CELEBREX) 200 mg, Oral, Daily    Cholecalciferol (Vitamin D3) 50 MCG (2000 UT) TABS TAKE 1 TABLET (2,000 UNITS TOTAL) BY MOUTH DAILY    clotrimazole-betamethasone (LOTRISONE) 1-0.05 % cream Topical, 2 times daily    Diclofenac Sodium (VOLTAREN) 2 g, Topical, 4 times daily    DULoxetine (CYMBALTA) 20 mg, Oral, Daily    Eyelid Cleansers ([employer]Soft Eyelid Cleansing) PADS APPLY AT BEDTIME AS DIRECTED SCRUB WITH PAD VS. FOAM ALONG CLOSED EYELIDS NIGHTLY PER DIRECTIONS. OCUSOFT OR SIMILAR BRAND (WIPES VS. FOAM)    ferrous sulfate 324 mg, Oral, Daily before breakfast    fluticasone (FLONASE) 50 mcg/act nasal spray 1 spray, Nasal, Daily    losartan (COZAAR) 25 mg, Oral, Daily    magnesium Oxide (MAG-OX) 400 mg, 2 times daily    neomycin-polymyxin-dexamethasone (MAXITROL) 0.35%-10,000 units/g-0.1% APPLY 0.25 INCH INTO AFFECTED EYE AT BEDTIME X 2 WEEKS THEN D/C    omeprazole (PRILOSEC) 20 mg, Oral, Daily    ondansetron (ZOFRAN) 4 mg, Oral, Every 8 hours PRN    Restasis 0.05 % ophthalmic emulsion INSTILL 1 DROP BOTH EYES TWICE A DAY    spironolactone (ALDACTONE) 25 mg, Oral, Daily    tiZANidine (ZANAFLEX) 2 mg, Oral, 2 times daily PRN    torsemide (DEMADEX) 5 mg, Oral, Daily    triamcinolone (KENALOG) 0.1 % ointment Topical, 2 times daily   No Known Allergies   Current Outpatient Medications on File Prior to Visit   Medication Sig Dispense Refill    acetaminophen (TYLENOL) 500 mg tablet TAKE 1 TABLET (500 MG TOTAL) BY MOUTH EVERY 6 (SIX) HOURS AS NEEDED FOR MILD PAIN OR MODERATE PAIN 30 tablet 1    ammonium lactate (LAC-HYDRIN) 12 % lotion Apply topically 2 (two) times a day as needed for dry skin 400 g 1    atorvastatin (LIPITOR) 20 mg tablet TAKE 1 TABLET (20 MG TOTAL) BY MOUTH DAILY 90 tablet 1    celecoxib (CeleBREX) 200 mg capsule TAKE 1 CAPSULE (200 MG TOTAL) BY MOUTH DAILY (Patient not taking: Reported on 9/23/2024) 30 capsule 2     Cholecalciferol (Vitamin D3) 50 MCG (2000 UT) TABS TAKE 1 TABLET (2,000 UNITS TOTAL) BY MOUTH DAILY 90 tablet 1    clotrimazole-betamethasone (LOTRISONE) 1-0.05 % cream APPLY TOPICALLY 2 (TWO) TIMES A DAY (Patient not taking: Reported on 4/25/2025) 45 g 1    Cyanocobalamin (B-12) 1000 MCG SUBL Place 1 tablet (1,000 mcg total) under the tongue in the morning 90 tablet 1    Diclofenac Sodium (VOLTAREN) 1 % Apply 2 g topically 4 (four) times a day 100 g 5    DULoxetine (CYMBALTA) 20 mg capsule TAKE 1 CAPSULE (20 MG TOTAL) BY MOUTH DAILY (Patient not taking: Reported on 9/23/2024) 90 capsule 0    Eyelid Cleansers ([employer]Soft Eyelid Cleansing) PADS APPLY AT BEDTIME AS DIRECTED SCRUB WITH PAD VS. FOAM ALONG CLOSED EYELIDS NIGHTLY PER DIRECTIONS. OCUSOFT OR SIMILAR BRAND (WIPES VS. FOAM) (Patient not taking: Reported on 9/23/2024)      ferrous sulfate 324 (65 Fe) mg Take 1 tablet (324 mg total) by mouth daily before breakfast 90 tablet 1    fluticasone (FLONASE) 50 mcg/act nasal spray 1 spray into each nostril in the morning. (Patient not taking: Reported on 4/25/2025) 15.8 mL 0    losartan (COZAAR) 25 mg tablet Take 1 tablet (25 mg total) by mouth daily 90 tablet 3    magnesium Oxide (MAG-OX) 400 mg TABS Take 400 mg by mouth in the morning and 400 mg in the evening. (Patient not taking: Reported on 6/5/2023)      neomycin-polymyxin-dexamethasone (MAXITROL) 0.35%-10,000 units/g-0.1% APPLY 0.25 INCH INTO AFFECTED EYE AT BEDTIME X 2 WEEKS THEN D/C (Patient not taking: Reported on 6/5/2023)      omeprazole (PriLOSEC) 20 mg delayed release capsule TAKE 1 CAPSULE (20 MG TOTAL) BY MOUTH DAILY 90 capsule 1    ondansetron (ZOFRAN) 4 mg tablet Take 1 tablet (4 mg total) by mouth every 8 (eight) hours as needed for nausea or vomiting (Patient not taking: Reported on 9/23/2024) 20 tablet 0    Restasis 0.05 % ophthalmic emulsion INSTILL 1 DROP BOTH EYES TWICE A DAY (Patient not taking: Reported on 6/5/2023)      spironolactone  (ALDACTONE) 25 mg tablet TAKE 1 TABLET (25 MG TOTAL) BY MOUTH DAILY 90 tablet 1    tiZANidine (ZANAFLEX) 2 mg tablet Take 1 tablet (2 mg total) by mouth 2 (two) times a day as needed for muscle spasms 60 tablet 5    torsemide (DEMADEX) 5 MG tablet TAKE 1 TABLET (5 MG TOTAL) BY MOUTH DAILY 90 tablet 1    triamcinolone (KENALOG) 0.1 % ointment APPLY TOPICALLY 2 (TWO) TIMES A DAY 30 g 1     No current facility-administered medications on file prior to visit.      Social History     Tobacco Use    Smoking status: Never    Smokeless tobacco: Never   Vaping Use    Vaping status: Never Used   Substance and Sexual Activity    Alcohol use: Never    Drug use: Never    Sexual activity: Not on file        Objective   Ht 5' (1.524 m)   BMI 33.01 kg/m²      Physical Exam  Blood pressure sitting on left: 130/70 with a heart rate of 68 and regular  Blood pressure standing on left: 128/74 with a heart rate of 80 and regular  Physical Exam: General:  No acute distress/obese  Skin:  No acute rash  Eyes:  No scleral icterus and noninjected  ENT:  Moist mucous membranes  Neck:  Supple, no jugular venous distention, trachea midline, overall appearance is normal  Chest:  Clear to auscultation  CVS:  Regular rate and rhythm, without a rub or gallops  Abdomen:  obese,Normal bowel sounds, soft and nontender and nondistended  Extremities:  No edema, and no cyanosis, no significant arthritic changes  Neuro:  No gross focality  Psych:  Alert and oriented and appropriate

## 2025-04-14 NOTE — ASSESSMENT & PLAN NOTE
To be determined we need labs patient counseled as such  Orders:    Vitamin D 25 hydroxy; Future    Comprehensive metabolic panel; Future    Protein / creatinine ratio, urine; Future    Lipid Panel with Direct LDL reflex; Future    Magnesium; Future    Magnesium; Future    Basic metabolic panel; Future    CBC; Future

## 2025-04-15 ENCOUNTER — TELEPHONE (OUTPATIENT)
Age: 70
End: 2025-04-15

## 2025-04-15 NOTE — TELEPHONE ENCOUNTER
Caller: Marquita from [facility]    Doctor: Dr. Edmondson    Reason for call: the health insurance is stating that the methocarbamol is not on their prescription formulary.  Pharmacy requesting a new script for the tizanidine which is covered by  health insurance    Please advise    Call back#: [phone]

## 2025-04-16 ENCOUNTER — TELEPHONE (OUTPATIENT)
Dept: NEPHROLOGY | Facility: CLINIC | Age: 70
End: 2025-04-16

## 2025-04-16 DIAGNOSIS — M79.18 MYOFASCIAL PAIN SYNDROME: Primary | ICD-10-CM

## 2025-04-16 RX ORDER — TIZANIDINE 2 MG/1
2 TABLET ORAL 2 TIMES DAILY PRN
Qty: 60 TABLET | Refills: 5 | Status: SHIPPED | OUTPATIENT
Start: 2025-04-16

## 2025-04-16 NOTE — TELEPHONE ENCOUNTER
Called patient's daughter-in-law, Becca, to remind them to go for lab work before her appt on 4/22 but no answer and unable to leave a message. Will try again later.

## 2025-04-16 NOTE — TELEPHONE ENCOUNTER
Methocarbamol discontinued and tizanidine 2 mg ordered for her to take twice a day as needed to help with her muscle spasms.  Common side effects include drowsiness and dizziness.  Please have her call our office if she is experiencing any adverse effects with this medication

## 2025-04-22 ENCOUNTER — OFFICE VISIT (OUTPATIENT)
Dept: NEPHROLOGY | Facility: CLINIC | Age: 70
End: 2025-04-22
Payer: MEDICARE

## 2025-04-22 VITALS — HEIGHT: 60 IN | BODY MASS INDEX: 33.01 KG/M2

## 2025-04-22 DIAGNOSIS — I12.9 PARENCHYMAL RENAL HYPERTENSION, STAGE 1 THROUGH STAGE 4 OR UNSPECIFIED CHRONIC KIDNEY DISEASE: ICD-10-CM

## 2025-04-22 DIAGNOSIS — E11.22 TYPE 2 DIABETES MELLITUS WITH STAGE 3A CHRONIC KIDNEY DISEASE, WITHOUT LONG-TERM CURRENT USE OF INSULIN (HCC): Primary | ICD-10-CM

## 2025-04-22 DIAGNOSIS — N18.31 TYPE 2 DIABETES MELLITUS WITH STAGE 3A CHRONIC KIDNEY DISEASE, WITHOUT LONG-TERM CURRENT USE OF INSULIN (HCC): Primary | ICD-10-CM

## 2025-04-22 DIAGNOSIS — E78.5 DYSLIPIDEMIA: ICD-10-CM

## 2025-04-22 DIAGNOSIS — N18.31 CHRONIC KIDNEY DISEASE, STAGE 3A (HCC): ICD-10-CM

## 2025-04-22 PROCEDURE — G2211 COMPLEX E/M VISIT ADD ON: HCPCS | Performed by: INTERNAL MEDICINE

## 2025-04-22 PROCEDURE — 99214 OFFICE O/P EST MOD 30 MIN: CPT | Performed by: INTERNAL MEDICINE

## 2025-04-22 RX ORDER — TRIAMCINOLONE ACETONIDE 1 MG/G
OINTMENT TOPICAL 2 TIMES DAILY
Qty: 30 G | Refills: 1 | Status: SHIPPED | OUTPATIENT
Start: 2025-04-22

## 2025-04-22 NOTE — PATIENT INSTRUCTIONS
Visit summary:  - Unfortunately I have no recent labs for blood pressures or list of medications we will need all of these before we can fully assess how the patient is doing    1.  We need a medication list  2.  We need a week of blood pressure readings morning evening just sitting using the left arm before taking medications  3.  We need recent lab work as ordered      -In terms of her joint discomfort she should follow-up with her family physician in that regards      1. Medication changes today:  None today pending home blood pressure readings    2.  General instructions:  Avoid salt  Try to lose weight  Try to exercise what ever she can tolerate    3.  Please go for  fasting  lab work at this time    4.  Please take 1 week a blood pressure readings at this time morning and evening just sitting using the left arm before taking medications and send those into the office    AS FOLLOWS  MORNING AND EVENING, SITTING AND STANDING as follows:  TAKE THE MORNING READINGS BEFORE ANY MEDICATIONS AND WHEN YOU ARE RELAXED FOR SEVERAL MINUTES  TAKE THE EVENING READINGS:  BETWEEN 7-10 P.M.; PRIOR TO ANY MEDICATIONS; AT LEAST IN OUR  FROM DINNER; AND CERTAINLY AFTER RELAXING FOR A FEW MINUTES  PLEASE INCLUDE HEART RATE WITH YOUR BLOOD PRESSURE READINGS  When taking standing readings, keep your arm supported at heart level and not dangling  Make sure you are sitting with your back supported and feet on the ground and do not cross your legs or feet  Make sure you have not taken any coffee or caffeine products or exercised or smoke cigarettes at least 30 minutes before taking your blood pressure  Then please mail these readings into the office      5.  In 4 months:  Please go for nonfasting lab work but in the morning  Please take 1 week a blood pressure readings as outlined above and mail those into the office      6.  Follow-up in 8 months  Please bring in 1 week a blood pressure readings morning evening, sitting and  standing is outlined above  PLEASE BRING AN YOUR BLOOD PRESSURE MACHINE TO CORRELATE WITH THE OFFICE MACHINE AT THIS NEXT SCHEDULED VISIT  Please go for fasting lab work 1-2 weeks prior to your appointment      7. General non medical recommendations:  AVOID SALT BUT NOT ADDING AN READING LABELS TO MAKE SURE THERE IS LOW-SALT IN THE FOOD THAT YOU ARE EATING  Goal is less than 2 g of sodium intake or less than 5 g of sodium chloride intake per day    Avoid nonsteroidal anti-inflammatory drugs such as Naprosyn, ibuprofen, Aleve, Advil, Celebrex, Meloxicam (Mobic) etc.  You can use Tylenol as needed if you do not have any liver condition to be concerned about    Avoid medications such as Sudafed or decongestants and antihistamines that contained the D component which is the decongestant.  You can take antihistamines without the decongestant or D component.    Try to avoid medications such as pantoprazole or  Protonix/Nexium or Esomeprazole)/Prilosec or omeprazole/Prevacid or lansoprazole/AcipHex or Rabeprazole.  If you are able to, use Pepcid as this is safer for your kidneys.    Try to exercise at least 30 minutes 3 days a week to begin with with an ultimate goal of 5 days a week for at least 30 minutes    Please do not drink more than 2 glasses of alcohol/wine on a daily basis as this may contribute to your high blood pressure.

## 2025-04-22 NOTE — LETTER
2025     Chelle Villarreal MD   [facility] [id number]    Patient: Lela Gomez   YOB: 1955   Date of Visit: 2025       Dear Dr. Chelle Villarreal MD:    Thank you for referring Lela Gomez to me for evaluation. Below are my notes for this consultation.    If you have questions, please do not hesitate to call me. I look forward to following your patient along with you.         Sincerely,        uRben Patel MD        CC: No Recipients    Ruben Patel MD  2025 10:27 AM  Sign when Signing Visit  Name: Lela Gomez      : 1955      MRN: [id number]  Encounter Provider: Ruben Patel MD  Encounter Date: 2025   Encounter department: [facility]  :  Assessment & Plan  Chronic kidney disease, stage 3a (HCC)  To be determined we need labs patient counseled as such  Orders:  •  Vitamin D 25 hydroxy; Future  •  Comprehensive metabolic panel; Future  •  Protein / creatinine ratio, urine; Future  •  Lipid Panel with Direct LDL reflex; Future  •  Magnesium; Future  •  Magnesium; Future  •  Basic metabolic panel; Future  •  CBC; Future    Parenchymal renal hypertension, stage 1 through stage 4 or unspecified chronic kidney disease  Good in the office today she will send in a week of blood pressure readings    Type 2 diabetes mellitus with stage 3a chronic kidney disease, without long-term current use of insulin (HCC)  To consider SGLT2 inhibitor but will hide to get more compliance with labs and medication list    Dyslipidemia  At goal no changes monitor  Orders:  •  Vitamin D 25 hydroxy; Future  •  Comprehensive metabolic panel; Future  •  Protein / creatinine ratio, urine; Future  •  Lipid Panel with Direct LDL reflex; Future  •  Magnesium; Future  •  Magnesium; Future  •  Basic metabolic panel; Future  •  CBC; Future        History of Present Illness  HPI  Lela Gomez is a 69 y.o. female who presents regarding follow-up with CKD 3 A  There has been no  hospitalizations or acute illnesses since last visit.  The patient overall is feeling well.  Except for diffuse joint pain including her knees back and legs  Good appetite and good energy  No fevers, chills, or cough or colds.  No hematuria, dysuria, voiding symptoms or foamy urine  No gastrointestinal symptoms  No cardiovascular symptoms including swelling of the legs  No headaches, dizziness or lightheadedness  Blood pressure medications:  To be determined    Renal pertinent medications:  To be determined      Review of Systems  Please see HPI, otherwise the review of systems as completely reviewed with the patient are negative    Pertinent Medical History  69y.o. year old female with a history of hypertension/dyslipidemia/peripheral edema: We are asked to see regarding peripheral edema and hypertension.   We are seeing regarding hypertension/edema/CKD     1.  CKD stage 3A: No labs since November 2024!   I HAVE PERSONALLY REVIEWED  AND ANALYZED THE DATA/ LABS FOR THIS VISIT: DISCUSSION AS FOLLOWS  Etiology:  Hypertensive nephrosclerosis/arteriolar nephrosclerosis.  Baseline creatinine:  1.0  Current creatinine: 0.90 as of 6/27/2024 at baseline  Urine protein creatinine ratio: Negative albuminuria as of 6/27/2024  UA: Trace proteinuria but no hematuria or cell seen  Renal ultrasound:  Negative, bilateral renal vascular calcification otherwise unremarkable  Renal artery duplex:: Negative  Recommendations:  Treat hypertension-please see below  Treat dyslipidemia-please see below  Maintain proteinuria less than 1 g or as low as possible  Avoid nephrotoxic agents such as NSAIDs, and proton pump inhibitors if possible; patient counseled as such     2.  Volume:  Peripheral edema:  Seems compatible with lymphedema  ?  Medication related including dihydropyridine calcium channel blockers/Lyrica  Negative DVT:  Follow-up negative  Echo 02/07/2022: EF 65% diastolic function mildly abnormal with grade 1 relaxation and aortic  valve sclerosis  Renal workup as outlined in particular ruling out proteinuria  Albumin is normal  Thyroid profile normal  Treatment:  Low-sodium diet  Leg elevation when able  Support hose especially if standing  Diuretics:  Torsemide 5 mg daily  Most likely lymphedema/venous stasis: Now seeing vascular surgery        3.  Hypertension:       Current blood pressure averages:  None today:     Goal blood pressure:  Less than 130/80 given CKD     Recommendations:  Push nonmedical regimen including weight loss, isotonic exercise and a low sodium diet.  Patient has been counseled the such.  MedicationChanges today:    No changes patient's blood pressure acceptable in the office but I will have her send week blood pressure readings     4.  Electrolytes:  Hypokalemia: Resolved at a level of 4.2 prior workup had been negative     5.  Mineral bone disorder:  Of chronic kidney disease:  Calcium/magnesium/phosphorus: Pending labs      6.  Dyslipidemia:  Goal LDL:  Less than 100 given CKD  Current lipid profile:  LDL 68/HDL 43/triglycerides 166 from 11/8/2024  Recommendations:   Low-cholesterol/low-fat diet / weight loss as appropriate and isotonic exercise   Medication changes today:  No changes as patient is at goal     7.  Anemia:  Current hemoglobin:  Normal at 11.8 from 6/27/2024     8.  Other problems:  Lymphedema  GERD  Arthritis  Diabetes mellitus: To consider SGLT2 inhibitor but patient has not shown of any of her meds so unable to be considered in the future         GI HEALTH MAINTENANCE:  Patient has refused a colonoscopy understanding the potential risk of colon cancer and subsequent death if she does have colon cancer without being found.  She still refuses a colonoscopy at this time.         Medical History Reviewed by provider this encounter:     .  Past Medical History  Past Medical History:   Diagnosis Date   • Hyperlipidemia    • Hypertension      History reviewed. No pertinent surgical history.  Family  History   Problem Relation Age of Onset   • No Known Problems Mother    • No Known Problems Father       reports that she has never smoked. She has never used smokeless tobacco. She reports that she does not drink alcohol and does not use drugs.  Current Outpatient Medications   Medication Instructions   • acetaminophen (TYLENOL) 500 mg, Oral, Every 6 hours PRN   • ammonium lactate (LAC-HYDRIN) 12 % lotion Topical, 2 times daily PRN   • atorvastatin (LIPITOR) 20 mg, Oral, Daily   • B-12 1,000 mcg, Sublingual, Daily   • celecoxib (CELEBREX) 200 mg, Oral, Daily   • Cholecalciferol (Vitamin D3) 50 MCG (2000 UT) TABS TAKE 1 TABLET (2,000 UNITS TOTAL) BY MOUTH DAILY   • clotrimazole-betamethasone (LOTRISONE) 1-0.05 % cream Topical, 2 times daily   • Diclofenac Sodium (VOLTAREN) 2 g, Topical, 4 times daily   • DULoxetine (CYMBALTA) 20 mg, Oral, Daily   • Eyelid Cleansers (OcuSoft Eyelid Cleansing) PADS APPLY AT BEDTIME AS DIRECTED SCRUB WITH PAD VS. FOAM ALONG CLOSED EYELIDS NIGHTLY PER DIRECTIONS. OCUSOFT OR SIMILAR BRAND (WIPES VS. FOAM)   • ferrous sulfate 324 mg, Oral, Daily before breakfast   • fluticasone (FLONASE) 50 mcg/act nasal spray 1 spray, Nasal, Daily   • gabapentin (NEURONTIN) 100 mg, Oral, Daily at bedtime   • losartan (COZAAR) 25 mg, Oral, Daily   • magnesium Oxide (MAG-OX) 400 mg, 2 times daily   • naproxen (NAPROSYN) 500 mg, Oral, 2 times daily PRN   • neomycin-polymyxin-dexamethasone (MAXITROL) 0.35%-10,000 units/g-0.1% APPLY 0.25 INCH INTO AFFECTED EYE AT BEDTIME X 2 WEEKS THEN D/C   • omeprazole (PRILOSEC) 20 mg, Oral, Daily   • ondansetron (ZOFRAN) 4 mg, Oral, Every 8 hours PRN   • promethazine-dextromethorphan (PHENERGAN-DM) 6.25-15 mg/5 mL oral syrup 5 mL, Oral, 4 times daily PRN   • Restasis 0.05 % ophthalmic emulsion INSTILL 1 DROP BOTH EYES TWICE A DAY   • spironolactone (ALDACTONE) 25 mg, Oral, Daily   • tiZANidine (ZANAFLEX) 2 mg, Oral, 2 times daily PRN   • torsemide (DEMADEX) 5 mg, Oral,  Daily   • triamcinolone (KENALOG) 0.1 % ointment Topical, 2 times daily   No Known Allergies   Current Outpatient Medications on File Prior to Visit   Medication Sig Dispense Refill   • acetaminophen (TYLENOL) 500 mg tablet TAKE 1 TABLET (500 MG TOTAL) BY MOUTH EVERY 6 (SIX) HOURS AS NEEDED FOR MILD PAIN OR MODERATE PAIN 30 tablet 1   • ammonium lactate (LAC-HYDRIN) 12 % lotion Apply topically 2 (two) times a day as needed for dry skin 400 g 1   • atorvastatin (LIPITOR) 20 mg tablet TAKE 1 TABLET (20 MG TOTAL) BY MOUTH DAILY 90 tablet 1   • celecoxib (CeleBREX) 200 mg capsule TAKE 1 CAPSULE (200 MG TOTAL) BY MOUTH DAILY (Patient not taking: Reported on 9/23/2024) 30 capsule 2   • Cholecalciferol (Vitamin D3) 50 MCG (2000 UT) TABS TAKE 1 TABLET (2,000 UNITS TOTAL) BY MOUTH DAILY 90 tablet 1   • clotrimazole-betamethasone (LOTRISONE) 1-0.05 % cream APPLY TOPICALLY 2 (TWO) TIMES A DAY 45 g 1   • Cyanocobalamin (B-12) 1000 MCG SUBL Place 1 tablet (1,000 mcg total) under the tongue in the morning 90 tablet 1   • Diclofenac Sodium (VOLTAREN) 1 % Apply 2 g topically 4 (four) times a day 100 g 5   • DULoxetine (CYMBALTA) 20 mg capsule TAKE 1 CAPSULE (20 MG TOTAL) BY MOUTH DAILY (Patient not taking: Reported on 9/23/2024) 90 capsule 0   • Eyelid Cleansers ([employer]Soft Eyelid Cleansing) PADS APPLY AT BEDTIME AS DIRECTED SCRUB WITH PAD VS. FOAM ALONG CLOSED EYELIDS NIGHTLY PER DIRECTIONS. OCUSOFT OR SIMILAR BRAND (WIPES VS. FOAM) (Patient not taking: Reported on 9/23/2024)     • ferrous sulfate 324 (65 Fe) mg Take 1 tablet (324 mg total) by mouth daily before breakfast 90 tablet 1   • fluticasone (FLONASE) 50 mcg/act nasal spray 1 spray into each nostril in the morning. (Patient not taking: Reported on 6/5/2023) 15.8 mL 0   • gabapentin (NEURONTIN) 100 mg capsule TAKE 1 CAPSULE (100 MG TOTAL) BY MOUTH DAILY AT BEDTIME 30 capsule 2   • losartan (COZAAR) 25 mg tablet Take 1 tablet (25 mg total) by mouth daily 90 tablet 3   •  magnesium Oxide (MAG-OX) 400 mg TABS Take 400 mg by mouth in the morning and 400 mg in the evening. (Patient not taking: Reported on 6/5/2023)     • naproxen (Naprosyn) 500 mg tablet Take 1 tablet (500 mg total) by mouth 2 (two) times a day as needed for mild pain 30 tablet 0   • neomycin-polymyxin-dexamethasone (MAXITROL) 0.35%-10,000 units/g-0.1% APPLY 0.25 INCH INTO AFFECTED EYE AT BEDTIME X 2 WEEKS THEN D/C (Patient not taking: Reported on 6/5/2023)     • omeprazole (PriLOSEC) 20 mg delayed release capsule TAKE 1 CAPSULE (20 MG TOTAL) BY MOUTH DAILY 90 capsule 1   • ondansetron (ZOFRAN) 4 mg tablet Take 1 tablet (4 mg total) by mouth every 8 (eight) hours as needed for nausea or vomiting (Patient not taking: Reported on 9/23/2024) 20 tablet 0   • promethazine-dextromethorphan (PHENERGAN-DM) 6.25-15 mg/5 mL oral syrup Take 5 mL by mouth 4 (four) times a day as needed for cough 240 mL 1   • Restasis 0.05 % ophthalmic emulsion INSTILL 1 DROP BOTH EYES TWICE A DAY (Patient not taking: Reported on 6/5/2023)     • spironolactone (ALDACTONE) 25 mg tablet TAKE 1 TABLET (25 MG TOTAL) BY MOUTH DAILY 90 tablet 1   • tiZANidine (ZANAFLEX) 2 mg tablet Take 1 tablet (2 mg total) by mouth 2 (two) times a day as needed for muscle spasms 60 tablet 5   • torsemide (DEMADEX) 5 MG tablet TAKE 1 TABLET (5 MG TOTAL) BY MOUTH DAILY 90 tablet 1   • triamcinolone (KENALOG) 0.1 % ointment Apply topically 2 (two) times a day 30 g 1     No current facility-administered medications on file prior to visit.      Social History     Tobacco Use   • Smoking status: Never   • Smokeless tobacco: Never   Vaping Use   • Vaping status: Never Used   Substance and Sexual Activity   • Alcohol use: Never   • Drug use: Never   • Sexual activity: Not on file        Objective  Ht 5' (1.524 m)   BMI 33.01 kg/m²      Physical Exam  Blood pressure sitting on left: 130/70 with a heart rate of 68 and regular  Blood pressure standing on left: 128/74 with a heart  rate of 80 and regular  Physical Exam: General:  No acute distress/obese  Skin:  No acute rash  Eyes:  No scleral icterus and noninjected  ENT:  Moist mucous membranes  Neck:  Supple, no jugular venous distention, trachea midline, overall appearance is normal  Chest:  Clear to auscultation  CVS:  Regular rate and rhythm, without a rub or gallops  Abdomen:  obese,Normal bowel sounds, soft and nontender and nondistended  Extremities:  No edema, and no cyanosis, no significant arthritic changes  Neuro:  No gross focality  Psych:  Alert and oriented and appropriate

## 2025-04-25 ENCOUNTER — TELEPHONE (OUTPATIENT)
Age: 70
End: 2025-04-25

## 2025-04-25 ENCOUNTER — DOCUMENTATION (OUTPATIENT)
Dept: NEPHROLOGY | Facility: CLINIC | Age: 70
End: 2025-04-25

## 2025-04-25 NOTE — TELEPHONE ENCOUNTER
"Patients daughter calling in regards to recent visit    States \"my mom said the doctor wanted us to call in with a list of her medications\"    Unable to reach cts team member at this time    Please advise Kathryn at [phone].  "

## 2025-04-25 NOTE — TELEPHONE ENCOUNTER
Call out to patients daughter to obtain current medication list. Daughter states she was asked to call in with up to date medication list for patient. Daughter verified medications below are the only medications patient is taking and is the most up to date list:    -Triamcinolone (KENALOG) 0.1 % ointment    -TiZANidine (ZANAFLEX) 2 mg tablet- 1 tablet by mouth BID prn   -Omeprazole (PriLOSEC) 20 mg delayed release capsule- 1 capsule daily  -Diclofenac Sodium (VOLTAREN) 1 %- Apply 2 g topically 4 (four) times a day  -Spironolactone (ALDACTONE) 25 mg tablet- TAKE 1 TABLET (25 MG TOTAL) BY MOUTH DAILY   -Torsemide (DEMADEX) 5 MG tablet- 1 tablet daily  -Ferrous sulfate 324 (65 Fe) mg -Take 1 tablet (324 mg total) by mouth daily before breakfast   -Cyanocobalamin (B-12) 1000 MCG SUB- 1 tablet (1,000 mcg total) under the tongue in the morning   -Cholecalciferol (Vitamin D3) 50 MCG -TAKE 1 TABLET (2,000 UNITS TOTAL) BY MOUTH DAILY   -Losartan (COZAAR) 25 mg tablet- Take 1 tablet (25 mg total) by mouth daily   -Atorvastatin (LIPITOR) 20 mg tablet- 1 tablet daily   -Acetaminophen (TYLENOL) 500 mg tablet- TAKE 1 TABLET (500 MG TOTAL) BY MOUTH EVERY 6 (SIX) HOURS AS NEEDED FOR MILD PAIN OR MODERATE PAIN   -Ammonium lactate (LAC-HYDRIN) 12 % lotion- apply BID PRN for dry skin    Fyi- Daughter asking to please remove Gabapentin, Naproxen, Lotrisone and Phenergan DM from medications list in EPIC as she states mother no longer takes these medications.     Please advise, thank you.

## 2025-04-29 NOTE — TELEPHONE ENCOUNTER
LM for patient's daughter, Kathryn, about the following, asked her to call back if she has any questions:      Great thank you, have her send in a week of blood pressures at this time as requested

## 2025-05-07 ENCOUNTER — OFFICE VISIT (OUTPATIENT)
Dept: URGENT CARE | Facility: CLINIC | Age: 70
End: 2025-05-07
Payer: MEDICARE

## 2025-05-07 VITALS
RESPIRATION RATE: 18 BRPM | HEART RATE: 73 BPM | DIASTOLIC BLOOD PRESSURE: 74 MMHG | SYSTOLIC BLOOD PRESSURE: 132 MMHG | TEMPERATURE: 96.4 F | OXYGEN SATURATION: 97 %

## 2025-05-07 DIAGNOSIS — R42 LIGHTHEADEDNESS: Primary | ICD-10-CM

## 2025-05-07 PROCEDURE — G0463 HOSPITAL OUTPT CLINIC VISIT: HCPCS

## 2025-05-07 PROCEDURE — 99213 OFFICE O/P EST LOW 20 MIN: CPT

## 2025-05-07 NOTE — PATIENT INSTRUCTIONS
I recommend testing but you did not want this today.  Since you are able to walk and feel better okay to discharge. No red flags on exam.   Go to emergency room (ER) if you are getting worse.

## 2025-05-07 NOTE — PROGRESS NOTES
[facility]    NAME: Lela Gomez is a 69 y.o. female  : 1955    MRN: [id number]  DATE: May 7, 2025  TIME: 10:51 AM    Assessment and Plan   Lightheadedness [R42]  1. Lightheadedness          Patient is reporting that symptoms have improved.  Patient is able to ambulate from the room to the hallway by herself.  I highly recommended that patient least have a blood sugar and EKG done. Patient declines this stating that she feels fine now.  Patient declines additional evaluation.  Given patient's of usual inability to walk out if allowed to leave.  Educated patient that if symptoms worsen or do not improve she should definitely get examination  Follow up with primary care in 3-5 days.  Go to ER if symptoms get worse.     Patient Instructions     I recommend testing but you did not want this today.  Since you are able to walk and feel better okay to discharge. No red flags on exam.   Go to emergency room (ER) if you are getting worse.     Chief Complaint     Chief Complaint   Patient presents with    Dizziness     Dizziness started last night, no appetite, has not eaten today, nausea. Stress and anxiety. Never has had an episode like this OTC-none.           History of Present Illness       Present dizziness that began early this morning.  With that she woke up about 230 feeling dizzy.  She has not eaten yet today and did have mild nausea.  Reports increase in stress anxiety regarding talking over family member who recently .  She is not taking any medication to help symptoms.  She denies that she has a history of diabetes denies that she is recently checking her blood sugar.  Denies that she has any cardiac history.  Denies that shortness of breath or chest pain.  While in the room during exam she reports that although symptoms are improving.  She reports that she longer feels nauseous.  She reports that her dizziness is improving.  This led to her son dropping her off here. [facility] was able to  provide translation for patient, she decline additional .         Review of Systems   Review of Systems   Constitutional:  Negative for chills and fever.   HENT:  Negative for congestion and sore throat.    Respiratory:  Negative for cough and shortness of breath.    Cardiovascular:  Negative for chest pain.   Gastrointestinal:  Positive for nausea. Negative for abdominal pain.   Musculoskeletal:  Negative for myalgias.   Neurological:  Positive for light-headedness.   Psychiatric/Behavioral:  Negative for confusion. The patient is nervous/anxious.          Current Medications       Current Outpatient Medications:     acetaminophen (TYLENOL) 500 mg tablet, TAKE 1 TABLET (500 MG TOTAL) BY MOUTH EVERY 6 (SIX) HOURS AS NEEDED FOR MILD PAIN OR MODERATE PAIN, Disp: 30 tablet, Rfl: 1    ammonium lactate (LAC-HYDRIN) 12 % lotion, Apply topically 2 (two) times a day as needed for dry skin, Disp: 400 g, Rfl: 1    atorvastatin (LIPITOR) 20 mg tablet, TAKE 1 TABLET (20 MG TOTAL) BY MOUTH DAILY, Disp: 90 tablet, Rfl: 1    Cholecalciferol (Vitamin D3) 50 MCG (2000 UT) TABS, TAKE 1 TABLET (2,000 UNITS TOTAL) BY MOUTH DAILY, Disp: 90 tablet, Rfl: 1    Cyanocobalamin (B-12) 1000 MCG SUBL, Place 1 tablet (1,000 mcg total) under the tongue in the morning, Disp: 90 tablet, Rfl: 1    Diclofenac Sodium (VOLTAREN) 1 %, Apply 2 g topically 4 (four) times a day, Disp: 100 g, Rfl: 5    ferrous sulfate 324 (65 Fe) mg, Take 1 tablet (324 mg total) by mouth daily before breakfast, Disp: 90 tablet, Rfl: 1    losartan (COZAAR) 25 mg tablet, Take 1 tablet (25 mg total) by mouth daily, Disp: 90 tablet, Rfl: 3    omeprazole (PriLOSEC) 20 mg delayed release capsule, TAKE 1 CAPSULE (20 MG TOTAL) BY MOUTH DAILY, Disp: 90 capsule, Rfl: 1    spironolactone (ALDACTONE) 25 mg tablet, TAKE 1 TABLET (25 MG TOTAL) BY MOUTH DAILY, Disp: 90 tablet, Rfl: 1    tiZANidine (ZANAFLEX) 2 mg tablet, Take 1 tablet (2 mg total) by mouth 2 (two) times a day as  needed for muscle spasms, Disp: 60 tablet, Rfl: 5    torsemide (DEMADEX) 5 MG tablet, TAKE 1 TABLET (5 MG TOTAL) BY MOUTH DAILY, Disp: 90 tablet, Rfl: 1    triamcinolone (KENALOG) 0.1 % ointment, APPLY TOPICALLY 2 (TWO) TIMES A DAY, Disp: 30 g, Rfl: 1    celecoxib (CeleBREX) 200 mg capsule, TAKE 1 CAPSULE (200 MG TOTAL) BY MOUTH DAILY (Patient not taking: Reported on 9/23/2024), Disp: 30 capsule, Rfl: 2    clotrimazole-betamethasone (LOTRISONE) 1-0.05 % cream, APPLY TOPICALLY 2 (TWO) TIMES A DAY (Patient not taking: Reported on 5/7/2025), Disp: 45 g, Rfl: 1    DULoxetine (CYMBALTA) 20 mg capsule, TAKE 1 CAPSULE (20 MG TOTAL) BY MOUTH DAILY (Patient not taking: Reported on 9/23/2024), Disp: 90 capsule, Rfl: 0    Eyelid Cleansers ([employer] Eyelid Cleansing) PADS, APPLY AT BEDTIME AS DIRECTED SCRUB WITH PAD VS. FOAM ALONG CLOSED EYELIDS NIGHTLY PER DIRECTIONS. OCUSOFT OR SIMILAR BRAND (WIPES VS. FOAM) (Patient not taking: Reported on 9/23/2024), Disp: , Rfl:     fluticasone (FLONASE) 50 mcg/act nasal spray, 1 spray into each nostril in the morning. (Patient not taking: Reported on 4/25/2025), Disp: 15.8 mL, Rfl: 0    magnesium Oxide (MAG-OX) 400 mg TABS, Take 400 mg by mouth in the morning and 400 mg in the evening. (Patient not taking: Reported on 6/5/2023), Disp: , Rfl:     neomycin-polymyxin-dexamethasone (MAXITROL) 0.35%-10,000 units/g-0.1%, APPLY 0.25 INCH INTO AFFECTED EYE AT BEDTIME X 2 WEEKS THEN D/C (Patient not taking: Reported on 6/5/2023), Disp: , Rfl:     ondansetron (ZOFRAN) 4 mg tablet, Take 1 tablet (4 mg total) by mouth every 8 (eight) hours as needed for nausea or vomiting (Patient not taking: Reported on 9/23/2024), Disp: 20 tablet, Rfl: 0    Restasis 0.05 % ophthalmic emulsion, INSTILL 1 DROP BOTH EYES TWICE A DAY (Patient not taking: Reported on 6/5/2023), Disp: , Rfl:     Current Allergies     Allergies as of 05/07/2025    (No Known Allergies)            The following portions of the patient's  history were reviewed and updated as appropriate: allergies, current medications, past family history, past medical history, past social history, past surgical history and problem list.     Past Medical History:   Diagnosis Date    Hyperlipidemia     Hypertension        History reviewed. No pertinent surgical history.    Family History   Problem Relation Age of Onset    No Known Problems Mother     No Known Problems Father          Medications have been verified.        Objective   /74   Pulse 73   Temp (!) 96.4 °F (35.8 °C)   Resp 18   SpO2 97%        Physical Exam     Physical Exam  Vitals reviewed.   Constitutional:       Appearance: Normal appearance.   Cardiovascular:      Rate and Rhythm: Normal rate and regular rhythm.      Pulses: Normal pulses.      Heart sounds: Normal heart sounds. No murmur heard.  Pulmonary:      Effort: Pulmonary effort is normal. No respiratory distress.      Breath sounds: Normal breath sounds.   Skin:     General: Skin is warm and dry.      Capillary Refill: Capillary refill takes less than 2 seconds.   Neurological:      General: No focal deficit present.      Mental Status: She is alert and oriented to person, place, and time.      Comments: Romberg negative.   Able to ambulate about 50 feet from room to doorway with some holding on to wall/desk but steady.   Able to follow EOMs.    Psychiatric:         Mood and Affect: Mood normal.         Behavior: Behavior normal.

## 2025-05-13 DIAGNOSIS — E11.42 TYPE 2 DIABETES MELLITUS WITH DIABETIC POLYNEUROPATHY, WITHOUT LONG-TERM CURRENT USE OF INSULIN (HCC): ICD-10-CM

## 2025-05-13 DIAGNOSIS — G57.93 NEUROPATHIC PAIN OF BOTH FEET: ICD-10-CM

## 2025-05-20 DIAGNOSIS — L30.9 ECZEMA, UNSPECIFIED TYPE: ICD-10-CM

## 2025-05-21 RX ORDER — TRIAMCINOLONE ACETONIDE 1 MG/G
OINTMENT TOPICAL 2 TIMES DAILY
Qty: 30 G | Refills: 1 | Status: SHIPPED | OUTPATIENT
Start: 2025-05-21

## 2025-05-21 RX ORDER — METHOCARBAMOL 500 MG/1
500 TABLET, FILM COATED ORAL 2 TIMES DAILY PRN
Qty: 60 TABLET | Refills: 1 | OUTPATIENT
Start: 2025-05-21

## 2025-05-27 ENCOUNTER — OFFICE VISIT (OUTPATIENT)
Dept: URGENT CARE | Facility: CLINIC | Age: 70
End: 2025-05-27
Payer: MEDICARE

## 2025-05-27 VITALS
OXYGEN SATURATION: 99 % | HEART RATE: 68 BPM | RESPIRATION RATE: 16 BRPM | DIASTOLIC BLOOD PRESSURE: 74 MMHG | TEMPERATURE: 97.5 F | SYSTOLIC BLOOD PRESSURE: 123 MMHG

## 2025-05-27 DIAGNOSIS — R05.1 ACUTE COUGH: Primary | ICD-10-CM

## 2025-05-27 PROCEDURE — 99213 OFFICE O/P EST LOW 20 MIN: CPT | Performed by: NURSE PRACTITIONER

## 2025-05-27 PROCEDURE — G0463 HOSPITAL OUTPT CLINIC VISIT: HCPCS | Performed by: NURSE PRACTITIONER

## 2025-05-27 RX ORDER — BENZONATATE 100 MG/1
100 CAPSULE ORAL 3 TIMES DAILY PRN
Qty: 20 CAPSULE | Refills: 0 | Status: SHIPPED | OUTPATIENT
Start: 2025-05-27

## 2025-05-27 NOTE — PROGRESS NOTES
Name: Lela Gomez      : 1955      MRN: [id number]  Encounter Provider: BOYD Garcia  Encounter Date: 2025   Encounter department: [facility]  :  Assessment & Plan  Acute cough  May take prescription cough suppressant as needed    Your symptoms are most likely related to a viral infection.    Recommend trialing warm salt water gargles, warm tea with honey, Chloraseptic spray or Cepacol cough drops as needed for sore throat.    Ensure adequate fluid intake.   You may alternate Motrin and Tylenol as needed for fever and pain.     Follow up with PCP in 3-5 days.  Proceed to  ER if symptoms worsen.     If tests are performed, our office will contact you with results only if changes need to made to the care plan discussed with you at the visit. You can review your full results on [facility].        Orders:    benzonatate (TESSALON PERLES) 100 mg capsule; Take 1 capsule (100 mg total) by mouth 3 (three) times a day as needed for cough        History of Present Illness   HPI  Lela Gomez is a 69 y.o. female who presents for evaluation of sore throat and cough x 1 day.  Patient denies fever, ear pain shortness of breath chest pain.  Patient reports occasional headaches for which she has been taking Tylenol for      Review of Systems   Constitutional:  Negative for chills and fever.   HENT:  Positive for sore throat. Negative for ear pain and rhinorrhea.    Eyes:  Negative for pain and visual disturbance.   Respiratory:  Positive for cough.    Gastrointestinal:  Negative for abdominal pain, diarrhea, nausea and vomiting.   Musculoskeletal:  Negative for arthralgias, back pain and myalgias.   Skin:  Negative for color change and rash.   Neurological:  Negative for seizures and syncope.   All other systems reviewed and are negative.         Objective   /74   Pulse 68   Temp 97.5 °F (36.4 °C)   Resp 16   SpO2 99%      Physical Exam  Vitals and nursing note reviewed.    Constitutional:       General: She is not in acute distress.     Appearance: She is well-developed. She is not ill-appearing.   HENT:      Head: Normocephalic and atraumatic.      Right Ear: Tympanic membrane, ear canal and external ear normal. There is no impacted cerumen.      Left Ear: Tympanic membrane, ear canal and external ear normal. There is no impacted cerumen.      Nose: No congestion.      Mouth/Throat:      Mouth: Mucous membranes are moist.      Pharynx: Oropharynx is clear. No oropharyngeal exudate or posterior oropharyngeal erythema.     Eyes:      Conjunctiva/sclera: Conjunctivae normal.       Cardiovascular:      Rate and Rhythm: Normal rate and regular rhythm.      Heart sounds: No murmur heard.  Pulmonary:      Effort: Pulmonary effort is normal. No respiratory distress.      Breath sounds: Normal breath sounds. No wheezing, rhonchi or rales.   Chest:      Chest wall: No tenderness.   Abdominal:      Palpations: Abdomen is soft.      Tenderness: There is no abdominal tenderness.     Musculoskeletal:         General: No swelling.      Cervical back: Neck supple.     Skin:     General: Skin is warm and dry.      Capillary Refill: Capillary refill takes less than 2 seconds.     Neurological:      Mental Status: She is alert.     Psychiatric:         Mood and Affect: Mood normal.

## 2025-05-27 NOTE — PATIENT INSTRUCTIONS
May take prescription cough suppressant as needed    Your symptoms are most likely related to a viral infection.    Recommend trialing warm salt water gargles, warm tea with honey, Chloraseptic spray or Cepacol cough drops as needed for sore throat.    Ensure adequate fluid intake.   You may alternate Motrin and Tylenol as needed for fever and pain.     Follow up with PCP in 3-5 days.  Proceed to  ER if symptoms worsen.     If tests are performed, our office will contact you with results only if changes need to made to the care plan discussed with you at the visit. You can review your full results on [facility].

## 2025-05-30 DIAGNOSIS — M67.449 GANGLION CYST OF FINGER: ICD-10-CM

## 2025-05-30 RX ORDER — IBUPROFEN 200 MG
200 TABLET ORAL EVERY 6 HOURS PRN
Qty: 30 TABLET | Refills: 1 | OUTPATIENT
Start: 2025-05-30

## 2025-06-03 DIAGNOSIS — M79.672 PAIN IN BOTH FEET: ICD-10-CM

## 2025-06-03 DIAGNOSIS — M79.671 PAIN IN BOTH FEET: ICD-10-CM

## 2025-06-03 RX ORDER — GABAPENTIN 100 MG/1
100 CAPSULE ORAL
Qty: 30 CAPSULE | Refills: 2 | Status: SHIPPED | OUTPATIENT
Start: 2025-06-03

## 2025-06-10 DIAGNOSIS — N18.9 CKD (CHRONIC KIDNEY DISEASE): ICD-10-CM

## 2025-06-10 DIAGNOSIS — R60.0 PEDAL EDEMA: ICD-10-CM

## 2025-06-10 DIAGNOSIS — E87.6 HYPOKALEMIA: ICD-10-CM

## 2025-06-10 DIAGNOSIS — N18.31 STAGE 3A CHRONIC KIDNEY DISEASE (HCC): ICD-10-CM

## 2025-06-10 DIAGNOSIS — I12.9 PARENCHYMAL RENAL HYPERTENSION, STAGE 1 THROUGH STAGE 4 OR UNSPECIFIED CHRONIC KIDNEY DISEASE: ICD-10-CM

## 2025-06-10 DIAGNOSIS — E78.49 OTHER HYPERLIPIDEMIA: ICD-10-CM

## 2025-06-11 RX ORDER — TORSEMIDE 5 MG/1
5 TABLET ORAL DAILY
Qty: 90 TABLET | Refills: 1 | Status: SHIPPED | OUTPATIENT
Start: 2025-06-11

## 2025-06-24 DIAGNOSIS — N18.31 STAGE 3A CHRONIC KIDNEY DISEASE (HCC): ICD-10-CM

## 2025-06-24 DIAGNOSIS — R60.0 PEDAL EDEMA: ICD-10-CM

## 2025-06-24 DIAGNOSIS — N18.9 CKD (CHRONIC KIDNEY DISEASE): ICD-10-CM

## 2025-06-24 DIAGNOSIS — E78.49 OTHER HYPERLIPIDEMIA: ICD-10-CM

## 2025-06-24 DIAGNOSIS — I12.9 PARENCHYMAL RENAL HYPERTENSION, STAGE 1 THROUGH STAGE 4 OR UNSPECIFIED CHRONIC KIDNEY DISEASE: ICD-10-CM

## 2025-06-24 DIAGNOSIS — E87.6 HYPOKALEMIA: ICD-10-CM

## 2025-06-24 RX ORDER — SPIRONOLACTONE 25 MG/1
25 TABLET ORAL DAILY
Qty: 90 TABLET | Refills: 1 | Status: SHIPPED | OUTPATIENT
Start: 2025-06-24

## 2025-07-08 ENCOUNTER — OFFICE VISIT (OUTPATIENT)
Dept: FAMILY MEDICINE CLINIC | Facility: CLINIC | Age: 70
End: 2025-07-08

## 2025-07-08 VITALS
HEART RATE: 71 BPM | OXYGEN SATURATION: 97 % | SYSTOLIC BLOOD PRESSURE: 120 MMHG | BODY MASS INDEX: 31.8 KG/M2 | HEIGHT: 60 IN | DIASTOLIC BLOOD PRESSURE: 62 MMHG | WEIGHT: 162 LBS | RESPIRATION RATE: 16 BRPM

## 2025-07-08 DIAGNOSIS — M67.449 GANGLION CYST OF FINGER: ICD-10-CM

## 2025-07-08 DIAGNOSIS — D50.9 IRON DEFICIENCY ANEMIA, UNSPECIFIED IRON DEFICIENCY ANEMIA TYPE: ICD-10-CM

## 2025-07-08 DIAGNOSIS — Z78.0 MENOPAUSE: ICD-10-CM

## 2025-07-08 DIAGNOSIS — D51.8 OTHER VITAMIN B12 DEFICIENCY ANEMIAS: ICD-10-CM

## 2025-07-08 DIAGNOSIS — R05.1 ACUTE COUGH: ICD-10-CM

## 2025-07-08 DIAGNOSIS — E78.5 HYPERLIPIDEMIA, UNSPECIFIED HYPERLIPIDEMIA TYPE: ICD-10-CM

## 2025-07-08 DIAGNOSIS — Z00.00 MEDICARE ANNUAL WELLNESS VISIT, SUBSEQUENT: Primary | ICD-10-CM

## 2025-07-08 DIAGNOSIS — E55.9 VITAMIN D DEFICIENCY: ICD-10-CM

## 2025-07-08 DIAGNOSIS — Z11.59 NEED FOR HEPATITIS C SCREENING TEST: ICD-10-CM

## 2025-07-08 RX ORDER — ATORVASTATIN CALCIUM 20 MG/1
20 TABLET, FILM COATED ORAL DAILY
Qty: 90 TABLET | Refills: 1 | Status: SHIPPED | OUTPATIENT
Start: 2025-07-08

## 2025-07-08 RX ORDER — MAGNESIUM 200 MG
1 TABLET ORAL DAILY
Qty: 90 TABLET | Refills: 1 | Status: SHIPPED | OUTPATIENT
Start: 2025-07-08

## 2025-07-08 RX ORDER — FERROUS SULFATE 324(65)MG
324 TABLET, DELAYED RELEASE (ENTERIC COATED) ORAL
Qty: 90 TABLET | Refills: 1 | Status: SHIPPED | OUTPATIENT
Start: 2025-07-08

## 2025-07-08 RX ORDER — BENZONATATE 100 MG/1
100 CAPSULE ORAL 3 TIMES DAILY PRN
Qty: 20 CAPSULE | Refills: 0 | Status: SHIPPED | OUTPATIENT
Start: 2025-07-08

## 2025-07-08 RX ORDER — ACETAMINOPHEN 500 MG
500 TABLET ORAL EVERY 6 HOURS PRN
Qty: 30 TABLET | Refills: 3 | Status: SHIPPED | OUTPATIENT
Start: 2025-07-08

## 2025-07-08 RX ORDER — CHOLECALCIFEROL (VITAMIN D3) 50 MCG
2000 TABLET ORAL DAILY
Qty: 90 TABLET | Refills: 1 | Status: SHIPPED | OUTPATIENT
Start: 2025-07-08

## 2025-07-08 NOTE — ASSESSMENT & PLAN NOTE
Continue Lipitor get labs done  Orders:  •  atorvastatin (LIPITOR) 20 mg tablet; Take 1 tablet (20 mg total) by mouth daily

## 2025-07-08 NOTE — ASSESSMENT & PLAN NOTE
Continue iron and needs  labs done    Orders:  •  ferrous sulfate 324 (65 Fe) mg; Take 1 tablet (324 mg total) by mouth daily before breakfast

## 2025-07-08 NOTE — PROGRESS NOTES
Name: Lela Gomez      : 1955      MRN: [id number]  Encounter Provider: Chelle Villarreal MD  Encounter Date: 2025   Encounter department: [facility] [place]  :  Assessment & Plan  Medicare annual wellness visit, subsequent  Advised the importance of getting the blood work done       Hyperlipidemia, unspecified hyperlipidemia type  Continue Lipitor get labs done  Orders:  •  atorvastatin (LIPITOR) 20 mg tablet; Take 1 tablet (20 mg total) by mouth daily    Iron deficiency anemia, unspecified iron deficiency anemia type    Continue iron and needs  labs done    Orders:  •  ferrous sulfate 324 (65 Fe) mg; Take 1 tablet (324 mg total) by mouth daily before breakfast    Other vitamin B12 deficiency anemias    Not taking currently vitamin and iron because she is out of medication, refills are sent    Orders:  •  Cyanocobalamin (B-12) 1000 MCG SUBL; Place 1 tablet (1,000 mcg total) under the tongue in the morning    Vitamin D deficiency    Orders:  •  Cholecalciferol (Vitamin D3) 50 MCG (2000 UT) TABS; Take 1 tablet (2,000 Units total) by mouth daily    Acute cough  URI, afebrile, lungs are clear, and she feels improvement, continue cough medicine as needed  Orders:  •  benzonatate (TESSALON PERLES) 100 mg capsule; Take 1 capsule (100 mg total) by mouth 3 (three) times a day as needed for cough    Ganglion cyst of finger  She was seen by the hand doctor, was advised to have surgery but she refused, Tylenol as needed  Orders:  •  acetaminophen (TYLENOL) 500 mg tablet; Take 1 tablet (500 mg total) by mouth every 6 (six) hours as needed for mild pain or moderate pain    Menopause    Orders:  •  DXA bone density spine hip and pelvis; Future    Need for hepatitis C screening test    Orders:  •  Hepatitis C antibody; Future       Preventive health issues were discussed with patient, and age appropriate screening tests were ordered as noted in patient's After Visit Summary. Personalized health advice and  appropriate referrals for health education or preventive services given if needed, as noted in patient's After Visit Summary.    History of Present Illness     Annual wellness visit and follow-up, she says she was seen in the urgent care and she was coughing and mucus, she was given Tessalon Perle and she has no more cough medicine, her cough is getting better she has no fever or chills, she has chronic arthritis muscle pains and she takes Tylenol as needed she was seen by the pain specialist and she had injection in her back, and she also had injections in her knees, she says she takes care of her  who is also very sick, she needs refill on all her vitamins and Tylenol, she did not go for the blood work  Follows nephrologist on regular basis       Patient Care Team:  Chelle Villarreal MD as PCP - General (Family Medicine)  Ruben Patel MD (Nephrology)    Review of Systems   Constitutional:  Negative for activity change, appetite change, chills, fatigue, fever and unexpected weight change.   HENT:  Negative for congestion, ear discharge, ear pain, nosebleeds, postnasal drip, rhinorrhea, sinus pressure, sneezing, sore throat, trouble swallowing and voice change.    Eyes:  Negative for photophobia, pain, discharge, redness and itching.   Respiratory:  Positive for cough. Negative for chest tightness, shortness of breath and wheezing.    Cardiovascular:  Negative for chest pain, palpitations and leg swelling.   Gastrointestinal:  Negative for abdominal pain, constipation, diarrhea, nausea and vomiting.   Endocrine: Negative for polyuria.   Genitourinary:  Negative for dysuria, frequency and urgency.   Musculoskeletal:  Positive for arthralgias. Negative for back pain, gait problem, myalgias and neck pain.   Skin:  Negative for color change, pallor and rash.   Allergic/Immunologic: Negative for environmental allergies and food allergies.   Neurological:  Negative for dizziness, weakness, light-headedness and  headaches.   Hematological:  Negative for adenopathy. Does not bruise/bleed easily.   Psychiatric/Behavioral:  Negative for behavioral problems. The patient is not nervous/anxious.      Medical History Reviewed by provider this encounter:  Tobacco  Allergies  Meds  Problems  Med Hx  Surg Hx  Fam Hx       Annual Wellness Visit Questionnaire   Lela is here for her Subsequent Wellness visit. Last Medicare Wellness visit information reviewed, patient interviewed, no change since last AWV.     Health Risk Assessment:   Patient rates overall health as good. Patient feels that their physical health rating is same. Eyesight was rated as same. Hearing was rated as same. Patient feels that their emotional and mental health rating is same. Patients states they are never, rarely angry. Patient states they are never, rarely unusually tired/fatigued. Pain experienced in the last 7 days has been some. Patient's pain rating has been 3/10. Patient states that she has experienced no weight loss or gain in last 6 months.     Depression Screening:   PHQ-2 Score: 0      Fall Risk Screening:   In the past year, patient has experienced: no history of falling in past year      Urinary Incontinence Screening:   Patient has not leaked urine accidently in the last six months.     Home Safety:  Patient does not have trouble with stairs inside or outside of their home. Patient has working smoke alarms and has working carbon monoxide detector. Home safety hazards include: none.     Nutrition:   Current diet is Regular.     Medications:   Patient is currently taking over-the-counter supplements. OTC medications include: see medication list. Patient is able to manage medications.     Activities of Daily Living (ADLs)/Instrumental Activities of Daily Living (IADLs):   Walk and transfer into and out of bed and chair?: Yes  Dress and groom yourself?: Yes    Bathe or shower yourself?: Yes    Feed yourself? Yes  Do your laundry/housekeeping?:  Yes  Manage your money, pay your bills and track your expenses?: Yes  Make your own meals?: Yes    Do your own shopping?: Yes    Previous Hospitalizations:   Any hospitalizations or ED visits within the last 12 months?: Yes    How many hospitalizations have you had in the last year?: 1-2    Advance Care Planning:   Living will: No    Durable POA for healthcare: No    Advanced directive: No      Cognitive Screening:   Provider or family/friend/caregiver concerned regarding cognition?: No    Preventive Screenings      Cardiovascular Screening:    General: Screening Not Indicated and History Lipid Disorder      Diabetes Screening:     General: Screening Not Indicated and History Diabetes      Colorectal Cancer Screening:     General: Risks and Benefits Discussed    Due for: Cologuard      Breast Cancer Screening:     General: Risks and Benefits Discussed    Due for: Mammogram        Cervical Cancer Screening:    General: Screening Not Indicated      Osteoporosis Screening:    General: Risks and Benefits Discussed and Patient Declines      Abdominal Aortic Aneurysm (AAA) Screening:        General: Risks and Benefits Discussed      Lung Cancer Screening:     General: Screening Not Indicated      Hepatitis C Screening:    General: Screening Current    Immunizations:  - Immunizations due: Zoster (Shingrix)    Screening, Brief Intervention, and Referral to Treatment (SBIRT)     Screening  Typical number of drinks in a day: 0  Typical number of drinks in a week: 0  Interpretation: Low risk drinking behavior.    Single Item Drug Screening:  How often have you used an illegal drug (including marijuana) or a prescription medication for non-medical reasons in the past year? never    Single Item Drug Screen Score: 0  Interpretation: Negative screen for possible drug use disorder    Other Counseling Topics:   Car/seat belt/driving safety, skin self-exam, sunscreen and calcium and vitamin D intake and regular weightbearing  exercise.     Social Drivers of Health     Food Insecurity: No Food Insecurity (7/8/2025)    Nursing - Inadequate Food Risk Classification    • Worried About Running Out of Food in the Last Year: Never true    • Ran Out of Food in the Last Year: Never true   Transportation Needs: No Transportation Needs (7/8/2025)    PRAPARE - Transportation    • Lack of Transportation (Medical): No    • Lack of Transportation (Non-Medical): No   Housing Stability: Low Risk  (7/8/2025)    Housing Stability Vital Sign    • Unable to Pay for Housing in the Last Year: No    • Number of Times Moved in the Last Year: 0    • Homeless in the Last Year: No   Utilities: Not At Risk (7/8/2025)    [facility] Utilities    • Threatened with loss of utilities: No     No results found.    Objective   /62   Pulse 71   Resp 16   Ht 5' (1.524 m)   Wt 73.5 kg (162 lb)   SpO2 97%   BMI 31.64 kg/m²     Physical Exam  Vitals and nursing note reviewed.   Constitutional:       General: She is not in acute distress.     Appearance: Normal appearance. She is not ill-appearing.   HENT:      Head: Normocephalic and atraumatic.      Right Ear: Tympanic membrane and ear canal normal. There is no impacted cerumen.      Left Ear: Tympanic membrane and ear canal normal. There is no impacted cerumen.      Nose: Nose normal. No congestion or rhinorrhea.      Mouth/Throat:      Mouth: Mucous membranes are moist.      Pharynx: Oropharynx is clear. Posterior oropharyngeal erythema present. No oropharyngeal exudate.     Eyes:      General: No scleral icterus.        Right eye: No discharge.         Left eye: No discharge.      Extraocular Movements: Extraocular movements intact.      Conjunctiva/sclera: Conjunctivae normal.       Cardiovascular:      Rate and Rhythm: Normal rate and regular rhythm.      Heart sounds: Normal heart sounds. No murmur heard.  Pulmonary:      Effort: Pulmonary effort is normal.      Breath sounds: Normal breath sounds. No wheezing or  rales.   Abdominal:      General: Abdomen is flat. Bowel sounds are normal. There is no distension.      Palpations: Abdomen is soft. There is no mass.      Tenderness: There is no abdominal tenderness.     Musculoskeletal:         General: No swelling, tenderness or deformity. Normal range of motion.      Cervical back: Normal range of motion and neck supple. No muscular tenderness.      Right lower leg: No edema.      Left lower leg: No edema.   Lymphadenopathy:      Cervical: No cervical adenopathy.     Skin:     Coloration: Skin is not jaundiced or pale.      Findings: No erythema, lesion or rash.     Neurological:      General: No focal deficit present.      Mental Status: She is alert and oriented to person, place, and time.      Gait: Gait normal.     Psychiatric:         Mood and Affect: Mood normal.         Behavior: Behavior normal.

## 2025-07-08 NOTE — ASSESSMENT & PLAN NOTE
Not taking currently vitamin and iron because she is out of medication, refills are sent    Orders:  •  Cyanocobalamin (B-12) 1000 MCG SUBL; Place 1 tablet (1,000 mcg total) under the tongue in the morning

## 2025-07-08 NOTE — ASSESSMENT & PLAN NOTE
She was seen by the hand doctor, was advised to have surgery but she refused, Tylenol as needed  Orders:  •  acetaminophen (TYLENOL) 500 mg tablet; Take 1 tablet (500 mg total) by mouth every 6 (six) hours as needed for mild pain or moderate pain

## 2025-07-08 NOTE — ASSESSMENT & PLAN NOTE
URI, afebrile, lungs are clear, and she feels improvement, continue cough medicine as needed  Orders:  •  benzonatate (TESSALON PERLES) 100 mg capsule; Take 1 capsule (100 mg total) by mouth 3 (three) times a day as needed for cough

## 2025-07-08 NOTE — PATIENT INSTRUCTIONS
Medicare Preventive Visit Patient Instructions  Thank you for completing your Welcome to Medicare Visit or Medicare Annual Wellness Visit today. Your next wellness visit will be due in one year (7/9/2026).  The screening/preventive services that you may require over the next 5-10 years are detailed below. Some tests may not apply to you based off risk factors and/or age. Screening tests ordered at today's visit but not completed yet may show as past due. Also, please note that scanned in results may not display below.  Preventive Screenings:  Service Recommendations Previous Testing/Comments   Colorectal Cancer Screening  * Colonoscopy    * Fecal Occult Blood Test (FOBT)/Fecal Immunochemical Test (FIT)  * Fecal DNA/Cologuard Test  * Flexible Sigmoidoscopy Age: 45-75 years old   Colonoscopy: every 10 years (may be performed more frequently if at higher risk)  OR  FOBT/FIT: every 1 year  OR  Cologuard: every 3 years  OR  Sigmoidoscopy: every 5 years  Screening may be recommended earlier than age 45 if at higher risk for colorectal cancer. Also, an individualized decision between you and your healthcare provider will decide whether screening between the ages of 76-85 would be appropriate. Colonoscopy: Not on file  FOBT/FIT: Not on file  Cologuard: Not on file  Sigmoidoscopy: Not on file    Risks and Benefits Discussed  Due for Cologuard     Breast Cancer Screening Age: 40+ years old  Frequency: every 1-2 years  Not required if history of left and right mastectomy Mammogram: Not on file    Risks and Benefits Discussed  Due for Mammogram   Cervical Cancer Screening Between the ages of 21-29, pap smear recommended once every 3 years.   Between the ages of 30-65, can perform pap smear with HPV co-testing every 5 years.   Recommendations may differ for women with a history of total hysterectomy, cervical cancer, or abnormal pap smears in past. Pap Smear: Not on file    Screening Not Indicated   Hepatitis C Screening Once  for adults born between 1945 and 1965  More frequently in patients at high risk for Hepatitis C Hep C Antibody: Not on file    Screening Current   Diabetes Screening 1-2 times per year if you're at risk for diabetes or have pre-diabetes Fasting glucose: 92 mg/dL (11/8/2024)  A1C: 6.4 % (11/8/2024)  Screening Not Indicated  History Diabetes   Cholesterol Screening Once every 5 years if you don't have a lipid disorder. May order more often based on risk factors. Lipid panel: 11/08/2024    Screening Not Indicated  History Lipid Disorder     Other Preventive Screenings Covered by Medicare:  Abdominal Aortic Aneurysm (AAA) Screening: covered once if your at risk. You're considered to be at risk if you have a family history of AAA.  Lung Cancer Screening: covers low dose CT scan once per year if you meet all of the following conditions: (1) Age 55-77; (2) No signs or symptoms of lung cancer; (3) Current smoker or have quit smoking within the last 15 years; (4) You have a tobacco smoking history of at least 20 pack years (packs per day multiplied by number of years you smoked); (5) You get a written order from a healthcare provider.  Glaucoma Screening: covered annually if you're considered high risk: (1) You have diabetes OR (2) Family history of glaucoma OR (3)  aged 50 and older OR (4)  American aged 65 and older  Osteoporosis Screening: covered every 2 years if you meet one of the following conditions: (1) You're estrogen deficient and at risk for osteoporosis based off medical history and other findings; (2) Have a vertebral abnormality; (3) On glucocorticoid therapy for more than 3 months; (4) Have primary hyperparathyroidism; (5) On osteoporosis medications and need to assess response to drug therapy.   Last bone density test (DXA Scan): Not on file.  HIV Screening: covered annually if you're between the age of 15-65. Also covered annually if you are younger than 15 and older than 65 with  risk factors for HIV infection. For pregnant patients, it is covered up to 3 times per pregnancy.    Immunizations:  Immunization Recommendations   Influenza Vaccine Annual influenza vaccination during flu season is recommended for all persons aged >= 6 months who do not have contraindications   Pneumococcal Vaccine   * Pneumococcal conjugate vaccine = PCV13 (Prevnar 13), PCV15 (Vaxneuvance), PCV20 (Prevnar 20)  * Pneumococcal polysaccharide vaccine = PPSV23 (Pneumovax) Adults 19-63 yo with certain risk factors or if 65+ yo  If never received any pneumonia vaccine: recommend Prevnar 20 (PCV20)  Give PCV20 if previously received 1 dose of PCV13 or PPSV23   Hepatitis B Vaccine 3 dose series if at intermediate or high risk (ex: diabetes, end stage renal disease, liver disease)   Respiratory syncytial virus (RSV) Vaccine - COVERED BY MEDICARE PART D  * RSVPreF3 (Arexvy) CDC recommends that adults 60 years of age and older may receive a single dose of RSV vaccine using shared clinical decision-making (SCDM)   Tetanus (Td) Vaccine - COST NOT COVERED BY MEDICARE PART B Following completion of primary series, a booster dose should be given every 10 years to maintain immunity against tetanus. Td may also be given as tetanus wound prophylaxis.   Tdap Vaccine - COST NOT COVERED BY MEDICARE PART B Recommended at least once for all adults. For pregnant patients, recommended with each pregnancy.   Shingles Vaccine (Shingrix) - COST NOT COVERED BY MEDICARE PART B  2 shot series recommended in those 19 years and older who have or will have weakened immune systems or those 50 years and older     Health Maintenance Due:      Topic Date Due   • Hepatitis C Screening  Never done   • Colorectal Cancer Screening  06/12/2030 (Originally 8/4/1998)   • Breast Cancer Screening: Mammogram  06/12/2030 (Originally 8/4/1993)     Immunizations Due:      Topic Date Due   • COVID-19 Vaccine (3 - 2024-25 season) 09/01/2024   • Influenza Vaccine (1)  09/01/2025     Advance Directives   What are advance directives?  Advance directives are legal documents that state your wishes and plans for medical care. These plans are made ahead of time in case you lose your ability to make decisions for yourself. Advance directives can apply to any medical decision, such as the treatments you want, and if you want to donate organs.   What are the types of advance directives?  There are many types of advance directives, and each state has rules about how to use them. You may choose a combination of any of the following:  Living will:  This is a written record of the treatment you want. You can also choose which treatments you do not want, which to limit, and which to stop at a certain time. This includes surgery, medicine, IV fluid, and tube feedings.   Durable power of  for healthcare (DPAHC):  This is a written record that states who you want to make healthcare choices for you when you are unable to make them for yourself. This person, called a proxy, is usually a family member or a friend. You may choose more than 1 proxy.  Do not resuscitate (DNR) order:  A DNR order is used in case your heart stops beating or you stop breathing. It is a request not to have certain forms of treatment, such as CPR. A DNR order may be included in other types of advance directives.  Medical directive:  This covers the care that you want if you are in a coma, near death, or unable to make decisions for yourself. You can list the treatments you want for each condition. Treatment may include pain medicine, surgery, blood transfusions, dialysis, IV or tube feedings, and a ventilator (breathing machine).  Values history:  This document has questions about your views, beliefs, and how you feel and think about life. This information can help others choose the care that you would choose.  Why are advance directives important?  An advance directive helps you control your care. Although spoken  wishes may be used, it is better to have your wishes written down. Spoken wishes can be misunderstood, or not followed. Treatments may be given even if you do not want them. An advance directive may make it easier for your family to make difficult choices about your care.   Weight Management   Why it is important to manage your weight:  Being overweight increases your risk of health conditions such as heart disease, high blood pressure, type 2 diabetes, and certain types of cancer. It can also increase your risk for osteoarthritis, sleep apnea, and other respiratory problems. Aim for a slow, steady weight loss. Even a small amount of weight loss can lower your risk of health problems.  How to lose weight safely:  A safe and healthy way to lose weight is to eat fewer calories and get regular exercise. You can lose up about 1 pound a week by decreasing the number of calories you eat by 500 calories each day.   Healthy meal plan for weight management:  A healthy meal plan includes a variety of foods, contains fewer calories, and helps you stay healthy. A healthy meal plan includes the following:  Eat whole-grain foods more often.  A healthy meal plan should contain fiber. Fiber is the part of grains, fruits, and vegetables that is not broken down by your body. Whole-grain foods are healthy and provide extra fiber in your diet. Some examples of whole-grain foods are whole-wheat breads and pastas, oatmeal, brown rice, and bulgur.  Eat a variety of vegetables every day.  Include dark, leafy greens such as spinach, kale, kai greens, and mustard greens. Eat yellow and orange vegetables such as carrots, sweet potatoes, and winter squash.   Eat a variety of fruits every day.  Choose fresh or canned fruit (canned in its own juice or light syrup) instead of juice. Fruit juice has very little or no fiber.  Eat low-fat dairy foods.  Drink fat-free (skim) milk or 1% milk. Eat fat-free yogurt and low-fat cottage cheese. Try  low-fat cheeses such as mozzarella and other reduced-fat cheeses.  Choose meat and other protein foods that are low in fat.  Choose beans or other legumes such as split peas or lentils. Choose fish, skinless poultry (chicken or turkey), or lean cuts of red meat (beef or pork). Before you cook meat or poultry, cut off any visible fat.   Use less fat and oil.  Try baking foods instead of frying them. Add less fat, such as margarine, sour cream, regular salad dressing and mayonnaise to foods. Eat fewer high-fat foods. Some examples of high-fat foods include french fries, doughnuts, ice cream, and cakes.  Eat fewer sweets.  Limit foods and drinks that are high in sugar. This includes candy, cookies, regular soda, and sweetened drinks.  Exercise:  Exercise at least 30 minutes per day on most days of the week. Some examples of exercise include walking, biking, dancing, and swimming. You can also fit in more physical activity by taking the stairs instead of the elevator or parking farther away from stores. Ask your healthcare provider about the best exercise plan for you.    © Copyright [employer] 2018 Information is for End User's use only and may not be sold, redistributed or otherwise used for commercial purposes. All illustrations and images included in CareNotes® are the copyrighted property of [employer] or [employer]

## 2025-07-09 ENCOUNTER — TELEPHONE (OUTPATIENT)
Dept: LAB | Facility: HOSPITAL | Age: 70
End: 2025-07-09

## 2025-07-11 ENCOUNTER — TELEPHONE (OUTPATIENT)
Age: 70
End: 2025-07-11

## 2025-07-11 NOTE — TELEPHONE ENCOUNTER
Patient daughter in-law called because her insurance needs a letter stating her diagnosis and that she lives with her and she takes care of her. She would like the letter uploaded to the patients [employer] portal and also a printed copy she can . Please advise.Thank you.

## 2025-07-15 ENCOUNTER — APPOINTMENT (OUTPATIENT)
Dept: LAB | Facility: HOSPITAL | Age: 70
End: 2025-07-15
Payer: MEDICARE

## 2025-07-15 DIAGNOSIS — I89.0 LYMPHEDEMA: ICD-10-CM

## 2025-07-15 DIAGNOSIS — E78.5 DYSLIPIDEMIA: ICD-10-CM

## 2025-07-15 DIAGNOSIS — E66.01 OBESITY, MORBID (HCC): ICD-10-CM

## 2025-07-15 DIAGNOSIS — E11.42 TYPE 2 DIABETES MELLITUS WITH DIABETIC POLYNEUROPATHY, WITHOUT LONG-TERM CURRENT USE OF INSULIN (HCC): ICD-10-CM

## 2025-07-15 DIAGNOSIS — Z11.59 NEED FOR HEPATITIS C SCREENING TEST: ICD-10-CM

## 2025-07-15 DIAGNOSIS — I12.9 PARENCHYMAL RENAL HYPERTENSION, STAGE 1 THROUGH STAGE 4 OR UNSPECIFIED CHRONIC KIDNEY DISEASE: ICD-10-CM

## 2025-07-15 DIAGNOSIS — E11.21 DIABETIC NEPHROPATHY ASSOCIATED WITH TYPE 2 DIABETES MELLITUS (HCC): ICD-10-CM

## 2025-07-15 DIAGNOSIS — I12.9 HYPERTENSIVE RENAL DISEASE, STAGE 1 THROUGH STAGE 4 OR UNSPECIFIED CHRONIC KIDNEY DISEASE: ICD-10-CM

## 2025-07-15 DIAGNOSIS — E87.6 HYPOKALEMIA: ICD-10-CM

## 2025-07-15 DIAGNOSIS — N18.31 CHRONIC KIDNEY DISEASE, STAGE 3A (HCC): ICD-10-CM

## 2025-07-15 LAB
ALBUMIN SERPL BCG-MCNC: 4 G/DL (ref 3.5–5)
ALP SERPL-CCNC: 80 U/L (ref 34–104)
ALT SERPL W P-5'-P-CCNC: 22 U/L (ref 7–52)
ANION GAP SERPL CALCULATED.3IONS-SCNC: 9 MMOL/L (ref 4–13)
AST SERPL W P-5'-P-CCNC: 23 U/L (ref 13–39)
BILIRUB SERPL-MCNC: 0.66 MG/DL (ref 0.2–1)
BUN SERPL-MCNC: 14 MG/DL (ref 5–25)
CALCIUM SERPL-MCNC: 9.1 MG/DL (ref 8.4–10.2)
CHLORIDE SERPL-SCNC: 102 MMOL/L (ref 96–108)
CHOLEST SERPL-MCNC: 151 MG/DL (ref ?–200)
CO2 SERPL-SCNC: 26 MMOL/L (ref 21–32)
CREAT SERPL-MCNC: 0.78 MG/DL (ref 0.6–1.3)
ERYTHROCYTE [DISTWIDTH] IN BLOOD BY AUTOMATED COUNT: 12.4 % (ref 11.6–15.1)
EST. AVERAGE GLUCOSE BLD GHB EST-MCNC: 123 MG/DL
GFR SERPL CREATININE-BSD FRML MDRD: 76 ML/MIN/1.73SQ M
GLUCOSE P FAST SERPL-MCNC: 83 MG/DL (ref 65–99)
HBA1C MFR BLD: 5.9 %
HCT VFR BLD AUTO: 39.6 % (ref 34.8–46.1)
HDLC SERPL-MCNC: 38 MG/DL
HGB BLD-MCNC: 13.4 G/DL (ref 11.5–15.4)
LDLC SERPL CALC-MCNC: 90 MG/DL (ref 0–100)
MAGNESIUM SERPL-MCNC: 1.8 MG/DL (ref 1.9–2.7)
MCH RBC QN AUTO: 31.6 PG (ref 26.8–34.3)
MCHC RBC AUTO-ENTMCNC: 33.8 G/DL (ref 31.4–37.4)
MCV RBC AUTO: 93 FL (ref 82–98)
PLATELET # BLD AUTO: 227 THOUSANDS/UL (ref 149–390)
PMV BLD AUTO: 11.8 FL (ref 8.9–12.7)
POTASSIUM SERPL-SCNC: 4.5 MMOL/L (ref 3.5–5.3)
PROT SERPL-MCNC: 7.7 G/DL (ref 6.4–8.4)
RBC # BLD AUTO: 4.24 MILLION/UL (ref 3.81–5.12)
SODIUM SERPL-SCNC: 137 MMOL/L (ref 135–147)
TRIGL SERPL-MCNC: 117 MG/DL (ref ?–150)
TSH SERPL DL<=0.05 MIU/L-ACNC: 2.46 UIU/ML (ref 0.45–4.5)
WBC # BLD AUTO: 6.79 THOUSAND/UL (ref 4.31–10.16)

## 2025-07-15 PROCEDURE — 80061 LIPID PANEL: CPT

## 2025-07-15 PROCEDURE — 85027 COMPLETE CBC AUTOMATED: CPT

## 2025-07-15 PROCEDURE — 83036 HEMOGLOBIN GLYCOSYLATED A1C: CPT

## 2025-07-15 PROCEDURE — 84443 ASSAY THYROID STIM HORMONE: CPT

## 2025-07-15 PROCEDURE — 36415 COLL VENOUS BLD VENIPUNCTURE: CPT

## 2025-07-15 PROCEDURE — 86803 HEPATITIS C AB TEST: CPT

## 2025-07-15 PROCEDURE — 80053 COMPREHEN METABOLIC PANEL: CPT

## 2025-07-15 PROCEDURE — 83735 ASSAY OF MAGNESIUM: CPT

## 2025-07-16 ENCOUNTER — RESULTS FOLLOW-UP (OUTPATIENT)
Dept: NEPHROLOGY | Facility: CLINIC | Age: 70
End: 2025-07-16

## 2025-07-16 LAB — HCV AB SER QL: NORMAL

## 2025-07-16 NOTE — TELEPHONE ENCOUNTER
----- Message from Ruben Patel MD sent at 7/16/2025  7:24 AM EDT -----  Labs are good except borderline low magnesium continue magnesium supplement thank you  ----- Message -----  From: Lab, Background User  Sent: 7/15/2025   7:32 PM EDT  To: Ruben Patel MD

## 2025-07-21 DIAGNOSIS — M79.18 MYOFASCIAL PAIN SYNDROME: ICD-10-CM

## 2025-07-22 RX ORDER — TIZANIDINE 2 MG/1
2 TABLET ORAL 2 TIMES DAILY PRN
Qty: 60 TABLET | Refills: 5 | OUTPATIENT
Start: 2025-07-22

## 2025-07-28 ENCOUNTER — HOSPITAL ENCOUNTER (OUTPATIENT)
Facility: HOSPITAL | Age: 70
Discharge: HOME | End: 2025-07-28
Attending: FAMILY MEDICINE
Payer: MEDICARE

## 2025-07-28 VITALS — HEIGHT: 57 IN | BODY MASS INDEX: 35.38 KG/M2 | WEIGHT: 164 LBS

## 2025-07-28 DIAGNOSIS — Z78.0 MENOPAUSE: ICD-10-CM

## 2025-07-28 PROCEDURE — 77080 DXA BONE DENSITY AXIAL: CPT

## 2025-07-29 ENCOUNTER — TELEPHONE (OUTPATIENT)
Dept: FAMILY MEDICINE CLINIC | Facility: CLINIC | Age: 70
End: 2025-07-29

## 2025-07-29 DIAGNOSIS — I89.0 LYMPHEDEMA: ICD-10-CM

## 2025-07-29 DIAGNOSIS — G57.93 NEUROPATHIC PAIN OF BOTH FEET: ICD-10-CM

## 2025-07-29 DIAGNOSIS — E87.6 HYPOKALEMIA: ICD-10-CM

## 2025-07-29 DIAGNOSIS — N18.31 CHRONIC KIDNEY DISEASE, STAGE 3A (HCC): ICD-10-CM

## 2025-07-29 DIAGNOSIS — E66.01 OBESITY, MORBID (HCC): ICD-10-CM

## 2025-07-29 DIAGNOSIS — E78.5 DYSLIPIDEMIA: ICD-10-CM

## 2025-07-29 DIAGNOSIS — E11.21 DIABETIC NEPHROPATHY ASSOCIATED WITH TYPE 2 DIABETES MELLITUS (HCC): ICD-10-CM

## 2025-07-29 DIAGNOSIS — E11.42 TYPE 2 DIABETES MELLITUS WITH DIABETIC POLYNEUROPATHY, WITHOUT LONG-TERM CURRENT USE OF INSULIN (HCC): ICD-10-CM

## 2025-07-29 DIAGNOSIS — I12.9 PARENCHYMAL RENAL HYPERTENSION, STAGE 1 THROUGH STAGE 4 OR UNSPECIFIED CHRONIC KIDNEY DISEASE: ICD-10-CM

## 2025-07-29 DIAGNOSIS — I12.9 HYPERTENSIVE RENAL DISEASE, STAGE 1 THROUGH STAGE 4 OR UNSPECIFIED CHRONIC KIDNEY DISEASE: ICD-10-CM

## 2025-07-29 NOTE — TELEPHONE ENCOUNTER
Patient's daughter in law helps with meal prep, transportation for doctor appointments, shower assistance, and daily routines. The daughter in law is her primary care taker.

## 2025-07-30 RX ORDER — LOSARTAN POTASSIUM 25 MG/1
25 TABLET ORAL DAILY
Qty: 90 TABLET | Refills: 1 | Status: SHIPPED | OUTPATIENT
Start: 2025-07-30 | End: 2025-08-05

## 2025-07-31 RX ORDER — GABAPENTIN 100 MG/1
100 CAPSULE ORAL
Qty: 30 CAPSULE | Refills: 2 | Status: SHIPPED | OUTPATIENT
Start: 2025-07-31

## 2025-08-03 ENCOUNTER — APPOINTMENT (EMERGENCY)
Dept: CT IMAGING | Facility: HOSPITAL | Age: 70
DRG: 101 | End: 2025-08-03
Payer: MEDICARE

## 2025-08-03 ENCOUNTER — APPOINTMENT (INPATIENT)
Dept: CT IMAGING | Facility: HOSPITAL | Age: 70
DRG: 101 | End: 2025-08-03
Payer: MEDICARE

## 2025-08-03 ENCOUNTER — HOSPITAL ENCOUNTER (INPATIENT)
Facility: HOSPITAL | Age: 70
LOS: 2 days | Discharge: HOME | DRG: 101 | End: 2025-08-05
Attending: EMERGENCY MEDICINE | Admitting: HOSPITALIST
Payer: MEDICARE

## 2025-08-03 DIAGNOSIS — E87.20 LACTIC ACIDOSIS: ICD-10-CM

## 2025-08-03 DIAGNOSIS — G89.29 CHRONIC BILATERAL LOW BACK PAIN WITHOUT SCIATICA: ICD-10-CM

## 2025-08-03 DIAGNOSIS — E53.8 B12 DEFICIENCY: ICD-10-CM

## 2025-08-03 DIAGNOSIS — G40.409 GENERALIZED TONIC-CLONIC SEIZURE (HCC): ICD-10-CM

## 2025-08-03 DIAGNOSIS — R56.9 SEIZURE (HCC): Primary | ICD-10-CM

## 2025-08-03 DIAGNOSIS — M54.50 CHRONIC BILATERAL LOW BACK PAIN WITHOUT SCIATICA: ICD-10-CM

## 2025-08-03 DIAGNOSIS — M54.9 BACK PAIN: ICD-10-CM

## 2025-08-03 PROBLEM — R11.0 NAUSEA: Status: ACTIVE | Noted: 2025-08-03

## 2025-08-03 PROBLEM — K59.09 OTHER CONSTIPATION: Status: ACTIVE | Noted: 2025-08-03

## 2025-08-03 LAB
2HR DELTA HS TROPONIN: 13 NG/L
ALBUMIN SERPL BCG-MCNC: 4.2 G/DL (ref 3.5–5)
ALP SERPL-CCNC: 69 U/L (ref 34–104)
ALT SERPL W P-5'-P-CCNC: 22 U/L (ref 7–52)
AMORPH URATE CRY URNS QL MICRO: ABNORMAL
ANION GAP SERPL CALCULATED.3IONS-SCNC: 11 MMOL/L (ref 4–13)
APTT PPP: 25 SECONDS (ref 23–34)
AST SERPL W P-5'-P-CCNC: 19 U/L (ref 13–39)
ATRIAL RATE: 101 BPM
BACTERIA UR QL AUTO: ABNORMAL /HPF
BASE EX.OXY STD BLDV CALC-SCNC: 86.1 % (ref 60–80)
BASE EXCESS BLDV CALC-SCNC: -1.3 MMOL/L
BASOPHILS # BLD AUTO: 0.03 THOUSANDS/ÂΜL (ref 0–0.1)
BASOPHILS NFR BLD AUTO: 0 % (ref 0–1)
BILIRUB SERPL-MCNC: 0.38 MG/DL (ref 0.2–1)
BILIRUB UR QL STRIP: NEGATIVE
BUN SERPL-MCNC: 14 MG/DL (ref 5–25)
CALCIUM SERPL-MCNC: 9.3 MG/DL (ref 8.4–10.2)
CARDIAC TROPONIN I PNL SERPL HS: 18 NG/L (ref ?–50)
CARDIAC TROPONIN I PNL SERPL HS: 31 NG/L (ref ?–50)
CHLORIDE SERPL-SCNC: 104 MMOL/L (ref 96–108)
CLARITY UR: ABNORMAL
CO2 SERPL-SCNC: 23 MMOL/L (ref 21–32)
COLOR UR: YELLOW
CREAT SERPL-MCNC: 0.88 MG/DL (ref 0.6–1.3)
EOSINOPHIL # BLD AUTO: 0.01 THOUSAND/ÂΜL (ref 0–0.61)
EOSINOPHIL NFR BLD AUTO: 0 % (ref 0–6)
ERYTHROCYTE [DISTWIDTH] IN BLOOD BY AUTOMATED COUNT: 12.3 % (ref 11.6–15.1)
FLUAV AG UPPER RESP QL IA.RAPID: NEGATIVE
FLUBV AG UPPER RESP QL IA.RAPID: NEGATIVE
GFR SERPL CREATININE-BSD FRML MDRD: 66 ML/MIN/1.73SQ M
GLUCOSE SERPL-MCNC: 108 MG/DL (ref 65–140)
GLUCOSE SERPL-MCNC: 110 MG/DL (ref 65–140)
GLUCOSE UR STRIP-MCNC: NEGATIVE MG/DL
HCO3 BLDV-SCNC: 23.3 MMOL/L (ref 24–30)
HCT VFR BLD AUTO: 41 % (ref 34.8–46.1)
HGB BLD-MCNC: 13.4 G/DL (ref 11.5–15.4)
HGB UR QL STRIP.AUTO: ABNORMAL
IMM GRANULOCYTES # BLD AUTO: 0.06 THOUSAND/UL (ref 0–0.2)
IMM GRANULOCYTES NFR BLD AUTO: 1 % (ref 0–2)
INR PPP: 0.86 (ref 0.85–1.19)
KETONES UR STRIP-MCNC: NEGATIVE MG/DL
LACTATE SERPL-SCNC: 1.7 MMOL/L (ref 0.5–2)
LACTATE SERPL-SCNC: 4.6 MMOL/L (ref 0.5–2)
LEUKOCYTE ESTERASE UR QL STRIP: NEGATIVE
LYMPHOCYTES # BLD AUTO: 1.5 THOUSANDS/ÂΜL (ref 0.6–4.47)
LYMPHOCYTES NFR BLD AUTO: 14 % (ref 14–44)
MCH RBC QN AUTO: 31.9 PG (ref 26.8–34.3)
MCHC RBC AUTO-ENTMCNC: 32.7 G/DL (ref 31.4–37.4)
MCV RBC AUTO: 98 FL (ref 82–98)
MONOCYTES # BLD AUTO: 0.65 THOUSAND/ÂΜL (ref 0.17–1.22)
MONOCYTES NFR BLD AUTO: 6 % (ref 4–12)
MUCOUS THREADS UR QL AUTO: ABNORMAL
NEUTROPHILS # BLD AUTO: 8.37 THOUSANDS/ÂΜL (ref 1.85–7.62)
NEUTS SEG NFR BLD AUTO: 79 % (ref 43–75)
NITRITE UR QL STRIP: NEGATIVE
NON-SQ EPI CELLS URNS QL MICRO: ABNORMAL /HPF
NRBC BLD AUTO-RTO: 0 /100 WBCS
O2 CT BLDV-SCNC: 16.2 ML/DL
P AXIS: 64 DEGREES
PCO2 BLDV: 38.9 MM HG (ref 42–50)
PH BLDV: 7.39 [PH] (ref 7.3–7.4)
PH UR STRIP.AUTO: 6 [PH]
PLATELET # BLD AUTO: 189 THOUSANDS/UL (ref 149–390)
PMV BLD AUTO: 12.1 FL (ref 8.9–12.7)
PO2 BLDV: 58.6 MM HG (ref 35–45)
POTASSIUM SERPL-SCNC: 4.4 MMOL/L (ref 3.5–5.3)
PR INTERVAL: 184 MS
PROCALCITONIN SERPL-MCNC: <0.05 NG/ML
PROLACTIN SERPL-MCNC: 5.99 NG/ML (ref 2.74–19.64)
PROT SERPL-MCNC: 7.5 G/DL (ref 6.4–8.4)
PROT UR STRIP-MCNC: ABNORMAL MG/DL
PROTHROMBIN TIME: 12.4 SECONDS (ref 12.3–15)
QRS AXIS: 27 DEGREES
QRSD INTERVAL: 68 MS
QT INTERVAL: 356 MS
QTC INTERVAL: 461 MS
RBC # BLD AUTO: 4.2 MILLION/UL (ref 3.81–5.12)
RBC #/AREA URNS AUTO: ABNORMAL /HPF
SARS-COV+SARS-COV-2 AG RESP QL IA.RAPID: NEGATIVE
SODIUM SERPL-SCNC: 138 MMOL/L (ref 135–147)
SP GR UR STRIP.AUTO: >=1.05 (ref 1–1.03)
T WAVE AXIS: 69 DEGREES
UROBILINOGEN UR STRIP-ACNC: <2 MG/DL
VENTRICULAR RATE: 101 BPM
WBC # BLD AUTO: 10.62 THOUSAND/UL (ref 4.31–10.16)
WBC #/AREA URNS AUTO: ABNORMAL /HPF

## 2025-08-03 PROCEDURE — 87086 URINE CULTURE/COLONY COUNT: CPT | Performed by: STUDENT IN AN ORGANIZED HEALTH CARE EDUCATION/TRAINING PROGRAM

## 2025-08-03 PROCEDURE — 87186 SC STD MICRODIL/AGAR DIL: CPT | Performed by: STUDENT IN AN ORGANIZED HEALTH CARE EDUCATION/TRAINING PROGRAM

## 2025-08-03 PROCEDURE — 70496 CT ANGIOGRAPHY HEAD: CPT

## 2025-08-03 PROCEDURE — 99284 EMERGENCY DEPT VISIT MOD MDM: CPT | Performed by: PSYCHIATRY & NEUROLOGY

## 2025-08-03 PROCEDURE — 81001 URINALYSIS AUTO W/SCOPE: CPT | Performed by: STUDENT IN AN ORGANIZED HEALTH CARE EDUCATION/TRAINING PROGRAM

## 2025-08-03 PROCEDURE — 82948 REAGENT STRIP/BLOOD GLUCOSE: CPT

## 2025-08-03 PROCEDURE — 87811 SARS-COV-2 COVID19 W/OPTIC: CPT | Performed by: EMERGENCY MEDICINE

## 2025-08-03 PROCEDURE — 71260 CT THORAX DX C+: CPT

## 2025-08-03 PROCEDURE — 87804 INFLUENZA ASSAY W/OPTIC: CPT | Performed by: EMERGENCY MEDICINE

## 2025-08-03 PROCEDURE — 96374 THER/PROPH/DIAG INJ IV PUSH: CPT

## 2025-08-03 PROCEDURE — 83605 ASSAY OF LACTIC ACID: CPT | Performed by: EMERGENCY MEDICINE

## 2025-08-03 PROCEDURE — 85025 COMPLETE CBC W/AUTO DIFF WBC: CPT | Performed by: EMERGENCY MEDICINE

## 2025-08-03 PROCEDURE — 36415 COLL VENOUS BLD VENIPUNCTURE: CPT | Performed by: EMERGENCY MEDICINE

## 2025-08-03 PROCEDURE — 96375 TX/PRO/DX INJ NEW DRUG ADDON: CPT

## 2025-08-03 PROCEDURE — 99285 EMERGENCY DEPT VISIT HI MDM: CPT | Performed by: EMERGENCY MEDICINE

## 2025-08-03 PROCEDURE — 70498 CT ANGIOGRAPHY NECK: CPT

## 2025-08-03 PROCEDURE — 93010 ELECTROCARDIOGRAM REPORT: CPT | Performed by: STUDENT IN AN ORGANIZED HEALTH CARE EDUCATION/TRAINING PROGRAM

## 2025-08-03 PROCEDURE — 99285 EMERGENCY DEPT VISIT HI MDM: CPT

## 2025-08-03 PROCEDURE — 80053 COMPREHEN METABOLIC PANEL: CPT | Performed by: EMERGENCY MEDICINE

## 2025-08-03 PROCEDURE — 96361 HYDRATE IV INFUSION ADD-ON: CPT

## 2025-08-03 PROCEDURE — 84146 ASSAY OF PROLACTIN: CPT | Performed by: STUDENT IN AN ORGANIZED HEALTH CARE EDUCATION/TRAINING PROGRAM

## 2025-08-03 PROCEDURE — 87077 CULTURE AEROBIC IDENTIFY: CPT | Performed by: STUDENT IN AN ORGANIZED HEALTH CARE EDUCATION/TRAINING PROGRAM

## 2025-08-03 PROCEDURE — 99223 1ST HOSP IP/OBS HIGH 75: CPT | Performed by: STUDENT IN AN ORGANIZED HEALTH CARE EDUCATION/TRAINING PROGRAM

## 2025-08-03 PROCEDURE — 70450 CT HEAD/BRAIN W/O DYE: CPT

## 2025-08-03 PROCEDURE — 85730 THROMBOPLASTIN TIME PARTIAL: CPT | Performed by: EMERGENCY MEDICINE

## 2025-08-03 PROCEDURE — 87040 BLOOD CULTURE FOR BACTERIA: CPT | Performed by: EMERGENCY MEDICINE

## 2025-08-03 PROCEDURE — 84484 ASSAY OF TROPONIN QUANT: CPT | Performed by: EMERGENCY MEDICINE

## 2025-08-03 PROCEDURE — 84145 PROCALCITONIN (PCT): CPT | Performed by: EMERGENCY MEDICINE

## 2025-08-03 PROCEDURE — 82805 BLOOD GASES W/O2 SATURATION: CPT | Performed by: EMERGENCY MEDICINE

## 2025-08-03 PROCEDURE — 85610 PROTHROMBIN TIME: CPT | Performed by: EMERGENCY MEDICINE

## 2025-08-03 PROCEDURE — 74177 CT ABD & PELVIS W/CONTRAST: CPT

## 2025-08-03 PROCEDURE — 93005 ELECTROCARDIOGRAM TRACING: CPT

## 2025-08-03 RX ORDER — ONDANSETRON 2 MG/ML
INJECTION INTRAMUSCULAR; INTRAVENOUS
Status: COMPLETED
Start: 2025-08-03 | End: 2025-08-03

## 2025-08-03 RX ORDER — SPIRONOLACTONE 25 MG/1
25 TABLET ORAL DAILY
Status: DISCONTINUED | OUTPATIENT
Start: 2025-08-04 | End: 2025-08-04

## 2025-08-03 RX ORDER — ONDANSETRON 2 MG/ML
4 INJECTION INTRAMUSCULAR; INTRAVENOUS ONCE
Status: COMPLETED | OUTPATIENT
Start: 2025-08-03 | End: 2025-08-03

## 2025-08-03 RX ORDER — ENOXAPARIN SODIUM 100 MG/ML
40 INJECTION SUBCUTANEOUS DAILY
Status: DISCONTINUED | OUTPATIENT
Start: 2025-08-03 | End: 2025-08-05 | Stop reason: HOSPADM

## 2025-08-03 RX ORDER — POLYETHYLENE GLYCOL 3350 17 G/17G
17 POWDER, FOR SOLUTION ORAL 3 TIMES DAILY
Status: DISCONTINUED | OUTPATIENT
Start: 2025-08-03 | End: 2025-08-05 | Stop reason: HOSPADM

## 2025-08-03 RX ORDER — DIAZEPAM 10 MG/2ML
1.25 INJECTION, SOLUTION INTRAMUSCULAR; INTRAVENOUS ONCE
Status: CANCELLED | OUTPATIENT
Start: 2025-08-03 | End: 2025-08-03

## 2025-08-03 RX ORDER — GABAPENTIN 100 MG/1
100 CAPSULE ORAL
Status: DISCONTINUED | OUTPATIENT
Start: 2025-08-03 | End: 2025-08-05 | Stop reason: HOSPADM

## 2025-08-03 RX ORDER — TORSEMIDE 10 MG/1
5 TABLET ORAL DAILY
Status: DISCONTINUED | OUTPATIENT
Start: 2025-08-03 | End: 2025-08-04

## 2025-08-03 RX ORDER — CYANOCOBALAMIN 1000 UG/ML
1000 INJECTION, SOLUTION INTRAMUSCULAR; SUBCUTANEOUS DAILY
Status: DISCONTINUED | OUTPATIENT
Start: 2025-08-03 | End: 2025-08-03 | Stop reason: SDUPTHER

## 2025-08-03 RX ORDER — ASPIRIN 325 MG
325 TABLET ORAL ONCE
Status: DISCONTINUED | OUTPATIENT
Start: 2025-08-03 | End: 2025-08-03

## 2025-08-03 RX ORDER — LOSARTAN POTASSIUM 25 MG/1
25 TABLET ORAL DAILY
Status: DISCONTINUED | OUTPATIENT
Start: 2025-08-04 | End: 2025-08-04

## 2025-08-03 RX ORDER — DIAZEPAM 10 MG/2ML
1.25 INJECTION, SOLUTION INTRAMUSCULAR; INTRAVENOUS ONCE
Status: COMPLETED | OUTPATIENT
Start: 2025-08-03 | End: 2025-08-03

## 2025-08-03 RX ORDER — ONDANSETRON 4 MG/1
4 TABLET, ORALLY DISINTEGRATING ORAL EVERY 6 HOURS PRN
Status: DISCONTINUED | OUTPATIENT
Start: 2025-08-03 | End: 2025-08-05 | Stop reason: HOSPADM

## 2025-08-03 RX ORDER — ACETAMINOPHEN 325 MG/1
975 TABLET ORAL EVERY 8 HOURS SCHEDULED
Status: DISCONTINUED | OUTPATIENT
Start: 2025-08-03 | End: 2025-08-05 | Stop reason: HOSPADM

## 2025-08-03 RX ORDER — BISACODYL 10 MG
10 SUPPOSITORY, RECTAL RECTAL DAILY PRN
Status: DISCONTINUED | OUTPATIENT
Start: 2025-08-03 | End: 2025-08-05 | Stop reason: HOSPADM

## 2025-08-03 RX ORDER — LIDOCAINE 50 MG/G
1 PATCH TOPICAL ONCE
Status: COMPLETED | OUTPATIENT
Start: 2025-08-03 | End: 2025-08-04

## 2025-08-03 RX ORDER — PANTOPRAZOLE SODIUM 40 MG/1
40 TABLET, DELAYED RELEASE ORAL
Status: DISCONTINUED | OUTPATIENT
Start: 2025-08-04 | End: 2025-08-05 | Stop reason: HOSPADM

## 2025-08-03 RX ORDER — FERROUS SULFATE 325(65) MG
325 TABLET ORAL
Status: DISCONTINUED | OUTPATIENT
Start: 2025-08-04 | End: 2025-08-05 | Stop reason: HOSPADM

## 2025-08-03 RX ORDER — ACETAMINOPHEN 10 MG/ML
1000 INJECTION, SOLUTION INTRAVENOUS ONCE
Status: COMPLETED | OUTPATIENT
Start: 2025-08-03 | End: 2025-08-03

## 2025-08-03 RX ORDER — KETOROLAC TROMETHAMINE 30 MG/ML
15 INJECTION, SOLUTION INTRAMUSCULAR; INTRAVENOUS ONCE
Status: COMPLETED | OUTPATIENT
Start: 2025-08-03 | End: 2025-08-03

## 2025-08-03 RX ORDER — ATORVASTATIN CALCIUM 20 MG/1
20 TABLET, FILM COATED ORAL
Status: DISCONTINUED | OUTPATIENT
Start: 2025-08-03 | End: 2025-08-05 | Stop reason: HOSPADM

## 2025-08-03 RX ADMIN — IOHEXOL 75 ML: 350 INJECTION, SOLUTION INTRAVENOUS at 10:14

## 2025-08-03 RX ADMIN — ATORVASTATIN CALCIUM 20 MG: 40 TABLET, FILM COATED ORAL at 18:01

## 2025-08-03 RX ADMIN — KETOROLAC TROMETHAMINE 15 MG: 30 INJECTION, SOLUTION INTRAMUSCULAR; INTRAVENOUS at 12:21

## 2025-08-03 RX ADMIN — ACETAMINOPHEN 975 MG: 325 TABLET ORAL at 18:00

## 2025-08-03 RX ADMIN — ONDANSETRON: 2 INJECTION INTRAMUSCULAR; INTRAVENOUS at 09:09

## 2025-08-03 RX ADMIN — SODIUM CHLORIDE 500 ML: 0.9 INJECTION, SOLUTION INTRAVENOUS at 09:39

## 2025-08-03 RX ADMIN — IOHEXOL 75 ML: 350 INJECTION, SOLUTION INTRAVENOUS at 13:55

## 2025-08-03 RX ADMIN — ACETAMINOPHEN 1000 MG: 10 INJECTION INTRAVENOUS at 09:39

## 2025-08-03 RX ADMIN — ONDANSETRON 4 MG: 2 INJECTION INTRAMUSCULAR; INTRAVENOUS at 10:25

## 2025-08-03 RX ADMIN — LIDOCAINE 1 PATCH: 50 PATCH CUTANEOUS at 12:21

## 2025-08-03 RX ADMIN — DIAZEPAM 1.25 MG: 10 INJECTION, SOLUTION INTRAMUSCULAR; INTRAVENOUS at 10:25

## 2025-08-03 RX ADMIN — POLYETHYLENE GLYCOL 3350 17 G: 17 POWDER, FOR SOLUTION ORAL at 23:15

## 2025-08-03 RX ADMIN — GABAPENTIN 100 MG: 100 CAPSULE ORAL at 23:15

## 2025-08-03 RX ADMIN — ACETAMINOPHEN 975 MG: 325 TABLET ORAL at 23:15

## 2025-08-04 ENCOUNTER — APPOINTMENT (INPATIENT)
Dept: NEUROLOGY | Facility: HOSPITAL | Age: 70
DRG: 101 | End: 2025-08-04
Attending: STUDENT IN AN ORGANIZED HEALTH CARE EDUCATION/TRAINING PROGRAM
Payer: MEDICARE

## 2025-08-04 ENCOUNTER — APPOINTMENT (INPATIENT)
Dept: MRI IMAGING | Facility: HOSPITAL | Age: 70
DRG: 101 | End: 2025-08-04
Payer: MEDICARE

## 2025-08-04 PROBLEM — N39.0 UTI (URINARY TRACT INFECTION): Status: ACTIVE | Noted: 2025-08-04

## 2025-08-04 PROBLEM — E87.20 LACTIC ACIDOSIS: Status: ACTIVE | Noted: 2025-08-04

## 2025-08-04 PROBLEM — E83.42 HYPOMAGNESEMIA: Status: ACTIVE | Noted: 2025-08-04

## 2025-08-04 PROBLEM — E53.8 B12 DEFICIENCY: Status: ACTIVE | Noted: 2025-08-04

## 2025-08-04 PROBLEM — R01.1 MURMUR: Status: ACTIVE | Noted: 2025-08-04

## 2025-08-04 PROBLEM — R56.9 SEIZURE (HCC): Status: ACTIVE | Noted: 2025-08-04

## 2025-08-04 PROBLEM — I95.9 HYPOTENSION: Status: ACTIVE | Noted: 2025-08-04

## 2025-08-04 LAB
CHOLEST SERPL-MCNC: 125 MG/DL (ref ?–200)
EST. AVERAGE GLUCOSE BLD GHB EST-MCNC: 126 MG/DL
FOLATE SERPL-MCNC: 9.2 NG/ML
HBA1C MFR BLD: 6 %
HDLC SERPL-MCNC: 37 MG/DL
LDLC SERPL CALC-MCNC: 67 MG/DL (ref 0–100)
TRIGL SERPL-MCNC: 103 MG/DL (ref ?–150)
VIT B12 SERPL-MCNC: 471 PG/ML (ref 180–914)

## 2025-08-04 PROCEDURE — 83036 HEMOGLOBIN GLYCOSYLATED A1C: CPT | Performed by: STUDENT IN AN ORGANIZED HEALTH CARE EDUCATION/TRAINING PROGRAM

## 2025-08-04 PROCEDURE — 80061 LIPID PANEL: CPT | Performed by: STUDENT IN AN ORGANIZED HEALTH CARE EDUCATION/TRAINING PROGRAM

## 2025-08-04 PROCEDURE — 99232 SBSQ HOSP IP/OBS MODERATE 35: CPT | Performed by: PHYSICIAN ASSISTANT

## 2025-08-04 PROCEDURE — 95816 EEG AWAKE AND DROWSY: CPT

## 2025-08-04 PROCEDURE — 70553 MRI BRAIN STEM W/O & W/DYE: CPT

## 2025-08-04 PROCEDURE — 95816 EEG AWAKE AND DROWSY: CPT | Performed by: PSYCHIATRY & NEUROLOGY

## 2025-08-04 PROCEDURE — 82746 ASSAY OF FOLIC ACID SERUM: CPT | Performed by: PHYSICIAN ASSISTANT

## 2025-08-04 PROCEDURE — A9585 GADOBUTROL INJECTION: HCPCS | Performed by: INTERNAL MEDICINE

## 2025-08-04 PROCEDURE — 82607 VITAMIN B-12: CPT | Performed by: PHYSICIAN ASSISTANT

## 2025-08-04 RX ORDER — SODIUM CHLORIDE, SODIUM GLUCONATE, SODIUM ACETATE, POTASSIUM CHLORIDE, MAGNESIUM CHLORIDE, SODIUM PHOSPHATE, DIBASIC, AND POTASSIUM PHOSPHATE .53; .5; .37; .037; .03; .012; .00082 G/100ML; G/100ML; G/100ML; G/100ML; G/100ML; G/100ML; G/100ML
75 INJECTION, SOLUTION INTRAVENOUS CONTINUOUS
Status: DISCONTINUED | OUTPATIENT
Start: 2025-08-04 | End: 2025-08-04

## 2025-08-04 RX ORDER — GADOBUTROL 604.72 MG/ML
7 INJECTION INTRAVENOUS
Status: COMPLETED | OUTPATIENT
Start: 2025-08-04 | End: 2025-08-04

## 2025-08-04 RX ADMIN — FERROUS SULFATE TAB 325 MG (65 MG ELEMENTAL FE) 325 MG: 325 (65 FE) TAB at 08:41

## 2025-08-04 RX ADMIN — CEFTRIAXONE 1000 MG: 10 INJECTION, POWDER, FOR SOLUTION INTRAVENOUS at 11:50

## 2025-08-04 RX ADMIN — ACETAMINOPHEN 975 MG: 325 TABLET ORAL at 14:01

## 2025-08-04 RX ADMIN — ATORVASTATIN CALCIUM 20 MG: 40 TABLET, FILM COATED ORAL at 16:54

## 2025-08-04 RX ADMIN — Medication 2000 UNITS: at 08:41

## 2025-08-04 RX ADMIN — PANTOPRAZOLE SODIUM 40 MG: 40 TABLET, DELAYED RELEASE ORAL at 05:25

## 2025-08-04 RX ADMIN — POLYETHYLENE GLYCOL 3350 17 G: 17 POWDER, FOR SOLUTION ORAL at 21:01

## 2025-08-04 RX ADMIN — GABAPENTIN 100 MG: 100 CAPSULE ORAL at 21:02

## 2025-08-04 RX ADMIN — SODIUM CHLORIDE 500 ML: 0.9 INJECTION, SOLUTION INTRAVENOUS at 06:07

## 2025-08-04 RX ADMIN — CYANOCOBALAMIN TAB 500 MCG 1000 MCG: 500 TAB at 08:41

## 2025-08-04 RX ADMIN — POLYETHYLENE GLYCOL 3350 17 G: 17 POWDER, FOR SOLUTION ORAL at 08:42

## 2025-08-04 RX ADMIN — GADOBUTROL 7 ML: 604.72 INJECTION INTRAVENOUS at 06:55

## 2025-08-04 RX ADMIN — ACETAMINOPHEN 975 MG: 325 TABLET ORAL at 21:02

## 2025-08-04 RX ADMIN — ACETAMINOPHEN 975 MG: 325 TABLET ORAL at 05:25

## 2025-08-05 ENCOUNTER — APPOINTMENT (INPATIENT)
Dept: NON INVASIVE DIAGNOSTICS | Facility: HOSPITAL | Age: 70
DRG: 101 | End: 2025-08-05
Payer: MEDICARE

## 2025-08-05 ENCOUNTER — TELEPHONE (OUTPATIENT)
Age: 70
End: 2025-08-05

## 2025-08-05 VITALS
BODY MASS INDEX: 35.6 KG/M2 | WEIGHT: 165 LBS | HEIGHT: 57 IN | HEART RATE: 56 BPM | DIASTOLIC BLOOD PRESSURE: 51 MMHG | SYSTOLIC BLOOD PRESSURE: 107 MMHG | TEMPERATURE: 97.6 F | RESPIRATION RATE: 17 BRPM | OXYGEN SATURATION: 96 %

## 2025-08-05 PROBLEM — K59.09 OTHER CONSTIPATION: Status: RESOLVED | Noted: 2025-08-03 | Resolved: 2025-08-05

## 2025-08-05 PROBLEM — E87.20 LACTIC ACIDOSIS: Status: RESOLVED | Noted: 2025-08-04 | Resolved: 2025-08-05

## 2025-08-05 PROBLEM — I95.9 HYPOTENSION: Status: RESOLVED | Noted: 2025-08-04 | Resolved: 2025-08-05

## 2025-08-05 LAB
ANION GAP SERPL CALCULATED.3IONS-SCNC: 7 MMOL/L (ref 4–13)
AORTIC ROOT: 3.2 CM
AORTIC VALVE MEAN VELOCITY: 13.3 M/S
ASCENDING AORTA: 3.4 CM
AV AREA BY CONTINUOUS VTI: 1.2 CM2
AV AREA PEAK VELOCITY: 1.1 CM2
AV LVOT MEAN GRADIENT: 1 MMHG
AV LVOT PEAK GRADIENT: 2 MMHG
AV MEAN PRESS GRAD SYS DOP V1V2: 8 MMHG
AV ORIFICE AREA US: 1.17 CM2
AV PEAK GRADIENT: 15 MMHG
AV VELOCITY RATIO: 0.41
AV VMAX SYS DOP: 1.93 M/S
BASOPHILS # BLD AUTO: 0.05 THOUSANDS/ÂΜL (ref 0–0.1)
BASOPHILS NFR BLD AUTO: 1 % (ref 0–1)
BSA FOR ECHO PROCEDURE: 1.65 M2
BUN SERPL-MCNC: 11 MG/DL (ref 5–25)
CALCIUM SERPL-MCNC: 8.5 MG/DL (ref 8.4–10.2)
CHLORIDE SERPL-SCNC: 108 MMOL/L (ref 96–108)
CO2 SERPL-SCNC: 26 MMOL/L (ref 21–32)
CREAT SERPL-MCNC: 0.77 MG/DL (ref 0.6–1.3)
DOP CALC AO VTI: 43.88 CM
DOP CALC LVOT AREA: 2.83 CM2
DOP CALC LVOT CARDIAC INDEX: 2.03 L/MIN/M2
DOP CALC LVOT CARDIAC OUTPUT: 3.36 L/MIN
DOP CALC LVOT DIAMETER: 1.9 CM
DOP CALC LVOT PEAK VEL VTI: 18.1 CM
DOP CALC LVOT PEAK VEL: 0.75 M/S
DOP CALC LVOT STROKE INDEX: 31.5 ML/M2
DOP CALC LVOT STROKE VOLUME: 51.29
E WAVE DECELERATION TIME: 190 MS
E/A RATIO: 1.1
EOSINOPHIL # BLD AUTO: 0.16 THOUSAND/ÂΜL (ref 0–0.61)
EOSINOPHIL NFR BLD AUTO: 3 % (ref 0–6)
ERYTHROCYTE [DISTWIDTH] IN BLOOD BY AUTOMATED COUNT: 12.5 % (ref 11.6–15.1)
FRACTIONAL SHORTENING: 38 (ref 28–44)
GFR SERPL CREATININE-BSD FRML MDRD: 77 ML/MIN/1.73SQ M
GLUCOSE SERPL-MCNC: 94 MG/DL (ref 65–140)
HCT VFR BLD AUTO: 34.6 % (ref 34.8–46.1)
HGB BLD-MCNC: 11.2 G/DL (ref 11.5–15.4)
IMM GRANULOCYTES # BLD AUTO: 0.02 THOUSAND/UL (ref 0–0.2)
IMM GRANULOCYTES NFR BLD AUTO: 0 % (ref 0–2)
INTERVENTRICULAR SEPTUM IN DIASTOLE (PARASTERNAL SHORT AXIS VIEW): 0.9 CM
INTERVENTRICULAR SEPTUM: 0.9 CM (ref 0.6–1.1)
LAAS-AP2: 17.8 CM2
LAAS-AP4: 13.2 CM2
LEFT ATRIUM SIZE: 3.3 CM
LEFT ATRIUM VOLUME (MOD BIPLANE): 43 ML
LEFT INTERNAL DIMENSION IN SYSTOLE: 2.6 CM (ref 2.1–4)
LEFT VENTRICULAR INTERNAL DIMENSION IN DIASTOLE: 4.2 CM (ref 3.5–6)
LEFT VENTRICULAR POSTERIOR WALL IN END DIASTOLE: 0.7 CM
LEFT VENTRICULAR STROKE VOLUME: 53 ML
LV EF US.2D.A4C+ESTIMATED: 63 %
LVSV (TEICH): 53 ML
LYMPHOCYTES # BLD AUTO: 2.18 THOUSANDS/ÂΜL (ref 0.6–4.47)
LYMPHOCYTES NFR BLD AUTO: 39 % (ref 14–44)
MAGNESIUM SERPL-MCNC: 2.1 MG/DL (ref 1.9–2.7)
MCH RBC QN AUTO: 31.4 PG (ref 26.8–34.3)
MCHC RBC AUTO-ENTMCNC: 32.4 G/DL (ref 31.4–37.4)
MCV RBC AUTO: 97 FL (ref 82–98)
MONOCYTES # BLD AUTO: 0.59 THOUSAND/ÂΜL (ref 0.17–1.22)
MONOCYTES NFR BLD AUTO: 11 % (ref 4–12)
MV E'TISSUE VEL-LAT: 10 CM/S
MV E'TISSUE VEL-SEP: 8 CM/S
MV PEAK A VEL: 0.97 M/S
MV PEAK E VEL: 107 CM/S
MV STENOSIS PRESSURE HALF TIME: 55 MS
MV VALVE AREA P 1/2 METHOD: 4
NEUTROPHILS # BLD AUTO: 2.64 THOUSANDS/ÂΜL (ref 1.85–7.62)
NEUTS SEG NFR BLD AUTO: 46 % (ref 43–75)
NRBC BLD AUTO-RTO: 0 /100 WBCS
PLATELET # BLD AUTO: 140 THOUSANDS/UL (ref 149–390)
PMV BLD AUTO: 12.1 FL (ref 8.9–12.7)
POTASSIUM SERPL-SCNC: 4 MMOL/L (ref 3.5–5.3)
RA PRESSURE ESTIMATED: 10 MMHG
RBC # BLD AUTO: 3.57 MILLION/UL (ref 3.81–5.12)
RIGHT ATRIAL 2D VOLUME: 21 ML
RIGHT ATRIUM AREA SYSTOLE A4C: 10.4 CM2
RIGHT VENTRICLE ID DIMENSION: 2.7 CM
RV PSP: 30 MMHG
SL CV LEFT ATRIUM LENGTH A2C: 5.2 CM
SL CV LV EF: 65
SL CV PED ECHO LEFT VENTRICLE DIASTOLIC VOLUME (MOD BIPLANE) 2D: 79 ML
SL CV PED ECHO LEFT VENTRICLE SYSTOLIC VOLUME (MOD BIPLANE) 2D: 25 ML
SODIUM SERPL-SCNC: 141 MMOL/L (ref 135–147)
TR MAX PG: 20 MMHG
TR PEAK VELOCITY: 2.2 M/S
TRICUSPID ANNULAR PLANE SYSTOLIC EXCURSION: 1.7 CM
TRICUSPID VALVE PEAK REGURGITATION VELOCITY: 2.22 M/S
WBC # BLD AUTO: 5.64 THOUSAND/UL (ref 4.31–10.16)

## 2025-08-05 PROCEDURE — 93306 TTE W/DOPPLER COMPLETE: CPT

## 2025-08-05 PROCEDURE — 80048 BASIC METABOLIC PNL TOTAL CA: CPT | Performed by: PHYSICIAN ASSISTANT

## 2025-08-05 PROCEDURE — 83735 ASSAY OF MAGNESIUM: CPT | Performed by: PHYSICIAN ASSISTANT

## 2025-08-05 PROCEDURE — 85025 COMPLETE CBC W/AUTO DIFF WBC: CPT | Performed by: PHYSICIAN ASSISTANT

## 2025-08-05 PROCEDURE — 99239 HOSP IP/OBS DSCHRG MGMT >30: CPT | Performed by: PHYSICIAN ASSISTANT

## 2025-08-05 PROCEDURE — 93306 TTE W/DOPPLER COMPLETE: CPT | Performed by: INTERNAL MEDICINE

## 2025-08-05 RX ORDER — POLYETHYLENE GLYCOL 3350 17 G/17G
17 POWDER, FOR SOLUTION ORAL DAILY
Start: 2025-08-05

## 2025-08-05 RX ORDER — FOLIC ACID 1 MG/1
1 TABLET ORAL DAILY
Start: 2025-08-05

## 2025-08-05 RX ADMIN — POLYETHYLENE GLYCOL 3350 17 G: 17 POWDER, FOR SOLUTION ORAL at 09:23

## 2025-08-05 RX ADMIN — ACETAMINOPHEN 975 MG: 325 TABLET ORAL at 13:18

## 2025-08-05 RX ADMIN — PANTOPRAZOLE SODIUM 40 MG: 40 TABLET, DELAYED RELEASE ORAL at 05:35

## 2025-08-05 RX ADMIN — ENOXAPARIN SODIUM 40 MG: 40 INJECTION SUBCUTANEOUS at 09:22

## 2025-08-05 RX ADMIN — CYANOCOBALAMIN TAB 500 MCG 1000 MCG: 500 TAB at 09:23

## 2025-08-05 RX ADMIN — ACETAMINOPHEN 975 MG: 325 TABLET ORAL at 05:35

## 2025-08-05 RX ADMIN — FERROUS SULFATE TAB 325 MG (65 MG ELEMENTAL FE) 325 MG: 325 (65 FE) TAB at 06:37

## 2025-08-05 RX ADMIN — CEFTRIAXONE 1000 MG: 10 INJECTION, POWDER, FOR SOLUTION INTRAVENOUS at 12:02

## 2025-08-05 RX ADMIN — Medication 2000 UNITS: at 09:22

## 2025-08-06 LAB
BACTERIA UR CULT: ABNORMAL
BACTERIA UR CULT: ABNORMAL

## 2025-08-07 PROBLEM — R05.1 ACUTE COUGH: Status: RESOLVED | Noted: 2022-01-12 | Resolved: 2025-08-07

## 2025-08-07 PROBLEM — Z11.59 NEED FOR HEPATITIS C SCREENING TEST: Status: RESOLVED | Noted: 2025-03-05 | Resolved: 2025-08-07

## 2025-08-07 PROBLEM — Z00.00 MEDICARE ANNUAL WELLNESS VISIT, SUBSEQUENT: Status: RESOLVED | Noted: 2024-06-13 | Resolved: 2025-08-07

## 2025-08-08 LAB
BACTERIA BLD CULT: NORMAL
BACTERIA BLD CULT: NORMAL
DME PARACHUTE DELIVERY DATE REQUESTED: NORMAL
DME PARACHUTE ITEM DESCRIPTION: NORMAL
DME PARACHUTE ORDER STATUS: NORMAL
DME PARACHUTE SUPPLIER NAME: NORMAL
DME PARACHUTE SUPPLIER PHONE: NORMAL

## 2025-08-19 ENCOUNTER — TELEPHONE (OUTPATIENT)
Age: 70
End: 2025-08-19

## 2025-08-19 ENCOUNTER — OFFICE VISIT (OUTPATIENT)
Dept: FAMILY MEDICINE CLINIC | Facility: CLINIC | Age: 70
End: 2025-08-19
Payer: MEDICARE

## 2025-08-19 VITALS
DIASTOLIC BLOOD PRESSURE: 70 MMHG | OXYGEN SATURATION: 97 % | WEIGHT: 163 LBS | BODY MASS INDEX: 35.17 KG/M2 | HEART RATE: 66 BPM | RESPIRATION RATE: 16 BRPM | SYSTOLIC BLOOD PRESSURE: 130 MMHG | HEIGHT: 57 IN

## 2025-08-19 DIAGNOSIS — M67.449 GANGLION CYST OF FINGER: ICD-10-CM

## 2025-08-19 DIAGNOSIS — E55.9 VITAMIN D DEFICIENCY: ICD-10-CM

## 2025-08-19 DIAGNOSIS — M79.671 PAIN IN BOTH FEET: ICD-10-CM

## 2025-08-19 DIAGNOSIS — K21.9 GASTROESOPHAGEAL REFLUX DISEASE WITHOUT ESOPHAGITIS: ICD-10-CM

## 2025-08-19 DIAGNOSIS — R56.9 SEIZURE (HCC): Primary | ICD-10-CM

## 2025-08-19 DIAGNOSIS — R73.03 PREDIABETES: ICD-10-CM

## 2025-08-19 DIAGNOSIS — M79.672 PAIN IN BOTH FEET: ICD-10-CM

## 2025-08-19 DIAGNOSIS — D51.8 OTHER VITAMIN B12 DEFICIENCY ANEMIAS: ICD-10-CM

## 2025-08-19 PROBLEM — E11.22 TYPE 2 DIABETES MELLITUS WITH CHRONIC KIDNEY DISEASE, WITHOUT LONG-TERM CURRENT USE OF INSULIN (HCC): Status: RESOLVED | Noted: 2024-02-07 | Resolved: 2025-08-19

## 2025-08-19 PROBLEM — E11.42 TYPE 2 DIABETES MELLITUS WITH DIABETIC POLYNEUROPATHY, WITHOUT LONG-TERM CURRENT USE OF INSULIN (HCC): Status: RESOLVED | Noted: 2023-06-05 | Resolved: 2025-08-19

## 2025-08-19 PROCEDURE — G2211 COMPLEX E/M VISIT ADD ON: HCPCS | Performed by: FAMILY MEDICINE

## 2025-08-19 PROCEDURE — 99214 OFFICE O/P EST MOD 30 MIN: CPT | Performed by: FAMILY MEDICINE

## 2025-08-19 RX ORDER — CHOLECALCIFEROL (VITAMIN D3) 50 MCG
2000 TABLET ORAL DAILY
Qty: 90 TABLET | Refills: 1 | Status: SHIPPED | OUTPATIENT
Start: 2025-08-19

## 2025-08-19 RX ORDER — ACETAMINOPHEN 500 MG
500 TABLET ORAL EVERY 6 HOURS PRN
Qty: 30 TABLET | Refills: 3 | Status: SHIPPED | OUTPATIENT
Start: 2025-08-19

## 2025-08-20 RX ORDER — OMEPRAZOLE 20 MG/1
20 CAPSULE, DELAYED RELEASE ORAL DAILY
Qty: 90 CAPSULE | Refills: 1 | OUTPATIENT
Start: 2025-08-20